# Patient Record
Sex: FEMALE | Race: WHITE | NOT HISPANIC OR LATINO | Employment: OTHER | URBAN - METROPOLITAN AREA
[De-identification: names, ages, dates, MRNs, and addresses within clinical notes are randomized per-mention and may not be internally consistent; named-entity substitution may affect disease eponyms.]

---

## 2017-01-26 ENCOUNTER — HOSPITAL ENCOUNTER (EMERGENCY)
Facility: MEDICAL CENTER | Age: 82
End: 2017-01-26
Attending: EMERGENCY MEDICINE
Payer: MEDICARE

## 2017-01-26 ENCOUNTER — APPOINTMENT (OUTPATIENT)
Dept: RADIOLOGY | Facility: MEDICAL CENTER | Age: 82
End: 2017-01-26
Attending: EMERGENCY MEDICINE
Payer: MEDICARE

## 2017-01-26 VITALS
OXYGEN SATURATION: 99 % | HEART RATE: 80 BPM | DIASTOLIC BLOOD PRESSURE: 92 MMHG | SYSTOLIC BLOOD PRESSURE: 169 MMHG | HEIGHT: 66 IN | RESPIRATION RATE: 25 BRPM | TEMPERATURE: 98.1 F | BODY MASS INDEX: 21.47 KG/M2 | WEIGHT: 133.6 LBS

## 2017-01-26 DIAGNOSIS — J01.00 ACUTE MAXILLARY SINUSITIS, RECURRENCE NOT SPECIFIED: ICD-10-CM

## 2017-01-26 DIAGNOSIS — R51.9 INTRACTABLE HEADACHE, UNSPECIFIED CHRONICITY PATTERN, UNSPECIFIED HEADACHE TYPE: ICD-10-CM

## 2017-01-26 DIAGNOSIS — R11.0 NAUSEA: ICD-10-CM

## 2017-01-26 LAB
ALBUMIN SERPL BCP-MCNC: 3.9 G/DL (ref 3.2–4.9)
ALBUMIN/GLOB SERPL: 1.4 G/DL
ALP SERPL-CCNC: 47 U/L (ref 30–99)
ALT SERPL-CCNC: 15 U/L (ref 2–50)
ANION GAP SERPL CALC-SCNC: 9 MMOL/L (ref 0–11.9)
APTT PPP: 24 SEC (ref 24.7–36)
AST SERPL-CCNC: 16 U/L (ref 12–45)
BASOPHILS # BLD AUTO: 0.4 % (ref 0–1.8)
BASOPHILS # BLD: 0.03 K/UL (ref 0–0.12)
BILIRUB SERPL-MCNC: 0.7 MG/DL (ref 0.1–1.5)
BUN SERPL-MCNC: 12 MG/DL (ref 8–22)
CALCIUM SERPL-MCNC: 8.8 MG/DL (ref 8.4–10.2)
CHLORIDE SERPL-SCNC: 102 MMOL/L (ref 96–112)
CO2 SERPL-SCNC: 24 MMOL/L (ref 20–33)
CREAT SERPL-MCNC: 0.78 MG/DL (ref 0.5–1.4)
EOSINOPHIL # BLD AUTO: 0.01 K/UL (ref 0–0.51)
EOSINOPHIL NFR BLD: 0.1 % (ref 0–6.9)
ERYTHROCYTE [DISTWIDTH] IN BLOOD BY AUTOMATED COUNT: 43 FL (ref 35.9–50)
GFR SERPL CREATININE-BSD FRML MDRD: >60 ML/MIN/1.73 M 2
GLOBULIN SER CALC-MCNC: 2.8 G/DL (ref 1.9–3.5)
GLUCOSE SERPL-MCNC: 104 MG/DL (ref 65–99)
HCT VFR BLD AUTO: 44.2 % (ref 37–47)
HGB BLD-MCNC: 15.3 G/DL (ref 12–16)
IMM GRANULOCYTES # BLD AUTO: 0.03 K/UL (ref 0–0.11)
IMM GRANULOCYTES NFR BLD AUTO: 0.4 % (ref 0–0.9)
INR PPP: 1.07 (ref 0.87–1.13)
LYMPHOCYTES # BLD AUTO: 1.17 K/UL (ref 1–4.8)
LYMPHOCYTES NFR BLD: 13.9 % (ref 22–41)
MCH RBC QN AUTO: 32.1 PG (ref 27–33)
MCHC RBC AUTO-ENTMCNC: 34.6 G/DL (ref 33.6–35)
MCV RBC AUTO: 92.7 FL (ref 81.4–97.8)
MONOCYTES # BLD AUTO: 0.7 K/UL (ref 0–0.85)
MONOCYTES NFR BLD AUTO: 8.3 % (ref 0–13.4)
NEUTROPHILS # BLD AUTO: 6.45 K/UL (ref 2–7.15)
NEUTROPHILS NFR BLD: 76.9 % (ref 44–72)
NRBC # BLD AUTO: 0 K/UL
NRBC BLD AUTO-RTO: 0 /100 WBC
PLATELET # BLD AUTO: 275 K/UL (ref 164–446)
PMV BLD AUTO: 10.3 FL (ref 9–12.9)
POTASSIUM SERPL-SCNC: 3.7 MMOL/L (ref 3.6–5.5)
PROT SERPL-MCNC: 6.7 G/DL (ref 6–8.2)
PROTHROMBIN TIME: 13.7 SEC (ref 12–14.6)
RBC # BLD AUTO: 4.77 M/UL (ref 4.2–5.4)
SODIUM SERPL-SCNC: 135 MMOL/L (ref 135–145)
WBC # BLD AUTO: 8.4 K/UL (ref 4.8–10.8)

## 2017-01-26 PROCEDURE — 85610 PROTHROMBIN TIME: CPT

## 2017-01-26 PROCEDURE — 96361 HYDRATE IV INFUSION ADD-ON: CPT

## 2017-01-26 PROCEDURE — 96374 THER/PROPH/DIAG INJ IV PUSH: CPT

## 2017-01-26 PROCEDURE — 85730 THROMBOPLASTIN TIME PARTIAL: CPT

## 2017-01-26 PROCEDURE — 700111 HCHG RX REV CODE 636 W/ 250 OVERRIDE (IP): Performed by: EMERGENCY MEDICINE

## 2017-01-26 PROCEDURE — 80053 COMPREHEN METABOLIC PANEL: CPT

## 2017-01-26 PROCEDURE — 85025 COMPLETE CBC W/AUTO DIFF WBC: CPT

## 2017-01-26 PROCEDURE — 96376 TX/PRO/DX INJ SAME DRUG ADON: CPT

## 2017-01-26 PROCEDURE — 70450 CT HEAD/BRAIN W/O DYE: CPT

## 2017-01-26 PROCEDURE — 99284 EMERGENCY DEPT VISIT MOD MDM: CPT

## 2017-01-26 PROCEDURE — 700105 HCHG RX REV CODE 258: Performed by: EMERGENCY MEDICINE

## 2017-01-26 PROCEDURE — 96375 TX/PRO/DX INJ NEW DRUG ADDON: CPT

## 2017-01-26 PROCEDURE — 36415 COLL VENOUS BLD VENIPUNCTURE: CPT

## 2017-01-26 RX ORDER — SODIUM CHLORIDE 9 MG/ML
INJECTION, SOLUTION INTRAVENOUS CONTINUOUS
Status: DISCONTINUED | OUTPATIENT
Start: 2017-01-26 | End: 2017-01-26 | Stop reason: HOSPADM

## 2017-01-26 RX ORDER — METOCLOPRAMIDE HYDROCHLORIDE 5 MG/ML
10 INJECTION INTRAMUSCULAR; INTRAVENOUS ONCE
Status: DISCONTINUED | OUTPATIENT
Start: 2017-01-26 | End: 2017-01-26 | Stop reason: HOSPADM

## 2017-01-26 RX ORDER — HYDROCODONE BITARTRATE AND ACETAMINOPHEN 5; 325 MG/1; MG/1
1-2 TABLET ORAL EVERY 4 HOURS PRN
Qty: 20 TAB | Refills: 0 | Status: SHIPPED | OUTPATIENT
Start: 2017-01-26 | End: 2017-02-08

## 2017-01-26 RX ORDER — ONDANSETRON 4 MG/1
4 TABLET, ORALLY DISINTEGRATING ORAL EVERY 8 HOURS PRN
Qty: 10 TAB | Refills: 0 | Status: SHIPPED | OUTPATIENT
Start: 2017-01-26 | End: 2017-04-13

## 2017-01-26 RX ORDER — CEPHALEXIN 500 MG/1
500 CAPSULE ORAL 4 TIMES DAILY
Qty: 40 CAP | Refills: 0 | Status: SHIPPED | OUTPATIENT
Start: 2017-01-26 | End: 2017-02-05

## 2017-01-26 RX ORDER — ONDANSETRON 2 MG/ML
4 INJECTION INTRAMUSCULAR; INTRAVENOUS ONCE
Status: COMPLETED | OUTPATIENT
Start: 2017-01-26 | End: 2017-01-26

## 2017-01-26 RX ADMIN — HYDROMORPHONE HYDROCHLORIDE 0.5 MG: 1 INJECTION, SOLUTION INTRAMUSCULAR; INTRAVENOUS; SUBCUTANEOUS at 16:10

## 2017-01-26 RX ADMIN — ONDANSETRON 4 MG: 2 INJECTION, SOLUTION INTRAMUSCULAR; INTRAVENOUS at 16:09

## 2017-01-26 RX ADMIN — HYDROMORPHONE HYDROCHLORIDE 0.5 MG: 1 INJECTION, SOLUTION INTRAMUSCULAR; INTRAVENOUS; SUBCUTANEOUS at 14:29

## 2017-01-26 RX ADMIN — SODIUM CHLORIDE: 9 INJECTION, SOLUTION INTRAVENOUS at 16:08

## 2017-01-26 RX ADMIN — ONDANSETRON 4 MG: 2 INJECTION, SOLUTION INTRAMUSCULAR; INTRAVENOUS at 14:29

## 2017-01-26 ASSESSMENT — PAIN SCALES - GENERAL
PAINLEVEL_OUTOF10: 8
PAINLEVEL_OUTOF10: 8

## 2017-01-26 NOTE — ED NOTES
Pt stated would like to sign out, aware rooms being cleaned and will receive next available bed.

## 2017-01-26 NOTE — ED NOTES
"Spouse at Triage desk yelling at RN stating \"We are here to see the Neurologist.  We are not here for a full routine. We are not here for an emergency.  This should all be set up.\"  Explained to pt this is an emergency department, asked if pt had a scheduled appointment, spouse stated \"No.\"  Explained to spouse no beds available at this time, would get pt in to see ERP as soon as possible.  Pt walked away from Triage Desk.  "

## 2017-01-26 NOTE — ED NOTES
"Chief Complaint   Patient presents with   • Headache     started on 01/08/2017, worse w/ any kind of movement     /92 mmHg  Pulse 82  Temp(Src) 36.7 °C (98.1 °F)  Resp 18  Ht 1.676 m (5' 6\")  Wt 60.6 kg (133 lb 9.6 oz)  BMI 21.57 kg/m2  SpO2 97%    Pt sent by family member who works w/ Neurosurgeon, c/o increasing pain in head since the beginning of January.  States\"I know I have been really bad for not coming in sooner.\"    "

## 2017-01-26 NOTE — ED AVS SNAPSHOT
After Visit Summary                                                                                                                Prabha Lizama   MRN: [id number]    Department:  [facility], Emergency Dept   Date of Visit:  1/26/2017            [facility], Emergency Dept    [address] [facility] [address]    Phone:  [phone]      You were seen by     Yair Cagle M.D.      Your Diagnosis Was     Intractable headache, unspecified chronicity pattern, unspecified headache type     R51       These are the medications you received during your hospitalization from 01/26/2017 1126 to 01/26/2017 1737     Date/Time Order Dose Route Action    01/26/2017 1429 HYDROmorphone (DILAUDID) injection 0.5 mg 0.5 mg Intravenous Given    01/26/2017 1429 ondansetron (ZOFRAN) syringe/vial injection 4 mg 4 mg Intravenous Given    01/26/2017 1608 NS infusion   Intravenous New Bag    01/26/2017 1610 HYDROmorphone (DILAUDID) injection 0.5 mg 0.5 mg Intravenous Given    01/26/2017 1609 ondansetron (ZOFRAN) syringe/vial injection 4 mg 4 mg Intravenous Given    01/26/2017 1700 metoclopramide (REGLAN) injection 10 mg 10 mg Intravenous Refused      Follow-up Information     1. Follow up with Kishor Lu M.D..    Specialty:  Family Medicine    Contact information    [address] [facility]  [address]  [place] [address] [phone]        Medication Information     Review all of your home medications and newly ordered medications with your primary doctor and/or pharmacist as soon as possible. Follow medication instructions as directed by your doctor and/or pharmacist.     Please keep your complete medication list with you and share with your physician. Update the information when medications are discontinued, doses are changed, or new medications (including over-the-counter products) are added; and carry medication information at all times in the event of emergency  situations.               Medication List      START taking these medications        Instructions    cephALEXin 500 MG Caps   Commonly known as:  KEFLEX    Take 1 Cap by mouth 4 times a day for 10 days.   Dose:  500 mg       hydrocodone-acetaminophen 5-325 MG Tabs per tablet   Commonly known as:  NORCO    Take 1-2 Tabs by mouth every four hours as needed.   Dose:  1-2 Tab       ondansetron 4 MG Tbdp   Commonly known as:  ZOFRAN ODT    Take 1 Tab by mouth every 8 hours as needed.   Dose:  4 mg         ASK your doctor about these medications        Instructions    aspirin EC 81 MG Tbec   Commonly known as:  ECOTRIN    Take 81 mg by mouth every bedtime.   Dose:  81 mg       DIOVAN 320 MG tablet   Generic drug:  valsartan    Take 320 mg by mouth every day.   Dose:  320 mg       HAIR/SKIN/NAILS PO    Take 1 Tab by mouth every day.   Dose:  1 Tab       lorazepam 1 MG Tabs   Commonly known as:  ATIVAN    Take 1 Tab by mouth every 8 hours as needed for Anxiety.   Dose:  1 mg       metoprolol 25 MG Tabs   Commonly known as:  LOPRESSOR    Take 25 mg by mouth 2 times a day.   Dose:  25 mg       oyster shell calcium/vitamin D 250-125 MG-UNIT Tabs tablet    Take 1 Tab by mouth every day.   Dose:  1 Tab       terazosin 2 MG Caps   Commonly known as:  HYTRIN    Take 2 mg by mouth every bedtime.   Dose:  2 mg               Procedures and tests performed during your visit     APTT    CBC WITH DIFFERENTIAL    COMP METABOLIC PANEL    CT-HEAD W/O    ESTIMATED GFR    IV Saline Lock    PROTHROMBIN TIME        Discharge Instructions       Nausea, Adult  Nausea is the feeling that you have an upset stomach or have to vomit. Nausea by itself is not likely a serious concern, but it may be an early sign of more serious medical problems. As nausea gets worse, it can lead to vomiting. If vomiting develops, there is the risk of dehydration.   CAUSES   · Viral infections.  · Food poisoning.  · Medicines.  · Pregnancy.  · Motion  sickness.  · Migraine headaches.  · Emotional distress.  · Severe pain from any source.  · Alcohol intoxication.  HOME CARE INSTRUCTIONS  · Get plenty of rest.  · Ask your caregiver about specific rehydration instructions.  · Eat small amounts of food and sip liquids more often.  · Take all medicines as told by your caregiver.  SEEK MEDICAL CARE IF:  · You have not improved after 2 days, or you get worse.  · You have a headache.  SEEK IMMEDIATE MEDICAL CARE IF:   · You have a fever.  · You faint.  · You keep vomiting or have blood in your vomit.  · You are extremely weak or dehydrated.  · You have dark or bloody stools.  · You have severe chest or abdominal pain.  MAKE SURE YOU:  · Understand these instructions.  · Will watch your condition.  · Will get help right away if you are not doing well or get worse.     This information is not intended to replace advice given to you by your health care provider. Make sure you discuss any questions you have with your health care provider.     Document Released: 01/25/2006 Document Revised: 01/08/2016 Document Reviewed: 08/29/2012  [employer] Interactive Patient Education ©2016 [employer]    Sinusitis, Adult  Sinusitis is redness, soreness, and inflammation of the paranasal sinuses. Paranasal sinuses are air pockets within the bones of your face. They are located beneath your eyes, in the middle of your forehead, and above your eyes. In healthy paranasal sinuses, mucus is able to drain out, and air is able to circulate through them by way of your nose. However, when your paranasal sinuses are inflamed, mucus and air can become trapped. This can allow bacteria and other germs to grow and cause infection.  Sinusitis can develop quickly and last only a short time (acute) or continue over a long period (chronic). Sinusitis that lasts for more than 12 weeks is considered chronic.  CAUSES  Causes of sinusitis include:  · Allergies.  · Structural abnormalities, such as displacement  of the cartilage that separates your nostrils (deviated septum), which can decrease the air flow through your nose and sinuses and affect sinus drainage.  · Functional abnormalities, such as when the small hairs (cilia) that line your sinuses and help remove mucus do not work properly or are not present.  SIGNS AND SYMPTOMS  Symptoms of acute and chronic sinusitis are the same. The primary symptoms are pain and pressure around the affected sinuses. Other symptoms include:  · Upper toothache.  · Earache.  · Headache.  · Bad breath.  · Decreased sense of smell and taste.  · A cough, which worsens when you are lying flat.  · Fatigue.  · Fever.  · Thick drainage from your nose, which often is green and may contain pus (purulent).  · Swelling and warmth over the affected sinuses.  DIAGNOSIS  Your health care provider will perform a physical exam. During your exam, your health care provider may perform any of the following to help determine if you have acute sinusitis or chronic sinusitis:  · Look in your nose for signs of abnormal growths in your nostrils (nasal polyps).  · Tap over the affected sinus to check for signs of infection.  · View the inside of your sinuses using an imaging device that has a light attached (endoscope).  If your health care provider suspects that you have chronic sinusitis, one or more of the following tests may be recommended:  · Allergy tests.  · Nasal culture. A sample of mucus is taken from your nose, sent to a lab, and screened for bacteria.  · Nasal cytology. A sample of mucus is taken from your nose and examined by your health care provider to determine if your sinusitis is related to an allergy.  TREATMENT  Most cases of acute sinusitis are related to a viral infection and will resolve on their own within 10 days. Sometimes, medicines are prescribed to help relieve symptoms of both acute and chronic sinusitis. These may include pain medicines, decongestants, nasal steroid sprays, or  saline sprays.  However, for sinusitis related to a bacterial infection, your health care provider will prescribe antibiotic medicines. These are medicines that will help kill the bacteria causing the infection.  Rarely, sinusitis is caused by a fungal infection. In these cases, your health care provider will prescribe antifungal medicine.  For some cases of chronic sinusitis, surgery is needed. Generally, these are cases in which sinusitis recurs more than 3 times per year, despite other treatments.  HOME CARE INSTRUCTIONS  · Drink plenty of water. Water helps thin the mucus so your sinuses can drain more easily.  · Use a humidifier.  · Inhale steam 3-4 times a day (for example, sit in the bathroom with the shower running).  · Apply a warm, moist washcloth to your face 3-4 times a day, or as directed by your health care provider.  · Use saline nasal sprays to help moisten and clean your sinuses.  · Take medicines only as directed by your health care provider.  · If you were prescribed either an antibiotic or antifungal medicine, finish it all even if you start to feel better.  SEEK IMMEDIATE MEDICAL CARE IF:  · You have increasing pain or severe headaches.  · You have nausea, vomiting, or drowsiness.  · You have swelling around your face.  · You have vision problems.  · You have a stiff neck.  · You have difficulty breathing.     This information is not intended to replace advice given to you by your health care provider. Make sure you discuss any questions you have with your health care provider.     Document Released: 12/18/2006 Document Revised: 01/08/2016 Document Reviewed: 01/01/2013  [employer] Interactive Patient Education ©2016 [employer]  Headaches, Frequently Asked Questions  MIGRAINE HEADACHES  Q: What is migraine? What causes it? How can I treat it?  A: Generally, migraine headaches begin as a dull ache. Then they develop into a constant, throbbing, and pulsating pain. You may experience pain at the  "temples. You may experience pain at the front or back of one or both sides of the head. The pain is usually accompanied by a combination of:  · Nausea.   · Vomiting.   · Sensitivity to light and noise.   Some people (about 15%) experience an aura (see below) before an attack. The cause of migraine is believed to be chemical reactions in the brain. Treatment for migraine may include over-the-counter or prescription medications. It may also include self-help techniques. These include relaxation training and biofeedback.   Q: What is an aura?  A: About 15% of people with migraine get an \"aura\". This is a sign of neurological symptoms that occur before a migraine headache. You may see wavy or jagged lines, dots, or flashing lights. You might experience tunnel vision or blind spots in one or both eyes. The aura can include visual or auditory hallucinations (something imagined). It may include disruptions in smell (such as strange odors), taste or touch. Other symptoms include:  · Numbness.   · A \"pins and needles\" sensation.   · Difficulty in recalling or speaking the correct word.   These neurological events may last as long as 60 minutes. These symptoms will fade as the headache begins.  Q: What is a trigger?  A: Certain physical or environmental factors can lead to or \"trigger\" a migraine. These include:  · Foods.   · Hormonal changes.   · Weather.   · Stress.   It is important to remember that triggers are different for everyone. To help prevent migraine attacks, you need to figure out which triggers affect you. Keep a headache diary. This is a good way to track triggers. The diary will help you talk to your healthcare professional about your condition.  Q: Does weather affect migraines?  A: Bright sunshine, hot, humid conditions, and drastic changes in barometric pressure may lead to, or \"trigger,\" a migraine attack in some people. But studies have shown that weather does not act as a trigger for everyone with " "migraines.  Q: What is the link between migraine and hormones?  A: Hormones start and regulate many of your body's functions. Hormones keep your body in balance within a constantly changing environment. The levels of hormones in your body are unbalanced at times. Examples are during menstruation, pregnancy, or menopause. That can lead to a migraine attack. In fact, about three quarters of all women with migraine report that their attacks are related to the menstrual cycle.   Q: Is there an increased risk of stroke for migraine sufferers?  A: The likelihood of a migraine attack causing a stroke is very remote. That is not to say that migraine sufferers cannot have a stroke associated with their migraines. In persons under age 40, the most common associated factor for stroke is migraine headache. But over the course of a person's normal life span, the occurrence of migraine headache may actually be associated with a reduced risk of dying from cerebrovascular disease due to stroke.   Q: What are acute medications for migraine?  A: Acute medications are used to treat the pain of the headache after it has started. Examples over-the-counter medications, NSAIDs, ergots, and triptans.   Q: What are the triptans?  A: Triptans are the newest class of abortive medications. They are specifically targeted to treat migraine. Triptans are vasoconstrictors. They moderate some chemical reactions in the brain. The triptans work on receptors in your brain. Triptans help to restore the balance of a neurotransmitter called serotonin. Fluctuations in levels of serotonin are thought to be a main cause of migraine.   Q: Are over-the-counter medications for migraine effective?  A: Over-the-counter, or \"OTC,\" medications may be effective in relieving mild to moderate pain and associated symptoms of migraine. But you should see your caregiver before beginning any treatment regimen for migraine.   Q: What are preventive medications for " "migraine?  A: Preventive medications for migraine are sometimes referred to as \"prophylactic\" treatments. They are used to reduce the frequency, severity, and length of migraine attacks. Examples of preventive medications include antiepileptic medications, antidepressants, beta-blockers, calcium channel blockers, and NSAIDs (nonsteroidal anti-inflammatory drugs).  Q: Why are anticonvulsants used to treat migraine?  A: During the past few years, there has been an increased interest in antiepileptic drugs for the prevention of migraine. They are sometimes referred to as \"anticonvulsants\". Both epilepsy and migraine may be caused by similar reactions in the brain.   Q: Why are antidepressants used to treat migraine?  A: Antidepressants are typically used to treat people with depression. They may reduce migraine frequency by regulating chemical levels, such as serotonin, in the brain.   Q: What alternative therapies are used to treat migraine?  A: The term \"alternative therapies\" is often used to describe treatments considered outside the scope of conventional Western medicine. Examples of alternative therapy include acupuncture, acupressure, and yoga. Another common alternative treatment is herbal therapy. Some herbs are believed to relieve headache pain. Always discuss alternative therapies with your caregiver before proceeding. Some herbal products contain arsenic and other toxins.  TENSION HEADACHES  Q: What is a tension-type headache? What causes it? How can I treat it?  A: Tension-type headaches occur randomly. They are often the result of temporary stress, anxiety, fatigue, or anger. Symptoms include soreness in your temples, a tightening band-like sensation around your head (a \"vice-like\" ache). Symptoms can also include a pulling feeling, pressure sensations, and yamilex head and neck muscles. The headache begins in your forehead, temples, or the back of your head and neck. Treatment for tension-type " "headache may include over-the-counter or prescription medications. Treatment may also include self-help techniques such as relaxation training and biofeedback.  CLUSTER HEADACHES  Q: What is a cluster headache? What causes it? How can I treat it?  A: Cluster headache gets its name because the attacks come in groups. The pain arrives with little, if any, warning. It is usually on one side of the head. A tearing or bloodshot eye and a runny nose on the same side of the headache may also accompany the pain. Cluster headaches are believed to be caused by chemical reactions in the brain. They have been described as the most severe and intense of any headache type. Treatment for cluster headache includes prescription medication and oxygen.  SINUS HEADACHES  Q: What is a sinus headache? What causes it? How can I treat it?  A: When a cavity in the bones of the face and skull (a sinus) becomes inflamed, the inflammation will cause localized pain. This condition is usually the result of an allergic reaction, a tumor, or an infection. If your headache is caused by a sinus blockage, such as an infection, you will probably have a fever. An x-ray will confirm a sinus blockage. Your caregiver's treatment might include antibiotics for the infection, as well as antihistamines or decongestants.   REBOUND HEADACHES  Q: What is a rebound headache? What causes it? How can I treat it?  A: A pattern of taking acute headache medications too often can lead to a condition known as \"rebound headache.\" A pattern of taking too much headache medication includes taking it more than 2 days per week or in excessive amounts. That means more than the label or a caregiver advises. With rebound headaches, your medications not only stop relieving pain, they actually begin to cause headaches. Doctors treat rebound headache by tapering the medication that is being overused. Sometimes your caregiver will gradually substitute a different type of treatment " or medication. Stopping may be a challenge. Regularly overusing a medication increases the potential for serious side effects. Consult a caregiver if you regularly use headache medications more than 2 days per week or more than the label advises.  ADDITIONAL QUESTIONS AND ANSWERS  Q: What is biofeedback?  A: Biofeedback is a self-help treatment. Biofeedback uses special equipment to monitor your body's involuntary physical responses. Biofeedback monitors:  · Breathing.   · Pulse.   · Heart rate.   · Temperature.   · Muscle tension.   · Brain activity.   Biofeedback helps you refine and perfect your relaxation exercises. You learn to control the physical responses that are related to stress. Once the technique has been mastered, you do not need the equipment any more.  Q: Are headaches hereditary?  A: Four out of five (80%) of people that suffer report a family history of migraine. Scientists are not sure if this is genetic or a family predisposition. Despite the uncertainty, a child has a 50% chance of having migraine if one parent suffers. The child has a 75% chance if both parents suffer.   Q: Can children get headaches?  A: By the time they reach high school, most young people have experienced some type of headache. Many safe and effective approaches or medications can prevent a headache from occurring or stop it after it has begun.   Q: What type of doctor should I see to diagnose and treat my headache?  A: Start with your primary caregiver. Discuss his or her experience and approach to headaches. Discuss methods of classification, diagnosis, and treatment. Your caregiver may decide to recommend you to a headache specialist, depending upon your symptoms or other physical conditions. Having diabetes, allergies, etc., may require a more comprehensive and inclusive approach to your headache. The National Headache Foundation will provide, upon request, a list of NHF physician members in your state.  Document  Released: 03/09/2005 Document Revised: 03/11/2013 Document Reviewed: 08/17/2009  [employer]® Patient Information ©2013 [employer].          Patient Information     Patient Information    Following emergency treatment: all patient requiring follow-up care must return either to a private physician or a clinic if your condition worsens before you are able to obtain further medical attention, please return to the emergency room.     Billing Information    At [facility], we work to make the billing process streamlined for our patients.  Our Representatives are here to answer any questions you may have regarding your hospital bill.  If you have insurance coverage and have supplied your insurance information to us, we will submit a claim to your insurer on your behalf.  Should you have any questions regarding your bill, we can be reached online or by phone as follows:  Online: You are able pay your bills online or live chat with our representatives about any billing questions you may have. We are here to help Monday - Friday from 8:00am to 7:30pm and 9:00am - 12:00pm on Saturdays.  Please visit [email or web address]  for more information.   Phone:  [phone] or [phone]    Please note that your emergency physician, surgeon, pathologist, radiologist, anesthesiologist, and other specialists are not employed by [facility] and will therefore bill separately for their services.  Please contact them directly for any questions concerning their bills at the numbers below:     Emergency Physician Services:  [phone]  [facility]:  [phone]  [facility]:  [phone]  [facility]:  [phone]    1. Your final bill may vary from the amount quoted upon discharge if all procedures are not complete at that time, or if your doctor has additional procedures of which we are not aware. You will receive an additional bill if you return to the  Emergency Department at [facility] for suture removal regardless of the facility of which the sutures were placed.     2. Please arrange for settlement of this account at the emergency registration.    3. All self-pay accounts are due in full at the time of treatment.  If you are unable to meet this obligation then payment is expected within 4-5 days.     4. If you have had radiology studies (CT, X-ray, Ultrasound, MRI), you have received a preliminary result during your emergency department visit. Please contact the radiology department [phone] to receive a copy of your final result. Please discuss the Final result with your primary physician or with the follow up physician provided.     Crisis Hotline:  [place] Crisis Hotline:  2-491-ASVJPNU or 1-548.986.5042  [place] Crisis Hotline:    1-214.319.3448 or 784-464-0982         ED Discharge Follow Up Questions    1. In order to provide you with very good care, we would like to follow up with a phone call in the next few days.  May we have your permission to contact you?     YES /  NO    2. What is the best phone number to call you? (       )_____-__________    3. What is the best time to call you?      Morning  /  Afternoon  /  Evening                   Patient Signature:  ____________________________________________________________    Date:  ____________________________________________________________      Your appointments     Feb 08, 2017  2:20 PM   New Patient with Amos Martinez M.D.   [facility])    [address]  [address]  [place] [address]   [phone]           Please bring Photo ID, Insurance Cards, All Medication Bottles and copies of any legal documents (such as Living Will, Power of ) If speaking a language besides English please bring an adult . Please arrive 30 minutes prior for check in and registration. You will be receiving a confirmation call a few days before  your appointment from our automated call confirmation system.            May 16, 2017  1:40 PM   Established Patient with José Miguel Zimmer M.D.   [facility])    [address], [address]  [place] [facility] [id number] [phone]           You will be receiving a confirmation call a few days before your appointment from our automated call confirmation system.

## 2017-01-26 NOTE — ED AVS SNAPSHOT
Loopcam Access Code: [email or web address]  Expires: 2/25/2017  5:22 PM    Your email address is not on file at [facility].  Email Addresses are required for you to sign up for Loopcam, please contact [phone] to verify your personal information and to provide your email address prior to attempting to register for Loopcam.    Prabha Mathews Lizama  [address] [place] [address]    Loopcam  A secure, online tool to manage your health information     [facility]’s Loopcam® is a secure, online tool that connects you to your personalized health information from the privacy of your home -- day or night - making it very easy for you to manage your healthcare. Once the activation process is completed, you can even access your medical information using the Loopcam mohamud, which is available for free in the Apple Mohamud store or Google Play store.     To learn more about Loopcam, visit [email or web address]    There are two levels of access available (as shown below):   My Chart Features  [facility] Primary Care Doctor [facility]  Specialists [facility]  Urgent  Care [facility] Primary Care Doctor   Email your healthcare team securely and privately 24/7 X X X    Manage appointments: schedule your next appointment; view details of past/upcoming appointments X      Request prescription refills. X      View recent personal medical records, including lab and immunizations X X X X   View health record, including health history, allergies, medications X X X X   Read reports about your outpatient visits, procedures, consult and ER notes X X X X   See your discharge summary, which is a recap of your hospital and/or ER visit that includes your diagnosis, lab results, and care plan X X  X     How to register for [employer]:  Once your e-mail address has been verified, follow the following steps to sign up for Loopcam.     1. Go to  [email or web address]  2. Click on the Sign Up Now box, which takes you to the New Member Sign Up page. You will  need to provide the following information:  a. Enter your [employer] Access Code exactly as it appears at the top of this page. (You will not need to use this code after you’ve completed the sign-up process. If you do not sign up before the expiration date, you must request a new code.)   b. Enter your date of birth.   c. Enter your home email address.   d. Click Submit, and follow the next screen’s instructions.  3. Create a Up[employer] ID. This will be your [employer] login ID and cannot be changed, so think of one that is secure and easy to remember.  4. Create a [employer] password. You can change your password at any time.  5. Enter your Password Reset Question and Answer. This can be used at a later time if you forget your password.   6. Enter your e-mail address. This allows you to receive e-mail notifications when new information is available in [employer].  7. Click Sign Up. You can now view your health information.    For assistance activating your [employer] account, call [phone]

## 2017-01-26 NOTE — ED AVS SNAPSHOT
1/26/2017          Prabha Lizama  [address]  [place] [address]    Dear Prabha:    [facility] wants to ensure your discharge home is safe and you or your loved ones have had all your questions answered regarding your care after you leave the hospital.    You may receive a telephone call within two days of your discharge.  This call is to make certain you understand your discharge instructions as well as ensure we provided you with the best care possible during your stay with us.     The call will only last approximately 3-5 minutes and will be done by a nurse.    Once again, we want to ensure your discharge home is safe and that you have a clear understanding of any next steps in your care.  If you have any questions or concerns, please do not hesitate to contact us, we are here for you.  Thank you for choosing [facility] for your healthcare needs.    Sincerely,    Shaji Emmanuel    [facility]

## 2017-01-26 NOTE — ED PROVIDER NOTES
ED Provider Note    CHIEF COMPLAINT  Chief Complaint   Patient presents with   • Headache     started on 01/08/2017, worse w/ any kind of movement       HPI  Prabha Lizama is a 81 y.o. female who presents for evaluation of a headache. She states that on January 8 of this year she felt an explosion in her head. Since then she's been having headaches. It's worse with movement of her head or if she steps to firmly on the ground. She's had no loss of consciousness. She is not on any blood thinners. She's had no numbness or weakness. She's had no syncope. She's never had this happen to her in the past.    REVIEW OF SYSTEMS  See HPI for further details. All other systems are negative.     PAST MEDICAL HISTORY  Past Medical History   Diagnosis Date   • CHI (closed head injury)    • Urinary tract infection    • Hypertension      medicated   • Insomnia    • Thyrotoxicosis      history in 2013 / euthyroid 2015   • Multiple thyroid nodules 2008   • Elevated cortisol level (CMS-HCC)      unknown etiology       FAMILY HISTORY  Family History   Problem Relation Age of Onset   • Heart Disease Mother    • Heart Disease Father    • Hypertension Sister    • Arthritis Sister    • Thyroid Sister        SOCIAL HISTORY  Social History     Social History   • Marital Status:      Spouse Name: N/A   • Number of Children: N/A   • Years of Education: N/A     Social History Main Topics   • Smoking status: Never Smoker    • Smokeless tobacco: Never Used   • Alcohol Use: No   • Drug Use: No   • Sexual Activity: Not Asked     Other Topics Concern   • None     Social History Narrative       SURGICAL HISTORY  Past Surgical History   Procedure Laterality Date   • Tonsillectomy     • Appendectomy         CURRENT MEDICATIONS  Home Medications     **Home medications have not yet been reviewed for this encounter**          ALLERGIES  Allergies   Allergen Reactions   • Amoxicillin      Unsure of reaction/or allergy   • Morphine Vomiting and  "Nausea   • Penicillins      1970 reaction       PHYSICAL EXAM  VITAL SIGNS: /92 mmHg  Pulse 82  Temp(Src) 36.7 °C (98.1 °F)  Resp 18  Ht 1.676 m (5' 6\")  Wt 60.6 kg (133 lb 9.6 oz)  BMI 21.57 kg/m2  SpO2 97%    Constitutional: Well developed, Well nourished, No acute distress, Non-toxic appearance.   HENT: Normocephalic, Atraumatic.   Eyes: PERRL, EOMI, Conjunctiva normal, No discharge.   Neck: Slight tenderness to palpation of the posterior neck muscles.  Cardiovascular: Normal heart rate.   Thorax & Lungs: No respiratory distress.  Skin: Warm, Dry.   Musculoskeletal: Good range of motion in all major joints.  Neurologic: Awake alert and oriented x 3. Cranial nerves II through XII are intact. Normal motor function, No focal deficits noted.       RADIOLOGY/PROCEDURES  CT-HEAD W/O   Final Result      1.  No evidence of acute intracranial process.      2.  Bilateral maxillary and right ethmoid sinus fluid consistent with sinus disease.      3.  Mild atrophy.            COURSE & MEDICAL DECISION MAKING  Pertinent Labs & Imaging studies reviewed. (See chart for details)  This is a 81-year-old here for evaluation of headache. Her symptoms were concerning as she described them as feeling like an explosion in her head on July 8. Patient appears neurologically intact. Her blood pressure is elevated however she has a history of hypertension and is currently on 3 different medications and is followed by Dr. agustin. An IV is established and she is treated with Dilaudid and Zofran. Laboratories included chemistries which are normal. INR is normal. CBC shows normal white count with a differential of 76 polys and 13 lymphocytes. CT scan of her head shows no evidence of an acute intracranial process Jesus Alberto does appear to have maxillary and ethmoid sinusitis. I discussed results of the tests with the patient and the . The patient is quite frustrated. He states that for the past 8 years she has been sick but nobody " is ever found anything wrong with her. At this point I do not think she requires acute hospitalization. I requested they follow up with her primary care provider as well as Dr. agustin who takes care of her for her hypertension. I'll provide her a prescription for Norco, Zofran, and Keflex. She is given a discharge instruction sheet on headaches as well as nausea and sinusitis. She does have tenderness to palpation of the neck musculature and I suspect this mostly represents a tension type headache but clearly she has sinusitis which is probably contributing to her symptoms. She is discharged in the care of her  in stable condition.    FINAL IMPRESSION  1. Headache  2. Sinusitis  3. Nausea         Electronically signed by: Yair Cagle, 1/26/2017 2:03 PM

## 2017-01-27 NOTE — ED NOTES
Pt feeling better at this time. D/c pt home, with family  rx given . Pt and family  aware of f/u instructions , aware to return for any changes or concerns. No further questions upon d/c home from ed

## 2017-01-27 NOTE — DISCHARGE INSTRUCTIONS
Nausea, Adult  Nausea is the feeling that you have an upset stomach or have to vomit. Nausea by itself is not likely a serious concern, but it may be an early sign of more serious medical problems. As nausea gets worse, it can lead to vomiting. If vomiting develops, there is the risk of dehydration.   CAUSES   · Viral infections.  · Food poisoning.  · Medicines.  · Pregnancy.  · Motion sickness.  · Migraine headaches.  · Emotional distress.  · Severe pain from any source.  · Alcohol intoxication.  HOME CARE INSTRUCTIONS  · Get plenty of rest.  · Ask your caregiver about specific rehydration instructions.  · Eat small amounts of food and sip liquids more often.  · Take all medicines as told by your caregiver.  SEEK MEDICAL CARE IF:  · You have not improved after 2 days, or you get worse.  · You have a headache.  SEEK IMMEDIATE MEDICAL CARE IF:   · You have a fever.  · You faint.  · You keep vomiting or have blood in your vomit.  · You are extremely weak or dehydrated.  · You have dark or bloody stools.  · You have severe chest or abdominal pain.  MAKE SURE YOU:  · Understand these instructions.  · Will watch your condition.  · Will get help right away if you are not doing well or get worse.     This information is not intended to replace advice given to you by your health care provider. Make sure you discuss any questions you have with your health care provider.     Document Released: 01/25/2006 Document Revised: 01/08/2016 Document Reviewed: 08/29/2012  [employer] Interactive Patient Education ©2016 [employer]    Sinusitis, Adult  Sinusitis is redness, soreness, and inflammation of the paranasal sinuses. Paranasal sinuses are air pockets within the bones of your face. They are located beneath your eyes, in the middle of your forehead, and above your eyes. In healthy paranasal sinuses, mucus is able to drain out, and air is able to circulate through them by way of your nose. However, when your paranasal sinuses are  inflamed, mucus and air can become trapped. This can allow bacteria and other germs to grow and cause infection.  Sinusitis can develop quickly and last only a short time (acute) or continue over a long period (chronic). Sinusitis that lasts for more than 12 weeks is considered chronic.  CAUSES  Causes of sinusitis include:  · Allergies.  · Structural abnormalities, such as displacement of the cartilage that separates your nostrils (deviated septum), which can decrease the air flow through your nose and sinuses and affect sinus drainage.  · Functional abnormalities, such as when the small hairs (cilia) that line your sinuses and help remove mucus do not work properly or are not present.  SIGNS AND SYMPTOMS  Symptoms of acute and chronic sinusitis are the same. The primary symptoms are pain and pressure around the affected sinuses. Other symptoms include:  · Upper toothache.  · Earache.  · Headache.  · Bad breath.  · Decreased sense of smell and taste.  · A cough, which worsens when you are lying flat.  · Fatigue.  · Fever.  · Thick drainage from your nose, which often is green and may contain pus (purulent).  · Swelling and warmth over the affected sinuses.  DIAGNOSIS  Your health care provider will perform a physical exam. During your exam, your health care provider may perform any of the following to help determine if you have acute sinusitis or chronic sinusitis:  · Look in your nose for signs of abnormal growths in your nostrils (nasal polyps).  · Tap over the affected sinus to check for signs of infection.  · View the inside of your sinuses using an imaging device that has a light attached (endoscope).  If your health care provider suspects that you have chronic sinusitis, one or more of the following tests may be recommended:  · Allergy tests.  · Nasal culture. A sample of mucus is taken from your nose, sent to a lab, and screened for bacteria.  · Nasal cytology. A sample of mucus is taken from your nose and  examined by your health care provider to determine if your sinusitis is related to an allergy.  TREATMENT  Most cases of acute sinusitis are related to a viral infection and will resolve on their own within 10 days. Sometimes, medicines are prescribed to help relieve symptoms of both acute and chronic sinusitis. These may include pain medicines, decongestants, nasal steroid sprays, or saline sprays.  However, for sinusitis related to a bacterial infection, your health care provider will prescribe antibiotic medicines. These are medicines that will help kill the bacteria causing the infection.  Rarely, sinusitis is caused by a fungal infection. In these cases, your health care provider will prescribe antifungal medicine.  For some cases of chronic sinusitis, surgery is needed. Generally, these are cases in which sinusitis recurs more than 3 times per year, despite other treatments.  HOME CARE INSTRUCTIONS  · Drink plenty of water. Water helps thin the mucus so your sinuses can drain more easily.  · Use a humidifier.  · Inhale steam 3-4 times a day (for example, sit in the bathroom with the shower running).  · Apply a warm, moist washcloth to your face 3-4 times a day, or as directed by your health care provider.  · Use saline nasal sprays to help moisten and clean your sinuses.  · Take medicines only as directed by your health care provider.  · If you were prescribed either an antibiotic or antifungal medicine, finish it all even if you start to feel better.  SEEK IMMEDIATE MEDICAL CARE IF:  · You have increasing pain or severe headaches.  · You have nausea, vomiting, or drowsiness.  · You have swelling around your face.  · You have vision problems.  · You have a stiff neck.  · You have difficulty breathing.     This information is not intended to replace advice given to you by your health care provider. Make sure you discuss any questions you have with your health care provider.     Document Released: 12/18/2006  "Document Revised: 01/08/2016 Document Reviewed: 01/01/2013  [employer] Interactive Patient Education ©2016 [employer]  Headaches, Frequently Asked Questions  MIGRAINE HEADACHES  Q: What is migraine? What causes it? How can I treat it?  A: Generally, migraine headaches begin as a dull ache. Then they develop into a constant, throbbing, and pulsating pain. You may experience pain at the temples. You may experience pain at the front or back of one or both sides of the head. The pain is usually accompanied by a combination of:  · Nausea.   · Vomiting.   · Sensitivity to light and noise.   Some people (about 15%) experience an aura (see below) before an attack. The cause of migraine is believed to be chemical reactions in the brain. Treatment for migraine may include over-the-counter or prescription medications. It may also include self-help techniques. These include relaxation training and biofeedback.   Q: What is an aura?  A: About 15% of people with migraine get an \"aura\". This is a sign of neurological symptoms that occur before a migraine headache. You may see wavy or jagged lines, dots, or flashing lights. You might experience tunnel vision or blind spots in one or both eyes. The aura can include visual or auditory hallucinations (something imagined). It may include disruptions in smell (such as strange odors), taste or touch. Other symptoms include:  · Numbness.   · A \"pins and needles\" sensation.   · Difficulty in recalling or speaking the correct word.   These neurological events may last as long as 60 minutes. These symptoms will fade as the headache begins.  Q: What is a trigger?  A: Certain physical or environmental factors can lead to or \"trigger\" a migraine. These include:  · Foods.   · Hormonal changes.   · Weather.   · Stress.   It is important to remember that triggers are different for everyone. To help prevent migraine attacks, you need to figure out which triggers affect you. Keep a headache diary. " "This is a good way to track triggers. The diary will help you talk to your healthcare professional about your condition.  Q: Does weather affect migraines?  A: Bright sunshine, hot, humid conditions, and drastic changes in barometric pressure may lead to, or \"trigger,\" a migraine attack in some people. But studies have shown that weather does not act as a trigger for everyone with migraines.  Q: What is the link between migraine and hormones?  A: Hormones start and regulate many of your body's functions. Hormones keep your body in balance within a constantly changing environment. The levels of hormones in your body are unbalanced at times. Examples are during menstruation, pregnancy, or menopause. That can lead to a migraine attack. In fact, about three quarters of all women with migraine report that their attacks are related to the menstrual cycle.   Q: Is there an increased risk of stroke for migraine sufferers?  A: The likelihood of a migraine attack causing a stroke is very remote. That is not to say that migraine sufferers cannot have a stroke associated with their migraines. In persons under age 40, the most common associated factor for stroke is migraine headache. But over the course of a person's normal life span, the occurrence of migraine headache may actually be associated with a reduced risk of dying from cerebrovascular disease due to stroke.   Q: What are acute medications for migraine?  A: Acute medications are used to treat the pain of the headache after it has started. Examples over-the-counter medications, NSAIDs, ergots, and triptans.   Q: What are the triptans?  A: Triptans are the newest class of abortive medications. They are specifically targeted to treat migraine. Triptans are vasoconstrictors. They moderate some chemical reactions in the brain. The triptans work on receptors in your brain. Triptans help to restore the balance of a neurotransmitter called serotonin. Fluctuations in levels of " "serotonin are thought to be a main cause of migraine.   Q: Are over-the-counter medications for migraine effective?  A: Over-the-counter, or \"OTC,\" medications may be effective in relieving mild to moderate pain and associated symptoms of migraine. But you should see your caregiver before beginning any treatment regimen for migraine.   Q: What are preventive medications for migraine?  A: Preventive medications for migraine are sometimes referred to as \"prophylactic\" treatments. They are used to reduce the frequency, severity, and length of migraine attacks. Examples of preventive medications include antiepileptic medications, antidepressants, beta-blockers, calcium channel blockers, and NSAIDs (nonsteroidal anti-inflammatory drugs).  Q: Why are anticonvulsants used to treat migraine?  A: During the past few years, there has been an increased interest in antiepileptic drugs for the prevention of migraine. They are sometimes referred to as \"anticonvulsants\". Both epilepsy and migraine may be caused by similar reactions in the brain.   Q: Why are antidepressants used to treat migraine?  A: Antidepressants are typically used to treat people with depression. They may reduce migraine frequency by regulating chemical levels, such as serotonin, in the brain.   Q: What alternative therapies are used to treat migraine?  A: The term \"alternative therapies\" is often used to describe treatments considered outside the scope of conventional Western medicine. Examples of alternative therapy include acupuncture, acupressure, and yoga. Another common alternative treatment is herbal therapy. Some herbs are believed to relieve headache pain. Always discuss alternative therapies with your caregiver before proceeding. Some herbal products contain arsenic and other toxins.  TENSION HEADACHES  Q: What is a tension-type headache? What causes it? How can I treat it?  A: Tension-type headaches occur randomly. They are often the result of " "temporary stress, anxiety, fatigue, or anger. Symptoms include soreness in your temples, a tightening band-like sensation around your head (a \"vice-like\" ache). Symptoms can also include a pulling feeling, pressure sensations, and yamilex head and neck muscles. The headache begins in your forehead, temples, or the back of your head and neck. Treatment for tension-type headache may include over-the-counter or prescription medications. Treatment may also include self-help techniques such as relaxation training and biofeedback.  CLUSTER HEADACHES  Q: What is a cluster headache? What causes it? How can I treat it?  A: Cluster headache gets its name because the attacks come in groups. The pain arrives with little, if any, warning. It is usually on one side of the head. A tearing or bloodshot eye and a runny nose on the same side of the headache may also accompany the pain. Cluster headaches are believed to be caused by chemical reactions in the brain. They have been described as the most severe and intense of any headache type. Treatment for cluster headache includes prescription medication and oxygen.  SINUS HEADACHES  Q: What is a sinus headache? What causes it? How can I treat it?  A: When a cavity in the bones of the face and skull (a sinus) becomes inflamed, the inflammation will cause localized pain. This condition is usually the result of an allergic reaction, a tumor, or an infection. If your headache is caused by a sinus blockage, such as an infection, you will probably have a fever. An x-ray will confirm a sinus blockage. Your caregiver's treatment might include antibiotics for the infection, as well as antihistamines or decongestants.   REBOUND HEADACHES  Q: What is a rebound headache? What causes it? How can I treat it?  A: A pattern of taking acute headache medications too often can lead to a condition known as \"rebound headache.\" A pattern of taking too much headache medication includes taking it more " than 2 days per week or in excessive amounts. That means more than the label or a caregiver advises. With rebound headaches, your medications not only stop relieving pain, they actually begin to cause headaches. Doctors treat rebound headache by tapering the medication that is being overused. Sometimes your caregiver will gradually substitute a different type of treatment or medication. Stopping may be a challenge. Regularly overusing a medication increases the potential for serious side effects. Consult a caregiver if you regularly use headache medications more than 2 days per week or more than the label advises.  ADDITIONAL QUESTIONS AND ANSWERS  Q: What is biofeedback?  A: Biofeedback is a self-help treatment. Biofeedback uses special equipment to monitor your body's involuntary physical responses. Biofeedback monitors:  · Breathing.   · Pulse.   · Heart rate.   · Temperature.   · Muscle tension.   · Brain activity.   Biofeedback helps you refine and perfect your relaxation exercises. You learn to control the physical responses that are related to stress. Once the technique has been mastered, you do not need the equipment any more.  Q: Are headaches hereditary?  A: Four out of five (80%) of people that suffer report a family history of migraine. Scientists are not sure if this is genetic or a family predisposition. Despite the uncertainty, a child has a 50% chance of having migraine if one parent suffers. The child has a 75% chance if both parents suffer.   Q: Can children get headaches?  A: By the time they reach high school, most young people have experienced some type of headache. Many safe and effective approaches or medications can prevent a headache from occurring or stop it after it has begun.   Q: What type of doctor should I see to diagnose and treat my headache?  A: Start with your primary caregiver. Discuss his or her experience and approach to headaches. Discuss methods of classification, diagnosis,  and treatment. Your caregiver may decide to recommend you to a headache specialist, depending upon your symptoms or other physical conditions. Having diabetes, allergies, etc., may require a more comprehensive and inclusive approach to your headache. The [employer] will provide, upon request, a list of NHF physician members in your state.  Document Released: 03/09/2005 Document Revised: 03/11/2013 Document Reviewed: 08/17/2009  [employer]® Patient Information ©2013 [employer].

## 2017-01-27 NOTE — ED NOTES
ERP aware of patient blood pressure. Patient cleared for discharge. Patient to follow-up with cardiovascular specialist.   Patient requested jell-o and crackers. Patient then vomited 200 ml orange/clear fluids. Patient stated would rest to see if nausea improves.

## 2017-02-08 ENCOUNTER — OFFICE VISIT (OUTPATIENT)
Dept: MEDICAL GROUP | Facility: MEDICAL CENTER | Age: 82
End: 2017-02-08
Payer: MEDICARE

## 2017-02-08 VITALS
HEART RATE: 89 BPM | TEMPERATURE: 99.9 F | BODY MASS INDEX: 21.66 KG/M2 | SYSTOLIC BLOOD PRESSURE: 138 MMHG | HEIGHT: 66 IN | DIASTOLIC BLOOD PRESSURE: 88 MMHG | WEIGHT: 134.8 LBS | OXYGEN SATURATION: 97 %

## 2017-02-08 DIAGNOSIS — I10 ESSENTIAL HYPERTENSION: ICD-10-CM

## 2017-02-08 DIAGNOSIS — L65.9 HAIR LOSS: ICD-10-CM

## 2017-02-08 DIAGNOSIS — F51.01 PRIMARY INSOMNIA: ICD-10-CM

## 2017-02-08 PROCEDURE — 99215 OFFICE O/P EST HI 40 MIN: CPT | Performed by: FAMILY MEDICINE

## 2017-02-08 RX ORDER — LORAZEPAM 0.5 MG/1
TABLET ORAL
Refills: 1 | COMMUNITY
Start: 2017-02-02 | End: 2017-02-08

## 2017-02-08 ASSESSMENT — PATIENT HEALTH QUESTIONNAIRE - PHQ9: CLINICAL INTERPRETATION OF PHQ2 SCORE: 0

## 2017-02-08 NOTE — MR AVS SNAPSHOT
"        Prabha Mathews Dl   2017 2:20 PM   Office Visit   MRN: [id number]    Department:  [place] [address]   Dept Phone:  [phone]    Description:  Female : 1935   Provider:  Amos Martinez M.D.           Reason for Visit     Establish Care Post ER visit      Allergies as of 2017     Allergen Noted Reactions    Amoxicillin 2015       Unsure of reaction/or allergy    Morphine 2016   Vomiting, Nausea    Penicillins 2015       1970 reaction      You were diagnosed with     Primary insomnia   [321777]       Hair loss   [703837]       Essential hypertension   [4154273]         Vital Signs     Blood Pressure Pulse Temperature Height Weight Body Mass Index    138/88 mmHg 89 37.7 °C (99.9 °F) 1.676 m (5' 6\") 61.145 kg (134 lb 12.8 oz) 21.77 kg/m2    Oxygen Saturation Smoking Status                97% Never Smoker           Basic Information     Date Of Birth Sex Race Ethnicity Preferred Language    1935 Female White Non- English      Your appointments     Mar 08, 2017  1:20 PM   Established Patient with Amos Martinez M.D.   [facility]    [address]  [address]  [facility] [address] [phone]           You will be receiving a confirmation call a few days before your appointment from our automated call confirmation system.            May 16, 2017  1:40 PM   Established Patient with José Miguel Zimmer M.D.   [facility] ([facility]    [address]  [facility] [address] [phone]           You will be receiving a confirmation call a few days before your appointment from our automated call confirmation system.              Problem List              ICD-10-CM Priority Class Noted - Resolved    HTN (hypertension) I10   2010 - Present    Insomnia G47.00   Unknown - Present    Impaired fasting blood sugar R73.01   7/10/2014 - Present    Anxiety F41.9   2014 " - Present    Multiple thyroid nodules E04.2   Unknown - Present    Elevated cortisol level (CMS-HCC) E27.0   9/29/2014 - Present    Hair loss L65.9   2/8/2017 - Present      Health Maintenance        Date Due Completion Dates    IMM DTaP/Tdap/Td Vaccine (1 - Tdap) 5/23/1956 ---    IMM ZOSTER VACCINE 5/23/1997 ---    IMM PNEUMOCOCCAL 65+ (ADULT) LOW/MEDIUM RISK SERIES (2 of 2 - PPSV23) 7/6/2017 7/6/2016    BONE DENSITY 1/1/2019 1/1/2014 (Done)    Override on 1/1/2014: Done            Current Immunizations     13-VALENT PCV PREVNAR 7/6/2016 11:43 AM    Influenza TIV (IM) 10/14/2015, 10/22/2014, 10/22/2013, 11/5/2012, 10/2/2011, 10/11/2010, 10/25/2008  3:45 PM    Influenza Vaccine Adult HD 10/25/2016 11:14 AM, 10/14/2015  4:14 PM, 10/22/2014      Below and/or attached are the medications your provider expects you to take. Review all of your home medications and newly ordered medications with your provider and/or pharmacist. Follow medication instructions as directed by your provider and/or pharmacist. Please keep your medication list with you and share with your provider. Update the information when medications are discontinued, doses are changed, or new medications (including over-the-counter products) are added; and carry medication information at all times in the event of emergency situations     Allergies:  AMOXICILLIN - (reactions not documented)     MORPHINE - Vomiting,Nausea     PENICILLINS - (reactions not documented)               Medications  Valid as of: February 08, 2017 -  3:13 PM    Generic Name Brand Name Tablet Size Instructions for use    Aspirin (Tablet Delayed Response) ECOTRIN 81 MG Take 81 mg by mouth every bedtime.        Calcium Carb-Cholecalciferol (Tab) oyster shell calcium/vitamin D 250-125 MG-UNIT Take 1 Tab by mouth every day.        Metoprolol Tartrate (Tab) LOPRESSOR 25 MG Take 25 mg by mouth 2 times a day.        Multiple Vitamins-Minerals   Take 1 Tab by mouth every day.         Ondansetron (TABLET DISPERSIBLE) ZOFRAN ODT 4 MG Take 1 Tab by mouth every 8 hours as needed.        Terazosin HCl (Cap) HYTRIN 2 MG Take 2 mg by mouth every bedtime.        Valsartan (Tab) DIOVAN 320 MG Take 320 mg by mouth every day.        .                 Medicines prescribed today were sent to:     [facility] [address] [facility] [place] - [address] [facility] [address]    [address] [facility] [place] [address]    Phone: [phone] Fax: [phone]    Open 24 Hours?: No      Medication refill instructions:       If your prescription bottle indicates you have medication refills left, it is not necessary to call your provider’s office. Please contact your pharmacy and they will refill your medication.    If your prescription bottle indicates you do not have any refills left, you may request refills at any time through one of the following ways: The online [employer] system (except Urgent Care), by calling your provider’s office, or by asking your pharmacy to contact your provider’s office with a refill request. Medication refills are processed only during regular business hours and may not be available until the next business day. Your provider may request additional information or to have a follow-up visit with you prior to refilling your medication.   *Please Note: Medication refills are assigned a new Rx number when refilled electronically. Your pharmacy may indicate that no refills were authorized even though a new prescription for the same medication is available at the pharmacy. Please request the medicine by name with the pharmacy before contacting your provider for a refill.        Your To Do List     Future Labs/Procedures Complete By Expires    FERRITIN  As directed 2/8/2018    IRON/TOTAL IRON BIND  As directed 2/8/2018      Instructions    Tips for Sleep:   A) Goal: Obtain a minimum of 7-8hours of continuous, uninterrupted, restful sleep per night.   B) Tips for Sleep Hygiene:   I) Go to  bed and wake up at consistent times whether work/school day or not.    II) Keep room dark, quiet, and comfortable.  Increase exposure to sunlight during awake times and avoid bright lights (especially anything with a backlight) at least the last 1-2hours before going to sleep.    III) Don't nap.    IV) Avoid stimulant or caffeine use more than 4 hours after wake time.           Shootitlive Access Code: [email or web address]  Expires: 2/25/2017  5:22 PM    Shootitlive  A secure, online tool to manage your health information     [employer]’s Shootitlive® is a secure, online tool that connects you to your personalized health information from the privacy of your home -- day or night - making it very easy for you to manage your healthcare. Once the activation process is completed, you can even access your medical information using the Shootitlive mohamud, which is available for free in the Apple Mohamud store or Google Play store.     Shootitlive provides the following levels of access (as shown below):   My Chart Features   [facility]  Specialists [facility]  Urgent  Care [facility]  Primary Care  Doctor   Email your healthcare team securely and privately 24/7 X X X    Manage appointments: schedule your next appointment; view details of past/upcoming appointments X      Request prescription refills. X      View recent personal medical records, including lab and immunizations X X X X   View health record, including health history, allergies, medications X X X X   Read reports about your outpatient visits, procedures, consult and ER notes X X X X   See your discharge summary, which is a recap of your hospital and/or ER visit that includes your diagnosis, lab results, and care plan. X X       How to register for Shootitlive:  1. Go to  [email or web address].  2. Click on the Sign Up Now box, which takes you to the New Member Sign Up page. You will need to provide the following information:  a. Enter your Shootitlive Access Code exactly  as it appears at the top of this page. (You will not need to use this code after you’ve completed the sign-up process. If you do not sign up before the expiration date, you must request a new code.)   b. Enter your date of birth.   c. Enter your home email address.   d. Click Submit, and follow the next screen’s instructions.  3. Create a Icera ID. This will be your Icera login ID and cannot be changed, so think of one that is secure and easy to remember.  4. Create a Icera password. You can change your password at any time.  5. Enter your Password Reset Question and Answer. This can be used at a later time if you forget your password.   6. Enter your e-mail address. This allows you to receive e-mail notifications when new information is available in Icera.  7. Click Sign Up. You can now view your health information.    For assistance activating your Icera account, call [phone]

## 2017-02-08 NOTE — PATIENT INSTRUCTIONS
Tips for Sleep:   A) Goal: Obtain a minimum of 7-8hours of continuous, uninterrupted, restful sleep per night.   B) Tips for Sleep Hygiene:   I) Go to bed and wake up at consistent times whether work/school day or not.    II) Keep room dark, quiet, and comfortable.  Increase exposure to sunlight during awake times and avoid bright lights (especially anything with a backlight) at least the last 1-2hours before going to sleep.    III) Don't nap.    IV) Avoid stimulant or caffeine use more than 4 hours after wake time.

## 2017-02-12 NOTE — PROGRESS NOTES
Subjective:     Chief Complaint   Patient presents with   • Establish Care     Post ER visit       History of Present Illness:  Prabha Lizama is a 81 y.o. female established patient who presents today to discuss her concerns for chronic, ongoing problems with insomnia as well as hairloss, and also to establish primary medical care with me:    Insomnia  Patient with difficulty both getting to sleep and staying asleep.  Formerly, she was taking benzodiazepines to help address this.  However, the problem is less severe than previous.  When it does get worse, she still takes Xanax PRN.    Patient denies heavy or binge alcohol drinking behavior.  Patient denies smoking.    ROS is NEGATIVE for exacerbation of anxiety, tachycardia, palpitations, tachypnea, dyspnea, daytime hypersomnolence/fatigue.    Hair loss  Patient reports long-standing problem with hair loss, mainly on her head, states that she has been having evaluations of her thyroid hormones with Endocrinologist (Dr. Zimmer).  Review of thyroid hormone levels (11/2016), CBC (01/2017) and CMP (01/2017) did not reveal any abnormalities in thyroid hormones, nutritional deficiencies (B12, folate, iron), or other metabolic issues (hepatic, renal), respectively.      Patient states she eats meat on a regular basis (at least once a week), tries to eat a variety of vegetables & fruits as well as whole grains.    ROS is NEGATIVE for generalized weakness/fatigue, generalized pallor, dizziness, lightheadedness, blurred vision, chest pain/pressure, palpitations, tachycardia, dyspnea, hematemesis, hemoptysis, diarrhea, hematochezia, melena, hematuria, vaginal bleeding, hand and foot tingling.    HTN (Hypertension)  Patient is taking her anti-hypertensives as directed, denies any adverse s/sx of elevated blood pressure, also denies any dizziness/lightheadedness or generalized fatigue/weakness.    ROS is NEGATIVE for dizziness, generalized weakness/fatigue,  "vision/hearing changes, jaw pain/paresthesias, BUE pain/paresthesias/numbness/weakness, chest pain/pressure, palpitations, dyspnea, RUQ abdominal pain, oliguria/anuria, BLE edema.      Patient Active Problem List    Diagnosis Date Noted   • Hair loss 02/08/2017   • Elevated cortisol level (CMS-[facility]) 09/29/2014   • Multiple thyroid nodules    • Anxiety 08/07/2014   • Impaired fasting blood sugar 07/10/2014   • Insomnia    • HTN (hypertension) 02/16/2010       Additional History:   Allergies:    Amoxicillin; Morphine; and Penicillins     Current Medications:     Current Outpatient Prescriptions   Medication Sig Dispense Refill   • Calcium Carb-Cholecalciferol (OYSTER SHELL CALCIUM/VITAMIN D) 250-125 MG-UNIT Tab tablet Take 1 Tab by mouth every day.     • Multiple Vitamins-Minerals (HAIR/SKIN/NAILS PO) Take 1 Tab by mouth every day.     • metoprolol (LOPRESSOR) 25 MG Tab Take 25 mg by mouth 2 times a day.     • ondansetron (ZOFRAN ODT) 4 MG TABLET DISPERSIBLE Take 1 Tab by mouth every 8 hours as needed. 10 Tab 0   • aspirin EC (ECOTRIN) 81 MG TBEC Take 81 mg by mouth every bedtime.     • terazosin (HYTRIN) 2 MG CAPS Take 2 mg by mouth every bedtime.     • valsartan (DIOVAN) 320 MG tablet Take 320 mg by mouth every day.       No current facility-administered medications for this visit.        Social History:     Social History   Substance Use Topics   • Smoking status: Never Smoker    • Smokeless tobacco: Never Used   • Alcohol Use: No       ROS:     - NOTE: All other systems reviewed and are negative, except as in HPI.     Objective:   Physical Exam:    Vitals: Blood pressure 138/88, pulse 89, temperature 37.7 °C (99.9 °F), height 1.676 m (5' 6\"), weight 61.145 kg (134 lb 12.8 oz), SpO2 97 %.   BMI: Body mass index is 21.77 kg/(m^2).   General/Constitutional: Vitals as above, Well nourished, well developed female in no acute distress   Head/Eyes: Head is grossly normal & atraumatic, bilateral conjunctivae clear and not " injected, bilateral EOMI, bilateral PERRL   ENT: Bilateral external ears grossly normal in appearance, Hearing grossly intact, External nares normal in appearance and without discharge/bleeding   Respiratory: No respiratory distress, bilateral lungs are clear to ausculation in all lung fields (anterior/lateral/posterior), no wheezing/rhonchi/rales   Cardiovascular: Regular rate and rhythm without murmur/gallops/rubs, distal pulses are intact and equal bilaterally (radial, posterior tibial), no bilateral lower extremity edema   MSK: Gait grossly normal & not antalgic   Integumentary: Patient with diffusely thinning hair over her scalp particularly at the crown, but no other signs of hirsutism (hair growth over face chest or back, no acne, No apparent rashes   Psych: Judgment grossly appropriate, no apparent depression/anxiety    Assessment and Plan:   1. Primary insomnia  Uncontrolled.  We discussed sleep hygiene, which is detailed int he patient instruction section as below.  Patient can take remaining Xanax PRN.  Patient verbalizes understanding.    2. Hair loss  Uncontrolled, unstable.  Although thyroid hormones WNL, and CBC not suggestive of microcytic/hypochromic anemia, I will evaluate for iron deficiency for possible ddx of telogen effluvium (which may be shown by low-normal iron), as well as hyperadrogenism.   - TESTOSTERONE, FREE AND TOTAL   - IRON/TOTAL IRON BIND; Future   - FERRITIN; Future    3. Essential hypertension  Stable, well-controlled.  Patient to continue taking antihypertensives as directed.    NOTE: A total of 40minutes was spent in direct face-to-face time with the patient, of which over 50% of the time was spent in counseling and/or coordination of care, the contents of which are described in this note.    PLEASE NOTE: This dictation was created using voice recognition software. I have made every reasonable attempt to correct obvious errors, but I expect that there are errors of grammar and  possibly content that I did not discover before finalizing the note.

## 2017-02-12 NOTE — ASSESSMENT & PLAN NOTE
Patient is taking her anti-hypertensives as directed, denies any adverse s/sx of elevated blood pressure, also denies any dizziness/lightheadedness or generalized fatigue/weakness.    ROS is NEGATIVE for dizziness, generalized weakness/fatigue, vision/hearing changes, jaw pain/paresthesias, BUE pain/paresthesias/numbness/weakness, chest pain/pressure, palpitations, dyspnea, RUQ abdominal pain, oliguria/anuria, BLE edema.

## 2017-02-12 NOTE — ASSESSMENT & PLAN NOTE
Patient reports long-standing problem with hair loss, mainly on her head, states that she has been having evaluations of her thyroid hormones with Endocrinologist (Dr. Zimmer).  Review of thyroid hormone levels (11/2016), CBC (01/2017) and CMP (01/2017) did not reveal any abnormalities in thyroid hormones, nutritional deficiencies (B12, folate, iron), or other metabolic issues (hepatic, renal), respectively.      Patient states she eats meat on a regular basis (at least once a week), tries to eat a variety of vegetables & fruits as well as whole grains.    ROS is NEGATIVE for generalized weakness/fatigue, generalized pallor, dizziness, lightheadedness, blurred vision, chest pain/pressure, palpitations, tachycardia, dyspnea, hematemesis, hemoptysis, diarrhea, hematochezia, melena, hematuria, vaginal bleeding, hand and foot tingling.

## 2017-02-12 NOTE — ASSESSMENT & PLAN NOTE
Patient with difficulty both getting to sleep and staying asleep.  Formerly, she was taking benzodiazepines to help address this.  However, the problem is less severe than previous.  When it does get worse, she still takes Xanax PRN.    Patient denies heavy or binge alcohol drinking behavior.  Patient denies smoking.    ROS is NEGATIVE for exacerbation of anxiety, tachycardia, palpitations, tachypnea, dyspnea, daytime hypersomnolence/fatigue.

## 2017-03-08 ENCOUNTER — OFFICE VISIT (OUTPATIENT)
Dept: MEDICAL GROUP | Facility: MEDICAL CENTER | Age: 82
End: 2017-03-08
Payer: MEDICARE

## 2017-03-08 VITALS
BODY MASS INDEX: 21.21 KG/M2 | OXYGEN SATURATION: 98 % | SYSTOLIC BLOOD PRESSURE: 154 MMHG | WEIGHT: 132 LBS | TEMPERATURE: 99.9 F | DIASTOLIC BLOOD PRESSURE: 82 MMHG | HEIGHT: 66 IN | HEART RATE: 85 BPM

## 2017-03-08 DIAGNOSIS — L65.9 HAIR LOSS: ICD-10-CM

## 2017-03-08 DIAGNOSIS — F51.01 PRIMARY INSOMNIA: ICD-10-CM

## 2017-03-08 DIAGNOSIS — I10 ESSENTIAL HYPERTENSION: ICD-10-CM

## 2017-03-08 DIAGNOSIS — Z78.0 POSTMENOPAUSAL ESTROGEN DEFICIENCY: ICD-10-CM

## 2017-03-08 PROCEDURE — 4040F PNEUMOC VAC/ADMIN/RCVD: CPT | Performed by: FAMILY MEDICINE

## 2017-03-08 PROCEDURE — 1101F PT FALLS ASSESS-DOCD LE1/YR: CPT | Performed by: FAMILY MEDICINE

## 2017-03-08 PROCEDURE — G8482 FLU IMMUNIZE ORDER/ADMIN: HCPCS | Performed by: FAMILY MEDICINE

## 2017-03-08 PROCEDURE — 99214 OFFICE O/P EST MOD 30 MIN: CPT | Performed by: FAMILY MEDICINE

## 2017-03-08 PROCEDURE — 1036F TOBACCO NON-USER: CPT | Performed by: FAMILY MEDICINE

## 2017-03-08 PROCEDURE — G8419 CALC BMI OUT NRM PARAM NOF/U: HCPCS | Performed by: FAMILY MEDICINE

## 2017-03-08 PROCEDURE — G8432 DEP SCR NOT DOC, RNG: HCPCS | Performed by: FAMILY MEDICINE

## 2017-03-08 RX ORDER — LANOLIN ALCOHOL/MO/W.PET/CERES
325 CREAM (GRAM) TOPICAL
Qty: 90 TAB | Refills: 2 | Status: SHIPPED | OUTPATIENT
Start: 2017-03-08 | End: 2017-04-13 | Stop reason: SDUPTHER

## 2017-03-08 RX ORDER — HYDROCODONE BITARTRATE AND ACETAMINOPHEN 5; 325 MG/1; MG/1
TABLET ORAL
Refills: 0 | COMMUNITY
Start: 2017-01-26 | End: 2017-04-13

## 2017-03-08 NOTE — MR AVS SNAPSHOT
"        Prabha Diaznighat Lizama   3/8/2017 1:20 PM   Office Visit   MRN: [id number]    Department:  [place] [address]   Dept Phone:  [phone]    Description:  Female : 1935   Provider:  Amos Martinez M.D.           Reason for Visit     Follow-Up hair loss and BP      Allergies as of 3/8/2017     Allergen Noted Reactions    Amoxicillin 2015       Unsure of reaction/or allergy    Morphine 2016   Vomiting, Nausea    Penicillins 2015       1970 reaction      You were diagnosed with     Hair loss   [781384]       Essential hypertension   [6919008]       Primary insomnia   [729443]       Postmenopausal estrogen deficiency   [779524]         Vital Signs     Blood Pressure Pulse Temperature Height Weight Body Mass Index    154/82 mmHg 85 37.7 °C (99.9 °F) 1.676 m (5' 5.98\") 59.875 kg (132 lb) 21.32 kg/m2    Oxygen Saturation Smoking Status                98% Never Smoker           Basic Information     Date Of Birth Sex Race Ethnicity Preferred Language    1935 Female White Non- English      Your appointments     2017  1:20 PM   Established Patient with Amos Martinez M.D.   [facility]    [address]  [facility] [address] [phone]           You will be receiving a confirmation call a few days before your appointment from our automated call confirmation system.            May 16, 2017  1:40 PM   Established Patient with José Miguel Zimmer M.D.   [facility] ([facility]    [address]  [facility] [address] [phone]           You will be receiving a confirmation call a few days before your appointment from our automated call confirmation system.              Problem List              ICD-10-CM Priority Class Noted - Resolved    HTN (hypertension) I10   2010 - Present    Insomnia G47.00   Unknown - Present    Impaired fasting blood sugar R73.01   " 7/10/2014 - Present    Anxiety F41.9   8/7/2014 - Present    Multiple thyroid nodules E04.2   Unknown - Present    Elevated cortisol level (CMS-HCC) E27.0   9/29/2014 - Present    Hair loss L65.9   2/8/2017 - Present    Postmenopausal estrogen deficiency Z78.0   3/8/2017 - Present      Health Maintenance        Date Due Completion Dates    IMM DTaP/Tdap/Td Vaccine (1 - Tdap) 5/23/1956 ---    IMM ZOSTER VACCINE 5/23/1997 ---    IMM PNEUMOCOCCAL 65+ (ADULT) LOW/MEDIUM RISK SERIES (2 of 2 - PPSV23) 7/6/2017 7/6/2016    BONE DENSITY 1/1/2019 1/1/2014 (Done)    Override on 1/1/2014: Done            Current Immunizations     13-VALENT PCV PREVNAR 7/6/2016 11:43 AM    Influenza TIV (IM) 10/14/2015, 10/22/2014, 10/22/2013, 11/5/2012, 10/2/2011, 10/11/2010, 10/25/2008  3:45 PM    Influenza Vaccine Adult HD 10/25/2016 11:14 AM, 10/14/2015  4:14 PM, 10/22/2014      Below and/or attached are the medications your provider expects you to take. Review all of your home medications and newly ordered medications with your provider and/or pharmacist. Follow medication instructions as directed by your provider and/or pharmacist. Please keep your medication list with you and share with your provider. Update the information when medications are discontinued, doses are changed, or new medications (including over-the-counter products) are added; and carry medication information at all times in the event of emergency situations     Allergies:  AMOXICILLIN - (reactions not documented)     MORPHINE - Vomiting,Nausea     PENICILLINS - (reactions not documented)               Medications  Valid as of: March 08, 2017 -  2:07 PM    Generic Name Brand Name Tablet Size Instructions for use    Aspirin (Tablet Delayed Response) ECOTRIN 81 MG Take 81 mg by mouth every bedtime.        Calcium Carb-Cholecalciferol (Tab) oyster shell calcium/vitamin D 250-125 MG-UNIT Take 1 Tab by mouth every day.        Ferrous Sulfate (Tablet Delayed Response) ferrous  sulfate 325 (65 FE) MG Take 1 Tab by mouth 3 times a day, with meals.        Hydrocodone-Acetaminophen (Tab) NORCO 5-325 MG TK     1  TO  2 TS  PO    Q  4 H    PRN        Metoprolol Tartrate (Tab) LOPRESSOR 25 MG Take 25 mg by mouth 2 times a day.        Multiple Vitamins-Minerals   Take 1 Tab by mouth every day.        Ondansetron (TABLET DISPERSIBLE) ZOFRAN ODT 4 MG Take 1 Tab by mouth every 8 hours as needed.        Terazosin HCl (Cap) HYTRIN 2 MG Take 2 mg by mouth every bedtime.        Valsartan (Tab) DIOVAN 320 MG Take 320 mg by mouth every day.        .                 Medicines prescribed today were sent to:     [facility] [address] [place] [address] & [address] [address]    Phone: [phone] Fax: [phone]    Open 24 Hours?: No      Medication refill instructions:       If your prescription bottle indicates you have medication refills left, it is not necessary to call your provider’s office. Please contact your pharmacy and they will refill your medication.    If your prescription bottle indicates you do not have any refills left, you may request refills at any time through one of the following ways: The online [employer] system (except Urgent Care), by calling your provider’s office, or by asking your pharmacy to contact your provider’s office with a refill request. Medication refills are processed only during regular business hours and may not be available until the next business day. Your provider may request additional information or to have a follow-up visit with you prior to refilling your medication.   *Please Note: Medication refills are assigned a new Rx number when refilled electronically. Your pharmacy may indicate that no refills were authorized even though a new prescription for the same medication is available at the pharmacy. Please request the medicine by name with the pharmacy before contacting your provider for a refill.         Your To Do List     Future Labs/Procedures Complete By Expires    DS-BONE DENSITY STUDY (DEXA)  As directed 3/8/2018    VITAMIN D,25 HYDROXY  As directed 3/8/2018      Instructions    Tips for Sleep:   A) Goal: Obtain a minimum of 7-8hours of continuous, uninterrupted, restful sleep per night.   B) Tips for Sleep Hygiene:   I) Go to bed and wake up at consistent times whether work/school day or not.    II) Keep room dark, quiet, and comfortable.  Increase exposure to sunlight during awake times and avoid bright lights (especially anything with a backlight) at least the last 1-2hours before going to sleep.    III) Don't nap.    IV) Avoid stimulant or caffeine use more than 4 hours after wake time.           [employer] Access Code: [email or web address]  Expires: 4/7/2017  2:07 PM    [employer] [facility]  A secure, online tool to manage your health information     [employer]® is a secure, online tool that connects you to your personalized health information from the privacy of your home -- day or night - making it very easy for you to manage your healthcare. Once the activation process is completed, you can even access your medical information using the [employer] mohamud, which is available for free in the Apple Mohamud store or Google Play store.     [employer] [facility] provides the following levels of access (as shown below):   My Chart Features   [facility] Doctor [facility]  Specialists [facility]  Urgent  Care Non-[facility]  Primary Care  Doctor   Email your healthcare team securely and privately 24/7 X X X    Manage appointments: schedule your next appointment; view details of past/upcoming appointments X      Request prescription refills. X      View recent personal medical records, including lab and immunizations X X X X   View health record, including health history, allergies, medications X X X X   Read reports about your outpatient visits, procedures, consult and ER notes X X X X   See your discharge summary, which is a recap of  your hospital and/or ER visit that includes your diagnosis, lab results, and care plan. X X       How to register for [employer]:  1. Go to  [email or web address].  2. Click on the Sign Up Now box, which takes you to the New Member Sign Up page. You will need to provide the following information:  a. Enter your [employer] Access Code exactly as it appears at the top of this page. (You will not need to use this code after you’ve completed the sign-up process. If you do not sign up before the expiration date, you must request a new code.)   b. Enter your date of birth.   c. Enter your home email address.   d. Click Submit, and follow the next screen’s instructions.  3. Create a [employer] ID. This will be your [employer] login ID and cannot be changed, so think of one that is secure and easy to remember.  4. Create a [employer] password. You can change your password at any time.  5. Enter your Password Reset Question and Answer. This can be used at a later time if you forget your password.   6. Enter your e-mail address. This allows you to receive e-mail notifications when new information is available in [employer].  7. Click Sign Up. You can now view your health information.    For assistance activating your [employer] account, call [phone]

## 2017-03-08 NOTE — ASSESSMENT & PLAN NOTE
Patient with persistent hair loss, has had iron labs performed at [facility].  We reviewed the levels and interpreted the results to mean that her hair loss is possibly 2/2 telogen effuvium with a ferritin level less than 70.  Therefore, we will begin iron supplementation despite her very healthy diet.

## 2017-03-08 NOTE — ASSESSMENT & PLAN NOTE
Sleeping from 11:30pm -1:30am or 2am.  Taking Melatonin 5mg when she wakes up.  Usually able to go back to sleep.

## 2017-03-16 ENCOUNTER — HOSPITAL ENCOUNTER (OUTPATIENT)
Dept: RADIOLOGY | Facility: MEDICAL CENTER | Age: 82
End: 2017-03-16
Attending: FAMILY MEDICINE
Payer: MEDICARE

## 2017-03-16 DIAGNOSIS — Z78.0 POSTMENOPAUSAL ESTROGEN DEFICIENCY: ICD-10-CM

## 2017-03-16 PROCEDURE — 77080 DXA BONE DENSITY AXIAL: CPT

## 2017-03-19 NOTE — ASSESSMENT & PLAN NOTE
Patient is taking her Lopressor 75mg by mouth twice a day, as well as valsartan 320mg by mouth daily. We reviewed recent laceration not reveal any abnormalities to hepatic or renal functions. Additionally, reviewed of recent lab reveals that patient does not have an anemia.     ROS is NEGATIVE for dizziness, generalized weakness/fatigue, vision/hearing changes, jaw pain/paresthesias, BUE pain/paresthesias/numbness/weakness, chest pain/pressure, palpitations, dyspnea.

## 2017-03-19 NOTE — PROGRESS NOTES
Subjective:     Chief Complaint   Patient presents with   • Follow-Up     hair loss and BP       History of Present Illness:  Prabha Lizama is a 81 y.o. female established patient who presents today to follow-up for hair loss as well as blood pressure management:    Hair loss  Patient with persistent hair loss, has had iron labs performed at [facility].  We reviewed the levels and interpreted the results to mean that her hair loss is possibly 2/2 telogen effuvium with a ferritin level less than 70.  Therefore, we will begin iron supplementation despite her very healthy diet.    Insomnia  Sleeping from 11:30pm -1:30am or 2am.  Taking Melatonin 5mg when she wakes up.  Usually able to go back to sleep.    HTN (Hypertension)  Patient is taking her Lopressor 75mg by mouth twice a day, as well as valsartan 320mg by mouth daily. We reviewed recent laceration not reveal any abnormalities to hepatic or renal functions. Additionally, reviewed of recent lab reveals that patient does not have an anemia.     ROS is NEGATIVE for dizziness, generalized weakness/fatigue, vision/hearing changes, jaw pain/paresthesias, BUE pain/paresthesias/numbness/weakness, chest pain/pressure, palpitations, dyspnea.    Postmenopausal estrogen deficiency  Patient hasn't had a bone density scan in at least 3 years.        Patient Active Problem List    Diagnosis Date Noted   • Postmenopausal estrogen deficiency 03/08/2017   • Hair loss 02/08/2017   • Elevated cortisol level (CMS-[facility]) 09/29/2014   • Multiple thyroid nodules    • Anxiety 08/07/2014   • Impaired fasting blood sugar 07/10/2014   • Insomnia    • HTN (hypertension) 02/16/2010       Additional History:   Allergies:    Amoxicillin; Morphine; and Penicillins     Current Medications:     Current Outpatient Prescriptions   Medication Sig Dispense Refill   • ferrous sulfate 325 (65 FE) MG EC tablet Take 1 Tab by mouth 3 times a day, with meals. 90 Tab 2   • metoprolol (LOPRESSOR) 25 MG Tab  "Take 25 mg by mouth 2 times a day.     • terazosin (HYTRIN) 2 MG CAPS Take 2 mg by mouth every bedtime.     • valsartan (DIOVAN) 320 MG tablet Take 320 mg by mouth every day.     • hydrocodone-acetaminophen (NORCO) 5-325 MG Tab per tablet TK     1  TO  2 TS  PO    Q  4 H    PRN  0   • Calcium Carb-Cholecalciferol (OYSTER SHELL CALCIUM/VITAMIN D) 250-125 MG-UNIT Tab tablet Take 1 Tab by mouth every day.     • Multiple Vitamins-Minerals (HAIR/SKIN/NAILS PO) Take 1 Tab by mouth every day.     • ondansetron (ZOFRAN ODT) 4 MG TABLET DISPERSIBLE Take 1 Tab by mouth every 8 hours as needed. 10 Tab 0   • aspirin EC (ECOTRIN) 81 MG TBEC Take 81 mg by mouth every bedtime.       No current facility-administered medications for this visit.        Social History:     Social History   Substance Use Topics   • Smoking status: Never Smoker    • Smokeless tobacco: Never Used   • Alcohol Use: No       ROS:     - ROS is NEGATIVE for blurred vision, polydipsia, polyuria, diaphoresis, palpitations, fatigue, irritability, flank pain, BLE paresthesias.    - ROS is NEGATIVE for: cold or heat intolerance, anxiety/depression, chest pain/pressure, hair thickening/coarsening/falling out/thinning, skin changes, diarrhea/constipation.    - NOTE: All other systems reviewed and are negative, except as in HPI.     Objective:   Physical Exam:    Vitals: Blood pressure 154/82, pulse 85, temperature 37.7 °C (99.9 °F), height 1.676 m (5' 5.98\"), weight 59.875 kg (132 lb), SpO2 98 %.   BMI: Body mass index is 21.32 kg/(m^2).   General/Constitutional: Vitals as above, Well nourished, well developed female in no acute distress   Head/Eyes: Head is grossly normal & atraumatic, bilateral conjunctivae clear and not injected, bilateral EOMI, bilateral PERRL   ENT: Bilateral external ears grossly normal in appearance, Hearing grossly intact, External nares normal in appearance and without discharge/bleeding   Respiratory: No respiratory distress, bilateral " lungs are clear to ausculation in all lung fields (anterior/lateral/posterior), no wheezing/rhonchi/rales   Cardiovascular: Regular rate and rhythm without murmur/gallops/rubs, distal pulses are intact and equal bilaterally (radial, posterior tibial), no bilateral lower extremity edema   MSK: Gait grossly normal & not antalgic   Integumentary: No apparent rashes   Psych: Judgment grossly appropriate, no apparent depression/anxiety    Imaging/Labs: Ferritin 56, serum iron as well as TIBC within normal limits (124 and 345 respectively), total and free testosterone within normal limits.    Assessment and Plan:   1. Hair loss  Uncontrolled, lab suggestive that he losses secondary to telogen effluvium. Patient to begin iron supplementation and we will revisit this in approximately one month.   - ferrous sulfate 325 (65 FE) MG EC tablet; Take 1 Tab by mouth 3 times a day, with meals.  Dispense: 90 Tab; Refill: 2    2. Primary insomnia  Uncontrolled, we discussed sleep hygiene. Patient will continue to work on this, and is notified that she can increase her dose of melatonin to 10 mg by mouth daily at bedtime. Patient was understanding.    3. Essential hypertension  Nearly well controlled. Patient to continue taking Lopressor as well as valsartan. We will recheck this again in one month. Patient briefed on your precautions for hypertensive emergency. Patient verbalizes understanding.    4. Postmenopausal estrogen deficiency  DEXA scan labs today. We'll reorder. Vitamin D check for bone health.   - DS-BONE DENSITY STUDY (DEXA); Future   - VITAMIN D,25 HYDROXY; Future      PLEASE NOTE: This dictation was created using voice recognition software. I have made every reasonable attempt to correct obvious errors, but I expect that there are errors of grammar and possibly content that I did not discover before finalizing the note.

## 2017-03-20 ENCOUNTER — TELEPHONE (OUTPATIENT)
Dept: MEDICAL GROUP | Facility: MEDICAL CENTER | Age: 82
End: 2017-03-20

## 2017-03-20 NOTE — TELEPHONE ENCOUNTER
Phone Number Called: [phone]     Message: Spoke with patient and let them know Amos Martinez M.D.'s message.    Left Message for patient to call back: NA

## 2017-03-20 NOTE — TELEPHONE ENCOUNTER
----- Message from Amos Martinez M.D. sent at 3/20/2017  5:46 AM PDT -----  MA to call patient to relate the following:     - Patient has osteoporosis.       - We will discuss medication for treatment at next visit on 04/13/17 @1:20pm.       - In the meantime, focus on high-calcium foods like brocolli, brussel sprouts, henrietta greens, kale, beans.

## 2017-04-13 ENCOUNTER — OFFICE VISIT (OUTPATIENT)
Dept: MEDICAL GROUP | Facility: MEDICAL CENTER | Age: 82
End: 2017-04-13
Payer: MEDICARE

## 2017-04-13 VITALS
HEIGHT: 65 IN | OXYGEN SATURATION: 96 % | DIASTOLIC BLOOD PRESSURE: 86 MMHG | BODY MASS INDEX: 22.63 KG/M2 | WEIGHT: 135.8 LBS | TEMPERATURE: 99.3 F | SYSTOLIC BLOOD PRESSURE: 146 MMHG | HEART RATE: 84 BPM

## 2017-04-13 DIAGNOSIS — M81.0 OSTEOPOROSIS: ICD-10-CM

## 2017-04-13 DIAGNOSIS — L65.9 HAIR LOSS: ICD-10-CM

## 2017-04-13 DIAGNOSIS — F51.01 PRIMARY INSOMNIA: ICD-10-CM

## 2017-04-13 PROCEDURE — 99215 OFFICE O/P EST HI 40 MIN: CPT | Performed by: FAMILY MEDICINE

## 2017-04-13 RX ORDER — LANOLIN ALCOHOL/MO/W.PET/CERES
325 CREAM (GRAM) TOPICAL 2 TIMES DAILY WITH MEALS
COMMUNITY
Start: 2017-04-13 | End: 2018-01-15

## 2017-04-13 RX ORDER — CHOLECALCIFEROL (VITAMIN D3) 125 MCG
1 CAPSULE ORAL DAILY
Qty: 90 TAB | Refills: 1 | COMMUNITY
Start: 2017-04-13 | End: 2018-05-18

## 2017-04-13 RX ORDER — FERROUS SULFATE 325(65) MG
TABLET ORAL
Refills: 2 | COMMUNITY
Start: 2017-03-08 | End: 2017-04-13

## 2017-04-13 RX ORDER — CHOLECALCIFEROL (VITAMIN D3) 125 MCG
CAPSULE ORAL
COMMUNITY
Start: 2017-04-13 | End: 2018-05-09

## 2017-04-13 NOTE — PROGRESS NOTES
Subjective:     Chief Complaint   Patient presents with   • Results     DEXA   • Hair/Scalp Problem     hair loss       History of Present Illness:  Prabha Lizama is a 81 y.o. female established patient who presents today to follow up regarding results of DEXA scan, as well as management for hair loss, and also to discuss the management:    Osteoporosis  Patient reviewed recent DEXA scan, which diagnosis her with osteoporosis both in the lumbar spine as well as left hip. Patient verbalizes understanding, and would like to proceed with increasing her dietary intake of calcium via planned based sources. Patient discussed that taking higher amounts of calcium supplementations through pills may increase her risk for renal consequences such as nephrolithiasis.    ROS is NEGATIVE for fevers, chills, rigors, flank pain, dysuria, hematuria, pyuria, polyuria, increased frequency of urination, diarrhea, constipation.    Hair loss  Patient I reviewed her recent iron labs, which shows that ferritin is at 56, which is less than what is needed for hair maintenance and growth at 70.  Patient states that, when she picked up her iron sulfate prescription pharmacy, she was told by pharmacist that are supplements can make her constipated. Patient therefore did not take it for this concern.    ROS is NEGATIVE for: cold or heat intolerance, anxiety/depression, chest pain/pressure, skin changes, diarrhea/constipation.    Insomnia  Patient has been having continued difficulty with insomnia, has stated that she has taken melatonin at a dose of 5 mg by mouth daily at bedtime. However, she may need further work on sleep hygiene.    Patient tries to sleep at approximately 10:30 to 11:00 each night, stopped watching TV approximately 30 minutes earlier at 10 to 10:30, and does not use anything else with a white back light.  She states that her room is in the back of the house and therefore relatively dark, however she does not use a  "night blindfold.      Patient states that she then wakes up somewhat returning one and 2:00, and then again between 4 and 5:00, and unable to go back to sleep. Therefore total sleep time is approximately 4-5 hours per night. Patient does not nap in the day, patient does not drink caffeinated beverages.    Patient denies taking any illicit drugs.    Patient does state that she has increased stress, but that this is not from her particular source, that this is \"the way I've always been.\"      Patient Active Problem List    Diagnosis Date Noted   • Osteoporosis 04/13/2017   • Postmenopausal estrogen deficiency 03/08/2017   • Hair loss 02/08/2017   • Elevated cortisol level (CMS-[facility]) 09/29/2014   • Multiple thyroid nodules    • Anxiety 08/07/2014   • Impaired fasting blood sugar 07/10/2014   • Insomnia    • HTN (hypertension) 02/16/2010       Additional History:   Allergies:    Amoxicillin; Morphine; and Penicillins     Current Medications:     Current Outpatient Prescriptions   Medication Sig Dispense Refill   • Melatonin ([facility] MELATONIN) 5 MG Tab Take  by mouth.     • ferrous sulfate 325 (65 FE) MG EC tablet Take 1 Tab by mouth 2 times a day, with meals.     • Cholecalciferol (VITAMIN D3) 2000 UNITS Tab Take 1 tablet by mouth every day. 90 Tab 1   • metoprolol (LOPRESSOR) 25 MG Tab Take 25 mg by mouth 2 times a day.     • terazosin (HYTRIN) 2 MG CAPS Take 2 mg by mouth every bedtime.     • valsartan (DIOVAN) 320 MG tablet Take 320 mg by mouth every day.     • Calcium Carb-Cholecalciferol (OYSTER SHELL CALCIUM/VITAMIN D) 250-125 MG-UNIT Tab tablet Take 1 Tab by mouth every day.     • Multiple Vitamins-Minerals (HAIR/SKIN/NAILS PO) Take 1 Tab by mouth every day.     • aspirin EC (ECOTRIN) 81 MG TBEC Take 81 mg by mouth every bedtime.       No current facility-administered medications for this visit.        Social History:     Social History   Substance Use Topics   • Smoking status: Never Smoker    • Smokeless tobacco: " "Never Used   • Alcohol Use: No       ROS:     - ROS is NEGATIVE for dizziness, generalized weakness/fatigue, vision/hearing changes, jaw pain/paresthesias, BUE pain/paresthesias/numbness/weakness, chest pain/pressure, palpitations, dyspnea, RUQ abdominal pain, oliguria/anuria, BLE edema.    - NOTE: All other systems reviewed and are negative, except as in HPI.     Objective:   Physical Exam:    Vitals: Blood pressure 162/88, pulse 84, temperature 37.4 °C (99.4 °F), height 1.651 m (5' 5\"), weight 61.598 kg (135 lb 12.8 oz), SpO2 96 %, not currently breastfeeding.   BMI: Body mass index is 22.6 kg/(m^2).   General/Constitutional: Vitals as above, Well nourished, well developed female in no acute distress   Head/Eyes: Head is grossly normal & atraumatic, bilateral conjunctivae clear and not injected, bilateral EOMI, bilateral PERRL   ENT: Bilateral external ears grossly normal in appearance, Hearing grossly intact, External nares normal in appearance and without discharge/bleeding   Respiratory: No respiratory distress, bilateral lungs are clear to ausculation in all lung fields (anterior/lateral/posterior), no wheezing/rhonchi/rales   Cardiovascular: Regular rate and rhythm without murmur/gallops/rubs, distal pulses are intact and equal bilaterally (radial, posterior tibial), no bilateral lower extremity edema   MSK: Gait grossly normal & not antalgic   Integumentary: No apparent rashes   Psych: Judgment grossly appropriate, no apparent depression/anxiety    Imaging/Labs:     - Patient and I discussed the DEXA scan is positive for osteoporosis both her lumbar spine as well as left hip    - Vitamin D levels low-normal at 34    - Ferritin 56, Iron labs otherwise WNL    Assessment and Plan:   1. Osteoporosis  Uncontrolled.  Patient given information re: how to increase plant-based sources of Calcium.  Vit D3 as below.  Patient can continue with current calcium supplement (250mg) on a daily basis.   - Cholecalciferol " (VITAMIN D3) 2000 UNITS Tab; Take 1 tablet by mouth every day.  Dispense: 90 Tab; Refill: 1    2. Hair loss  Uncontrolled.  Patient given information re: how to increase plant-based sources of Iron.  If insufficent results, she will start Ferrous sulfate at 1time per day with meals, can increase to twice daily with meals.  Patient instructed to take citrus fruit (e.g. Mandarin oranges)   - ferrous sulfate 325 (65 FE) MG EC tablet; Take 1 Tab by mouth 2 times a day, with meals.    3. Primary insomnia  Uncontrolled.  Patient given instructions on sleep hygiene (night blindfold, quitting TV at least 1 hour prior to bedtime, increasing cardiovascular exercise [duration + frequency], can go up on Melatonin)   - Melatonin ([employer] MELATONIN) 5 MG Tab; Take  by mouth.    NOTE: A total of 40minutes was spent in direct face-to-face time with the patient, of which over 50% of the time was spent in counseling and/or coordination of care, the contents of which are described in this note.    PLEASE NOTE: This dictation was created using voice recognition software. I have made every reasonable attempt to correct obvious errors, but I expect that there are errors of grammar and possibly content that I did not discover before finalizing the note.

## 2017-04-13 NOTE — ASSESSMENT & PLAN NOTE
Patient reviewed recent DEXA scan, which diagnosis her with osteoporosis both in the lumbar spine as well as left hip. Patient verbalizes understanding, and would like to proceed with increasing her dietary intake of calcium via planned based sources. Patient discussed that taking higher amounts of calcium supplementations through pills may increase her risk for renal consequences such as nephrolithiasis.    ROS is NEGATIVE for fevers, chills, rigors, flank pain, dysuria, hematuria, pyuria, polyuria, increased frequency of urination, diarrhea, constipation.

## 2017-04-13 NOTE — MR AVS SNAPSHOT
"        Prabha Mathews Dl   2017 1:20 PM   Office Visit   MRN: [id number]    Department:  [place] [address]   Dept Phone:  [phone]    Description:  Female : 1935   Provider:  Amos Martinez M.D.           Reason for Visit     Results DEXA    Hair/Scalp Problem hair loss      Allergies as of 2017     Allergen Noted Reactions    Amoxicillin 2015       Unsure of reaction/or allergy    Morphine 2016   Vomiting, Nausea    Penicillins 2015       1970 reaction      You were diagnosed with     Osteoporosis   [7381195]       Hair loss   [657232]       Primary insomnia   [091049]         Vital Signs     Blood Pressure Pulse Temperature Height Weight Body Mass Index    162/88 mmHg 84 37.4 °C (99.4 °F) 1.651 m (5' 5\") 61.598 kg (135 lb 12.8 oz) 22.60 kg/m2    Oxygen Saturation Breastfeeding? Smoking Status             96% No Never Smoker          Basic Information     Date Of Birth Sex Race Ethnicity Preferred Language    1935 Female White Non- English      Your appointments     May 16, 2017  1:40 PM   Established Patient with José Miguel Zimmer M.D.   [facility] ([facility])    [address]  [facility] [address] [phone]           You will be receiving a confirmation call a few days before your appointment from our automated call confirmation system.            2017  1:20 PM   ANNUAL EXAM PREVENTATIVE with Amos Martinez M.D.   [facility] ([place])    [address]  [place] [id number] [phone]              Problem List              ICD-10-CM Priority Class Noted - Resolved    HTN (hypertension) I10   2010 - Present    Insomnia G47.00   Unknown - Present    Impaired fasting blood sugar R73.01   7/10/2014 - Present    Anxiety F41.9   2014 - Present    Multiple thyroid nodules E04.2   Unknown - Present    Elevated cortisol level (CMS-[facility]) " E27.0   9/29/2014 - Present    Hair loss L65.9   2/8/2017 - Present    Postmenopausal estrogen deficiency Z78.0   3/8/2017 - Present    Osteoporosis M81.0   4/13/2017 - Present      Health Maintenance        Date Due Completion Dates    IMM DTaP/Tdap/Td Vaccine (1 - Tdap) 5/23/1956 ---    IMM ZOSTER VACCINE 5/23/1997 ---    IMM PNEUMOCOCCAL 65+ (ADULT) LOW/MEDIUM RISK SERIES (2 of 2 - PPSV23) 7/6/2017 7/6/2016    BONE DENSITY 3/16/2022 3/16/2017, 1/1/2014 (Done)    Override on 1/1/2014: Done            Current Immunizations     13-VALENT PCV PREVNAR 7/6/2016 11:43 AM    Influenza TIV (IM) 10/14/2015, 10/22/2014, 10/22/2013, 11/5/2012, 10/2/2011, 10/11/2010, 10/25/2008  3:45 PM    Influenza Vaccine Adult HD 10/25/2016 11:14 AM, 10/14/2015  4:14 PM, 10/22/2014      Below and/or attached are the medications your provider expects you to take. Review all of your home medications and newly ordered medications with your provider and/or pharmacist. Follow medication instructions as directed by your provider and/or pharmacist. Please keep your medication list with you and share with your provider. Update the information when medications are discontinued, doses are changed, or new medications (including over-the-counter products) are added; and carry medication information at all times in the event of emergency situations     Allergies:  AMOXICILLIN - (reactions not documented)     MORPHINE - Vomiting,Nausea     PENICILLINS - (reactions not documented)               Medications  Valid as of: April 13, 2017 -  2:04 PM    Generic Name Brand Name Tablet Size Instructions for use    Aspirin (Tablet Delayed Response) ECOTRIN 81 MG Take 81 mg by mouth every bedtime.        Calcium Carb-Cholecalciferol (Tab) oyster shell calcium/vitamin D 250-125 MG-UNIT Take 1 Tab by mouth every day.        Cholecalciferol (Tab) Vitamin D3 2000 UNITS Take 1 tablet by mouth every day.        Ferrous Sulfate (Tablet Delayed Response) ferrous sulfate  325 (65 FE) MG Take 1 Tab by mouth 2 times a day, with meals.        Hydrocodone-Acetaminophen (Tab) NORCO 5-325 MG TK     1  TO  2 TS  PO    Q  4 H    PRN        Melatonin (Tab) Melatonin 5 MG Take  by mouth.        Metoprolol Tartrate (Tab) LOPRESSOR 25 MG Take 25 mg by mouth 2 times a day.        Multiple Vitamins-Minerals   Take 1 Tab by mouth every day.        Ondansetron (TABLET DISPERSIBLE) ZOFRAN ODT 4 MG Take 1 Tab by mouth every 8 hours as needed.        Terazosin HCl (Cap) HYTRIN 2 MG Take 2 mg by mouth every bedtime.        Valsartan (Tab) DIOVAN 320 MG Take 320 mg by mouth every day.        .                 Medicines prescribed today were sent to:     [facility] [address] [place] [address] & [address] [address]    Phone: [phone] Fax: [phone]    Open 24 Hours?: No      Medication refill instructions:       If your prescription bottle indicates you have medication refills left, it is not necessary to call your provider’s office. Please contact your pharmacy and they will refill your medication.    If your prescription bottle indicates you do not have any refills left, you may request refills at any time through one of the following ways: The online [employer] system (except Urgent Care), by calling your provider’s office, or by asking your pharmacy to contact your provider’s office with a refill request. Medication refills are processed only during regular business hours and may not be available until the next business day. Your provider may request additional information or to have a follow-up visit with you prior to refilling your medication.   *Please Note: Medication refills are assigned a new Rx number when refilled electronically. Your pharmacy may indicate that no refills were authorized even though a new prescription for the same medication is available at the pharmacy. Please request the medicine by name with the pharmacy  before contacting your provider for a refill.           AdvanDx Access Code: [email or web address]  Expires: 5/13/2017  2:04 PM    AdvanDx  A secure, online tool to manage your health information     [employer]’s AdvanDx® is a secure, online tool that connects you to your personalized health information from the privacy of your home -- day or night - making it very easy for you to manage your healthcare. Once the activation process is completed, you can even access your medical information using the AdvanDx mohamud, which is available for free in the Apple Mohamud store or Google Play store.     AdvanDx provides the following levels of access (as shown below):   My Chart Features   [facility] Primary Care Doctor [facility]  Specialists [facility]  Urgent  Care Non-[facility]  Primary Care  Doctor   Email your healthcare team securely and privately 24/7 X X X    Manage appointments: schedule your next appointment; view details of past/upcoming appointments X      Request prescription refills. X      View recent personal medical records, including lab and immunizations X X X X   View health record, including health history, allergies, medications X X X X   Read reports about your outpatient visits, procedures, consult and ER notes X X X X   See your discharge summary, which is a recap of your hospital and/or ER visit that includes your diagnosis, lab results, and care plan. X X       How to register for AdvanDx:  1. Go to  [email or web address].  2. Click on the Sign Up Now box, which takes you to the New Member Sign Up page. You will need to provide the following information:  a. Enter your AdvanDx Access Code exactly as it appears at the top of this page. (You will not need to use this code after you’ve completed the sign-up process. If you do not sign up before the expiration date, you must request a new code.)   b. Enter your date of birth.   c. Enter your home email address.   d. Click Submit, and follow the next screen’s  instructions.  3. Create a [employer] ID. This will be your [employer] login ID and cannot be changed, so think of one that is secure and easy to remember.  4. Create a [employer] password. You can change your password at any time.  5. Enter your Password Reset Question and Answer. This can be used at a later time if you forget your password.   6. Enter your e-mail address. This allows you to receive e-mail notifications when new information is available in [employer].  7. Click Sign Up. You can now view your health information.    For assistance activating your [employer] account, call [phone]

## 2017-04-13 NOTE — ASSESSMENT & PLAN NOTE
Patient I reviewed her recent iron labs, which shows that ferritin is at 56, which is less than what is needed for hair maintenance and growth at 70.  Patient states that, when she picked up her iron sulfate prescription pharmacy, she was told by pharmacist that are supplements can make her constipated. Patient therefore did not take it for this concern.    ROS is NEGATIVE for: cold or heat intolerance, anxiety/depression, chest pain/pressure, skin changes, diarrhea/constipation.

## 2017-04-13 NOTE — ASSESSMENT & PLAN NOTE
"Patient has been having continued difficulty with insomnia, has stated that she has taken melatonin at a dose of 5 mg by mouth daily at bedtime. However, she may need further work on sleep hygiene.    Patient tries to sleep at approximately 10:30 to 11:00 each night, stopped watching TV approximately 30 minutes earlier at 10 to 10:30, and does not use anything else with a white back light.  She states that her room is in the back of the house and therefore relatively dark, however she does not use a night blindfold.      Patient states that she then wakes up somewhat returning one and 2:00, and then again between 4 and 5:00, and unable to go back to sleep. Therefore total sleep time is approximately 4-5 hours per night. Patient does not nap in the day, patient does not drink caffeinated beverages.    Patient denies taking any illicit drugs.    Patient does state that she has increased stress, but that this is not from her particular source, that this is \"the way I've always been.\"  "

## 2017-04-17 DIAGNOSIS — E04.2 MULTIPLE THYROID NODULES: ICD-10-CM

## 2017-04-20 ENCOUNTER — TELEPHONE (OUTPATIENT)
Dept: ENDOCRINOLOGY | Facility: MEDICAL CENTER | Age: 82
End: 2017-04-20

## 2017-04-20 NOTE — TELEPHONE ENCOUNTER
You have numerous nodules but 2 nodules stand out as being significantly larger than the others. Only one of the 2 has been biopsied. When I discussed that with you you did not care to biopsy the other large nodule. As of your ultrasound in July none of your nodules have gotten any larger. However, if we are not going to biopsy the other large one then we should follow it with ultrasound sometime between now and July. Your choice.    José Miguel Zimmer M.D.

## 2017-04-20 NOTE — TELEPHONE ENCOUNTER
Pt called states radiology called her to schedule a US of her neck, and she wanted to know if dr Zimmer wanted her to get this done before her next appt, since she didn't know anything about this she wanted to make sure.   she states last august she was told he US was good.    Next appointment pt has with dr Zimmer on 5/16/17

## 2017-04-21 NOTE — TELEPHONE ENCOUNTER
Called back spoke with pt, notified of dr Zimmer's note. Pt agreed and states she will think about getting it done.

## 2017-05-16 ENCOUNTER — OFFICE VISIT (OUTPATIENT)
Dept: ENDOCRINOLOGY | Facility: MEDICAL CENTER | Age: 82
End: 2017-05-16
Payer: MEDICARE

## 2017-05-16 VITALS
DIASTOLIC BLOOD PRESSURE: 90 MMHG | SYSTOLIC BLOOD PRESSURE: 140 MMHG | WEIGHT: 135 LBS | HEART RATE: 89 BPM | HEIGHT: 66 IN | OXYGEN SATURATION: 99 % | BODY MASS INDEX: 21.69 KG/M2

## 2017-05-16 DIAGNOSIS — E04.2 MULTIPLE THYROID NODULES: ICD-10-CM

## 2017-05-16 PROCEDURE — G8420 CALC BMI NORM PARAMETERS: HCPCS | Performed by: INTERNAL MEDICINE

## 2017-05-16 PROCEDURE — 1101F PT FALLS ASSESS-DOCD LE1/YR: CPT | Performed by: INTERNAL MEDICINE

## 2017-05-16 PROCEDURE — 1036F TOBACCO NON-USER: CPT | Performed by: INTERNAL MEDICINE

## 2017-05-16 PROCEDURE — 99213 OFFICE O/P EST LOW 20 MIN: CPT | Performed by: INTERNAL MEDICINE

## 2017-05-16 PROCEDURE — 4040F PNEUMOC VAC/ADMIN/RCVD: CPT | Performed by: INTERNAL MEDICINE

## 2017-05-16 NOTE — MR AVS SNAPSHOT
"        Prabha Mathews Dl   2017 1:40 PM   Office Visit   MRN: [id number]    Department:  Endocrinology [facility]   Dept Phone:  [phone]    Description:  Female : 1935   Provider:  José Miguel Zimmer M.D.           Allergies as of 2017     Allergen Noted Reactions    Amoxicillin 2015       Unsure of reaction/or allergy    Morphine 2016   Vomiting, Nausea    Penicillins 2015       1970 reaction      Vital Signs     Blood Pressure Pulse Height Weight Body Mass Index Oxygen Saturation    140/90 mmHg 89 1.676 m (5' 5.98\") 61.236 kg (135 lb) 21.80 kg/m2 99%    Smoking Status                   Never Smoker            Basic Information     Date Of Birth Sex Race Ethnicity Preferred Language    1935 Female White Non- English      Your appointments     2017  1:20 PM   ANNUAL EXAM PREVENTATIVE with Amos Martinez M.D.   [facility] ([place])    [address]  [facility] [id number] [phone]            2017  1:40 PM   Established Patient with José Miguel Zimmer M.D.   [facility] ([facility])    [address]  [facility] [address] [phone]           You will be receiving a confirmation call a few days before your appointment from our automated call confirmation system.              Problem List              ICD-10-CM Priority Class Noted - Resolved    HTN (hypertension) I10   2010 - Present    Insomnia G47.00   Unknown - Present    Impaired fasting blood sugar R73.01   7/10/2014 - Present    Anxiety F41.9   2014 - Present    Multiple thyroid nodules E04.2   Unknown - Present    Elevated cortisol level (CMS-[facility]) E27.0   2014 - Present    Hair loss L65.9   2017 - Present    Postmenopausal estrogen deficiency Z78.0   3/8/2017 - Present    Osteoporosis M81.0   2017 - Present      Health Maintenance        Date Due Completion Dates    IMM " DTaP/Tdap/Td Vaccine (1 - Tdap) 5/23/1956 ---    IMM ZOSTER VACCINE 5/23/1997 ---    IMM PNEUMOCOCCAL 65+ (ADULT) LOW/MEDIUM RISK SERIES (2 of 2 - PPSV23) 7/6/2017 7/6/2016    BONE DENSITY 3/16/2022 3/16/2017, 1/1/2014 (Done)    Override on 1/1/2014: Done            Current Immunizations     13-VALENT PCV PREVNAR 7/6/2016 11:43 AM    Influenza TIV (IM) 10/14/2015, 10/22/2014, 10/22/2013, 11/5/2012, 10/2/2011, 10/11/2010, 10/25/2008  3:45 PM    Influenza Vaccine Adult HD 10/25/2016 11:14 AM, 10/14/2015  4:14 PM, 10/22/2014      Below and/or attached are the medications your provider expects you to take. Review all of your home medications and newly ordered medications with your provider and/or pharmacist. Follow medication instructions as directed by your provider and/or pharmacist. Please keep your medication list with you and share with your provider. Update the information when medications are discontinued, doses are changed, or new medications (including over-the-counter products) are added; and carry medication information at all times in the event of emergency situations     Allergies:  AMOXICILLIN - (reactions not documented)     MORPHINE - Vomiting,Nausea     PENICILLINS - (reactions not documented)               Medications  Valid as of: May 16, 2017 -  2:10 PM    Generic Name Brand Name Tablet Size Instructions for use    Aspirin (Tablet Delayed Response) ECOTRIN 81 MG Take 81 mg by mouth every bedtime.        Calcium Carb-Cholecalciferol (Tab) oyster shell calcium/vitamin D 250-125 MG-UNIT Take 1 Tab by mouth every day.        Cholecalciferol (Tab) Vitamin D3 2000 UNITS Take 1 tablet by mouth every day.        Ferrous Sulfate (Tablet Delayed Response) ferrous sulfate 325 (65 FE) MG Take 1 Tab by mouth 2 times a day, with meals.        Melatonin (Tab) Melatonin 5 MG Take  by mouth.        Metoprolol Tartrate (Tab) LOPRESSOR 25 MG Take 25 mg by mouth 2 times a day.        Multiple Vitamins-Minerals   Take 1  Tab by mouth every day.        Terazosin HCl (Cap) HYTRIN 2 MG Take 2 mg by mouth every bedtime.        Valsartan (Tab) DIOVAN 320 MG Take 320 mg by mouth every day.        .                 Medicines prescribed today were sent to:     [id number] - [place], [place] - [address] [facility] [address] [facility] [address]    [address] [place] [address]    Phone: [phone] Fax: [phone]    Open 24 Hours?: No      Medication refill instructions:       If your prescription bottle indicates you have medication refills left, it is not necessary to call your provider’s office. Please contact your pharmacy and they will refill your medication.    If your prescription bottle indicates you do not have any refills left, you may request refills at any time through one of the following ways: The online HIT Community system (except Urgent Care), by calling your provider’s office, or by asking your pharmacy to contact your provider’s office with a refill request. Medication refills are processed only during regular business hours and may not be available until the next business day. Your provider may request additional information or to have a follow-up visit with you prior to refilling your medication.   *Please Note: Medication refills are assigned a new Rx number when refilled electronically. Your pharmacy may indicate that no refills were authorized even though a new prescription for the same medication is available at the pharmacy. Please request the medicine by name with the pharmacy before contacting your provider for a refill.           HIT Community Access Code: [email or web address]  Expires: 6/15/2017  2:10 PM    HIT Community  A secure, online tool to manage your health information     [employer]’s HIT Community® is a secure, online tool that connects you to your personalized health information from the privacy of your home -- day or night - making it very easy for you to manage your healthcare. Once the activation process is  completed, you can even access your medical information using the Hotel[employer]ight mohamud, which is available for free in the Apple Mohamud store or Google Play store.     HotelTonight provides the following levels of access (as shown below):   My Chart Features   [facility] Primary Care Doctor [facility]  Specialists [facility]  Urgent  Care Non-[facility]  Primary Care  Doctor   Email your healthcare team securely and privately 24/7 X X X    Manage appointments: schedule your next appointment; view details of past/upcoming appointments X      Request prescription refills. X      View recent personal medical records, including lab and immunizations X X X X   View health record, including health history, allergies, medications X X X X   Read reports about your outpatient visits, procedures, consult and ER notes X X X X   See your discharge summary, which is a recap of your hospital and/or ER visit that includes your diagnosis, lab results, and care plan. X X       How to register for HotelTonight:  1. Go to  [email or web address].  2. Click on the Sign Up Now box, which takes you to the New Member Sign Up page. You will need to provide the following information:  a. Enter your HotelTonight Access Code exactly as it appears at the top of this page. (You will not need to use this code after you’ve completed the sign-up process. If you do not sign up before the expiration date, you must request a new code.)   b. Enter your date of birth.   c. Enter your home email address.   d. Click Submit, and follow the next screen’s instructions.  3. Create a HotelTonight ID. This will be your HotelTonight login ID and cannot be changed, so think of one that is secure and easy to remember.  4. Create a HotelTonight password. You can change your password at any time.  5. Enter your Password Reset Question and Answer. This can be used at a later time if you forget your password.   6. Enter your e-mail address. This allows you to receive e-mail notifications when new information is  available in [employer].  7. Click Sign Up. You can now view your health information.    For assistance activating your [employer] account, call [phone]

## 2017-05-17 NOTE — PROGRESS NOTES
Chief Complaint   Patient presents with   • Follow-Up     multiple thyroid nodules        HPI:    Multiple thyroid nodules          Thyroid gland is asymptomatic. Patient is not aware of any change in her gland. No discomfort. No difficulty swallowing, breathing, or voice change.           I ordered her thyroid ultrasound last month. She said somebody contacted her to schedule but mentioned the Medicare may not pay for it in less than a year. That does not make sense.           The order is still active so I told her to call scheduling and go ahead and schedule it. The largest nodule in the right lobe has been biopsied with a benign outcome. There is a significant nodule in the left lobe that has not been biopsied. We need to see the ultrasound to see if either one of these nodules has grown.    ROS:  Still complaining about her hair thinning. I think her scalp is healthy. I explained again that I think this might be nutritional. She needs more protein. She told me she is taking biotin.  As a great deal of difficulty sleeping. She can sleep for about 4 hours and then she is awake and can get back to sleep. A common problem at her age.  All other systems reported as negative or unchanged since last exam      Allergies:   Allergies   Allergen Reactions   • Amoxicillin      Unsure of reaction/or allergy   • Morphine Vomiting and Nausea   • Penicillins      1970 reaction       Current medicines including changes today:  Current Outpatient Prescriptions   Medication Sig Dispense Refill   • Melatonin ([facility] MELATONIN) 5 MG Tab Take  by mouth.     • Cholecalciferol (VITAMIN D3) 2000 UNITS Tab Take 1 tablet by mouth every day. 90 Tab 1   • Multiple Vitamins-Minerals (HAIR/SKIN/NAILS PO) Take 1 Tab by mouth every day.     • metoprolol (LOPRESSOR) 25 MG Tab Take 25 mg by mouth 2 times a day.     • aspirin EC (ECOTRIN) 81 MG TBEC Take 81 mg by mouth every bedtime.     • terazosin (HYTRIN) 2 MG CAPS Take 2 mg by mouth every  "bedtime.     • valsartan (DIOVAN) 320 MG tablet Take 320 mg by mouth every day.     • ferrous sulfate 325 (65 FE) MG EC tablet Take 1 Tab by mouth 2 times a day, with meals.     • Calcium Carb-Cholecalciferol (OYSTER SHELL CALCIUM/VITAMIN D) 250-125 MG-UNIT Tab tablet Take 1 Tab by mouth every day.       No current facility-administered medications for this visit.        Past Medical History   Diagnosis Date   • CHI (closed head injury)    • Urinary tract infection    • Hypertension      medicated   • Insomnia    • Thyrotoxicosis      history in 2013 / euthyroid 2015   • Multiple thyroid nodules 2008   • Elevated cortisol level (CMS-[facility])      unknown etiology       PHYSICAL EXAM:    /90 mmHg  Pulse 89  Ht 1.676 m (5' 5.98\")  Wt 61.236 kg (135 lb)  BMI 21.80 kg/m2  SpO2 99%    Gen.   appears healthy              Skin   appropriate for sex and age           Her scalp hair is thinning. The scalp appears healthy.    HEENT  unremarkable    Neck  thyroid gland is palpable with a bosselated surface. I cannot feel distinct nodules in the gland or elsewhere in the neck or supraclavicular areas.    Heart  regular    Extremities  no edema    Neuro  gait and station normal    Psych  appropriate        ASSESSMENT AND RECOMMENDATIONS    1. Multiple thyroid nodules            Repeat ultrasound. The left lobe nodule has not been biopsied.      DISPOSITION: Return in about 6 months (around 11/16/2017).       José Miguel Zimmer M.D.    Copies to: Amos Martinez M.D. [phone]  "

## 2017-06-09 ENCOUNTER — HOSPITAL ENCOUNTER (OUTPATIENT)
Dept: RADIOLOGY | Facility: MEDICAL CENTER | Age: 82
End: 2017-06-09
Attending: INTERNAL MEDICINE
Payer: MEDICARE

## 2017-06-09 DIAGNOSIS — E04.2 MULTIPLE THYROID NODULES: ICD-10-CM

## 2017-06-09 PROCEDURE — 76536 US EXAM OF HEAD AND NECK: CPT

## 2017-07-06 ENCOUNTER — TELEPHONE (OUTPATIENT)
Dept: MEDICAL GROUP | Facility: MEDICAL CENTER | Age: 82
End: 2017-07-06

## 2017-07-06 NOTE — TELEPHONE ENCOUNTER
ANNUAL WELLNESS VISIT PRE-VISIT PLANNING     1.  Reviewed last PCP office visit assessment and plan notes: Yes 04/13/2017    2.  If any orders were placed last visit do we have Results/Consult Notes?        •  Labs? Not comp        •  Imaging? Yes 06/09/2017    4.  Patient is due for these Health Maintenance Topics:   Health Maintenance Due   Topic Date Due   • Annual Wellness Visit  1935   • IMM DTaP/Tdap/Td Vaccine (1 - Tdap) 05/23/1956   • IMM ZOSTER VACCINE  05/23/1997   • IMM PNEUMOCOCCAL 65+ (ADULT) LOW/MEDIUM RISK SERIES (2 of 2 - PPSV23) 07/06/2017       5.  Immunizations were updated in [employer] using WebIZ?: Yes         •  Is patient due for Tdap/Shingles? Yes.  If yes, was patient alerted of copay? Yes    6.  Patient has:  No chronic disease    7.  Updated Care Team with "[employer] and all specialists?        •   Gait devices, O2, CPAP, etc: yes        •   Eye professional: yes       •   Other specialists (GYN, cardiology, endo, etc): no    8.  Is patient in need of any refills prior to office visit? No       •    Separate refill encounter created?: no    9.  Patient was informed to arrive 15 min prior to their scheduled appointment and bring in their medication bottles? yes    10.  Patient was advised: “This is a free wellness visit. The provider will screen for medical conditions to help you stay healthy. If you have other concerns to address you may be asked to discuss these at a separate visit or there may be an additional fee.”  Yes

## 2017-07-13 ENCOUNTER — OFFICE VISIT (OUTPATIENT)
Dept: MEDICAL GROUP | Facility: MEDICAL CENTER | Age: 82
End: 2017-07-13
Payer: MEDICARE

## 2017-07-13 VITALS
WEIGHT: 136.2 LBS | SYSTOLIC BLOOD PRESSURE: 150 MMHG | TEMPERATURE: 99.1 F | RESPIRATION RATE: 16 BRPM | HEIGHT: 65 IN | OXYGEN SATURATION: 98 % | HEART RATE: 84 BPM | DIASTOLIC BLOOD PRESSURE: 84 MMHG | BODY MASS INDEX: 22.69 KG/M2

## 2017-07-13 DIAGNOSIS — Z00.00 MEDICARE ANNUAL WELLNESS VISIT, SUBSEQUENT: ICD-10-CM

## 2017-07-13 PROCEDURE — G0439 PPPS, SUBSEQ VISIT: HCPCS | Performed by: FAMILY MEDICINE

## 2017-07-13 ASSESSMENT — PAIN SCALES - GENERAL: PAINLEVEL: NO PAIN

## 2017-07-13 ASSESSMENT — PATIENT HEALTH QUESTIONNAIRE - PHQ9: CLINICAL INTERPRETATION OF PHQ2 SCORE: 0

## 2017-07-13 NOTE — PROGRESS NOTES
Chief Complaint   Patient presents with   • Annual Exam         HPI:  Prabha Lizama is a 82 y.o. female here for Medicare Annual Wellness Visit    Patient Active Problem List    Diagnosis Date Noted   • Osteoporosis 04/13/2017   • Postmenopausal estrogen deficiency 03/08/2017   • Hair loss 02/08/2017   • Elevated cortisol level (CMS-HCC) 09/29/2014   • Multiple thyroid nodules    • Anxiety 08/07/2014   • Impaired fasting blood sugar 07/10/2014   • Insomnia    • HTN (hypertension) 02/16/2010       Current Outpatient Prescriptions   Medication Sig Dispense Refill   • Cholecalciferol (VITAMIN D3) 2000 UNITS Tab Take 1 tablet by mouth every day. 90 Tab 1   • Calcium Carb-Cholecalciferol (OYSTER SHELL CALCIUM/VITAMIN D) 250-125 MG-UNIT Tab tablet Take 1 Tab by mouth every day.     • Multiple Vitamins-Minerals (HAIR/SKIN/NAILS PO) Take 1 Tab by mouth every day.     • metoprolol (LOPRESSOR) 25 MG Tab Take 25 mg by mouth 2 times a day.     • aspirin EC (ECOTRIN) 81 MG TBEC Take 81 mg by mouth every bedtime.     • terazosin (HYTRIN) 2 MG CAPS Take 2 mg by mouth every bedtime.     • valsartan (DIOVAN) 320 MG tablet Take 320 mg by mouth every day.     • Melatonin ([employer] MELATONIN) 5 MG Tab Take  by mouth.     • ferrous sulfate 325 (65 FE) MG EC tablet Take 1 Tab by mouth 2 times a day, with meals.       No current facility-administered medications for this visit.        Patient is taking medications as noted in medication list.  Current supplements as per medication list.   Chronic narcotic pain medicines: no    Allergies: Amoxicillin; Morphine; and Penicillins    Current social contact/activities: dinner, concerts, spend time with .      Is patient current with immunizations?  No, due for PNEUMOVAX (PPSV23), TDAP and ZOSTAVAX (Shingles). Patient would like to discuss PPSV 23 with PCP.    She  reports that she has never smoked. She has never used smokeless tobacco. She reports that she does not drink alcohol or use  illicit drugs.  Counseling given: Not Answered        DPA/Advanced Directive:  Patient does not have an Advanced Directive.  A packet and workshop information was given on Advanced Directives.    ROS:    Gait: Uses no assistive device   Ostomy: no   Other tubes: no   Amputations: no   Chronic oxygen use: no   Last eye exam: appt coming up 08/04/2017   Wears hearing aids: no   : Denies incontinence     Depression Screening    Little interest or pleasure in doing things?  0 - not at all  Feeling down, depressed, or hopeless?  0 - not at all  Patient Health Questionnaire Score: 0  If depressive symptoms identified deferred to follow up visit unless specifically addressed in assessment and plan.    Interpretation of PHQ-9 Total Score   Score Severity   1-4 Minimal Depression   5-9 Mild Depression   10-14 Moderate Depression   15-19 Moderately Severe Depression   20-27 Severe Depression    Screening for Cognitive Impairment    Three Minute Recall (banana, sunrise, fence)  3/3    Draw clock face with all 12 numbers set to the hand to show 10 minutes past 11 o'clock  5/5  If cognitive concerns identified deferred to follow up visit unless specifically addressed in assessment and plan.    Fall Risk Assessment    Has the patient had two or more falls in the last year or any fall with injury in the last year?  No  If Fall Risk identified deferred to follow up visit unless specifically addressed in assessment and plan.    Safety Assessment    Throw rugs on floor.  No  Handrails on all stairs.  No  Good lighting in all hallways.  Yes  Difficulty hearing.  No  Patient counseled about all safety risks that were identified.    Functional Assessment ADLs    Are there any barriers preventing you from cooking for yourself or meeting nutritional needs?  No.    Are there any barriers preventing you from driving safely or obtaining transportation?  No.    Are there any barriers preventing you from using a telephone or calling for  "help?  No.    Are there any barriers preventing you from shopping?  No.    Are there any barriers preventing you from taking care of your own finances?  No.    Are there any barriers preventing you from managing your medications?  No.    Are currently engaging any exercise or physical activity?  Yes.  Walking 30 mins daily.     Health Maintenance Summary                Annual Wellness Visit Overdue 1935     IMM DTaP/Tdap/Td Vaccine Overdue 5/23/1956     IMM ZOSTER VACCINE Overdue 5/23/1997     IMM PNEUMOCOCCAL 65+ (ADULT) LOW/MEDIUM RISK SERIES Overdue 7/6/2017      Done 7/6/2016 Imm Admin: Pneumococcal Conjugate Vaccine (Prevnar/PCV-13)    IMM INFLUENZA Next Due 9/1/2017      Done 10/25/2016 Imm Admin: Influenza Vaccine Adult HD     Patient has more history with this topic...    BONE DENSITY Next Due 3/16/2022      Done 3/16/2017 DS-BONE DENSITY STUDY (DEXA)     Patient has more history with this topic...          Patient Care Team:  Amos Martinez M.D. as PCP - General (Family Medicine)  José Miguel Zimmer M.D. as Consulting Physician (Endocrinology)  Michael J Bloch, M.D. as Consulting Physician (Internal Medicine)  Afia Kulkarni O.D. as Consulting Physician (Optometry)    Social History   Substance Use Topics   • Smoking status: Never Smoker    • Smokeless tobacco: Never Used   • Alcohol Use: No     Family History   Problem Relation Age of Onset   • Heart Disease Mother    • Heart Disease Father    • Hypertension Sister    • Arthritis Sister    • Thyroid Sister      She  has a past medical history of CHI (closed head injury); Urinary tract infection; Hypertension; Insomnia; Thyrotoxicosis; Multiple thyroid nodules (2008); and Elevated cortisol level (CMS-HCC).   Past Surgical History   Procedure Laterality Date   • Tonsillectomy     • Appendectomy             Exam:     Blood pressure 150/84, pulse 84, temperature 37.3 °C (99.1 °F), resp. rate 16, height 1.657 m (5' 5.24\"), weight 61.78 kg (136 lb 3.2 " oz), SpO2 98 %. Body mass index is 22.5 kg/(m^2).    Hearing excellent.    Dentition good --> seeing dentist for cleaning 3x/year  Alert, oriented in no acute distress.  Eye contact is good, speech goal directed, affect calm      Assessment and Plan. The following treatment and monitoring plan is recommended:    1. Medicare annual wellness visit, subsequent           Services suggested: No services needed at this time  Health Care Screening recommendations as per orders if indicated.  Referrals offered: PT/OT/Nutrition counseling/Behavioral Health/Smoking cessation as per orders if indicated.    Discussion today about general wellness and lifestyle habits:    · Prevent falls and reduce trip hazards; Cautioned about securing or removing rugs.  · Have a working fire alarm and carbon monoxide detector;   · Engage in regular physical activity and social activities       Follow-up: No Follow-up on file.

## 2017-07-13 NOTE — MR AVS SNAPSHOT
"        Prabha Mathews Dl   2017 1:20 PM   Office Visit   MRN: [id number]    Department:  [place] [address]   Dept Phone:  [phone]    Description:  Female : 1935   Provider:  Amos Martinez M.D.; [facility]            Reason for Visit     Annual Exam           Allergies as of 2017     Allergen Noted Reactions    Amoxicillin 2015       Unsure of reaction/or allergy    Morphine 2016   Vomiting, Nausea    Penicillins 2015       1970 reaction      Vital Signs     Blood Pressure Pulse Temperature Respirations Height Weight    150/84 mmHg 84 37.3 °C (99.1 °F) 16 1.657 m (5' 5.24\") 61.78 kg (136 lb 3.2 oz)    Body Mass Index Oxygen Saturation Smoking Status             22.50 kg/m2 98% Never Smoker          Basic Information     Date Of Birth Sex Race Ethnicity Preferred Language    1935 Female White Non- English      Your appointments     2017  1:40 PM   Established Patient with José Miguel Zimmer M.D.   [facility] ([facility])    [address], [address]  [place] [address] [phone]           You will be receiving a confirmation call a few days before your appointment from our automated call confirmation system.              Problem List              ICD-10-CM Priority Class Noted - Resolved    HTN (hypertension) I10   2010 - Present    Insomnia G47.00   Unknown - Present    Impaired fasting blood sugar R73.01   7/10/2014 - Present    Anxiety F41.9   2014 - Present    Multiple thyroid nodules E04.2   Unknown - Present    Elevated cortisol level (CMS-[facility]) E27.0   2014 - Present    Hair loss L65.9   2017 - Present    Postmenopausal estrogen deficiency Z78.0   3/8/2017 - Present    Osteoporosis M81.0   2017 - Present      Health Maintenance        Date Due Completion Dates    IMM DTaP/Tdap/Td Vaccine (1 - Tdap) 1956 ---    IMM ZOSTER VACCINE 1997 ---    IMM PNEUMOCOCCAL " 65+ (ADULT) LOW/MEDIUM RISK SERIES (2 of 2 - PPSV23) 7/6/2017 7/6/2016    IMM INFLUENZA (1) 9/1/2017 10/25/2016, 10/14/2015, 10/14/2015, 10/22/2014, 10/22/2014, 10/22/2013, 11/5/2012, 10/2/2011, 10/11/2010, 10/25/2008    BONE DENSITY 3/16/2022 3/16/2017, 1/1/2014 (Done)    Override on 1/1/2014: Done            Current Immunizations     13-VALENT PCV PREVNAR 7/6/2016 11:43 AM    Influenza TIV (IM) 10/14/2015, 10/22/2014, 10/22/2013, 11/5/2012, 10/2/2011, 10/11/2010, 10/25/2008  3:45 PM    Influenza Vaccine Adult HD 10/25/2016 11:14 AM, 10/14/2015  4:14 PM, 10/22/2014      Below and/or attached are the medications your provider expects you to take. Review all of your home medications and newly ordered medications with your provider and/or pharmacist. Follow medication instructions as directed by your provider and/or pharmacist. Please keep your medication list with you and share with your provider. Update the information when medications are discontinued, doses are changed, or new medications (including over-the-counter products) are added; and carry medication information at all times in the event of emergency situations     Allergies:  AMOXICILLIN - (reactions not documented)     MORPHINE - Vomiting,Nausea     PENICILLINS - (reactions not documented)               Medications  Valid as of: July 13, 2017 -  2:15 PM    Generic Name Brand Name Tablet Size Instructions for use    Aspirin (Tablet Delayed Response) ECOTRIN 81 MG Take 81 mg by mouth every bedtime.        Calcium Carb-Cholecalciferol (Tab) oyster shell calcium/vitamin D 250-125 MG-UNIT Take 1 Tab by mouth every day.        Cholecalciferol (Tab) Vitamin D3 2000 UNITS Take 1 tablet by mouth every day.        Ferrous Sulfate (Tablet Delayed Response) ferrous sulfate 325 (65 FE) MG Take 1 Tab by mouth 2 times a day, with meals.        Melatonin (Tab) Melatonin 5 MG Take  by mouth.        Metoprolol Tartrate (Tab) LOPRESSOR 25 MG Take 25 mg by mouth 2 times a  day.        Multiple Vitamins-Minerals   Take 1 Tab by mouth every day.        Terazosin HCl (Cap) HYTRIN 2 MG Take 2 mg by mouth every bedtime.        Valsartan (Tab) DIOVAN 320 MG Take 320 mg by mouth every day.        .                 Medicines prescribed today were sent to:     [facility] [place], [place] - [address] AT [address]    [address] [place] [address]    Phone: [phone] Fax: [phone]    Open 24 Hours?: No      Medication refill instructions:       If your prescription bottle indicates you have medication refills left, it is not necessary to call your provider’s office. Please contact your pharmacy and they will refill your medication.    If your prescription bottle indicates you do not have any refills left, you may request refills at any time through one of the following ways: The online [employer] system (except Urgent Care), by calling your provider’s office, or by asking your pharmacy to contact your provider’s office with a refill request. Medication refills are processed only during regular business hours and may not be available until the next business day. Your provider may request additional information or to have a follow-up visit with you prior to refilling your medication.   *Please Note: Medication refills are assigned a new Rx number when refilled electronically. Your pharmacy may indicate that no refills were authorized even though a new prescription for the same medication is available at the pharmacy. Please request the medicine by name with the pharmacy before contacting your provider for a refill.        Instructions    Please check for the following vaccines with Medicare:     1) PPSV23 (Pneumococcal polysaccharide)     2) Tdap (Tetanus, diphtheria, pertussis)     3) Shingles (aka Zoster vaccine aka Zostavax)          MyChart Status: Patient Declined

## 2017-07-13 NOTE — PATIENT INSTRUCTIONS
Please check for the following vaccines with Medicare:     1) PPSV23 (Pneumococcal polysaccharide)     2) Tdap (Tetanus, diphtheria, pertussis)     3) Shingles (aka Zoster vaccine aka Zostavax)

## 2017-11-28 ENCOUNTER — OFFICE VISIT (OUTPATIENT)
Dept: ENDOCRINOLOGY | Facility: MEDICAL CENTER | Age: 82
End: 2017-11-28
Payer: MEDICARE

## 2017-11-28 VITALS
BODY MASS INDEX: 22.49 KG/M2 | HEIGHT: 65 IN | SYSTOLIC BLOOD PRESSURE: 146 MMHG | OXYGEN SATURATION: 97 % | DIASTOLIC BLOOD PRESSURE: 90 MMHG | HEART RATE: 95 BPM | WEIGHT: 135 LBS

## 2017-11-28 DIAGNOSIS — E04.2 MULTIPLE THYROID NODULES: ICD-10-CM

## 2017-11-28 PROCEDURE — 99213 OFFICE O/P EST LOW 20 MIN: CPT | Performed by: INTERNAL MEDICINE

## 2017-11-28 NOTE — PROGRESS NOTES
"Chief Complaint   Patient presents with   • Follow-Up     multiple thyroid nodules        HPI:    Multiple thyroid nodules          Thyroid gland is asymptomatic. Patient is not aware of any change in her gland. No discomfort. No difficulty swallowing, breathing, or voice change.           Thyroid ultrasound June 2017 showed her multiple nodules as been stable. The nodules appear to be suspicious in any way. The largest nodule on the right as had FNA biopsy showing a \"benign\" result. There is also a nodule in the left low 2.6 cm which is unchanged in size compared to the year before. This nodule has not been biopsied but there is nothing particularly suspicious about it. She had a very difficult time with the previous FNA biopsy and really does not want to do another one unless I insist. I think as long as were following these nodules will be okay to do without.           She does not take thyroid hormone. Her last thyroid blood levels were one year ago. TSH 2.1 and free thyroxine 1.5.             She is going to do blood tests for Dr. Bloch so and he has included a TSH level so that should suffice.    ROS:  Patient has been monitoring blood pressures regularly and consistently finds it in good range with systolics 130 or less and diastolics less than 80. Current office reading I think is not consistent with her usual readings. Also her heart rate slowed from 95 down to 70 during our conversation in the office.    Her scalp hair is growing in and she is pleased about that. She is using biotin as well as good nutrition.    All other systems reported as negative or unchanged since last exam      Allergies:   Allergies   Allergen Reactions   • Amoxicillin      Unsure of reaction/or allergy   • Morphine Vomiting and Nausea   • Penicillins      1970 reaction       Current medicines including changes today:  Current Outpatient Prescriptions   Medication Sig Dispense Refill   • Cholecalciferol (VITAMIN D3) 2000 UNITS Tab " "Take 1 tablet by mouth every day. 90 Tab 1   • Calcium Carb-Cholecalciferol (OYSTER SHELL CALCIUM/VITAMIN D) 250-125 MG-UNIT Tab tablet Take 1 Tab by mouth every day.     • Multiple Vitamins-Minerals (HAIR/SKIN/NAILS PO) Take 1 Tab by mouth every day.     • metoprolol (LOPRESSOR) 25 MG Tab Take 25 mg by mouth 2 times a day.     • aspirin EC (ECOTRIN) 81 MG TBEC Take 81 mg by mouth every bedtime.     • terazosin (HYTRIN) 2 MG CAPS Take 2 mg by mouth every bedtime.     • valsartan (DIOVAN) 320 MG tablet Take 320 mg by mouth every day.     • Melatonin ([employer]S MELATONIN) 5 MG Tab Take  by mouth.     • ferrous sulfate 325 (65 FE) MG EC tablet Take 1 Tab by mouth 2 times a day, with meals.       No current facility-administered medications for this visit.         Past Medical History:   Diagnosis Date   • CHI (closed head injury)    • Elevated cortisol level (CMS-HCC)     unknown etiology   • Hypertension     medicated   • Insomnia    • Multiple thyroid nodules 2008   • Thyrotoxicosis     history in 2013 / euthyroid 2015   • Urinary tract infection        PHYSICAL EXAM:    /90   Pulse 95   Ht 1.651 m (5' 5\")   Wt 61.2 kg (135 lb)   SpO2 97%   BMI 22.47 kg/m²      Gen.   appears healthy in no distress    Skin   appropriate for sex and age    HEENT  unremarkable     Neck  thyroid gland is mildly enlarged bilaterally. Bosselated surface. I don't feel any distinct nodules in the gland or elsewhere in the neck or clavicular areas.    Heart  regular    Extremities  no edema    Neuro  gait and station normal    Psych  appropriate      ASSESSMENT AND RECOMMENDATIONS    1. Multiple thyroid nodules           Asymptomatic and stable by ultrasound. FNA biopsy ×1 \"benign\"      DISPOSITION:   Repeat ultrasound in follow-up in July 2008       José Miguel Zimmer M.D.    Copies to: Amos Martinez M.D. [phone]                   Dr Michael Bloch  "

## 2018-01-12 ENCOUNTER — TELEPHONE (OUTPATIENT)
Dept: MEDICAL GROUP | Facility: MEDICAL CENTER | Age: 83
End: 2018-01-12

## 2018-01-12 NOTE — TELEPHONE ENCOUNTER
ESTABLISHED PATIENT PRE-VISIT PLANNING     Note: Patient will not be contacted if there is no indication to call.     1.  Reviewed notes from the last few office visits within the medical group: Yes 07/13/2017    2.  If any orders were placed at last visit or intended to be done for this visit (i.e. 6 mos follow-up), do we have Results/Consult Notes?        •  Labs - Labs were not ordered at last office visit.   Note: If patient appointment is for lab review and patient did not complete labs, check with provider if OK to reschedule patient until labs completed.       •  Imaging - Imaging was not ordered at last office visit.       •  Referrals - No referrals were ordered at last office visit.    3. Is this appointment scheduled as a Hospital Follow-Up? No    4.  Immunizations were updated in Epic using WebIZ?: No WebIZ record       •  Web [employer] Recommendations: MMR , PNEUMOVAX (PPSV23), TDAP and ZOSTAVAX (Shingles)    5.  Patient is due for the following Health Maintenance Topics:   Health Maintenance Due   Topic Date Due   • IMM DTaP/Tdap/Td Vaccine (1 - Tdap) 05/23/1956   • IMM ZOSTER VACCINE  05/23/1997   • IMM PNEUMOCOCCAL 65+ (ADULT) LOW/MEDIUM RISK SERIES (2 of 2 - PPSV23) 07/06/2017   • IMM INFLUENZA (1) 09/01/2017         6.  Patient was NOT informed to arrive 15 min prior to their scheduled appointment and bring in their medication bottles.

## 2018-01-15 ENCOUNTER — OFFICE VISIT (OUTPATIENT)
Dept: MEDICAL GROUP | Facility: MEDICAL CENTER | Age: 83
End: 2018-01-15
Payer: MEDICARE

## 2018-01-15 VITALS
DIASTOLIC BLOOD PRESSURE: 88 MMHG | WEIGHT: 135 LBS | HEIGHT: 65 IN | OXYGEN SATURATION: 94 % | TEMPERATURE: 98.8 F | HEART RATE: 87 BPM | SYSTOLIC BLOOD PRESSURE: 152 MMHG | BODY MASS INDEX: 22.49 KG/M2

## 2018-01-15 DIAGNOSIS — E78.00 PURE HYPERCHOLESTEROLEMIA: ICD-10-CM

## 2018-01-15 DIAGNOSIS — E04.2 MULTIPLE THYROID NODULES: ICD-10-CM

## 2018-01-15 DIAGNOSIS — F51.01 PRIMARY INSOMNIA: ICD-10-CM

## 2018-01-15 DIAGNOSIS — R73.01 IMPAIRED FASTING BLOOD SUGAR: ICD-10-CM

## 2018-01-15 LAB
HBA1C MFR BLD: 5.2 % (ref ?–5.8)
INT CON NEG: NEGATIVE
INT CON POS: POSITIVE

## 2018-01-15 PROCEDURE — 99214 OFFICE O/P EST MOD 30 MIN: CPT | Performed by: FAMILY MEDICINE

## 2018-01-15 PROCEDURE — 83036 HEMOGLOBIN GLYCOSYLATED A1C: CPT | Performed by: FAMILY MEDICINE

## 2018-01-15 RX ORDER — TERAZOSIN 1 MG/1
1 CAPSULE ORAL EVERY MORNING
Qty: 30 CAP | Refills: 3 | COMMUNITY
Start: 2018-01-15 | End: 2018-07-13 | Stop reason: DRUGHIGH

## 2018-01-15 NOTE — ASSESSMENT & PLAN NOTE
Patient and I discussed recent labs (see below) and that ASCVD risk is increased based on most recent lipid panel, current blood pressure (hypertensive,  medication), diabetes status (non-diabetic), and smoking status (non-smoker).    Patient and I then discussed necessary dietary changes to make to address dyslipidemia.  Patient verbalized understanding.    ROS is NEGATIVE for dizziness, generalized weakness/fatigue, vision/hearing changes, jaw pain/paresthesias, BUE pain/paresthesias/numbness/weakness, chest pain/pressure, palpitations, dyspnea, RUQ abdominal pain, oliguria/anuria, BLE edema.    Lab Results   Component Value Date/Time    CHOLSTRLTOT 200 (H) 02/08/2010 07:10 AM     (H) 02/08/2010 07:10 AM    HDL 54 02/08/2010 07:10 AM    TRIGLYCERIDE 121 02/08/2010 07:10 AM     Dietary history as below:  Breakfast: Baptism Oats pre-packaged oatmeal (with water), apple, Ensure  Lunch: (Ham Cheese & Bashir Lettuce sandwich on dill/rye bread; may sub in friend egg for cheese) OR Cheerios (with pineapple or walnuts) or slice of Pizza (with coke zero), water  Dinner: Salad (Coleslaw or Bashir + Tomato), 2oz sirloin, 2-3 veggies [zucchini cauliflower, brocolli], water  Snack: Yogurt

## 2018-01-15 NOTE — ASSESSMENT & PLAN NOTE
Patient with impaired fasting blood sugar based on recent labs (01/2018), that we will check her A1c today to detect if she has prediabetes or diabetes.    ROS is NEGATIVE for blurred vision, polydipsia, polyuria, diaphoresis, palpitations, fatigue, irritability, flank pain, BLE paresthesias.

## 2018-01-16 NOTE — PROGRESS NOTES
Subjective:   Chief Complaint/History of Present Illness:  Prabha Lizama is a 82 y.o. female established patient who presents today to discuss the following medical conditions:    Pure hypercholesterolemia  Patient and I discussed recent labs (see below) and that ASCVD risk is increased based on most recent lipid panel, current blood pressure (hypertensive,  medication), diabetes status (non-diabetic), and smoking status (non-smoker).    Patient and I then discussed necessary dietary changes to make to address dyslipidemia.  Patient verbalized understanding.    ROS is NEGATIVE for dizziness, generalized weakness/fatigue, vision/hearing changes, jaw pain/paresthesias, BUE pain/paresthesias/numbness/weakness, chest pain/pressure, palpitations, dyspnea, RUQ abdominal pain, oliguria/anuria, BLE edema.    Lab Results   Component Value Date/Time    CHOLSTRLTOT 200 (H) 02/08/2010 07:10 AM     (H) 02/08/2010 07:10 AM    HDL 54 02/08/2010 07:10 AM    TRIGLYCERIDE 121 02/08/2010 07:10 AM     Dietary history as below:  Breakfast: [employer] Oats pre-packaged oatmeal (with water), apple, Ensure  Lunch: (Ham Cheese & Bashir Lettuce sandwich on dill/rye bread; may sub in friend egg for cheese) OR Cheerios (with pineapple or walnuts) or slice of Pizza (with coke zero), water  Dinner: Salad (Coleslaw or Bashir + Tomato), 2oz sirloin, 2-3 veggies [zucchini cauliflower, brocolli], water  Snack: Yogurt    Impaired fasting blood sugar  Patient with impaired fasting blood sugar based on recent labs (01/2018), that we will check her A1c today to detect if she has prediabetes or diabetes.    ROS is NEGATIVE for blurred vision, polydipsia, polyuria, diaphoresis, palpitations, fatigue, irritability, flank pain, BLE paresthesias.    Multiple thyroid nodules  Chronic, stable, well-controlled, being monitored/evaluated/managed by Dr. Zimmer.    Insomnia  Chronic, uncontrolled, but improving and somewhat responsive to Melatonin,  "but this may also causing her to have daytime \"groginess.\"      ROS is NEGATIVE for generalized weakness/fatigue, headaches upon awakening, dyspnea, tachypnea, respiratory distress, cyanotic skin changes.      Patient Active Problem List    Diagnosis Date Noted   • Pure hypercholesterolemia 01/15/2018   • Osteoporosis 04/13/2017   • Postmenopausal estrogen deficiency 03/08/2017   • Hair loss 02/08/2017   • Elevated cortisol level (CMS-[facility]) 09/29/2014   • Multiple thyroid nodules    • Anxiety 08/07/2014   • Impaired fasting blood sugar 07/10/2014   • Insomnia    • HTN (hypertension) 02/16/2010       Additional History:   Allergies:    Amoxicillin; Morphine; and Penicillins     Current Medications:     Current Outpatient Prescriptions   Medication Sig Dispense Refill   • terazosin (HYTRIN) 1 MG Cap Take 1 Cap by mouth every morning. 30 Cap 3   • Melatonin ([facility] MELATONIN) 5 MG Tab Take  by mouth.     • metoprolol (LOPRESSOR) 25 MG Tab Take 25 mg by mouth 2 times a day.     • terazosin (HYTRIN) 2 MG CAPS Take 2 mg by mouth every bedtime.     • valsartan (DIOVAN) 320 MG tablet Take 320 mg by mouth every day.     • Cholecalciferol (VITAMIN D3) 2000 UNITS Tab Take 1 tablet by mouth every day. 90 Tab 1   • Calcium Carb-Cholecalciferol (OYSTER SHELL CALCIUM/VITAMIN D) 250-125 MG-UNIT Tab tablet Take 1 Tab by mouth every day.     • Multiple Vitamins-Minerals (HAIR/SKIN/NAILS PO) Take 1 Tab by mouth every day.     • aspirin EC (ECOTRIN) 81 MG TBEC Take 81 mg by mouth every bedtime.       No current facility-administered medications for this visit.         Social History:     Social History   Substance Use Topics   • Smoking status: Never Smoker   • Smokeless tobacco: Never Used   • Alcohol use No       ROS:     - NOTE: All other systems reviewed and are negative, except as in HPI.     Objective:   Physical Exam:    Vitals: Blood pressure 152/88, pulse 87, temperature 37.1 °C (98.8 °F), height 1.651 m (5' 5\"), weight 61.2 kg " (135 lb), SpO2 94 %, not currently breastfeeding.   BMI: Body mass index is 22.47 kg/m².   General/Constitutional: Vitals as above, Well nourished, well developed female in no acute distress   Head/Eyes: Head is grossly normal & atraumatic, bilateral conjunctivae clear and not injected, bilateral EOMI, bilateral PERRL   ENT: Bilateral external ears grossly normal in appearance, Hearing grossly intact, External nares normal in appearance and without discharge/bleeding   Respiratory: No respiratory distress, bilateral lungs are clear to ausculation in all lung fields (anterior/lateral/posterior), no wheezing/rhonchi/rales   Cardiovascular: Regular rate and rhythm without murmur/gallops/rubs, distal pulses are intact and equal bilaterally (radial, posterior tibial), no bilateral lower extremity edema   MSK: Gait grossly normal & not antalgic   Integumentary: No apparent rashes   Psych: Judgment grossly appropriate, no apparent depression/anxiety    Health Maintenance:     - Not addressed at this visit    Imaging/Labs:     - POC A1c 5.2%, no prediabetes, and no diabetes    Assessment and Plan:   1. Pure hypercholesterolemia  Chronic, uncontrolled, last lipid profile in 11/2016.  Lipid profile to recheck.   - LIPID PROFILE; Future    2. Impaired fasting blood sugar  Chronic, previously uncontrolled but without prediabetes nor diabetes.   - POCT  A1C    3. Multiple thyroid nodules  Chronic, stable, well-controlled, continue care with Dr. Zimmer.    4. Primary insomnia  Chronic, uncontrolled, patient advised to take Melatonin at lower dosages.        RTC: in 6months for Medicare Annual Wellness.    PLEASE NOTE: This dictation was created using voice recognition software. I have made every reasonable attempt to correct obvious errors, but I expect that there are errors of grammar and possibly content that I did not discover before finalizing the note.

## 2018-01-16 NOTE — ASSESSMENT & PLAN NOTE
"Chronic, uncontrolled, but improving and somewhat responsive to Melatonin, but this may also causing her to have daytime \"groginess.\"      ROS is NEGATIVE for generalized weakness/fatigue, headaches upon awakening, dyspnea, tachypnea, respiratory distress, cyanotic skin changes.  "

## 2018-05-09 ENCOUNTER — HOSPITAL ENCOUNTER (EMERGENCY)
Facility: MEDICAL CENTER | Age: 83
End: 2018-05-09
Attending: EMERGENCY MEDICINE
Payer: MEDICARE

## 2018-05-09 ENCOUNTER — APPOINTMENT (OUTPATIENT)
Dept: RADIOLOGY | Facility: MEDICAL CENTER | Age: 83
End: 2018-05-09
Attending: EMERGENCY MEDICINE
Payer: MEDICARE

## 2018-05-09 VITALS
RESPIRATION RATE: 17 BRPM | OXYGEN SATURATION: 98 % | SYSTOLIC BLOOD PRESSURE: 158 MMHG | HEART RATE: 74 BPM | HEIGHT: 66 IN | TEMPERATURE: 98.6 F | WEIGHT: 133.6 LBS | DIASTOLIC BLOOD PRESSURE: 91 MMHG | BODY MASS INDEX: 21.47 KG/M2

## 2018-05-09 DIAGNOSIS — N39.0 URINARY TRACT INFECTION WITHOUT HEMATURIA, SITE UNSPECIFIED: ICD-10-CM

## 2018-05-09 DIAGNOSIS — M54.9 PAIN, UPPER BACK: ICD-10-CM

## 2018-05-09 DIAGNOSIS — F41.9 ANXIETY: ICD-10-CM

## 2018-05-09 LAB
ALBUMIN SERPL BCP-MCNC: 4.1 G/DL (ref 3.2–4.9)
ALBUMIN/GLOB SERPL: 1.4 G/DL
ALP SERPL-CCNC: 44 U/L (ref 30–99)
ALT SERPL-CCNC: 19 U/L (ref 2–50)
ANION GAP SERPL CALC-SCNC: 8 MMOL/L (ref 0–11.9)
APPEARANCE UR: CLEAR
AST SERPL-CCNC: 21 U/L (ref 12–45)
BACTERIA #/AREA URNS HPF: ABNORMAL /HPF
BASOPHILS # BLD AUTO: 0.8 % (ref 0–1.8)
BASOPHILS # BLD: 0.05 K/UL (ref 0–0.12)
BILIRUB SERPL-MCNC: 0.6 MG/DL (ref 0.1–1.5)
BILIRUB UR QL STRIP.AUTO: NEGATIVE
BUN SERPL-MCNC: 9 MG/DL (ref 8–22)
CALCIUM SERPL-MCNC: 9 MG/DL (ref 8.4–10.2)
CHLORIDE SERPL-SCNC: 104 MMOL/L (ref 96–112)
CO2 SERPL-SCNC: 24 MMOL/L (ref 20–33)
COLOR UR: YELLOW
CREAT SERPL-MCNC: 0.72 MG/DL (ref 0.5–1.4)
EOSINOPHIL # BLD AUTO: 0.01 K/UL (ref 0–0.51)
EOSINOPHIL NFR BLD: 0.2 % (ref 0–6.9)
EPI CELLS #/AREA URNS HPF: ABNORMAL /HPF
ERYTHROCYTE [DISTWIDTH] IN BLOOD BY AUTOMATED COUNT: 42.7 FL (ref 35.9–50)
GLOBULIN SER CALC-MCNC: 3 G/DL (ref 1.9–3.5)
GLUCOSE SERPL-MCNC: 124 MG/DL (ref 65–99)
GLUCOSE UR STRIP.AUTO-MCNC: NEGATIVE MG/DL
HCT VFR BLD AUTO: 45.6 % (ref 37–47)
HGB BLD-MCNC: 15.8 G/DL (ref 12–16)
IMM GRANULOCYTES # BLD AUTO: 0.03 K/UL (ref 0–0.11)
IMM GRANULOCYTES NFR BLD AUTO: 0.5 % (ref 0–0.9)
KETONES UR STRIP.AUTO-MCNC: NEGATIVE MG/DL
LEUKOCYTE ESTERASE UR QL STRIP.AUTO: ABNORMAL
LIPASE SERPL-CCNC: 27 U/L (ref 7–58)
LYMPHOCYTES # BLD AUTO: 0.97 K/UL (ref 1–4.8)
LYMPHOCYTES NFR BLD: 14.6 % (ref 22–41)
MCH RBC QN AUTO: 32 PG (ref 27–33)
MCHC RBC AUTO-ENTMCNC: 34.6 G/DL (ref 33.6–35)
MCV RBC AUTO: 92.5 FL (ref 81.4–97.8)
MICRO URNS: ABNORMAL
MONOCYTES # BLD AUTO: 0.57 K/UL (ref 0–0.85)
MONOCYTES NFR BLD AUTO: 8.6 % (ref 0–13.4)
NEUTROPHILS # BLD AUTO: 5.02 K/UL (ref 2–7.15)
NEUTROPHILS NFR BLD: 75.3 % (ref 44–72)
NITRITE UR QL STRIP.AUTO: NEGATIVE
NRBC # BLD AUTO: 0 K/UL
NRBC BLD-RTO: 0 /100 WBC
PH UR STRIP.AUTO: 6 [PH]
PLATELET # BLD AUTO: 316 K/UL (ref 164–446)
PMV BLD AUTO: 10 FL (ref 9–12.9)
POTASSIUM SERPL-SCNC: 3.4 MMOL/L (ref 3.6–5.5)
PROT SERPL-MCNC: 7.1 G/DL (ref 6–8.2)
PROT UR QL STRIP: NEGATIVE MG/DL
RBC # BLD AUTO: 4.93 M/UL (ref 4.2–5.4)
RBC # URNS HPF: ABNORMAL /HPF
RBC UR QL AUTO: ABNORMAL
SODIUM SERPL-SCNC: 136 MMOL/L (ref 135–145)
SP GR UR STRIP.AUTO: <=1.005
TROPONIN I SERPL-MCNC: <0.02 NG/ML (ref 0–0.04)
UNIDENT CRYS URNS QL MICRO: ABNORMAL /HPF
WBC # BLD AUTO: 6.7 K/UL (ref 4.8–10.8)
WBC #/AREA URNS HPF: ABNORMAL /HPF

## 2018-05-09 PROCEDURE — 83690 ASSAY OF LIPASE: CPT

## 2018-05-09 PROCEDURE — 81001 URINALYSIS AUTO W/SCOPE: CPT

## 2018-05-09 PROCEDURE — A9270 NON-COVERED ITEM OR SERVICE: HCPCS | Performed by: EMERGENCY MEDICINE

## 2018-05-09 PROCEDURE — 94760 N-INVAS EAR/PLS OXIMETRY 1: CPT

## 2018-05-09 PROCEDURE — 85025 COMPLETE CBC W/AUTO DIFF WBC: CPT

## 2018-05-09 PROCEDURE — 700111 HCHG RX REV CODE 636 W/ 250 OVERRIDE (IP): Performed by: EMERGENCY MEDICINE

## 2018-05-09 PROCEDURE — 71045 X-RAY EXAM CHEST 1 VIEW: CPT

## 2018-05-09 PROCEDURE — 99284 EMERGENCY DEPT VISIT MOD MDM: CPT

## 2018-05-09 PROCEDURE — 96376 TX/PRO/DX INJ SAME DRUG ADON: CPT

## 2018-05-09 PROCEDURE — 84484 ASSAY OF TROPONIN QUANT: CPT

## 2018-05-09 PROCEDURE — 96375 TX/PRO/DX INJ NEW DRUG ADDON: CPT

## 2018-05-09 PROCEDURE — 96374 THER/PROPH/DIAG INJ IV PUSH: CPT

## 2018-05-09 PROCEDURE — 80053 COMPREHEN METABOLIC PANEL: CPT

## 2018-05-09 PROCEDURE — 700102 HCHG RX REV CODE 250 W/ 637 OVERRIDE(OP): Performed by: EMERGENCY MEDICINE

## 2018-05-09 PROCEDURE — 36415 COLL VENOUS BLD VENIPUNCTURE: CPT

## 2018-05-09 RX ORDER — ONDANSETRON 2 MG/ML
4 INJECTION INTRAMUSCULAR; INTRAVENOUS ONCE
Status: COMPLETED | OUTPATIENT
Start: 2018-05-09 | End: 2018-05-09

## 2018-05-09 RX ORDER — LORAZEPAM 0.5 MG/1
0.5 TABLET ORAL EVERY 8 HOURS PRN
Qty: 4 TAB | Refills: 0 | Status: SHIPPED | OUTPATIENT
Start: 2018-05-09 | End: 2018-05-12

## 2018-05-09 RX ORDER — CIPROFLOXACIN 500 MG/1
500 TABLET, FILM COATED ORAL 2 TIMES DAILY
Qty: 20 TAB | Refills: 0 | Status: ON HOLD | OUTPATIENT
Start: 2018-05-09 | End: 2018-05-22

## 2018-05-09 RX ORDER — CIPROFLOXACIN 500 MG/1
500 TABLET, FILM COATED ORAL ONCE
Status: COMPLETED | OUTPATIENT
Start: 2018-05-09 | End: 2018-05-09

## 2018-05-09 RX ADMIN — ONDANSETRON 4 MG: 2 INJECTION INTRAMUSCULAR; INTRAVENOUS at 17:35

## 2018-05-09 RX ADMIN — CIPROFLOXACIN HYDROCHLORIDE 500 MG: 500 TABLET, FILM COATED ORAL at 19:45

## 2018-05-09 RX ADMIN — ONDANSETRON 4 MG: 2 INJECTION INTRAMUSCULAR; INTRAVENOUS at 19:41

## 2018-05-09 ASSESSMENT — PAIN SCALES - GENERAL: PAINLEVEL_OUTOF10: 2

## 2018-05-09 ASSESSMENT — LIFESTYLE VARIABLES: DO YOU DRINK ALCOHOL: NO

## 2018-05-09 NOTE — ED NOTES
"Chief Complaint   Patient presents with   • Back Pain     upper back pain, non traumatic intermittently for 1 week, no appetite. No chest pain or SOB.   BP (!) 186/99   Pulse 92   Temp 37.2 °C (99 °F)   Resp 18   Ht 1.676 m (5' 6\")   Wt 60.6 kg (133 lb 9.6 oz)   SpO2 98%   BMI 21.56 kg/m²      Patient complains of N/V, insomnia, upper back pain, headaches, starting one week. Also having rib pain where a rib was previously broken on R side. Nausea is present constantly. Back pain is present with movement.   "

## 2018-05-09 NOTE — ED NOTES
Chief Complaint   Patient presents with   • Back Pain     upper back pain, non traumatic intermittently for 1 week, no appetite. No chest pain or SOB.

## 2018-05-10 NOTE — DISCHARGE INSTRUCTIONS
Urinary Tract Infection, Adult  Introduction  A urinary tract infection (UTI) is an infection of any part of the urinary tract. The urinary tract includes the:  · Kidneys.  · Ureters.  · Bladder.  · Urethra.  These organs make, store, and get rid of pee (urine) in the body.  Follow these instructions at home:  · Take over-the-counter and prescription medicines only as told by your doctor.  · If you were prescribed an antibiotic medicine, take it as told by your doctor. Do not stop taking the antibiotic even if you start to feel better.  · Avoid the following drinks:  ¨ Alcohol.  ¨ Caffeine.  ¨ Tea.  ¨ Carbonated drinks.  · Drink enough fluid to keep your pee clear or pale yellow.  · Keep all follow-up visits as told by your doctor. This is important.  · Make sure to:  ¨ Empty your bladder often and completely. Do not to hold pee for long periods of time.  ¨ Empty your bladder before and after sex.  ¨ Wipe from front to back after a bowel movement if you are female. Use each tissue one time when you wipe.  Contact a doctor if:  · You have back pain.  · You have a fever.  · You feel sick to your stomach (nauseous).  · You throw up (vomit).  · Your symptoms do not get better after 3 days.  · Your symptoms go away and then come back.  Get help right away if:  · You have very bad back pain.  · You have very bad lower belly (abdominal) pain.  · You are throwing up and cannot keep down any medicines or water.  This information is not intended to replace advice given to you by your health care provider. Make sure you discuss any questions you have with your health care provider.  Document Released: 06/05/2009 Document Revised: 05/25/2017 Document Reviewed: 11/07/2016  © 2017 [employer]  Panic Attacks  Panic attacks are sudden, short feelings of great fear or discomfort. You may have them for no reason when you are relaxed, when you are uneasy (anxious), or when you are sleeping.  Follow these instructions at home:  · Take all  your medicines as told.  · Check with your doctor before starting new medicines.  · Keep all doctor visits.  Contact a doctor if:  · You are not able to take your medicines as told.  · Your symptoms do not get better.  · Your symptoms get worse.  Get help right away if:  · Your attacks seem different than your normal attacks.  · You have thoughts about hurting yourself or others.  · You take panic attack medicine and you have a side effect.  This information is not intended to replace advice given to you by your health care provider. Make sure you discuss any questions you have with your health care provider.  Document Released: 01/20/2012 Document Revised: 05/25/2017 Document Reviewed: 08/01/2014  [employer] Patient Education © 2017 [employer]  Back Pain, Adult  Introduction  Back pain is very common. The pain often gets better over time. The cause of back pain is usually not dangerous. Most people can learn to manage their back pain on their own.  Follow these instructions at home:  Watch your back pain for any changes. The following actions may help to lessen any pain you are feeling:  · Stay active. Start with short walks on flat ground if you can. Try to walk farther each day.  · Exercise regularly as told by your doctor. Exercise helps your back heal faster. It also helps avoid future injury by keeping your muscles strong and flexible.  · Do not sit, drive, or  one place for more than 30 minutes.  · Do not stay in bed. Resting more than 1-2 days can slow down your recovery.  · Be careful when you bend or lift an object. Use good form when lifting:  ¨ Bend at your knees.  ¨ Keep the object close to your body.  ¨ Do not twist.  · Sleep on a firm mattress. Lie on your side, and bend your knees. If you lie on your back, put a pillow under your knees.  · Take medicines only as told by your doctor.  · Put ice on the injured area.  ¨ Put ice in a plastic bag.  ¨ Place a towel between your skin  and the bag.  ¨ Leave the ice on for 20 minutes, 2-3 times a day for the first 2-3 days. After that, you can switch between ice and heat packs.  · Avoid feeling anxious or stressed. Find good ways to deal with stress, such as exercise.  · Maintain a healthy weight. Extra weight puts stress on your back.  Contact a doctor if:  · You have pain that does not go away with rest or medicine.  · You have worsening pain that goes down into your legs or buttocks.  · You have pain that does not get better in one week.  · You have pain at night.  · You lose weight.  · You have a fever or chills.  Get help right away if:  · You cannot control when you poop (bowel movement) or pee (urinate).  · Your arms or legs feel weak.  · Your arms or legs lose feeling (numbness).  · You feel sick to your stomach (nauseous) or throw up (vomit).  · You have belly (abdominal) pain.  · You feel like you may pass out (faint).  This information is not intended to replace advice given to you by your health care provider. Make sure you discuss any questions you have with your health care provider.  Document Released: 06/05/2009 Document Revised: 05/25/2017 Document Reviewed: 04/21/2015  © 2017 [employer]

## 2018-05-10 NOTE — ED PROVIDER NOTES
ED Provider Note    CHIEF COMPLAINT  Chief Complaint   Patient presents with   • Back Pain     upper back pain, non traumatic intermittently for 1 week, no appetite. No chest pain or SOB.       HPI  Prabha Lizama is a 82 y.o. female here for evaluation of chronic back pain. The patient states that she has had upper back pain for 3-4 months, that is intermittent in nature. She states it is an aching pain, but it is non-radiating. She has no chest pain, no shortness of breath, and states that her back pain is worse with bending and rotating. She has no neck pain, no headache. She does complain of some dysuria, but no overt abdominal pain. She has no leg pain.    PAST MEDICAL HISTORY   has a past medical history of CHI (closed head injury); Elevated cortisol level (HCC); Hypertension; Insomnia; Multiple thyroid nodules (2008); Thyrotoxicosis; and Urinary tract infection.    SOCIAL HISTORY  Social History     Social History Main Topics   • Smoking status: Never Smoker   • Smokeless tobacco: Never Used   • Alcohol use No   • Drug use: No   • Sexual activity: Not on file       SURGICAL HISTORY   has a past surgical history that includes tonsillectomy and appendectomy.    CURRENT MEDICATIONS  Home Medications     Reviewed by Anneliese Vallecillo R.N. (Registered Nurse) on 05/09/18 at 1642  Med List Status: Not Addressed   Medication Last Dose Status   aspirin EC (ECOTRIN) 81 MG TBEC 5/8/2018 Active   Calcium Carb-Cholecalciferol (OYSTER SHELL CALCIUM/VITAMIN D) 250-125 MG-UNIT Tab tablet 5/9/2018 Active   Cholecalciferol (VITAMIN D3) 2000 UNITS Tab 5/9/2018 Active   metoprolol (LOPRESSOR) 25 MG Tab 5/9/2018 Active   Multiple Vitamins-Minerals (HAIR/SKIN/NAILS PO) 5/9/2018 Active   terazosin (HYTRIN) 1 MG Cap 5/9/2018 Active   terazosin (HYTRIN) 2 MG CAPS 5/8/2018 Active   valsartan (DIOVAN) 320 MG tablet 5/9/2018 Active                ALLERGIES  Allergies   Allergen Reactions   • Amoxicillin      Unsure of reaction/or  allergy   • Morphine Vomiting and Nausea   • Penicillins      1970 reaction       REVIEW OF SYSTEMS  See HPI for further details. Review of systems as above, otherwise all other systems are negative.     PHYSICAL EXAM  Constitutional: Well developed, well nourished. No acute distress.  HEENT: Normocephalic, atraumatic. Posterior pharynx clear and moist.  Eyes:  EOMI. Normal sclera.  Neck: Supple, Full range of motion, nontender.  Chest/Pulmonary: clear to ausculation. Symmetrical expansion.   Cardio: Regular rate and rhythm with no murmur.   Abdomen: Soft, nontender. No peritoneal signs. No guarding. No palpable masses.  Back: Tenderness to T5, T6, T7 midline, no obvious step-off  Musculoskeletal: No deformity, no edema, neurovascular intact.   Neuro: Clear speech, appropriate, cooperative, cranial nerves II-XII grossly intact.  Psych: Normal mood and affect    Results for orders placed or performed during the hospital encounter of 05/09/18   CBC WITH DIFFERENTIAL   Result Value Ref Range    WBC 6.7 4.8 - 10.8 K/uL    RBC 4.93 4.20 - 5.40 M/uL    Hemoglobin 15.8 12.0 - 16.0 g/dL    Hematocrit 45.6 37.0 - 47.0 %    MCV 92.5 81.4 - 97.8 fL    MCH 32.0 27.0 - 33.0 pg    MCHC 34.6 33.6 - 35.0 g/dL    RDW 42.7 35.9 - 50.0 fL    Platelet Count 316 164 - 446 K/uL    MPV 10.0 9.0 - 12.9 fL    Neutrophils-Polys 75.30 (H) 44.00 - 72.00 %    Lymphocytes 14.60 (L) 22.00 - 41.00 %    Monocytes 8.60 0.00 - 13.40 %    Eosinophils 0.20 0.00 - 6.90 %    Basophils 0.80 0.00 - 1.80 %    Immature Granulocytes 0.50 0.00 - 0.90 %    Nucleated RBC 0.00 /100 WBC    Neutrophils (Absolute) 5.02 2.00 - 7.15 K/uL    Lymphs (Absolute) 0.97 (L) 1.00 - 4.80 K/uL    Monos (Absolute) 0.57 0.00 - 0.85 K/uL    Eos (Absolute) 0.01 0.00 - 0.51 K/uL    Baso (Absolute) 0.05 0.00 - 0.12 K/uL    Immature Granulocytes (abs) 0.03 0.00 - 0.11 K/uL    NRBC (Absolute) 0.00 K/uL   COMP METABOLIC PANEL   Result Value Ref Range    Sodium 136 135 - 145 mmol/L     Potassium 3.4 (L) 3.6 - 5.5 mmol/L    Chloride 104 96 - 112 mmol/L    Co2 24 20 - 33 mmol/L    Anion Gap 8.0 0.0 - 11.9    Glucose 124 (H) 65 - 99 mg/dL    Bun 9 8 - 22 mg/dL    Creatinine 0.72 0.50 - 1.40 mg/dL    Calcium 9.0 8.4 - 10.2 mg/dL    AST(SGOT) 21 12 - 45 U/L    ALT(SGPT) 19 2 - 50 U/L    Alkaline Phosphatase 44 30 - 99 U/L    Total Bilirubin 0.6 0.1 - 1.5 mg/dL    Albumin 4.1 3.2 - 4.9 g/dL    Total Protein 7.1 6.0 - 8.2 g/dL    Globulin 3.0 1.9 - 3.5 g/dL    A-G Ratio 1.4 g/dL   LIPASE   Result Value Ref Range    Lipase 27 7 - 58 U/L   TROPONIN   Result Value Ref Range    Troponin I <0.02 0.00 - 0.04 ng/mL   URINALYSIS CULTURE, IF INDICATED   Result Value Ref Range    Color Yellow     Character Clear     Specific Gravity <=1.005 <1.035    Ph 6.0 5.0 - 8.0    Glucose Negative Negative mg/dL    Ketones Negative Negative mg/dL    Protein Negative Negative mg/dL    Bilirubin Negative Negative    Nitrite Negative Negative    Leukocyte Esterase Small (A) Negative    Occult Blood Trace (A) Negative    Micro Urine Req Microscopic    URINE MICROSCOPIC (W/UA)   Result Value Ref Range    WBC 0-2 /hpf    RBC 0-2 /hpf    Bacteria Rare (A) None /hpf    Epithelial Cells Rare Few /hpf    Urine Crystals Few Amorphous /hpf   ESTIMATED GFR   Result Value Ref Range    GFR If African American >60 >60 mL/min/1.73 m 2    GFR If Non African American >60 >60 mL/min/1.73 m 2        DX-CHEST-PORTABLE (1 VIEW)   Final Result      No acute cardiac or pulmonary abnormalities are identified. The ribs are not well seen on the single view portable chest examination.          PROCEDURES     MEDICAL RECORD  I have reviewed patient's medical record and pertinent results are listed above.    COURSE & MEDICAL DECISION MAKING  I have reviewed any medical record information, laboratory studies and radiographic results as noted above.    If you have had any blood pressure issues while here in the emergency department, please see your doctor  for a further evaluation or work up.    The patient is afebrile, nontoxic appearing, but is anxious. She will be given medications here for anxiety, as a short duration, until she sees her family practice doctor this week. She also treated with Cipro for UTI. At this time she'll follow-up, or return here for any further issues or concerns. Her back pain is chronic in nature, and has been present for approximately 4 months, she's not had any trauma to the back, and it is reproducible with rotation and movement. Over-the-counter medication review her discomfort, and she agrees to follow-up for this as well. I do not suspect dissection secondary to the length of time of her presentation, and her not having any chest pain or shortness breath.    Differential diagnoses include but not limited to: Infection, arthritic back changes, abscess, chronic pain, anxiety    This patient presents with UTI, anxiety, and upper back pain that is chronic..  At this time, I have counseled the patient/family regarding their medications, pain control, and follow up.  They will continue their medications, if any, as prescribed.  They will return immediately for any worsening symptoms and/or any other medical concerns.  They will see their doctor, or contact the doctor provided, in 1-2 days for follow up.       FINAL IMPRESSION  1. Urinary tract infection without hematuria, site unspecified    2. Anxiety    3. Pain, upper back            Electronically signed by: Joshua Berger, 5/9/2018 7:24 PM

## 2018-05-10 NOTE — ED NOTES
Pt dc'd home with rx for ativan and cipro rx e-prescribed to pt's pharmacy of choice, pt encouraged to f/u with pcp, opportunity provided to ask questions all questions answered, pt ambulated out of ed with steady gait with .

## 2018-05-18 ENCOUNTER — APPOINTMENT (OUTPATIENT)
Dept: RADIOLOGY | Facility: MEDICAL CENTER | Age: 83
End: 2018-05-18
Attending: EMERGENCY MEDICINE
Payer: MEDICARE

## 2018-05-18 ENCOUNTER — HOSPITAL ENCOUNTER (OUTPATIENT)
Facility: MEDICAL CENTER | Age: 83
End: 2018-05-22
Attending: EMERGENCY MEDICINE | Admitting: INTERNAL MEDICINE
Payer: MEDICARE

## 2018-05-18 DIAGNOSIS — S32.030A CLOSED WEDGE COMPRESSION FRACTURE OF THIRD LUMBAR VERTEBRA, INITIAL ENCOUNTER: ICD-10-CM

## 2018-05-18 DIAGNOSIS — S32.030A CLOSED COMPRESSION FRACTURE OF THIRD LUMBAR VERTEBRA, INITIAL ENCOUNTER: ICD-10-CM

## 2018-05-18 PROBLEM — N39.0 UTI (URINARY TRACT INFECTION): Status: ACTIVE | Noted: 2018-05-18

## 2018-05-18 LAB
ALBUMIN SERPL BCP-MCNC: 3.3 G/DL (ref 3.2–4.9)
ALBUMIN/GLOB SERPL: 1.7 G/DL
ALP SERPL-CCNC: 36 U/L (ref 30–99)
ALT SERPL-CCNC: 11 U/L (ref 2–50)
ANION GAP SERPL CALC-SCNC: 9 MMOL/L (ref 0–11.9)
AST SERPL-CCNC: 18 U/L (ref 12–45)
BASOPHILS # BLD AUTO: 0.5 % (ref 0–1.8)
BASOPHILS # BLD: 0.09 K/UL (ref 0–0.12)
BILIRUB SERPL-MCNC: 0.4 MG/DL (ref 0.1–1.5)
BUN SERPL-MCNC: 12 MG/DL (ref 8–22)
CALCIUM SERPL-MCNC: 8.4 MG/DL (ref 8.5–10.5)
CHLORIDE SERPL-SCNC: 106 MMOL/L (ref 96–112)
CO2 SERPL-SCNC: 21 MMOL/L (ref 20–33)
CREAT SERPL-MCNC: 0.59 MG/DL (ref 0.5–1.4)
EOSINOPHIL # BLD AUTO: 0.03 K/UL (ref 0–0.51)
EOSINOPHIL NFR BLD: 0.2 % (ref 0–6.9)
ERYTHROCYTE [DISTWIDTH] IN BLOOD BY AUTOMATED COUNT: 43.8 FL (ref 35.9–50)
GLOBULIN SER CALC-MCNC: 2 G/DL (ref 1.9–3.5)
GLUCOSE SERPL-MCNC: 113 MG/DL (ref 65–99)
HCT VFR BLD AUTO: 38.9 % (ref 37–47)
HGB BLD-MCNC: 13.2 G/DL (ref 12–16)
IMM GRANULOCYTES # BLD AUTO: 0.19 K/UL (ref 0–0.11)
IMM GRANULOCYTES NFR BLD AUTO: 1.1 % (ref 0–0.9)
LYMPHOCYTES # BLD AUTO: 0.64 K/UL (ref 1–4.8)
LYMPHOCYTES NFR BLD: 3.6 % (ref 22–41)
MCH RBC QN AUTO: 32.1 PG (ref 27–33)
MCHC RBC AUTO-ENTMCNC: 33.9 G/DL (ref 33.6–35)
MCV RBC AUTO: 94.6 FL (ref 81.4–97.8)
MONOCYTES # BLD AUTO: 1.15 K/UL (ref 0–0.85)
MONOCYTES NFR BLD AUTO: 6.4 % (ref 0–13.4)
NEUTROPHILS # BLD AUTO: 15.81 K/UL (ref 2–7.15)
NEUTROPHILS NFR BLD: 88.2 % (ref 44–72)
NRBC # BLD AUTO: 0 K/UL
NRBC BLD-RTO: 0 /100 WBC
PLATELET # BLD AUTO: 247 K/UL (ref 164–446)
PMV BLD AUTO: 10.3 FL (ref 9–12.9)
POTASSIUM SERPL-SCNC: 3.5 MMOL/L (ref 3.6–5.5)
PROT SERPL-MCNC: 5.3 G/DL (ref 6–8.2)
RBC # BLD AUTO: 4.11 M/UL (ref 4.2–5.4)
SODIUM SERPL-SCNC: 136 MMOL/L (ref 135–145)
WBC # BLD AUTO: 17.9 K/UL (ref 4.8–10.8)

## 2018-05-18 PROCEDURE — 74177 CT ABD & PELVIS W/CONTRAST: CPT

## 2018-05-18 PROCEDURE — 72100 X-RAY EXAM L-S SPINE 2/3 VWS: CPT

## 2018-05-18 PROCEDURE — 99220 PR INITIAL OBSERVATION CARE,LEVL III: CPT | Performed by: INTERNAL MEDICINE

## 2018-05-18 PROCEDURE — 96376 TX/PRO/DX INJ SAME DRUG ADON: CPT

## 2018-05-18 PROCEDURE — 85025 COMPLETE CBC W/AUTO DIFF WBC: CPT

## 2018-05-18 PROCEDURE — G0378 HOSPITAL OBSERVATION PER HR: HCPCS

## 2018-05-18 PROCEDURE — 80053 COMPREHEN METABOLIC PANEL: CPT

## 2018-05-18 PROCEDURE — 700117 HCHG RX CONTRAST REV CODE 255: Performed by: EMERGENCY MEDICINE

## 2018-05-18 PROCEDURE — 700105 HCHG RX REV CODE 258: Performed by: EMERGENCY MEDICINE

## 2018-05-18 PROCEDURE — 96374 THER/PROPH/DIAG INJ IV PUSH: CPT | Mod: XU

## 2018-05-18 PROCEDURE — 96375 TX/PRO/DX INJ NEW DRUG ADDON: CPT

## 2018-05-18 PROCEDURE — 72131 CT LUMBAR SPINE W/O DYE: CPT

## 2018-05-18 PROCEDURE — 72170 X-RAY EXAM OF PELVIS: CPT

## 2018-05-18 PROCEDURE — 700111 HCHG RX REV CODE 636 W/ 250 OVERRIDE (IP): Performed by: EMERGENCY MEDICINE

## 2018-05-18 PROCEDURE — 71045 X-RAY EXAM CHEST 1 VIEW: CPT

## 2018-05-18 PROCEDURE — 99285 EMERGENCY DEPT VISIT HI MDM: CPT

## 2018-05-18 RX ORDER — MORPHINE SULFATE 4 MG/ML
4 INJECTION, SOLUTION INTRAMUSCULAR; INTRAVENOUS
Status: DISCONTINUED | OUTPATIENT
Start: 2018-05-18 | End: 2018-05-22 | Stop reason: HOSPADM

## 2018-05-18 RX ORDER — POLYETHYLENE GLYCOL 3350 17 G/17G
1 POWDER, FOR SOLUTION ORAL
Status: DISCONTINUED | OUTPATIENT
Start: 2018-05-18 | End: 2018-05-22 | Stop reason: HOSPADM

## 2018-05-18 RX ORDER — KETOROLAC TROMETHAMINE 30 MG/ML
15 INJECTION, SOLUTION INTRAMUSCULAR; INTRAVENOUS EVERY 6 HOURS PRN
Status: DISCONTINUED | OUTPATIENT
Start: 2018-05-18 | End: 2018-05-22 | Stop reason: HOSPADM

## 2018-05-18 RX ORDER — LABETALOL HYDROCHLORIDE 5 MG/ML
10 INJECTION, SOLUTION INTRAVENOUS EVERY 4 HOURS PRN
Status: DISCONTINUED | OUTPATIENT
Start: 2018-05-18 | End: 2018-05-22 | Stop reason: HOSPADM

## 2018-05-18 RX ORDER — ONDANSETRON 2 MG/ML
4 INJECTION INTRAMUSCULAR; INTRAVENOUS ONCE
Status: COMPLETED | OUTPATIENT
Start: 2018-05-18 | End: 2018-05-18

## 2018-05-18 RX ORDER — OXYCODONE HYDROCHLORIDE 5 MG/1
5 TABLET ORAL
Status: DISCONTINUED | OUTPATIENT
Start: 2018-05-18 | End: 2018-05-20

## 2018-05-18 RX ORDER — ONDANSETRON 2 MG/ML
4 INJECTION INTRAMUSCULAR; INTRAVENOUS EVERY 4 HOURS PRN
Status: DISCONTINUED | OUTPATIENT
Start: 2018-05-18 | End: 2018-05-22 | Stop reason: HOSPADM

## 2018-05-18 RX ORDER — SODIUM CHLORIDE 9 MG/ML
INJECTION, SOLUTION INTRAVENOUS CONTINUOUS
Status: DISCONTINUED | OUTPATIENT
Start: 2018-05-18 | End: 2018-05-20

## 2018-05-18 RX ORDER — AMOXICILLIN 250 MG
2 CAPSULE ORAL 2 TIMES DAILY
Status: DISCONTINUED | OUTPATIENT
Start: 2018-05-18 | End: 2018-05-22 | Stop reason: HOSPADM

## 2018-05-18 RX ORDER — IBUPROFEN 600 MG/1
600 TABLET ORAL EVERY 6 HOURS PRN
Status: DISCONTINUED | OUTPATIENT
Start: 2018-05-24 | End: 2018-05-22 | Stop reason: HOSPADM

## 2018-05-18 RX ORDER — VALSARTAN 80 MG/1
320 TABLET ORAL EVERY MORNING
Status: DISCONTINUED | OUTPATIENT
Start: 2018-05-19 | End: 2018-05-22 | Stop reason: HOSPADM

## 2018-05-18 RX ORDER — TERAZOSIN 1 MG/1
1 CAPSULE ORAL EVERY MORNING
Status: DISCONTINUED | OUTPATIENT
Start: 2018-05-19 | End: 2018-05-22 | Stop reason: HOSPADM

## 2018-05-18 RX ORDER — CIPROFLOXACIN 500 MG/1
500 TABLET, FILM COATED ORAL 2 TIMES DAILY
Status: DISPENSED | OUTPATIENT
Start: 2018-05-18 | End: 2018-05-20

## 2018-05-18 RX ORDER — ACETAMINOPHEN 325 MG/1
650 TABLET ORAL EVERY 6 HOURS PRN
Status: DISCONTINUED | OUTPATIENT
Start: 2018-05-18 | End: 2018-05-22 | Stop reason: HOSPADM

## 2018-05-18 RX ORDER — ONDANSETRON 4 MG/1
4 TABLET, ORALLY DISINTEGRATING ORAL EVERY 4 HOURS PRN
Status: DISCONTINUED | OUTPATIENT
Start: 2018-05-18 | End: 2018-05-22 | Stop reason: HOSPADM

## 2018-05-18 RX ORDER — KETOROLAC TROMETHAMINE 30 MG/ML
30 INJECTION, SOLUTION INTRAMUSCULAR; INTRAVENOUS ONCE
Status: COMPLETED | OUTPATIENT
Start: 2018-05-18 | End: 2018-05-19

## 2018-05-18 RX ORDER — OXYCODONE HYDROCHLORIDE 10 MG/1
10 TABLET ORAL
Status: DISCONTINUED | OUTPATIENT
Start: 2018-05-18 | End: 2018-05-20

## 2018-05-18 RX ORDER — BISACODYL 10 MG
10 SUPPOSITORY, RECTAL RECTAL
Status: DISCONTINUED | OUTPATIENT
Start: 2018-05-18 | End: 2018-05-22 | Stop reason: HOSPADM

## 2018-05-18 RX ADMIN — IOHEXOL 100 ML: 350 INJECTION, SOLUTION INTRAVENOUS at 20:34

## 2018-05-18 RX ADMIN — FENTANYL CITRATE 50 MCG: 50 INJECTION INTRAMUSCULAR; INTRAVENOUS at 17:11

## 2018-05-18 RX ADMIN — SODIUM CHLORIDE: 9 INJECTION, SOLUTION INTRAVENOUS at 17:11

## 2018-05-18 RX ADMIN — ONDANSETRON 4 MG: 2 INJECTION INTRAMUSCULAR; INTRAVENOUS at 17:11

## 2018-05-18 RX ADMIN — FENTANYL CITRATE 50 MCG: 50 INJECTION INTRAMUSCULAR; INTRAVENOUS at 22:45

## 2018-05-18 ASSESSMENT — ENCOUNTER SYMPTOMS
BRUISES/BLEEDS EASILY: 0
CHILLS: 0
COUGH: 0
HEADACHES: 0
FEVER: 0
SEIZURES: 0
MYALGIAS: 0
BLURRED VISION: 0
DIARRHEA: 0
BLOOD IN STOOL: 0
TINGLING: 0
SPUTUM PRODUCTION: 0
FALLS: 1
NECK PAIN: 0
NAUSEA: 0
ABDOMINAL PAIN: 0
FOCAL WEAKNESS: 0
WHEEZING: 0
BACK PAIN: 1
DIAPHORESIS: 0
VOMITING: 0
FLANK PAIN: 0
PALPITATIONS: 0
LOSS OF CONSCIOUSNESS: 0
DIZZINESS: 0
DEPRESSION: 0
SORE THROAT: 0
SENSORY CHANGE: 0
SHORTNESS OF BREATH: 0

## 2018-05-18 ASSESSMENT — PAIN SCALES - GENERAL
PAINLEVEL_OUTOF10: 5
PAINLEVEL_OUTOF10: 3

## 2018-05-18 ASSESSMENT — PAIN SCALES - WONG BAKER: WONGBAKER_NUMERICALRESPONSE: HURTS A WHOLE LOT

## 2018-05-19 PROBLEM — D72.829 LEUKOCYTOSIS: Status: ACTIVE | Noted: 2018-05-19

## 2018-05-19 PROBLEM — E87.6 HYPOKALEMIA: Status: ACTIVE | Noted: 2018-05-19

## 2018-05-19 LAB
25(OH)D3 SERPL-MCNC: 31 NG/ML (ref 30–100)
ANION GAP SERPL CALC-SCNC: 9 MMOL/L (ref 0–11.9)
BASOPHILS # BLD AUTO: 0.6 % (ref 0–1.8)
BASOPHILS # BLD: 0.04 K/UL (ref 0–0.12)
BUN SERPL-MCNC: 10 MG/DL (ref 8–22)
CALCIUM SERPL-MCNC: 8.7 MG/DL (ref 8.5–10.5)
CHLORIDE SERPL-SCNC: 108 MMOL/L (ref 96–112)
CO2 SERPL-SCNC: 22 MMOL/L (ref 20–33)
CREAT SERPL-MCNC: 0.81 MG/DL (ref 0.5–1.4)
EOSINOPHIL # BLD AUTO: 0.03 K/UL (ref 0–0.51)
EOSINOPHIL NFR BLD: 0.5 % (ref 0–6.9)
ERYTHROCYTE [DISTWIDTH] IN BLOOD BY AUTOMATED COUNT: 44.4 FL (ref 35.9–50)
GLUCOSE SERPL-MCNC: 173 MG/DL (ref 65–99)
HCT VFR BLD AUTO: 38.5 % (ref 37–47)
HGB BLD-MCNC: 13 G/DL (ref 12–16)
IMM GRANULOCYTES # BLD AUTO: 0.02 K/UL (ref 0–0.11)
IMM GRANULOCYTES NFR BLD AUTO: 0.3 % (ref 0–0.9)
LYMPHOCYTES # BLD AUTO: 0.66 K/UL (ref 1–4.8)
LYMPHOCYTES NFR BLD: 10.1 % (ref 22–41)
MAGNESIUM SERPL-MCNC: 2.2 MG/DL (ref 1.5–2.5)
MCH RBC QN AUTO: 32.1 PG (ref 27–33)
MCHC RBC AUTO-ENTMCNC: 33.8 G/DL (ref 33.6–35)
MCV RBC AUTO: 95.1 FL (ref 81.4–97.8)
MONOCYTES # BLD AUTO: 0.48 K/UL (ref 0–0.85)
MONOCYTES NFR BLD AUTO: 7.4 % (ref 0–13.4)
NEUTROPHILS # BLD AUTO: 5.29 K/UL (ref 2–7.15)
NEUTROPHILS NFR BLD: 81.1 % (ref 44–72)
NRBC # BLD AUTO: 0 K/UL
NRBC BLD-RTO: 0 /100 WBC
PHOSPHATE SERPL-MCNC: 3.9 MG/DL (ref 2.5–4.5)
PLATELET # BLD AUTO: 238 K/UL (ref 164–446)
PMV BLD AUTO: 10.3 FL (ref 9–12.9)
POTASSIUM SERPL-SCNC: 3.3 MMOL/L (ref 3.6–5.5)
RBC # BLD AUTO: 4.05 M/UL (ref 4.2–5.4)
SODIUM SERPL-SCNC: 139 MMOL/L (ref 135–145)
TSH SERPL DL<=0.005 MIU/L-ACNC: 0.74 UIU/ML (ref 0.38–5.33)
WBC # BLD AUTO: 6.5 K/UL (ref 4.8–10.8)

## 2018-05-19 PROCEDURE — 85025 COMPLETE CBC W/AUTO DIFF WBC: CPT

## 2018-05-19 PROCEDURE — 700102 HCHG RX REV CODE 250 W/ 637 OVERRIDE(OP): Performed by: FAMILY MEDICINE

## 2018-05-19 PROCEDURE — G0378 HOSPITAL OBSERVATION PER HR: HCPCS

## 2018-05-19 PROCEDURE — 700111 HCHG RX REV CODE 636 W/ 250 OVERRIDE (IP): Performed by: INTERNAL MEDICINE

## 2018-05-19 PROCEDURE — A9270 NON-COVERED ITEM OR SERVICE: HCPCS | Performed by: INTERNAL MEDICINE

## 2018-05-19 PROCEDURE — 700101 HCHG RX REV CODE 250: Performed by: INTERNAL MEDICINE

## 2018-05-19 PROCEDURE — 96376 TX/PRO/DX INJ SAME DRUG ADON: CPT

## 2018-05-19 PROCEDURE — L0637 LSO SC R ANT/POS PNL PRE CST: HCPCS

## 2018-05-19 PROCEDURE — 84443 ASSAY THYROID STIM HORMONE: CPT

## 2018-05-19 PROCEDURE — 82306 VITAMIN D 25 HYDROXY: CPT

## 2018-05-19 PROCEDURE — 99226 PR SUBSEQUENT OBSERVATION CARE,LEVEL III: CPT | Performed by: FAMILY MEDICINE

## 2018-05-19 PROCEDURE — 83735 ASSAY OF MAGNESIUM: CPT

## 2018-05-19 PROCEDURE — 700111 HCHG RX REV CODE 636 W/ 250 OVERRIDE (IP): Performed by: EMERGENCY MEDICINE

## 2018-05-19 PROCEDURE — 36415 COLL VENOUS BLD VENIPUNCTURE: CPT

## 2018-05-19 PROCEDURE — A9270 NON-COVERED ITEM OR SERVICE: HCPCS | Performed by: FAMILY MEDICINE

## 2018-05-19 PROCEDURE — 96375 TX/PRO/DX INJ NEW DRUG ADDON: CPT

## 2018-05-19 PROCEDURE — 84100 ASSAY OF PHOSPHORUS: CPT

## 2018-05-19 PROCEDURE — 700102 HCHG RX REV CODE 250 W/ 637 OVERRIDE(OP): Performed by: INTERNAL MEDICINE

## 2018-05-19 PROCEDURE — 80048 BASIC METABOLIC PNL TOTAL CA: CPT

## 2018-05-19 RX ORDER — CLONIDINE HYDROCHLORIDE 0.1 MG/1
0.2 TABLET ORAL ONCE
Status: COMPLETED | OUTPATIENT
Start: 2018-05-19 | End: 2018-05-19

## 2018-05-19 RX ORDER — POTASSIUM CHLORIDE 20 MEQ/1
20 TABLET, EXTENDED RELEASE ORAL DAILY
Status: DISCONTINUED | OUTPATIENT
Start: 2018-05-19 | End: 2018-05-21

## 2018-05-19 RX ADMIN — STANDARDIZED SENNA CONCENTRATE AND DOCUSATE SODIUM 2 TABLET: 8.6; 5 TABLET, FILM COATED ORAL at 22:09

## 2018-05-19 RX ADMIN — OXYCODONE HYDROCHLORIDE 10 MG: 10 TABLET ORAL at 22:07

## 2018-05-19 RX ADMIN — STANDARDIZED SENNA CONCENTRATE AND DOCUSATE SODIUM 2 TABLET: 8.6; 5 TABLET, FILM COATED ORAL at 10:45

## 2018-05-19 RX ADMIN — METOPROLOL TARTRATE 25 MG: 25 TABLET, FILM COATED ORAL at 00:32

## 2018-05-19 RX ADMIN — STANDARDIZED SENNA CONCENTRATE AND DOCUSATE SODIUM 2 TABLET: 8.6; 5 TABLET, FILM COATED ORAL at 00:31

## 2018-05-19 RX ADMIN — POTASSIUM CHLORIDE 20 MEQ: 1500 TABLET, EXTENDED RELEASE ORAL at 12:25

## 2018-05-19 RX ADMIN — KETOROLAC TROMETHAMINE 30 MG: 30 INJECTION, SOLUTION INTRAMUSCULAR; INTRAVENOUS at 00:33

## 2018-05-19 RX ADMIN — ONDANSETRON 4 MG: 2 INJECTION INTRAMUSCULAR; INTRAVENOUS at 15:59

## 2018-05-19 RX ADMIN — OXYCODONE HYDROCHLORIDE 10 MG: 10 TABLET ORAL at 13:06

## 2018-05-19 RX ADMIN — METOPROLOL TARTRATE 25 MG: 25 TABLET, FILM COATED ORAL at 10:45

## 2018-05-19 RX ADMIN — ASPIRIN 81 MG: 81 TABLET, COATED ORAL at 22:06

## 2018-05-19 RX ADMIN — OXYCODONE HYDROCHLORIDE 5 MG: 5 TABLET ORAL at 02:56

## 2018-05-19 RX ADMIN — CLONIDINE HYDROCHLORIDE 0.2 MG: 0.1 TABLET ORAL at 02:56

## 2018-05-19 RX ADMIN — METOPROLOL TARTRATE 25 MG: 25 TABLET, FILM COATED ORAL at 22:06

## 2018-05-19 RX ADMIN — FENTANYL CITRATE 50 MCG: 50 INJECTION INTRAMUSCULAR; INTRAVENOUS at 01:31

## 2018-05-19 RX ADMIN — LABETALOL HYDROCHLORIDE 10 MG: 5 INJECTION, SOLUTION INTRAVENOUS at 01:47

## 2018-05-19 RX ADMIN — ONDANSETRON 4 MG: 2 INJECTION INTRAMUSCULAR; INTRAVENOUS at 20:29

## 2018-05-19 ASSESSMENT — ENCOUNTER SYMPTOMS
WHEEZING: 0
NAUSEA: 0
CHILLS: 0
NERVOUS/ANXIOUS: 1
HEARTBURN: 0
WEAKNESS: 1
BACK PAIN: 1
ABDOMINAL PAIN: 0
VOMITING: 0
FOCAL WEAKNESS: 0
COUGH: 0
SENSORY CHANGE: 0
BLURRED VISION: 0
HEADACHES: 0
SORE THROAT: 0
SHORTNESS OF BREATH: 0
FEVER: 0
NECK PAIN: 0
DIZZINESS: 0
DIARRHEA: 0

## 2018-05-19 ASSESSMENT — COPD QUESTIONNAIRES
COPD SCREENING SCORE: 3
DURING THE PAST 4 WEEKS HOW MUCH DID YOU FEEL SHORT OF BREATH: SOME OF THE TIME
HAVE YOU SMOKED AT LEAST 100 CIGARETTES IN YOUR ENTIRE LIFE: NO/DON'T KNOW
DO YOU EVER COUGH UP ANY MUCUS OR PHLEGM?: NO/ONLY WITH OCCASIONAL COLDS OR INFECTIONS

## 2018-05-19 ASSESSMENT — PAIN SCALES - GENERAL
PAINLEVEL_OUTOF10: 3
PAINLEVEL_OUTOF10: 5
PAINLEVEL_OUTOF10: 6
PAINLEVEL_OUTOF10: 2
PAINLEVEL_OUTOF10: 3
PAINLEVEL_OUTOF10: 2
PAINLEVEL_OUTOF10: 3
PAINLEVEL_OUTOF10: 6
PAINLEVEL_OUTOF10: 4
PAINLEVEL_OUTOF10: 2
PAINLEVEL_OUTOF10: 10

## 2018-05-19 ASSESSMENT — LIFESTYLE VARIABLES: EVER_SMOKED: NEVER

## 2018-05-19 ASSESSMENT — PATIENT HEALTH QUESTIONNAIRE - PHQ9
2. FEELING DOWN, DEPRESSED, IRRITABLE, OR HOPELESS: NOT AT ALL
1. LITTLE INTEREST OR PLEASURE IN DOING THINGS: NOT AT ALL
SUM OF ALL RESPONSES TO PHQ9 QUESTIONS 1 AND 2: 0

## 2018-05-19 ASSESSMENT — PAIN SCALES - WONG BAKER: WONGBAKER_NUMERICALRESPONSE: DOESN'T HURT AT ALL

## 2018-05-19 NOTE — H&P
Hospital Medicine History and Physical    Date of Service  5/18/2018    Chief Complaint  Chief Complaint   Patient presents with   • Low Back Pain     slipped at home and fell backwards on to back.  family/ems reports she landed on her tailbone.  denies numbness/tingling.  no loc.  pain in lower back.    • Nausea     feels nauseous after pain medication.       History of Presenting Illness  82 y.o. female with a past medical history of hypertension who presented 5/18/2018 with a ground-level fall and back pain. Patient states that she is bending over to pick her glasses when she suddenly fell down and a her back and right hip. She denied any loss of consciousness, chest pain or seizure like activity. She reports severe pain 10/10 pain worse with any kind of movement. Patient has been unable to get up or ambulate. The patient denies any weakness, numbness, tingling or urinary/bowel incontinence. She denies any fevers, chest pain, shortness of breath or abdominal pain. The patient was recently diagnosed with a UTI and started on ciprofloxacin. However she continues to report some dysuria today.    Primary Care Physician  Amos Martinez M.D.    Consultants  Neurosurgery Dr. Szymanski    Code Status  Full code    Review of Systems  Review of Systems   Constitutional: Negative for chills, diaphoresis and fever.   HENT: Negative for hearing loss and sore throat.    Eyes: Negative for blurred vision.   Respiratory: Negative for cough, sputum production, shortness of breath and wheezing.    Cardiovascular: Negative for chest pain, palpitations and leg swelling.   Gastrointestinal: Negative for abdominal pain, blood in stool, diarrhea, nausea and vomiting.   Genitourinary: Positive for dysuria. Negative for flank pain and urgency.   Musculoskeletal: Positive for back pain and falls. Negative for joint pain, myalgias and neck pain.   Skin: Negative for rash.   Neurological: Negative for dizziness, tingling, sensory change,  focal weakness, seizures, loss of consciousness and headaches.   Endo/Heme/Allergies: Does not bruise/bleed easily.   Psychiatric/Behavioral: Negative for depression and suicidal ideas.   All other systems reviewed and are negative.       Past Medical History  Past Medical History:   Diagnosis Date   • Multiple thyroid nodules    • CHI (closed head injury)    • Elevated cortisol level (HCC)     unknown etiology   • Hypertension     medicated   • Insomnia    • Thyrotoxicosis     history in  / euthyroid    • Urinary tract infection        Surgical History  Past Surgical History:   Procedure Laterality Date   • APPENDECTOMY     • TONSILLECTOMY         Medications  No current facility-administered medications on file prior to encounter.      Current Outpatient Prescriptions on File Prior to Encounter   Medication Sig Dispense Refill   • ciprofloxacin (CIPRO) 500 MG Tab Take 1 Tab by mouth 2 times a day. 20 Tab 0   • terazosin (HYTRIN) 1 MG Cap Take 1 Cap by mouth every morning. 30 Cap 3   • metoprolol (LOPRESSOR) 25 MG Tab Take 25 mg by mouth 2 times a day.     • aspirin EC (ECOTRIN) 81 MG TBEC Take 81 mg by mouth every evening.     • terazosin (HYTRIN) 2 MG CAPS Take 2 mg by mouth every bedtime.     • valsartan (DIOVAN) 320 MG tablet Take 320 mg by mouth every morning.         Family History  Family History   Problem Relation Age of Onset   • Heart Disease Mother    • Heart Disease Father    • Hypertension Sister    • Arthritis Sister    • Thyroid Sister        Social History  Social History   Substance Use Topics   • Smoking status: Never Smoker   • Smokeless tobacco: Never Used   • Alcohol use No       Allergies  Allergies   Allergen Reactions   • Amoxicillin      Unsure of reaction/or allergy   • Morphine Vomiting and Nausea   • Penicillins      1970 reaction        Physical Exam  Laboratory   Hemodynamics  No data recorded.      Pulse  Av.5  Min: 72  Max: 95    Blood Pressure : 155/70, NIBP: (!)  170/70      Respiratory      Respiration: 17, Pulse Oximetry: 96 %             Physical Exam   Constitutional: She is oriented to person, place, and time. She appears well-developed and well-nourished. No distress.   HENT:   Head: Normocephalic and atraumatic.   Mouth/Throat: Oropharynx is clear and moist.   Eyes: Conjunctivae are normal. Pupils are equal, round, and reactive to light.   Neck: Neck supple.   Cardiovascular: Normal rate, regular rhythm and normal heart sounds.    Pulmonary/Chest: Effort normal and breath sounds normal. No respiratory distress. She has no wheezes. She has no rales.   Abdominal: Soft. Bowel sounds are normal. She exhibits no distension. There is no tenderness. There is no rebound.   Musculoskeletal: She exhibits tenderness. She exhibits no edema.   Limited range of motion of lower extremities due to pain   Neurological: She is alert and oriented to person, place, and time. No cranial nerve deficit. Coordination normal.   Strength and sensation intact   Skin: Skin is warm and dry.   Psychiatric: She has a normal mood and affect. Her behavior is normal.   Nursing note and vitals reviewed.      Recent Labs      05/18/18   1715   WBC  17.9*   RBC  4.11*   HEMOGLOBIN  13.2   HEMATOCRIT  38.9   MCV  94.6   MCH  32.1   MCHC  33.9   RDW  43.8   PLATELETCT  247   MPV  10.3     Recent Labs      05/18/18   1715   SODIUM  136   POTASSIUM  3.5*   CHLORIDE  106   CO2  21   GLUCOSE  113*   BUN  12   CREATININE  0.59   CALCIUM  8.4*     Recent Labs      05/18/18   1715   ALTSGPT  11   ASTSGOT  18   ALKPHOSPHAT  36   TBILIRUBIN  0.4   GLUCOSE  113*                 Lab Results   Component Value Date    TROPONINI <0.02 05/09/2018     Urinalysis:    Lab Results  Component Value Date/Time   SPECGRAVITY <=1.005 05/09/2018 1712   GLUCOSEUR Negative 05/09/2018 1712   KETONES Negative 05/09/2018 1712   NITRITE Negative 05/09/2018 1712   WBCURINE 0-2 05/09/2018 1712   RBCURINE 0-2 05/09/2018 1712   BACTERIA  Rare (A) 05/09/2018 1712   EPITHELCELL Rare 05/09/2018 1712        Imaging  CT-LSPINE W/O PLUS RECONS   Final Result      Acute appearing mild compression fracture involving the superior endplate of L3 with approximately 10% loss of height and posterior buckling resulting in mild narrowing of the central canal.      CT-ABDOMEN-PELVIS WITH   Final Result      No evidence of abdominal or pelvic injury.      Apparent mild acute compression deformity involving the superior endplate of L3. This will be described in better detail on lumbar spine CT report.      Distended urinary bladder.      No significant change in 2.1 cm left ovarian cyst.      DX-LUMBAR SPINE-2 OR 3 VIEWS   Final Result      Negative lumbar spine series.      DX-CHEST-PORTABLE (1 VIEW)   Final Result      No acute cardiac or pulmonary abnormality is noted.      DX-PELVIS-1 OR 2 VIEWS   Final Result      Negative AP view of the pelvis.           Assessment/Plan     I anticipate this patient is appropriate for observation status at this time.    Closed compression fracture of L3 lumbar vertebra (HCC)- (present on admission)   Assessment & Plan    L 3 compression fracture with intractable back pain  Patient will be started on a multimodal pain control regimen  Toradol IV Q6 hours PRN, tylenol, oxycodone and IV morphine for breakthrough, monitor respiratory status closely  Neurosurgery was consulted   PT OT eval            UTI (urinary tract infection)- (present on admission)   Assessment & Plan    Recently diagnosed with UTI  Continue Cipro 500 mg BID  Pt continues to report some dysuria  Check UA and urine culture        Essential hypertension- (present on admission)   Assessment & Plan    Uncontrolled  Continue metoprolol 25 mg twice a day, Hytrin 1 mg daily, valsartan 32 mg daily  IV labetalol when necessary for SBP greater than 160            VTE prophylaxis: SCD.

## 2018-05-19 NOTE — ED NOTES
Patient placed on and off bedpan to urinate. Gown and sheets changed.  at bedside. Awaiting admission.

## 2018-05-19 NOTE — ED PROVIDER NOTES
ED Provider Note    CHIEF COMPLAINT  Chief Complaint   Patient presents with   • Low Back Pain     slipped at home and fell backwards on to back.  family/ems reports she landed on her tailbone.  denies numbness/tingling.  no loc.  pain in lower back.    • Nausea     feels nauseous after pain medication.       HPI  Prabha Lizama is a 82 y.o. female who presents complaining of low back pain and right sided pelvic pain after ground-level fall today.  Patient slipped.  Patient states the pain is severe.  10/10 in severity.  Worse with movement.  Patient was unable to get up.  Patient denied numbness tingling or weakness in extremities.  No further details of the history of present illness can be obtained within the constraints of the urgency the patient's clinical condition    REVIEW OF SYSTEMS  See HPI for further details.  No fever no chills.  No neck pain.  No headache.  No chest pain.  No loss of consciousness.   all other systems are negative.    PAST MEDICAL HISTORY  Past Medical History:   Diagnosis Date   • CHI (closed head injury)    • Elevated cortisol level (HCC)     unknown etiology   • Hypertension     medicated   • Insomnia    • Multiple thyroid nodules 2008   • Thyrotoxicosis     history in 2013 / euthyroid 2015   • Urinary tract infection        FAMILY HISTORY  Family History   Problem Relation Age of Onset   • Heart Disease Mother    • Heart Disease Father    • Hypertension Sister    • Arthritis Sister    • Thyroid Sister        SOCIAL HISTORY  Social History     Social History   • Marital status:      Spouse name: N/A   • Number of children: N/A   • Years of education: N/A     Social History Main Topics   • Smoking status: Never Smoker   • Smokeless tobacco: Never Used   • Alcohol use No   • Drug use: No   • Sexual activity: Not on file     Other Topics Concern   • Not on file     Social History Narrative   • No narrative on file       SURGICAL HISTORY  Past Surgical History:   Procedure  "Laterality Date   • APPENDECTOMY     • TONSILLECTOMY         CURRENT MEDICATIONS  @ He is Leisa@    ALLERGIES  Allergies   Allergen Reactions   • Amoxicillin      Unsure of reaction/or allergy   • Morphine Vomiting and Nausea   • Penicillins      1970 reaction       PHYSICAL EXAM  VITAL SIGNS: /70   Pulse 95   Resp 17   Ht 1.676 m (5' 6\")   Wt 59 kg (130 lb)   SpO2 96%   BMI 20.98 kg/m²   Constitutional: Elderly female.  Tearful.  In obvious distress  HENT: Normocephalic, Atraumatic, Bilateral external ears normal, Oropharynx moist, No oral exudates, Nose normal.   Eyes: KRISSY, EOMI, Conjunctiva normal, No discharge.   Neck: Normal range of motion, No tenderness, Supple, No stridor. No nuchal rigidity.  Nontender cervical spine  Lymphatic: No lymphadenopathy noted.   Cardiovascular: Normal heart rate, Normal rhythm, No murmurs, No rubs, No gallops.   Thorax & Lungs: Normal breath sounds, No respiratory distress, No wheezing, No chest tenderness.  : Tender right pelvis.  Tender right sacroiliac tenderness  Abdomen: Soft.  Mild tenderness in the right upper and lower quadrant  Musculoskeletal: Tenderness over the lumbar spine diffusely  Skin: Warm, Dry, No erythema, No rash.   Back: No tenderness, positive right sided CVA tenderness.   Extremities: No edema, No tenderness, No cyanosis, No clubbing. Dorsalis pedis pulses 2+ equal bilaterally. Radial pulses 2+ equal bilaterally    RADIOLOGY/PROCEDURES  CT-LSPINE W/O PLUS RECONS   Final Result      Acute appearing mild compression fracture involving the superior endplate of L3 with approximately 10% loss of height and posterior buckling resulting in mild narrowing of the central canal.      CT-ABDOMEN-PELVIS WITH   Final Result      No evidence of abdominal or pelvic injury.      Apparent mild acute compression deformity involving the superior endplate of L3. This will be described in better detail on lumbar spine CT report.      Distended urinary bladder.    "   No significant change in 2.1 cm left ovarian cyst.      DX-LUMBAR SPINE-2 OR 3 VIEWS   Final Result      Negative lumbar spine series.      DX-CHEST-PORTABLE (1 VIEW)   Final Result      No acute cardiac or pulmonary abnormality is noted.      DX-PELVIS-1 OR 2 VIEWS   Final Result      Negative AP view of the pelvis.        Results for orders placed or performed during the hospital encounter of 05/18/18   CBC WITH DIFFERENTIAL   Result Value Ref Range    WBC 17.9 (H) 4.8 - 10.8 K/uL    RBC 4.11 (L) 4.20 - 5.40 M/uL    Hemoglobin 13.2 12.0 - 16.0 g/dL    Hematocrit 38.9 37.0 - 47.0 %    MCV 94.6 81.4 - 97.8 fL    MCH 32.1 27.0 - 33.0 pg    MCHC 33.9 33.6 - 35.0 g/dL    RDW 43.8 35.9 - 50.0 fL    Platelet Count 247 164 - 446 K/uL    MPV 10.3 9.0 - 12.9 fL    Neutrophils-Polys 88.20 (H) 44.00 - 72.00 %    Lymphocytes 3.60 (L) 22.00 - 41.00 %    Monocytes 6.40 0.00 - 13.40 %    Eosinophils 0.20 0.00 - 6.90 %    Basophils 0.50 0.00 - 1.80 %    Immature Granulocytes 1.10 (H) 0.00 - 0.90 %    Nucleated RBC 0.00 /100 WBC    Neutrophils (Absolute) 15.81 (H) 2.00 - 7.15 K/uL    Lymphs (Absolute) 0.64 (L) 1.00 - 4.80 K/uL    Monos (Absolute) 1.15 (H) 0.00 - 0.85 K/uL    Eos (Absolute) 0.03 0.00 - 0.51 K/uL    Baso (Absolute) 0.09 0.00 - 0.12 K/uL    Immature Granulocytes (abs) 0.19 (H) 0.00 - 0.11 K/uL    NRBC (Absolute) 0.00 K/uL   COMP METABOLIC PANEL   Result Value Ref Range    Sodium 136 135 - 145 mmol/L    Potassium 3.5 (L) 3.6 - 5.5 mmol/L    Chloride 106 96 - 112 mmol/L    Co2 21 20 - 33 mmol/L    Anion Gap 9.0 0.0 - 11.9    Glucose 113 (H) 65 - 99 mg/dL    Bun 12 8 - 22 mg/dL    Creatinine 0.59 0.50 - 1.40 mg/dL    Calcium 8.4 (L) 8.5 - 10.5 mg/dL    AST(SGOT) 18 12 - 45 U/L    ALT(SGPT) 11 2 - 50 U/L    Alkaline Phosphatase 36 30 - 99 U/L    Total Bilirubin 0.4 0.1 - 1.5 mg/dL    Albumin 3.3 3.2 - 4.9 g/dL    Total Protein 5.3 (L) 6.0 - 8.2 g/dL    Globulin 2.0 1.9 - 3.5 g/dL    A-G Ratio 1.7 g/dL   ESTIMATED GFR    Result Value Ref Range    GFR If African American >60 >60 mL/min/1.73 m 2    GFR If Non African American >60 >60 mL/min/1.73 m 2         COURSE & MEDICAL DECISION MAKING  Pertinent Labs & Imaging studies reviewed. (See chart for details)  Differential diagnosis includes spine fracture versus pelvis fracture versus intra-abdominal injury.    Plain films are unremarkable.  CT scan delineated a 10% compression fracture of the lumbar spine.  Patient is in quite a bit of pain.  She certainly will not be able to be discharged home.  Patient will be admitted by Dr. Jung.  Spine consultation will be obtained.    FINAL IMPRESSION  1.  Compression fracture L3  2.   3.    Please note that this dictation was created using voice recognition software. I have worked with consultants from the vendor as well as technical experts from [employer] to optimize the interface. I have made every reasonable attempt to correct obvious errors, but I expect that there are errors of grammar and possibly content that I did not discover before finalizing the note.    Electronically signed by: Aguilar Garcia, 5/18/2018 10:04 PM

## 2018-05-19 NOTE — CARE PLAN
Problem: Pain Management  Goal: Pain level will decrease to patient's comfort goal    Intervention: Educate and implement non-pharmacologic comfort measures. Examples: relaxation, distration, play therapy, activity therapy, massage, etc.  Pt educated on deep breathing and relaxation techniques using breathe.

## 2018-05-19 NOTE — CARE PLAN
Problem: Communication  Goal: The ability to communicate needs accurately and effectively will improve    Intervention: Educate patient and significant other/support system about the plan of care, procedures, treatments, medications and allow for questions  Pt oriented to room and plan of care for toileting and pain management, demonstrates use of call light, needs met.

## 2018-05-19 NOTE — CARE PLAN
Problem: Safety  Goal: Will remain free from falls    Intervention: Implement fall precautions  Pt unable to move due to back injury, bed alarm is not necessary at this time.

## 2018-05-19 NOTE — CONSULTS
Neurosurgery Consult Note    Patient: Prabha Lizama    MRN: [id number]    Date of Consultation: 5/19/2018    Reason for Consultation: L3 osteoporotic compression fracture    Referring Physician: Dr. Aguilar Garcia    History of Present Illness:  Prabha Lizama is a 82 y.o. female who presents complaining of low back pain and right sided pelvic pain after ground-level fall yesterday.  Patient slipped.  Patient states the pain is severe.  10/10 in severity.  Worse with movement.  Patient was unable to get up.  Patient denied numbness tingling or weakness in extremities.    Review of Systems:  Constitutional: Denies fevers, chills, night sweats, or weight changes  Eyes: Denies changes in vision, or eye pain  Ears/Nose/Throat/Mouth: Denies nasal congestion or sore throat   Cardiovascular: Denies chest pain or palpitations   Respiratory: Denies shortness of breath, or cough  Gastrointestinal/Hepatic: Denies abdominal pain, nausea, vomiting, diarrhea, or constipation  Genitourinary: Denies dysuria, frequency, or incontinence  Musculoskeletal/Rheum: Back pain  Skin: Denies rash  Neurological: Denies headache, confusion, memory loss, focal weakness, or parasthesias  Psychiatric: Denies mood disorder   Endocrine: Denies hx of diabetes  Heme/Oncology/Lymph Nodes: Denies enlarged lymph nodes, bruising, or known bleeding disorder  Allergic/Immunologic: Denies hx of autoimmune disorder    Medications:  Current Facility-Administered Medications   Medication Dose Route Frequency Provider Last Rate Last Dose   • potassium chloride SA (Kdur) tablet 20 mEq  20 mEq Oral DAILY Sohan Novoa M.D.       • NS infusion   Intravenous Continuous Aguilar Garcia M.D.   Stopped at 05/19/18 0059   • fentaNYL (SUBLIMAZE) injection 50 mcg  50 mcg Intravenous Q HOUR PRN Aguilar Garcia M.D.   50 mcg at 05/19/18 0131   • senna-docusate (PERICOLACE or SENOKOT S) 8.6-50 MG per tablet 2 Tab  2 Tab Oral BID Juarez Jung M.D.   2 Tab at  05/19/18 1045    And   • polyethylene glycol/lytes (MIRALAX) PACKET 1 Packet  1 Packet Oral QDAY PRN Juarez Jung M.D.        And   • magnesium hydroxide (MILK OF MAGNESIA) suspension 30 mL  30 mL Oral QDAY PRN Juarez Jung M.D.        And   • bisacodyl (DULCOLAX) suppository 10 mg  10 mg Rectal QDAY PRN Juarez Jung M.D.       • Respiratory Care per Protocol   Nebulization Continuous RT Juarez Jung M.D.       • ondansetron (ZOFRAN) syringe/vial injection 4 mg  4 mg Intravenous Q4HRS PRN Juarez Jung M.D.       • ondansetron (ZOFRAN ODT) dispertab 4 mg  4 mg Oral Q4HRS PRN Juarez Jung M.D.       • acetaminophen (TYLENOL) tablet 650 mg  650 mg Oral Q6HRS PRN Juarez Jung M.D.       • Pharmacy Consult Request ...Pain Management Review   Other PRN Juarez Jung M.D.        And   • oxyCODONE immediate-release (ROXICODONE) tablet 5 mg  5 mg Oral Q3HRS PRN Juarez Jung M.D.   5 mg at 05/19/18 0256    And   • oxyCODONE immediate-release (ROXICODONE) tablet 10 mg  10 mg Oral Q3HRS PRN Juarez Jung M.D.        And   • morphine (pf) 4 mg/ml injection 4 mg  4 mg Intravenous Q3HRS PRN Juarez Jung M.D.       • metoprolol (LOPRESSOR) tablet 25 mg  25 mg Oral BID Juarez Jung M.D.   25 mg at 05/19/18 1045   • aspirin EC (ECOTRIN) tablet 81 mg  81 mg Oral Q EVENING Juarez Jung M.D.       • valsartan (DIOVAN) tablet 320 mg  320 mg Oral JEFE Jung M.D.       • ciprofloxacin (CIPRO) tablet 500 mg  500 mg Oral BID Juarez Jung M.D.       • terazosin (HYTRIN) capsule 1 mg  1 mg Oral JEFE Jung M.D.       • ketorolac (TORADOL) injection 15 mg  15 mg Intravenous Q6HRS PRN Juarez Jung M.D.       • [START ON 5/24/2018] ibuprofen (MOTRIN) tablet 600 mg  600 mg Oral Q6HRS PRN Juarez Jung M.D.       • labetalol (NORMODYNE,TRANDATE) injection 10 mg  10 mg Intravenous Q4HRS PRN Juarez Jung M.D.   10 mg at 05/19/18 0147       Allergies:  Allergies   Allergen Reactions   • Amoxicillin      Unsure of reaction/or allergy   •  Hydralazine Unspecified     Pt reported severe hypotensive reaction   • Morphine Vomiting and Nausea   • Penicillins      1970 reaction       Past Medical History:  Past Medical History:   Diagnosis Date   • CHI (closed head injury)    • Elevated cortisol level (HCC)     unknown etiology   • Hypertension     medicated   • Insomnia    • Multiple thyroid nodules 2008   • Thyrotoxicosis     history in 2013 / euthyroid 2015   • Urinary tract infection        Past Surgical History:  Past Surgical History:   Procedure Laterality Date   • APPENDECTOMY     • TONSILLECTOMY         Family History:  Family History   Problem Relation Age of Onset   • Heart Disease Mother    • Heart Disease Father    • Hypertension Sister    • Arthritis Sister    • Thyroid Sister        Social History:  Social History     Social History   • Marital status:      Spouse name: N/A   • Number of children: N/A   • Years of education: N/A     Occupational History   • Not on file.     Social History Main Topics   • Smoking status: Never Smoker   • Smokeless tobacco: Never Used   • Alcohol use No   • Drug use: No   • Sexual activity: Not on file     Other Topics Concern   • Not on file     Social History Narrative   • No narrative on file       Physical Examination:  Alert and oriented  Appears comfortable sitting in bed  Full strength in the lower extremities bilaterally  Sensation is normal in the lower extremities bilaterally  Gait not assessed due to pain    Labs:  Recent Labs      05/18/18   1715  05/19/18   0836   WBC  17.9*  6.5   RBC  4.11*  4.05*   HEMOGLOBIN  13.2  13.0   HEMATOCRIT  38.9  38.5   MCV  94.6  95.1   MCH  32.1  32.1   MCHC  33.9  33.8   RDW  43.8  44.4   PLATELETCT  247  238   MPV  10.3  10.3     Recent Labs      05/18/18   1715  05/19/18   0836   SODIUM  136  139   POTASSIUM  3.5*  3.3*   CHLORIDE  106  108   CO2  21  22   GLUCOSE  113*  173*   BUN  12  10   CREATININE  0.59  0.81   CALCIUM  8.4*  8.7                Imaging:  Minor compression fracture involving L3    Assessment and Plan:  Prabha Lizama is an 82-year-old woman who fell on her tailbone and has sustained a minor compression fracture at L3. I do not think this will require surgical intervention. I recommend treating her in a lumbosacral orthosis to see if this will help with her pain and mobility. If it does then this will be sufficient, but if she is unable to mobilize despite the lumbosacral orthosis then vertebral plasty could be considered, although it would not be my 1st recommendation.    Jono Szymanski M.D.

## 2018-05-19 NOTE — CARE PLAN
"Problem: Safety  Goal: Will remain free from injury  Outcome: PROGRESSING AS EXPECTED  Safety precautions in place. Call light within reach. Bed is low and in locked position. Hourly rounding in place.     Problem: Bowel/Gastric:  Goal: Normal bowel function is maintained or improved  Outcome: PROGRESSING AS EXPECTED  Pt was having nausea after receiving oxycodone. Offered pt Zofran. Pt refused at first saying \"I don't want too many pills in my body\". Educated the pt that if her nausea is really bad she should receive medication. Pt agreed to receive Zofran. Medication given. Will monitor for response.     Problem: Pain Management  Goal: Pain level will decrease to patient's comfort goal  Outcome: PROGRESSING AS EXPECTED  Pt pain is controlled with Oxycodone 5-10 mg. Pt does not like to take the pain medications. Educated the pt that if it hurts and is uncomfortable before her pain gets too high to let the nurse know. Pt verbalized understanding.      "

## 2018-05-19 NOTE — ED NOTES
Patient placed on bedpan, had BM. Provided pericare. No other needs at this time.  at bedside. Awaiting CT.

## 2018-05-19 NOTE — CARE PLAN
Problem: Venous Thromboembolism (VTW)/Deep Vein Thrombosis (DVT) Prevention:  Goal: Patient will participate in Venous Thrombosis (VTE)/Deep Vein Thrombosis (DVT)Prevention Measures    Intervention: Ensure patient wears graduated elastic stockings (MARY hose) and/or SCDs, if ordered, when in bed or chair (Remove at least once per shift for skin check)  SCDs in place.

## 2018-05-19 NOTE — PROGRESS NOTES
[facility]    Date of Service: 2018    Chief Complaint  82 y.o. female admitted 2018 with L3 fracture    Interval Problem Update  L3 fx - pain controlled  HTN - controlled  Low K    Consultants/Specialty  Neurosurgery consulted    Disposition  TBD        Review of Systems   Constitutional: Positive for malaise/fatigue. Negative for chills and fever.   HENT: Negative for hearing loss and sore throat.    Eyes: Negative for blurred vision.   Respiratory: Negative for cough, shortness of breath and wheezing.    Cardiovascular: Negative for chest pain and leg swelling.   Gastrointestinal: Negative for abdominal pain, diarrhea, heartburn, nausea and vomiting.   Genitourinary: Negative for dysuria.   Musculoskeletal: Positive for back pain. Negative for neck pain.   Skin: Negative for rash.   Neurological: Positive for weakness. Negative for dizziness, sensory change, focal weakness and headaches.   Psychiatric/Behavioral: The patient is nervous/anxious.       Physical Exam  Laboratory/Imaging   Hemodynamics  Temp (24hrs), Av.5 °C (97.7 °F), Min:36 °C (96.8 °F), Max:37 °C (98.6 °F)   Temperature: 36 °C (96.8 °F)  Pulse  Av.4  Min: 65  Max: 98 Heart Rate (Monitored): 65  Blood Pressure : 106/57, NIBP: (!) 170/70      Respiratory      Respiration: 20, Pulse Oximetry: 98 %     Work Of Breathing / Effort: Mild  RUL Breath Sounds: Clear, RML Breath Sounds: Clear, RLL Breath Sounds: Diminished, ARSEN Breath Sounds: Clear, LLL Breath Sounds: Diminished    Fluids  No intake or output data in the 24 hours ending 18 1118    Nutrition  Orders Placed This Encounter   Procedures   • Diet Order     Standing Status:   Standing     Number of Occurrences:   1     Order Specific Question:   Diet:     Answer:   Cardiac [6]     Order Specific Question:   Diet:     Answer:   2 Gram Sodium [7]     Physical Exam   Constitutional: She is oriented to person, place, and time. She appears well-developed  and well-nourished.   HENT:   Head: Normocephalic and atraumatic.   Eyes: Conjunctivae are normal. Pupils are equal, round, and reactive to light.   Neck: No tracheal deviation present. No thyromegaly present.   Cardiovascular: Normal rate and regular rhythm.    Pulmonary/Chest: Effort normal and breath sounds normal.   Abdominal: Soft. Bowel sounds are normal. She exhibits no distension. There is no tenderness.   Musculoskeletal: She exhibits no edema.   Lymphadenopathy:     She has no cervical adenopathy.   Neurological: She is alert and oriented to person, place, and time.   MMT 5/5   Skin: Skin is warm and dry.   Nursing note and vitals reviewed.      Recent Labs      05/18/18   1715  05/19/18   0836   WBC  17.9*  6.5   RBC  4.11*  4.05*   HEMOGLOBIN  13.2  13.0   HEMATOCRIT  38.9  38.5   MCV  94.6  95.1   MCH  32.1  32.1   MCHC  33.9  33.8   RDW  43.8  44.4   PLATELETCT  247  238   MPV  10.3  10.3     Recent Labs      05/18/18   1715  05/19/18   0836   SODIUM  136  139   POTASSIUM  3.5*  3.3*   CHLORIDE  106  108   CO2  21  22   GLUCOSE  113*  173*   BUN  12  10   CREATININE  0.59  0.81   CALCIUM  8.4*  8.7                      Assessment/Plan     * Closed compression fracture of L3 lumbar vertebra (HCC)- (present on admission)   Assessment & Plan    Pain control  Neurosurgery consulted            Leukocytosis- (present on admission)   Assessment & Plan    Reactive?  Follow cbc        Hypokalemia- (present on admission)   Assessment & Plan    Kdur, follow bmp        UTI (urinary tract infection)- (present on admission)   Assessment & Plan    Cipro        Essential hypertension- (present on admission)   Assessment & Plan    Metoprolol, Hytrin, Valsartan 32 mg daily            Quality-Core Measures   Reviewed items::  Medications reviewed, Labs reviewed and Radiology images reviewed  Keenan catheter::  No Keenan  DVT prophylaxis - mechanical:  SCDs  Ulcer Prophylaxis::  No  Antibiotics:  Treating active  infection/contamination beyond 24 hours perioperative coverage

## 2018-05-20 LAB
ANION GAP SERPL CALC-SCNC: 7 MMOL/L (ref 0–11.9)
BUN SERPL-MCNC: 9 MG/DL (ref 8–22)
CALCIUM SERPL-MCNC: 8.6 MG/DL (ref 8.5–10.5)
CHLORIDE SERPL-SCNC: 105 MMOL/L (ref 96–112)
CO2 SERPL-SCNC: 24 MMOL/L (ref 20–33)
CREAT SERPL-MCNC: 0.82 MG/DL (ref 0.5–1.4)
GLUCOSE SERPL-MCNC: 102 MG/DL (ref 65–99)
POTASSIUM SERPL-SCNC: 4 MMOL/L (ref 3.6–5.5)
SODIUM SERPL-SCNC: 136 MMOL/L (ref 135–145)

## 2018-05-20 PROCEDURE — 99225 PR SUBSEQUENT OBSERVATION CARE,LEVEL II: CPT | Performed by: FAMILY MEDICINE

## 2018-05-20 PROCEDURE — 80048 BASIC METABOLIC PNL TOTAL CA: CPT

## 2018-05-20 PROCEDURE — G8988 SELF CARE GOAL STATUS: HCPCS | Mod: CI

## 2018-05-20 PROCEDURE — 96376 TX/PRO/DX INJ SAME DRUG ADON: CPT

## 2018-05-20 PROCEDURE — 700111 HCHG RX REV CODE 636 W/ 250 OVERRIDE (IP): Performed by: INTERNAL MEDICINE

## 2018-05-20 PROCEDURE — 97166 OT EVAL MOD COMPLEX 45 MIN: CPT

## 2018-05-20 PROCEDURE — A9270 NON-COVERED ITEM OR SERVICE: HCPCS | Performed by: INTERNAL MEDICINE

## 2018-05-20 PROCEDURE — G8978 MOBILITY CURRENT STATUS: HCPCS | Mod: CK

## 2018-05-20 PROCEDURE — 302146: Performed by: INTERNAL MEDICINE

## 2018-05-20 PROCEDURE — 700102 HCHG RX REV CODE 250 W/ 637 OVERRIDE(OP): Performed by: FAMILY MEDICINE

## 2018-05-20 PROCEDURE — G0378 HOSPITAL OBSERVATION PER HR: HCPCS

## 2018-05-20 PROCEDURE — G8987 SELF CARE CURRENT STATUS: HCPCS | Mod: CK

## 2018-05-20 PROCEDURE — 700102 HCHG RX REV CODE 250 W/ 637 OVERRIDE(OP): Performed by: INTERNAL MEDICINE

## 2018-05-20 PROCEDURE — 97535 SELF CARE MNGMENT TRAINING: CPT

## 2018-05-20 PROCEDURE — 36415 COLL VENOUS BLD VENIPUNCTURE: CPT

## 2018-05-20 PROCEDURE — 97162 PT EVAL MOD COMPLEX 30 MIN: CPT

## 2018-05-20 PROCEDURE — G8979 MOBILITY GOAL STATUS: HCPCS | Mod: CI

## 2018-05-20 PROCEDURE — A9270 NON-COVERED ITEM OR SERVICE: HCPCS | Performed by: FAMILY MEDICINE

## 2018-05-20 RX ORDER — HYDROCODONE BITARTRATE AND ACETAMINOPHEN 5; 325 MG/1; MG/1
1-2 TABLET ORAL EVERY 4 HOURS PRN
Status: DISCONTINUED | OUTPATIENT
Start: 2018-05-20 | End: 2018-05-22 | Stop reason: HOSPADM

## 2018-05-20 RX ADMIN — VALSARTAN 320 MG: 80 TABLET ORAL at 10:54

## 2018-05-20 RX ADMIN — STANDARDIZED SENNA CONCENTRATE AND DOCUSATE SODIUM 2 TABLET: 8.6; 5 TABLET, FILM COATED ORAL at 21:01

## 2018-05-20 RX ADMIN — METOPROLOL TARTRATE 25 MG: 25 TABLET, FILM COATED ORAL at 21:02

## 2018-05-20 RX ADMIN — ASPIRIN 81 MG: 81 TABLET, COATED ORAL at 21:01

## 2018-05-20 RX ADMIN — STANDARDIZED SENNA CONCENTRATE AND DOCUSATE SODIUM 2 TABLET: 8.6; 5 TABLET, FILM COATED ORAL at 10:54

## 2018-05-20 RX ADMIN — OXYCODONE HYDROCHLORIDE 10 MG: 10 TABLET ORAL at 04:40

## 2018-05-20 RX ADMIN — HYDROCODONE BITARTRATE AND ACETAMINOPHEN 1 TABLET: 5; 325 TABLET ORAL at 21:03

## 2018-05-20 RX ADMIN — POTASSIUM CHLORIDE 20 MEQ: 1500 TABLET, EXTENDED RELEASE ORAL at 10:54

## 2018-05-20 RX ADMIN — TERAZOSIN HYDROCHLORIDE 1 MG: 1 CAPSULE ORAL at 10:54

## 2018-05-20 RX ADMIN — METOPROLOL TARTRATE 25 MG: 25 TABLET, FILM COATED ORAL at 10:54

## 2018-05-20 RX ADMIN — ONDANSETRON 4 MG: 2 INJECTION INTRAMUSCULAR; INTRAVENOUS at 08:53

## 2018-05-20 RX ADMIN — HYDROCODONE BITARTRATE AND ACETAMINOPHEN 1 TABLET: 5; 325 TABLET ORAL at 12:57

## 2018-05-20 ASSESSMENT — ENCOUNTER SYMPTOMS
VOMITING: 0
ABDOMINAL PAIN: 0
DIZZINESS: 0
NAUSEA: 1
NECK PAIN: 0
CHILLS: 0
BLURRED VISION: 0
NERVOUS/ANXIOUS: 1
DIARRHEA: 0
WEAKNESS: 1
HEADACHES: 0
SENSORY CHANGE: 0
WHEEZING: 0
FEVER: 0
SORE THROAT: 0
FOCAL WEAKNESS: 0
SHORTNESS OF BREATH: 0
HEARTBURN: 0
BACK PAIN: 1
COUGH: 0

## 2018-05-20 ASSESSMENT — COGNITIVE AND FUNCTIONAL STATUS - GENERAL
SUGGESTED CMS G CODE MODIFIER DAILY ACTIVITY: CK
MOVING TO AND FROM BED TO CHAIR: A LITTLE
DRESSING REGULAR UPPER BODY CLOTHING: A LITTLE
HELP NEEDED FOR BATHING: A LOT
SUGGESTED CMS G CODE MODIFIER MOBILITY: CK
MOBILITY SCORE: 18
WALKING IN HOSPITAL ROOM: A LITTLE
DRESSING REGULAR LOWER BODY CLOTHING: A LOT
MOVING FROM LYING ON BACK TO SITTING ON SIDE OF FLAT BED: A LITTLE
PERSONAL GROOMING: A LITTLE
TURNING FROM BACK TO SIDE WHILE IN FLAT BAD: A LITTLE
DAILY ACTIVITIY SCORE: 17
CLIMB 3 TO 5 STEPS WITH RAILING: A LITTLE
TOILETING: A LITTLE
STANDING UP FROM CHAIR USING ARMS: A LITTLE

## 2018-05-20 ASSESSMENT — PAIN SCALES - GENERAL
PAINLEVEL_OUTOF10: 8
PAINLEVEL_OUTOF10: 8

## 2018-05-20 ASSESSMENT — GAIT ASSESSMENTS
ASSISTIVE DEVICE: FRONT WHEEL WALKER
GAIT LEVEL OF ASSIST: CONTACT GUARD ASSIST
DISTANCE (FEET): 25

## 2018-05-20 ASSESSMENT — ACTIVITIES OF DAILY LIVING (ADL): TOILETING: INDEPENDENT

## 2018-05-20 NOTE — PROGRESS NOTES
Patient concerned about getting BP meds but is very nauseas, gave Zofran 0853 and needed to hold morning meds. BP is 159/61. Patient worked with PT/OT and is still dry heaving. Paged hospitalist. BP is now 133/71.

## 2018-05-20 NOTE — THERAPY
"Physical Therapy Evaluation completed.   Bed Mobility:  Supine to Sit: Minimal Assist  Transfers: Sit to Stand: Minimal Assist  Gait: Level Of Assist: Contact Guard Assist with Front-Wheel Walker       Plan of Care: Will benefit from Physical Therapy 4 times per week  Discharge Recommendations: Equipment: Front-Wheel Walker. Post-acute therapy Discharge to home with outpatient or home health for additional skilled therapy services.    See \"Rehab Therapy-Acute\" Patient Summary Report for complete documentation.     "

## 2018-05-20 NOTE — PROGRESS NOTES
[facility]ist Progress Note    Date of Service: 2018    Chief Complaint  82 y.o. female admitted 2018 with L3 fracture    Interval Problem Update  L3 fx - pain controlled, but may be having nausea from oxycodone  HTN - controlled    Consultants/Specialty  Neurosurgery - Stephon    Disposition  TBD        Review of Systems   Constitutional: Positive for malaise/fatigue. Negative for chills and fever.   HENT: Negative for hearing loss and sore throat.    Eyes: Negative for blurred vision.   Respiratory: Negative for cough, shortness of breath and wheezing.    Cardiovascular: Negative for chest pain and leg swelling.   Gastrointestinal: Positive for nausea. Negative for abdominal pain, diarrhea, heartburn and vomiting.   Genitourinary: Negative for dysuria.   Musculoskeletal: Positive for back pain. Negative for neck pain.   Skin: Negative for rash.   Neurological: Positive for weakness. Negative for dizziness, sensory change, focal weakness and headaches.   Psychiatric/Behavioral: The patient is nervous/anxious.       Physical Exam  Laboratory/Imaging   Hemodynamics  Temp (24hrs), Av.2 °C (98.9 °F), Min:36.4 °C (97.6 °F), Max:37.6 °C (99.7 °F)   Temperature: 37.2 °C (99 °F)  Pulse  Av.7  Min: 61  Max: 98    Blood Pressure : 145/61      Respiratory      Respiration: 18, Pulse Oximetry: 93 %     Work Of Breathing / Effort: Mild  RUL Breath Sounds: Clear, RML Breath Sounds: Clear, RLL Breath Sounds: Clear, ARSEN Breath Sounds: Clear, LLL Breath Sounds: Clear    Fluids    Intake/Output Summary (Last 24 hours) at 18 1423  Last data filed at 18 2030   Gross per 24 hour   Intake              100 ml   Output                0 ml   Net              100 ml       Nutrition  Orders Placed This Encounter   Procedures   • Diet Order     Standing Status:   Standing     Number of Occurrences:   1     Order Specific Question:   Diet:     Answer:   Cardiac [6]     Order Specific Question:   Diet:      Answer:   2 Gram Sodium [7]     Physical Exam   Constitutional: She is oriented to person, place, and time. She appears well-developed and well-nourished.   HENT:   Head: Normocephalic and atraumatic.   Eyes: Conjunctivae are normal. Pupils are equal, round, and reactive to light.   Neck: No tracheal deviation present. No thyromegaly present.   Cardiovascular: Normal rate and regular rhythm.    Pulmonary/Chest: Effort normal and breath sounds normal.   Abdominal: Soft. Bowel sounds are normal. She exhibits no distension. There is no tenderness.   Musculoskeletal: She exhibits no edema.   Lymphadenopathy:     She has no cervical adenopathy.   Neurological: She is alert and oriented to person, place, and time.   MMT 5/5   Skin: Skin is warm and dry.   Nursing note and vitals reviewed.      Recent Labs      05/18/18 1715 05/19/18   0836   WBC  17.9*  6.5   RBC  4.11*  4.05*   HEMOGLOBIN  13.2  13.0   HEMATOCRIT  38.9  38.5   MCV  94.6  95.1   MCH  32.1  32.1   MCHC  33.9  33.8   RDW  43.8  44.4   PLATELETCT  247  238   MPV  10.3  10.3     Recent Labs      05/18/18   1715  05/19/18   0836  05/20/18   0042   SODIUM  136  139  136   POTASSIUM  3.5*  3.3*  4.0   CHLORIDE  106  108  105   CO2  21  22  24   GLUCOSE  113*  173*  102*   BUN  12  10  9   CREATININE  0.59  0.81  0.82   CALCIUM  8.4*  8.7  8.6                      Assessment/Plan     * Closed compression fracture of L3 lumbar vertebra (HCC)- (present on admission)   Assessment & Plan    Change to Norco, Back brace  PT, OT          Leukocytosis- (present on admission)   Assessment & Plan    Reactive?  Follow cbc        Hypokalemia- (present on admission)   Assessment & Plan    Kdur, follow bmp        UTI (urinary tract infection)- (present on admission)   Assessment & Plan    Finished course of Cipro        Essential hypertension- (present on admission)   Assessment & Plan    Metoprolol, Terazosin, Valsartan             Quality-Core Measures   Reviewed items::   Medications reviewed, Labs reviewed and Radiology images reviewed  Keenan catheter::  No Keenan  DVT prophylaxis - mechanical:  SCDs  Ulcer Prophylaxis::  No  Antibiotics:  Treating active infection/contamination beyond 24 hours perioperative coverage

## 2018-05-20 NOTE — CARE PLAN
Problem: Psychosocial Needs:  Goal: Level of anxiety will decrease  Patient very anxious about blood pressure.  Assured patient we would monitor BP. So far blood pressure is within good range.  Will continue to monitor.

## 2018-05-20 NOTE — PROGRESS NOTES
Shift report received from night RN, assumed care at 0715. Patient up in bed, routinely medicated prn per MAR. AOx4, up with assist, calls appropriately, bed alarm not in use. PIV assessed and locked. O2 0.5L nasal cannula, SPO2 0.5%. VTE SCDs. Plan is PT/OT. Discussed POC for multimodal pain management, monitoring VS/labs/I&O, mobility, day time routine, comfort, and safety. Patient has call light and personal belongings within reach. Safety and fall precautions in place. Reviewed current labs, notes, medications, and orders. Hourly rounding in place with RN and CNA.

## 2018-05-20 NOTE — CARE PLAN
Problem: Safety  Goal: Will remain free from falls  Patient medicated for pain rated at 6/10.  Patient able to rest comfortably.

## 2018-05-20 NOTE — THERAPY
"Occupational Therapy Evaluation completed.   Functional Status:  Pt up with PT upon OT's arrival. Pt required mod A for toilet transfer, min A toileting.  Pt returned to bed with min A using log roll.  Pt limited by pain and nausea, and as a result moves very slowly, requiring extra time with all tasks.  Plan of Care: Will benefit from Occupational Therapy 3 times per week  Discharge Recommendations:  Equipment: Will Continue to Assess for Equipment Needs. Pt will likely require further therapy at SNF prior to DC home.    Pt's LSO currently too large, causing discomfort.    See \"Rehab Therapy-Acute\" Patient Summary Report for complete documentation.    "

## 2018-05-20 NOTE — PROGRESS NOTES
Spoke with Dr Szymanski and he thinks patient might do better with a smaller back brace. Reached out to [facility] with traction and they will come refit her.

## 2018-05-20 NOTE — PROGRESS NOTES
Neurosurgery Progress Note    Subjective:  Back pain    Exam:  Mobilized from bathroom to bed with assistance in LSO brace  Large LSO provided; I think a small would fit her better    BP  Min: 115/58  Max: 159/61  Pulse  Av.2  Min: 61  Max: 83  Resp  Av.2  Min: 16  Max: 20  Temp  Av.1 °C (98.7 °F)  Min: 36.4 °C (97.6 °F)  Max: 37.6 °C (99.7 °F)  SpO2  Av.5 %  Min: 93 %  Max: 98 %    No Data Recorded    Recent Labs      18   0836   WBC  17.9*  6.5   RBC  4.11*  4.05*   HEMOGLOBIN  13.2  13.0   HEMATOCRIT  38.9  38.5   MCV  94.6  95.1   MCH  32.1  32.1   MCHC  33.9  33.8   RDW  43.8  44.4   PLATELETCT  247  238   MPV  10.3  10.3     Recent Labs      18   0836  18   0042   SODIUM  136  139  136   POTASSIUM  3.5*  3.3*  4.0   CHLORIDE  106  108  105   CO2  21  22  24   GLUCOSE  113*  173*  102*   BUN  12  10  9   CREATININE  0.59  0.81  0.82   CALCIUM  8.4*  8.7  8.6               Intake/Output       18 0700 - 18 0659 18 0700 - 18 0659       Total  Total       Intake    P.O.  --  100 100  --  -- --    P.O. -- 100 100 -- -- --    Total Intake -- 100 100 -- -- --       Output    Urine  --  -- --  --  -- --    Number of Times Voided 4 x 6 x 10 x -- -- --    Stool  --  -- --  --  -- --    Number of Times Stooled -- 0 x 0 x -- -- --    Total Output -- -- -- -- -- --       Net I/O     -- 100 100 -- -- --            Intake/Output Summary (Last 24 hours) at 18 1047  Last data filed at 18   Gross per 24 hour   Intake              100 ml   Output                0 ml   Net              100 ml            • potassium chloride SA  20 mEq DAILY   • NS   Continuous   • fentaNYL  50 mcg Q HOUR PRN   • senna-docusate  2 Tab BID    And   • polyethylene glycol/lytes  1 Packet QDAY PRN    And   • magnesium hydroxide  30 mL QDAY PRN    And   • bisacodyl  10 mg QDAY PRN   • Respiratory Care per  Protocol   Continuous RT   • ondansetron  4 mg Q4HRS PRN   • ondansetron  4 mg Q4HRS PRN   • acetaminophen  650 mg Q6HRS PRN   • Pharmacy Consult Request   PRN    And   • oxyCODONE immediate-release  5 mg Q3HRS PRN    And   • oxyCODONE immediate-release  10 mg Q3HRS PRN    And   • morphine injection  4 mg Q3HRS PRN   • metoprolol  25 mg BID   • aspirin EC  81 mg Q EVENING   • valsartan  320 mg QAM   • terazosin  1 mg QAM   • ketorolac  15 mg Q6HRS PRN   • [START ON 5/24/2018] ibuprofen  600 mg Q6HRS PRN   • labetalol  10 mg Q4HRS PRN       Assessment and Plan:  Hospital day #3  Prophylactic anticoagulation: no         Start date/time: OK from NSx POV  Traction to refit LSO brace  Continue to mobilize in LSO brace  Pain control will be an issue; significant psychological component to patient's pain

## 2018-05-21 ENCOUNTER — HOME HEALTH ADMISSION (OUTPATIENT)
Dept: HOME HEALTH SERVICES | Facility: HOME HEALTHCARE | Age: 83
End: 2018-05-21
Payer: MEDICARE

## 2018-05-21 LAB
ANION GAP SERPL CALC-SCNC: 5 MMOL/L (ref 0–11.9)
BUN SERPL-MCNC: 10 MG/DL (ref 8–22)
CALCIUM SERPL-MCNC: 8.6 MG/DL (ref 8.5–10.5)
CHLORIDE SERPL-SCNC: 105 MMOL/L (ref 96–112)
CO2 SERPL-SCNC: 26 MMOL/L (ref 20–33)
CREAT SERPL-MCNC: 0.82 MG/DL (ref 0.5–1.4)
GLUCOSE SERPL-MCNC: 102 MG/DL (ref 65–99)
POTASSIUM SERPL-SCNC: 4.4 MMOL/L (ref 3.6–5.5)
SODIUM SERPL-SCNC: 136 MMOL/L (ref 135–145)

## 2018-05-21 PROCEDURE — 80048 BASIC METABOLIC PNL TOTAL CA: CPT

## 2018-05-21 PROCEDURE — A9270 NON-COVERED ITEM OR SERVICE: HCPCS | Performed by: INTERNAL MEDICINE

## 2018-05-21 PROCEDURE — 99225 PR SUBSEQUENT OBSERVATION CARE,LEVEL II: CPT | Performed by: FAMILY MEDICINE

## 2018-05-21 PROCEDURE — 700102 HCHG RX REV CODE 250 W/ 637 OVERRIDE(OP): Performed by: FAMILY MEDICINE

## 2018-05-21 PROCEDURE — 36415 COLL VENOUS BLD VENIPUNCTURE: CPT

## 2018-05-21 PROCEDURE — 96376 TX/PRO/DX INJ SAME DRUG ADON: CPT

## 2018-05-21 PROCEDURE — 97110 THERAPEUTIC EXERCISES: CPT

## 2018-05-21 PROCEDURE — 97530 THERAPEUTIC ACTIVITIES: CPT

## 2018-05-21 PROCEDURE — 97116 GAIT TRAINING THERAPY: CPT | Mod: XU

## 2018-05-21 PROCEDURE — 700111 HCHG RX REV CODE 636 W/ 250 OVERRIDE (IP): Performed by: INTERNAL MEDICINE

## 2018-05-21 PROCEDURE — G0378 HOSPITAL OBSERVATION PER HR: HCPCS

## 2018-05-21 PROCEDURE — A9270 NON-COVERED ITEM OR SERVICE: HCPCS | Performed by: FAMILY MEDICINE

## 2018-05-21 PROCEDURE — 700102 HCHG RX REV CODE 250 W/ 637 OVERRIDE(OP): Performed by: INTERNAL MEDICINE

## 2018-05-21 PROCEDURE — 97535 SELF CARE MNGMENT TRAINING: CPT | Mod: XU

## 2018-05-21 RX ADMIN — POTASSIUM CHLORIDE 20 MEQ: 1500 TABLET, EXTENDED RELEASE ORAL at 09:35

## 2018-05-21 RX ADMIN — VALSARTAN 320 MG: 80 TABLET ORAL at 09:35

## 2018-05-21 RX ADMIN — TERAZOSIN HYDROCHLORIDE 1 MG: 1 CAPSULE ORAL at 09:35

## 2018-05-21 RX ADMIN — MAGNESIUM HYDROXIDE 30 ML: 400 SUSPENSION ORAL at 09:35

## 2018-05-21 RX ADMIN — METOPROLOL TARTRATE 25 MG: 25 TABLET, FILM COATED ORAL at 09:35

## 2018-05-21 RX ADMIN — ASPIRIN 81 MG: 81 TABLET, COATED ORAL at 21:08

## 2018-05-21 RX ADMIN — ACETAMINOPHEN 650 MG: 325 TABLET, FILM COATED ORAL at 09:36

## 2018-05-21 RX ADMIN — BISACODYL 10 MG: 10 SUPPOSITORY RECTAL at 13:50

## 2018-05-21 RX ADMIN — HYDROCODONE BITARTRATE AND ACETAMINOPHEN 1 TABLET: 5; 325 TABLET ORAL at 02:34

## 2018-05-21 RX ADMIN — STANDARDIZED SENNA CONCENTRATE AND DOCUSATE SODIUM 2 TABLET: 8.6; 5 TABLET, FILM COATED ORAL at 09:35

## 2018-05-21 RX ADMIN — KETOROLAC TROMETHAMINE 15 MG: 30 INJECTION, SOLUTION INTRAMUSCULAR; INTRAVENOUS at 18:05

## 2018-05-21 RX ADMIN — METOPROLOL TARTRATE 25 MG: 25 TABLET, FILM COATED ORAL at 21:08

## 2018-05-21 ASSESSMENT — COGNITIVE AND FUNCTIONAL STATUS - GENERAL
MOVING TO AND FROM BED TO CHAIR: UNABLE
HELP NEEDED FOR BATHING: A LOT
TURNING FROM BACK TO SIDE WHILE IN FLAT BAD: A LITTLE
DRESSING REGULAR LOWER BODY CLOTHING: A LOT
MOVING FROM LYING ON BACK TO SITTING ON SIDE OF FLAT BED: A LITTLE
SUGGESTED CMS G CODE MODIFIER DAILY ACTIVITY: CK
CLIMB 3 TO 5 STEPS WITH RAILING: A LITTLE
DAILY ACTIVITIY SCORE: 17
STANDING UP FROM CHAIR USING ARMS: A LITTLE
WALKING IN HOSPITAL ROOM: A LITTLE
SUGGESTED CMS G CODE MODIFIER MOBILITY: CK
DRESSING REGULAR UPPER BODY CLOTHING: A LITTLE
TOILETING: A LITTLE
PERSONAL GROOMING: A LITTLE
MOBILITY SCORE: 16

## 2018-05-21 ASSESSMENT — ENCOUNTER SYMPTOMS
FOCAL WEAKNESS: 0
NAUSEA: 1
BACK PAIN: 1
NECK PAIN: 0
BLURRED VISION: 0
FEVER: 0
WEAKNESS: 1
SENSORY CHANGE: 0
COUGH: 0
SHORTNESS OF BREATH: 0
CHILLS: 0
ABDOMINAL PAIN: 0
VOMITING: 0
WHEEZING: 0
SORE THROAT: 0
HEADACHES: 0
DIZZINESS: 0
DIARRHEA: 0
HEARTBURN: 0
NERVOUS/ANXIOUS: 1

## 2018-05-21 ASSESSMENT — PAIN SCALES - GENERAL
PAINLEVEL_OUTOF10: 2
PAINLEVEL_OUTOF10: 5
PAINLEVEL_OUTOF10: 3
PAINLEVEL_OUTOF10: 3
PAINLEVEL_OUTOF10: 5
PAINLEVEL_OUTOF10: 5
PAINLEVEL_OUTOF10: 4
PAINLEVEL_OUTOF10: 4

## 2018-05-21 ASSESSMENT — GAIT ASSESSMENTS
GAIT LEVEL OF ASSIST: STAND BY ASSIST
ASSISTIVE DEVICE: FRONT WHEEL WALKER
DISTANCE (FEET): 40
DEVIATION: ANTALGIC;BRADYKINETIC;SHUFFLED GAIT;STEP TO

## 2018-05-21 ASSESSMENT — PATIENT HEALTH QUESTIONNAIRE - PHQ9
1. LITTLE INTEREST OR PLEASURE IN DOING THINGS: NOT AT ALL
2. FEELING DOWN, DEPRESSED, IRRITABLE, OR HOPELESS: NOT AT ALL
SUM OF ALL RESPONSES TO PHQ9 QUESTIONS 1 AND 2: 0

## 2018-05-21 NOTE — PROGRESS NOTES
[facility]ist Progress Note    Date of Service: 2018    Chief Complaint  82 y.o. female admitted 2018 with L3 fracture    Interval Problem Update  L3 fx - pain better controlled, less nausea, pt and spouse quite anxious about discharge, pt was seen in the hallway ambulating with walker and brace on with PT  HTN - controlled    Consultants/Specialty  Neurosurgery - Victor    Disposition  TBD        Review of Systems   Constitutional: Positive for malaise/fatigue. Negative for chills and fever.   HENT: Negative for hearing loss and sore throat.    Eyes: Negative for blurred vision.   Respiratory: Negative for cough, shortness of breath and wheezing.    Cardiovascular: Negative for chest pain and leg swelling.   Gastrointestinal: Positive for nausea. Negative for abdominal pain, diarrhea, heartburn and vomiting.   Genitourinary: Negative for dysuria.   Musculoskeletal: Positive for back pain. Negative for neck pain.   Skin: Negative for rash.   Neurological: Positive for weakness. Negative for dizziness, sensory change, focal weakness and headaches.   Psychiatric/Behavioral: The patient is nervous/anxious.       Physical Exam  Laboratory/Imaging   Hemodynamics  Temp (24hrs), Av.3 °C (99.1 °F), Min:36.9 °C (98.5 °F), Max:37.7 °C (99.8 °F)   Temperature: 37.7 °C (99.8 °F)  Pulse  Av.1  Min: 61  Max: 98    Blood Pressure : 140/69      Respiratory      Respiration: 16, Pulse Oximetry: 96 %     Work Of Breathing / Effort: Mild  RUL Breath Sounds: Clear, RML Breath Sounds: Clear, RLL Breath Sounds: Clear, ARSEN Breath Sounds: Clear, LLL Breath Sounds: Diminished    Fluids    Intake/Output Summary (Last 24 hours) at 18 1419  Last data filed at 18 0200   Gross per 24 hour   Intake               20 ml   Output                0 ml   Net               20 ml       Nutrition  Orders Placed This Encounter   Procedures   • Diet Order     Standing Status:   Standing     Number of Occurrences:   1      Order Specific Question:   Diet:     Answer:   Cardiac [6]     Order Specific Question:   Diet:     Answer:   2 Gram Sodium [7]     Physical Exam   Constitutional: She is oriented to person, place, and time. She appears well-developed and well-nourished.   HENT:   Head: Normocephalic and atraumatic.   Eyes: Conjunctivae are normal. Pupils are equal, round, and reactive to light.   Neck: No tracheal deviation present. No thyromegaly present.   Cardiovascular: Normal rate and regular rhythm.    Pulmonary/Chest: Effort normal and breath sounds normal.   Abdominal: Soft. Bowel sounds are normal. She exhibits no distension. There is no tenderness.   Musculoskeletal: She exhibits no edema.   Lymphadenopathy:     She has no cervical adenopathy.   Neurological: She is alert and oriented to person, place, and time.   MMT 5/5   Skin: Skin is warm and dry.   Nursing note and vitals reviewed.      Recent Labs      05/18/18   1715  05/19/18   0836   WBC  17.9*  6.5   RBC  4.11*  4.05*   HEMOGLOBIN  13.2  13.0   HEMATOCRIT  38.9  38.5   MCV  94.6  95.1   MCH  32.1  32.1   MCHC  33.9  33.8   RDW  43.8  44.4   PLATELETCT  247  238   MPV  10.3  10.3     Recent Labs      05/19/18   0836  05/20/18   0042  05/21/18   0239   SODIUM  139  136  136   POTASSIUM  3.3*  4.0  4.4   CHLORIDE  108  105  105   CO2  22  24  26   GLUCOSE  173*  102*  102*   BUN  10  9  10   CREATININE  0.81  0.82  0.82   CALCIUM  8.7  8.6  8.6                      Assessment/Plan     * Closed compression fracture of L3 lumbar vertebra (HCC)- (present on admission)   Assessment & Plan    Norco, Back brace  PT, OT  Encourage to ambulate        Leukocytosis- (present on admission)   Assessment & Plan    Reactive?  Follow cbc        Hypokalemia- (present on admission)   Assessment & Plan    Stop Kdur, follow bmp        UTI (urinary tract infection)- (present on admission)   Assessment & Plan    Finished course of Cipro        Essential hypertension- (present on  admission)   Assessment & Plan    Metoprolol, Terazosin, Valsartan             Quality-Core Measures   Reviewed items::  Medications reviewed, Labs reviewed and Radiology images reviewed  Keenan catheter::  No Keenan  DVT prophylaxis - mechanical:  SCDs  Ulcer Prophylaxis::  No  Antibiotics:  Treating active infection/contamination beyond 24 hours perioperative coverage

## 2018-05-21 NOTE — DISCHARGE PLANNING
ATTN: Case Management  RE: Referral for Home Health                We would like to take this opportunity to thank you for submitting a referral for your patient to continue care with [facility]. Our skilled team is dedicated to helping all patients recover and gain independence in the home setting.            As of 5/21/18, we have accepted the above patient into our service. A [facility] clinician will be out to see the patient within 48 hours to conduct our initial visit. If you have any questions or concerns regarding the patient’s transition to Home Health, please do not hesitate to contact us. We are open for referrals 7 days a week from 8AM to 5PM at [phone].      We look forward to collaborating with you,  [facility] Team

## 2018-05-21 NOTE — DISCHARGE PLANNING
TCN met with patient and  at bedside to discuss the care teams recommendation for HH. Patient and  are agreeable to suggestion and selected [facility]. Choice signed and faxed to [facility]. TCN to follow as needed.

## 2018-05-21 NOTE — PROGRESS NOTES
Neurosurgery Progress Note    Subjective:  Back pain    Exam:  DF/PF intact  Brace at bedside    BP  Min: 125/51  Max: 154/80  Pulse  Av.4  Min: 68  Max: 88  Resp  Av.2  Min: 16  Max: 18  Temp  Av.2 °C (99 °F)  Min: 36.9 °C (98.5 °F)  Max: 37.4 °C (99.4 °F)  SpO2  Av.2 %  Min: 93 %  Max: 96 %    No Data Recorded    Recent Labs      18   1715  18   0836   WBC  17.9*  6.5   RBC  4.11*  4.05*   HEMOGLOBIN  13.2  13.0   HEMATOCRIT  38.9  38.5   MCV  94.6  95.1   MCH  32.1  32.1   MCHC  33.9  33.8   RDW  43.8  44.4   PLATELETCT  247  238   MPV  10.3  10.3     Recent Labs      18   0836  18   0042  18   0239   SODIUM  139  136  136   POTASSIUM  3.3*  4.0  4.4   CHLORIDE  108  105  105   CO2  22  24  26   GLUCOSE  173*  102*  102*   BUN  10  9  10   CREATININE  0.81  0.82  0.82   CALCIUM  8.7  8.6  8.6               Intake/Output       18 0700 - 18 0659 18 07 - 18 0659      1900-0659 Total 1498-6747 2095-1359 Total       Intake    Enteral  --  20 20  --  -- --    Free Water / Tube Flush -- 20 20 -- -- --    Total Intake -- 20 20 -- -- --       Output    Urine  --  -- --  --  -- --    Number of Times Voided 2 x 1 x 3 x -- -- --    Total Output -- -- -- -- -- --       Net I/O     -- 20 20 -- -- --            Intake/Output Summary (Last 24 hours) at 18 0944  Last data filed at 18 0200   Gross per 24 hour   Intake               20 ml   Output                0 ml   Net               20 ml            • HYDROcodone-acetaminophen  1-2 Tab Q4HRS PRN   • prochlorperazine  10 mg Q6HRS PRN   • potassium chloride SA  20 mEq DAILY   • fentaNYL  50 mcg Q HOUR PRN   • senna-docusate  2 Tab BID    And   • polyethylene glycol/lytes  1 Packet QDAY PRN    And   • magnesium hydroxide  30 mL QDAY PRN    And   • bisacodyl  10 mg QDAY PRN   • Respiratory Care per Protocol   Continuous RT   • ondansetron  4 mg Q4HRS PRN   • ondansetron  4 mg Q4HRS PRN    • acetaminophen  650 mg Q6HRS PRN   • Pharmacy Consult Request   PRN    And   • morphine injection  4 mg Q3HRS PRN   • metoprolol  25 mg BID   • aspirin EC  81 mg Q EVENING   • valsartan  320 mg QAM   • terazosin  1 mg QAM   • ketorolac  15 mg Q6HRS PRN   • [START ON 5/24/2018] ibuprofen  600 mg Q6HRS PRN   • labetalol  10 mg Q4HRS PRN       Assessment and Plan:  Hospital day #4  Prophylactic anticoagulation: no         Start date/time: OK from NSx POV  Traction to refit LSO brace  Continue to mobilize in LSO brace  Pain control will be an issue; significant psychological component to patient's pain  Will sign off- dc home when able- follow up two weekswith xrays- will send a note to our office

## 2018-05-21 NOTE — DISCHARGE PLANNING
Received choice form from [facility] at 117.  Referral sent to [facility] at 1118 on 05-21-18.

## 2018-05-21 NOTE — CARE PLAN
Problem: Safety  Goal: Will remain free from injury  Patient educated on  calling for assistance with mobility , appropriate signage in place, bed locked and in lowest position. Treaded slipper socks on , reinforced use of call light.

## 2018-05-21 NOTE — THERAPY
"Physical Therapy Treatment completed.   Bed Mobility:  Supine to Sit: Minimal Assist  Transfers: Sit to Stand: Contact Guard Assist  Gait: Level Of Assist: Stand by Assist with Front-Wheel Walker       Plan of Care: Will benefit from Physical Therapy 4 times per week  Discharge Recommendations: Equipment: No Equipment Needed. FWW delivered to pt room.    See \"Rehab Therapy-Acute\" Patient Summary Report for complete documentation.     Pt still presenting w/ decreased fucntional mobility. Pt has the most difficulty w/ bed mobility and requires assist d/t pain and weakness. Pt also very perseverative on brace fit. Brace does go into pts armpits causing discomfort. Adjusted brace in standing w/ pt finding more comfort. Pt initially unsteady w/ gait but was able to show improved steadiness w/ increased ambulation. Pt was able to perform stairs however was very anxious and scared. Pt and SO given training on mobility w/ SO able to demonstrate safely. Pt also w/ increased nausea and pain throughout tx most likely d/t the pressure on the abdomen from brace and not having a BM. Pt stating that she will feel comfortable staying one more day to address BM and BP issues. Will conitine to follow to address functional limitations.  "

## 2018-05-21 NOTE — PROGRESS NOTES
Assumed Care at 1900  VSS  No complaints of pain at this time   Call light within reach.  Patient resting comfortably    Room air   at bedside

## 2018-05-21 NOTE — THERAPY
"Occupational Therapy Treatment completed with focus on ADLs, ADL transfers and patient education.  Functional Status:  Pt seen for OT tx. SBA supine > sit, max A to don brace. Pt able to direct care. Pt continues to c/o discomfort from brace, brace adjusted to pt. SBA sit > stand, tolerated amb to BR. Completed toilet transfer and pericare w/ supv. Stood at the sink w/ SBA. Pt educated about maintaining spinal precautions during ADLs and ADL transfers. Pt also educated about possible DME needed for home to assist w/ ADLs and ADL transfers. Pt pleasant and cooperative. Pt motivated to regain strength, endurance and independence in ADLs and ADL transfers.   Plan of Care: Will benefit from Occupational Therapy 3 times per week  Discharge Recommendations:  Equipment Will Continue to Assess for Equipment Needs.    See \"Rehab Therapy-Acute\" Patient Summary Report for complete documentation.   "

## 2018-05-21 NOTE — DISCHARGE PLANNING
Received choice form from [facility]ela at 1042.  Referral sent to [facility] at 1044 on 05-21-18.

## 2018-05-21 NOTE — CARE PLAN
Problem: Bowel/Gastric:  Goal: Normal bowel function is maintained or improved    Intervention: Educate patient and significant other/support system about diet, fluid intake, medications and activity to promote bowel function  Educated patient and encouraged adequate water intake to assist in ensuring bowel movement. Concerned patient has not had a bowel movement since 05/19  Bowel protocol in place , providing prune juice in the am

## 2018-05-21 NOTE — DISCHARGE PLANNING
Anticipated Discharge Disposition: Home    Action: Attending physician placed order for FWW. LSW met with pt at bedside to obtain DME choice. DME choice form initialed and signed consenting for referral to be sent to [facility]. LSW faxed choice form to [facility]. SW notified nurse to order FWW from traction. LSW recommended that pt and spouse obtain raised toilet seat from store such as [facility].     Barriers to Discharge: None    Plan: Pt pending acceptance from [facility].

## 2018-05-21 NOTE — FACE TO FACE
Face to Face Note  -  Durable Medical Equipment    Sohan Novoa M.D. - NPI: [id number]  I certify that this patient is under my care and that they have had a durable medical equipment(DME)face to face encounter by myself that meets the physician DME face-to-face encounter requirements with this patient on:    Date of encounter:   Patient:                    MRN:                       YOB: 2016  Prabha Mathews Lizama  [id number]  1935     The encounter with the patient was in whole, or in part, for the following medical condition, which is the primary reason for durable medical equipment:  Other - L3 Fracture    I certify that, based on my findings, the following durable medical equipment is medically necessary:  Walkers.    HOME O2 Saturation Measurements:(Values must be present for Home Oxygen orders)         ,     ,         My Clinical findings support the need for the above equipment due to:  Abnormal Gait    Supporting Symptoms: Pain     ------------------------------------------------------------------------------------------------------------------    Face to Face Supporting Documentation - Home Health    The encounter with this patient was in whole or in part the primary reason for home health admission.    Date of encounter:   Patient:                    MRN:                       YOB: 2016  Prabha Mathews [facility]  [id number]  1935     Home health to see patient for:  Skilled Nursing care for assessment, interventions & education    Skilled need for:  New Onset Medical Diagnosis L3 Fracture    Skilled nursing interventions to include:  Comment: PT/OT    Homebound evidenced status by:  Needs the assistance of another person in order to leave the home. Leaving home must require a considerable and taxing effort. There must exist a normal inability to leave the home.    Community Physician to provide follow up care: Amos Martinez M.D.     Optional  Interventions    Wound information & treatment:    Home Infusion Therapy orders:    Line/Drain/Airway:    I certify the face to face encounter for this home care referral meets the CMS requirements and the encounter/clinical assessment with the patient was, in whole, or in part, for the medical condition(s) listed above, which is the primary reason for home health care. Based on my clinical findings: the service(s) are medically necessary, support the need for home health care, and the homebound criteria are met.  I certify that this patient has had a face to face encounter by myself.  Sohan Novoa M.D. - NPI: [id number]    *Debility, frailty and advanced age in the absence of an acute deterioration or exacerbation of a condition do not qualify a patient for home health.

## 2018-05-22 VITALS
SYSTOLIC BLOOD PRESSURE: 132 MMHG | WEIGHT: 136.69 LBS | BODY MASS INDEX: 21.97 KG/M2 | RESPIRATION RATE: 16 BRPM | HEIGHT: 66 IN | HEART RATE: 83 BPM | DIASTOLIC BLOOD PRESSURE: 83 MMHG | OXYGEN SATURATION: 96 % | TEMPERATURE: 98.1 F

## 2018-05-22 PROBLEM — E87.6 HYPOKALEMIA: Status: RESOLVED | Noted: 2018-05-19 | Resolved: 2018-05-22

## 2018-05-22 LAB
ANION GAP SERPL CALC-SCNC: 6 MMOL/L (ref 0–11.9)
BUN SERPL-MCNC: 15 MG/DL (ref 8–22)
CALCIUM SERPL-MCNC: 8.2 MG/DL (ref 8.5–10.5)
CHLORIDE SERPL-SCNC: 104 MMOL/L (ref 96–112)
CO2 SERPL-SCNC: 25 MMOL/L (ref 20–33)
CREAT SERPL-MCNC: 0.72 MG/DL (ref 0.5–1.4)
GLUCOSE SERPL-MCNC: 100 MG/DL (ref 65–99)
POTASSIUM SERPL-SCNC: 4 MMOL/L (ref 3.6–5.5)
SODIUM SERPL-SCNC: 135 MMOL/L (ref 135–145)

## 2018-05-22 PROCEDURE — 97535 SELF CARE MNGMENT TRAINING: CPT

## 2018-05-22 PROCEDURE — 99217 PR OBSERVATION CARE DISCHARGE: CPT | Performed by: HOSPITALIST

## 2018-05-22 PROCEDURE — 36415 COLL VENOUS BLD VENIPUNCTURE: CPT

## 2018-05-22 PROCEDURE — 700102 HCHG RX REV CODE 250 W/ 637 OVERRIDE(OP): Performed by: INTERNAL MEDICINE

## 2018-05-22 PROCEDURE — 80048 BASIC METABOLIC PNL TOTAL CA: CPT

## 2018-05-22 PROCEDURE — 97530 THERAPEUTIC ACTIVITIES: CPT

## 2018-05-22 PROCEDURE — 700102 HCHG RX REV CODE 250 W/ 637 OVERRIDE(OP): Performed by: FAMILY MEDICINE

## 2018-05-22 PROCEDURE — 700111 HCHG RX REV CODE 636 W/ 250 OVERRIDE (IP): Performed by: INTERNAL MEDICINE

## 2018-05-22 PROCEDURE — A9270 NON-COVERED ITEM OR SERVICE: HCPCS | Performed by: FAMILY MEDICINE

## 2018-05-22 PROCEDURE — G0378 HOSPITAL OBSERVATION PER HR: HCPCS

## 2018-05-22 PROCEDURE — A9270 NON-COVERED ITEM OR SERVICE: HCPCS | Performed by: INTERNAL MEDICINE

## 2018-05-22 PROCEDURE — 96376 TX/PRO/DX INJ SAME DRUG ADON: CPT

## 2018-05-22 PROCEDURE — 97116 GAIT TRAINING THERAPY: CPT | Mod: XU

## 2018-05-22 RX ORDER — AMOXICILLIN 250 MG
2 CAPSULE ORAL 2 TIMES DAILY
Qty: 30 TAB | Refills: 0 | Status: SHIPPED | OUTPATIENT
Start: 2018-05-22 | End: 2018-08-08

## 2018-05-22 RX ORDER — ONDANSETRON 4 MG/1
4 TABLET, ORALLY DISINTEGRATING ORAL EVERY 4 HOURS PRN
Qty: 20 TAB | Refills: 0 | Status: SHIPPED | OUTPATIENT
Start: 2018-05-22 | End: 2019-03-14

## 2018-05-22 RX ORDER — HYDROCODONE BITARTRATE AND ACETAMINOPHEN 5; 325 MG/1; MG/1
1-2 TABLET ORAL EVERY 6 HOURS PRN
Qty: 30 TAB | Refills: 0 | Status: SHIPPED | OUTPATIENT
Start: 2018-05-22 | End: 2018-06-01

## 2018-05-22 RX ADMIN — TERAZOSIN HYDROCHLORIDE 1 MG: 1 CAPSULE ORAL at 08:42

## 2018-05-22 RX ADMIN — ACETAMINOPHEN 650 MG: 325 TABLET, FILM COATED ORAL at 02:17

## 2018-05-22 RX ADMIN — METOPROLOL TARTRATE 25 MG: 25 TABLET, FILM COATED ORAL at 08:43

## 2018-05-22 RX ADMIN — HYDROCODONE BITARTRATE AND ACETAMINOPHEN 1 TABLET: 5; 325 TABLET ORAL at 13:26

## 2018-05-22 RX ADMIN — KETOROLAC TROMETHAMINE 15 MG: 30 INJECTION, SOLUTION INTRAMUSCULAR; INTRAVENOUS at 02:18

## 2018-05-22 RX ADMIN — VALSARTAN 320 MG: 80 TABLET ORAL at 08:42

## 2018-05-22 ASSESSMENT — COGNITIVE AND FUNCTIONAL STATUS - GENERAL
DAILY ACTIVITIY SCORE: 19
MOBILITY SCORE: 18
HELP NEEDED FOR BATHING: A LITTLE
STANDING UP FROM CHAIR USING ARMS: A LITTLE
SUGGESTED CMS G CODE MODIFIER DAILY ACTIVITY: CK
TURNING FROM BACK TO SIDE WHILE IN FLAT BAD: A LITTLE
DRESSING REGULAR LOWER BODY CLOTHING: A LOT
MOVING FROM LYING ON BACK TO SITTING ON SIDE OF FLAT BED: A LITTLE
MOVING TO AND FROM BED TO CHAIR: A LITTLE
DRESSING REGULAR UPPER BODY CLOTHING: A LITTLE
SUGGESTED CMS G CODE MODIFIER MOBILITY: CK
WALKING IN HOSPITAL ROOM: A LITTLE
TOILETING: A LITTLE
CLIMB 3 TO 5 STEPS WITH RAILING: A LITTLE

## 2018-05-22 ASSESSMENT — GAIT ASSESSMENTS
GAIT LEVEL OF ASSIST: SUPERVISED
DISTANCE (FEET): 100
ASSISTIVE DEVICE: FRONT WHEEL WALKER
DEVIATION: ANTALGIC;BRADYKINETIC;DECREASED HEEL STRIKE

## 2018-05-22 ASSESSMENT — PATIENT HEALTH QUESTIONNAIRE - PHQ9
2. FEELING DOWN, DEPRESSED, IRRITABLE, OR HOPELESS: NOT AT ALL
SUM OF ALL RESPONSES TO PHQ9 QUESTIONS 1 AND 2: 0
1. LITTLE INTEREST OR PLEASURE IN DOING THINGS: NOT AT ALL

## 2018-05-22 ASSESSMENT — PAIN SCALES - GENERAL
PAINLEVEL_OUTOF10: 3
PAINLEVEL_OUTOF10: 5
PAINLEVEL_OUTOF10: 3
PAINLEVEL_OUTOF10: 5
PAINLEVEL_OUTOF10: 7

## 2018-05-22 NOTE — CARE PLAN
Problem: Safety  Goal: Will remain free from injury  Outcome: PROGRESSING AS EXPECTED  Treaded socks in place, bed in the lowest position, call light and belongings within reach, pt call for assistance appropriately    Problem: Pain Management  Goal: Pain level will decrease to patient's comfort goal  Outcome: PROGRESSING AS EXPECTED  Pt.'s pain adequately controlled with PRN pain medication.    Problem: Mobility  Goal: Risk for activity intolerance will decrease  Outcome: PROGRESSING AS EXPECTED  Pt. up to bathroom, standby assist with FWW. LSO brace on when OOB

## 2018-05-22 NOTE — CARE PLAN
Problem: Bowel/Gastric:  Goal: Normal bowel function is maintained or improved  Outcome: PROGRESSING AS EXPECTED  Pt able to have large, hard BM today after MOM and suppository.    Problem: Pain Management  Goal: Pain level will decrease to patient's comfort goal  Outcome: PROGRESSING AS EXPECTED  Pt complaining that most of her pain/discomfort comes from the back brace. Repeated re-fitting/adjusting by traction and OT.    Problem: Psychosocial Needs:  Goal: Level of anxiety will decrease  Outcome: PROGRESSING AS EXPECTED  Pt updated on POC, education on medications provided, encouraged pt in ADLs, prompt attention to needs.

## 2018-05-22 NOTE — THERAPY
"Occupational Therapy Treatment completed with focus on ADLs and ADL transfers.  Functional Status:  Pt seen for OT tx. Up in BR upon arrival. Pt and spouse educated on possible DME/AE needs for home to assist w/ ADLs and ADL transfers. Pt and spouse given handout on where to obtain equipment from. Pt and spouse accepting and receptive to education. Pt continues to complete ADLs and ADL transfers w/ SBA.   Plan of Care: Will benefit from Occupational Therapy 3 times per week  Discharge Recommendations:  Equipment Will Continue to Assess for Equipment Needs.     See \"Rehab Therapy-Acute\" Patient Summary Report for complete documentation.   "

## 2018-05-22 NOTE — PROGRESS NOTES
Pt anxious today about BM, cramps after MOM and suppository, brace fitting, pain meds, pain in foot, ambulating hallway. Defers decision making to RN or . Provided pt with information and education for each choice to be made.  Encouraged pt that it is her body, and she is the best one to make decisions for it.

## 2018-05-22 NOTE — THERAPY
"Physical Therapy Treatment completed.   Bed Mobility:  Supine to Sit: Stand by Assist  Transfers: Sit to Stand: Supervised  Gait: Level Of Assist: Supervised with Front-Wheel Walker       Plan of Care: Will benefit from Physical Therapy 4 times per week  Discharge Recommendations: Equipment: No Equipment Needed.     See \"Rehab Therapy-Acute\" Patient Summary Report for complete documentation.     Pt continues to progress w/ therapy. Pt is able to perform most mobility at a SPV-SBA level. Pt demonstrates a steady gait pattern w/ no noted LOB. Pt able to perform stairs w/ SO standing close for gaurding. Pt still very anxious about DC home. Reassured pt of ability to perform mobility and safety techniques w/ pt receptive. As per initial eval, pt will benefit from HH upon DC from acute care.  "

## 2018-05-22 NOTE — CARE PLAN
Problem: Bowel/Gastric:  Goal: Normal bowel function is maintained or improved  Outcome: PROGRESSING AS EXPECTED  Pt had some loose stools today afet her stool softner and suppository , will be cautious of c diff indicators      Problem: Pain Management  Goal: Pain level will decrease to patient's comfort goal  Outcome: PROGRESSING AS EXPECTED  Pt complaining that most of her pain/discomfort comes from the back brace. Repeated re-fitting/adjusting by traction and OT.

## 2018-05-22 NOTE — PROGRESS NOTES
Pt is very hesitant to take norco because it is an opioid. Have discussed pain management several times today. Have tried Tylenol, rest and repositioning, alleviating bowel problems as that was also causing discomfort, now toradol. Advised pt that norco is available as a tool for her pain management and it is up to pt to decide.

## 2018-05-23 ENCOUNTER — HOME CARE VISIT (OUTPATIENT)
Dept: HOME HEALTH SERVICES | Facility: HOME HEALTHCARE | Age: 83
End: 2018-05-23
Payer: MEDICARE

## 2018-05-23 ENCOUNTER — TELEPHONE (OUTPATIENT)
Dept: HEALTH INFORMATION MANAGEMENT | Facility: OTHER | Age: 83
End: 2018-05-23

## 2018-05-23 VITALS
HEIGHT: 65 IN | HEART RATE: 79 BPM | SYSTOLIC BLOOD PRESSURE: 122 MMHG | DIASTOLIC BLOOD PRESSURE: 74 MMHG | WEIGHT: 138.7 LBS | BODY MASS INDEX: 23.11 KG/M2 | TEMPERATURE: 99.2 F | RESPIRATION RATE: 16 BRPM | OXYGEN SATURATION: 97 %

## 2018-05-23 PROCEDURE — 665001 SOC-HOME HEALTH

## 2018-05-23 PROCEDURE — 665999 HH PPS REVENUE DEBIT

## 2018-05-23 PROCEDURE — 665998 HH PPS REVENUE CREDIT

## 2018-05-23 PROCEDURE — G0493 RN CARE EA 15 MIN HH/HOSPICE: HCPCS

## 2018-05-23 SDOH — ECONOMIC STABILITY: HOUSING INSECURITY: UNSAFE COOKING RANGE AREA: 0

## 2018-05-23 SDOH — ECONOMIC STABILITY: HOUSING INSECURITY: HOME SAFETY: PT HAS MANY THROW RUGS THAT CREATES A POTENTIAL RISK AS A TRIP/SLIP HAZARD.

## 2018-05-23 SDOH — ECONOMIC STABILITY: HOUSING INSECURITY: UNSAFE APPLIANCES: 0

## 2018-05-23 ASSESSMENT — PATIENT HEALTH QUESTIONNAIRE - PHQ9
1. LITTLE INTEREST OR PLEASURE IN DOING THINGS: 01
2. FEELING DOWN, DEPRESSED, IRRITABLE, OR HOPELESS: 01

## 2018-05-23 ASSESSMENT — ACTIVITIES OF DAILY LIVING (ADL)
HOME_HEALTH_OASIS: 01
HOME_HEALTH_OASIS: 02
OASIS_M1830: 03

## 2018-05-23 ASSESSMENT — ENCOUNTER SYMPTOMS: VOMITING: DENIES

## 2018-05-23 NOTE — DISCHARGE INSTRUCTIONS
Discharge Instructions    Discharged to home by car with relative. Discharged via wheelchair, hospital escort: Yes.  Special equipment needed: Not Applicable    Be sure to schedule a follow-up appointment with your primary care doctor or any specialists as instructed.   Follow up with your PCP  Take medication as prescribed  For any questions or concerns contact MD  Wear brace at all times when out of bed.    Discharge Plan:   Diet Plan: Discussed  Activity Level: Discussed  Confirmed Follow up Appointment: Patient to Call and Schedule Appointment  Pneumococcal Vaccine Administered/Refused: Not given - Patient refused pneumococcal vaccine  Influenza Vaccine Indication: Not indicated: Previously immunized this influenza season and > 8 years of age    I understand that a diet low in cholesterol, fat, and sodium is recommended for good health. Unless I have been given specific instructions below for another diet, I accept this instruction as my diet prescription.   Other diet: Regular diet    Special Instructions: Discharge instructions for the Orthopedic Patient    Follow up with Primary Care Physician within 2 weeks of discharge to home, regarding:  Review of medications and diagnostic testing.  Surveillance for medical complications.  Workup and treatment of osteoporosis, if appropriate.     -Is this a Joint Replacement patient? No    -Is this patient being discharged with medication to prevent blood clots?  No    · Is patient discharged on Warfarin / Coumadin?   No     Vertebral Fracture  Introduction  A vertebral fracture means that one of the bones in the spine is broken (fractured). These bones are called vertebrae. You may have back pain that gets worse when you move.  Vertebral fractures can be mild or severe. Many will get better without surgery. Surgery may be needed for more severe breaks.  Follow these instructions at home:  General instructions  · Take medicines only as told by your doctor.  · Do not  drive or use heavy machinery while taking pain medicine.  · If told, put ice on the injured area:  ¨ Put ice in a plastic bag.  ¨ Place a towel between your skin and the bag.  ¨ Leave the ice on for 30 minutes every 2 hours at first. Then use the ice as needed.  · Wear your neck brace or back brace as told by your doctor.  · Do not drink alcohol.  · Keep all follow-up visits as told by your doctor. This is important.  Activity  · Stay in bed (on bed rest) only as told by your doctor. Being on bed rest for too long can make your condition worse.  · Return to your normal activities as told by your doctor. Ask your doctor what is safe for you to do.  · Do exercises for your back (physical therapy) as told by your doctor.  · Exercise often as told by your doctor.  Contact a doctor if:  · You have a fever.  · You have a cough that makes your pain worse.  · Your pain medicine is not helping.  · Your pain does not get better over time.  · You cannot return to your normal activities as planned.  Get help right away if:  · Your pain is very bad and it suddenly gets worse.  · You are not able to move any body part (paralysis) that is below the level of your injury.  · You have numbness, tingling, or weakness in any body part that is below the level of your injury.  · You cannot control when you pee (urinate) or when you poop (have bowel movements).  This information is not intended to replace advice given to you by your health care provider. Make sure you discuss any questions you have with your health care provider.  Document Released: 06/07/2011 Document Revised: 05/25/2017 Document Reviewed: 12/23/2015  © 2017 Elsevier      Depression / Suicide Risk    As you are discharged from this [facility] facility, it is important to learn how to keep safe from harming yourself.    Recognize the warning signs:  · Abrupt changes in personality, positive or negative- including increase in energy   · Giving away possessions  · Change  in eating patterns- significant weight changes-  positive or negative  · Change in sleeping patterns- unable to sleep or sleeping all the time   · Unwillingness or inability to communicate  · Depression  · Unusual sadness, discouragement and loneliness  · Talk of wanting to die  · Neglect of personal appearance   · Rebelliousness- reckless behavior  · Withdrawal from people/activities they love  · Confusion- inability to concentrate     If you or a loved one observes any of these behaviors or has concerns about self-harm, here's what you can do:  · Talk about it- your feelings and reasons for harming yourself  · Remove any means that you might use to hurt yourself (examples: pills, rope, extension cords, firearm)  · Get professional help from the community (Mental Health, Substance Abuse, psychological counseling)  · Do not be alone:Call your Safe Contact- someone whom you trust who will be there for you.  · Call your local CRISIS HOTLINE 066-0709 or 301-382-2642  · Call your local Children's Mobile Crisis Response Team [place] (876) 233-3733 or www.[employer]  · Call the toll free National Suicide Prevention Hotlines   · National Suicide Prevention Lifeline 108-333-CMZU (8489)  · National Hope Line Network 800-SUICIDE (824-0129)

## 2018-05-23 NOTE — PROGRESS NOTES
Pt. D/C'd home.  Discharge instructions provided to pt.  Pt states understanding.  Pt states all questions have been answered.  Copy of discharge provided to pt.  Signed copy in chart.  Prescriptions provided to pt, copy in chart. Pt states that all personal belongings are in possession. Pt escorted off unit with assistance from CNA without incident.

## 2018-05-23 NOTE — TELEPHONE ENCOUNTER
Received referral from [facility]. Medications reviewed against discharge summary. No clinically significant interactions or medication issues noted.     Alysia Blue, DkD

## 2018-05-24 ENCOUNTER — HOME CARE VISIT (OUTPATIENT)
Dept: HOME HEALTH SERVICES | Facility: HOME HEALTHCARE | Age: 83
End: 2018-05-24
Payer: MEDICARE

## 2018-05-24 PROCEDURE — 665998 HH PPS REVENUE CREDIT

## 2018-05-24 PROCEDURE — 665999 HH PPS REVENUE DEBIT

## 2018-05-25 ENCOUNTER — HOME CARE VISIT (OUTPATIENT)
Dept: HOME HEALTH SERVICES | Facility: HOME HEALTHCARE | Age: 83
End: 2018-05-25
Payer: MEDICARE

## 2018-05-25 VITALS
DIASTOLIC BLOOD PRESSURE: 69 MMHG | TEMPERATURE: 97.7 F | RESPIRATION RATE: 16 BRPM | OXYGEN SATURATION: 98 % | HEART RATE: 85 BPM | SYSTOLIC BLOOD PRESSURE: 144 MMHG

## 2018-05-25 VITALS
TEMPERATURE: 97.7 F | RESPIRATION RATE: 16 BRPM | SYSTOLIC BLOOD PRESSURE: 158 MMHG | HEART RATE: 86 BPM | DIASTOLIC BLOOD PRESSURE: 7 MMHG | OXYGEN SATURATION: 98 %

## 2018-05-25 PROCEDURE — G0151 HHCP-SERV OF PT,EA 15 MIN: HCPCS

## 2018-05-25 PROCEDURE — G0162 HHC RN E&M PLAN SVS, 15 MIN: HCPCS

## 2018-05-25 PROCEDURE — 665999 HH PPS REVENUE DEBIT

## 2018-05-25 PROCEDURE — 665998 HH PPS REVENUE CREDIT

## 2018-05-25 ASSESSMENT — ENCOUNTER SYMPTOMS
VOMITING: DENIES
RESPIRATORY SYMPTOMS COMMENTS: NO CONCERNS AT THE TIME OF THIS ASSESSMENT.
DEBILITATING PAIN: 1
NAUSEA: DENIES

## 2018-05-25 ASSESSMENT — ACTIVITIES OF DAILY LIVING (ADL)
IADLS_COMMENTS: <!--EPICS-->SEE OT REPORT<!--EPICE-->
ADLS_COMMENTS: <!--EPICS-->SEE OT REPORT<!--EPICE-->

## 2018-05-26 PROCEDURE — 665998 HH PPS REVENUE CREDIT

## 2018-05-26 PROCEDURE — 665999 HH PPS REVENUE DEBIT

## 2018-05-27 PROCEDURE — 665999 HH PPS REVENUE DEBIT

## 2018-05-27 PROCEDURE — 665998 HH PPS REVENUE CREDIT

## 2018-05-28 PROCEDURE — 665998 HH PPS REVENUE CREDIT

## 2018-05-28 PROCEDURE — 665999 HH PPS REVENUE DEBIT

## 2018-05-29 ENCOUNTER — HOME CARE VISIT (OUTPATIENT)
Dept: HOME HEALTH SERVICES | Facility: HOME HEALTHCARE | Age: 83
End: 2018-05-29
Payer: MEDICARE

## 2018-05-29 VITALS
HEART RATE: 91 BPM | TEMPERATURE: 97.3 F | SYSTOLIC BLOOD PRESSURE: 128 MMHG | RESPIRATION RATE: 16 BRPM | DIASTOLIC BLOOD PRESSURE: 60 MMHG | OXYGEN SATURATION: 97 %

## 2018-05-29 VITALS
DIASTOLIC BLOOD PRESSURE: 60 MMHG | TEMPERATURE: 98.3 F | RESPIRATION RATE: 16 BRPM | HEART RATE: 89 BPM | OXYGEN SATURATION: 97 % | SYSTOLIC BLOOD PRESSURE: 128 MMHG

## 2018-05-29 PROCEDURE — G0495 RN CARE TRAIN/EDU IN HH: HCPCS

## 2018-05-29 PROCEDURE — 665998 HH PPS REVENUE CREDIT

## 2018-05-29 PROCEDURE — G0151 HHCP-SERV OF PT,EA 15 MIN: HCPCS

## 2018-05-29 PROCEDURE — 665999 HH PPS REVENUE DEBIT

## 2018-05-29 ASSESSMENT — ENCOUNTER SYMPTOMS: POOR JUDGMENT: 1

## 2018-05-30 ENCOUNTER — HOME CARE VISIT (OUTPATIENT)
Dept: HOME HEALTH SERVICES | Facility: HOME HEALTHCARE | Age: 83
End: 2018-05-30
Payer: MEDICARE

## 2018-05-30 VITALS
TEMPERATURE: 98.4 F | SYSTOLIC BLOOD PRESSURE: 112 MMHG | RESPIRATION RATE: 16 BRPM | HEART RATE: 76 BPM | OXYGEN SATURATION: 98 % | DIASTOLIC BLOOD PRESSURE: 68 MMHG

## 2018-05-30 PROCEDURE — 665999 HH PPS REVENUE DEBIT

## 2018-05-30 PROCEDURE — G0152 HHCP-SERV OF OT,EA 15 MIN: HCPCS

## 2018-05-30 PROCEDURE — 665998 HH PPS REVENUE CREDIT

## 2018-05-30 PROCEDURE — G0180 MD CERTIFICATION HHA PATIENT: HCPCS | Performed by: FAMILY MEDICINE

## 2018-05-30 SDOH — ECONOMIC STABILITY: HOUSING INSECURITY
HOME SAFETY: RECOMMEND REMOVEAL OF THROW RUGS AND CAUTION W/ WELL ANCHORED AREA RUGS. RECOMMEND SHOWER CHAIR, HAND HELD, NON-SLIP MAT AND GRAB BAR IN SHOWER.

## 2018-05-30 ASSESSMENT — ACTIVITIES OF DAILY LIVING (ADL)
LAUNDRY_ASSISTANCE: 6
TOILETING_ASSISTANCE: 4
DRESSING_UB_ASSISTANCE: 4
TRANSPORTATION_ASSISTANCE: 6
TOILETING_EQUIPMENT_USED: OVER TOILET COMMODE
GROOMING_COMMENTS: STANDING AT SINK
DRESSING_LB_ASSISTANCE: 4
EATING_ASSISTANCE: 0
GROOMING_ASSISTANCE: 0
HOUSEKEEPING_ASSISTANCE: 6
SHOPPING_ASSISTANCE: 6
MEAL_PREP_ASSISTANCE: 6
TELEPHONE_ASSISTANCE: 0
ORAL_CARE_ASSISTANCE: 0
BATHING_ASSISTANCE: 5
TOILETING_ASSISTANCE: 0

## 2018-05-31 PROCEDURE — 665999 HH PPS REVENUE DEBIT

## 2018-05-31 PROCEDURE — 665998 HH PPS REVENUE CREDIT

## 2018-05-31 ASSESSMENT — ACTIVITIES OF DAILY LIVING (ADL): ADLS_COMMENTS: ->

## 2018-06-01 ENCOUNTER — HOME CARE VISIT (OUTPATIENT)
Dept: HOME HEALTH SERVICES | Facility: HOME HEALTHCARE | Age: 83
End: 2018-06-01
Payer: MEDICARE

## 2018-06-01 VITALS
HEART RATE: 85 BPM | DIASTOLIC BLOOD PRESSURE: 72 MMHG | RESPIRATION RATE: 16 BRPM | OXYGEN SATURATION: 97 % | SYSTOLIC BLOOD PRESSURE: 122 MMHG | TEMPERATURE: 97.6 F

## 2018-06-01 VITALS
DIASTOLIC BLOOD PRESSURE: 72 MMHG | RESPIRATION RATE: 16 BRPM | HEART RATE: 85 BPM | OXYGEN SATURATION: 97 % | SYSTOLIC BLOOD PRESSURE: 122 MMHG | TEMPERATURE: 97.6 F

## 2018-06-01 PROCEDURE — G0151 HHCP-SERV OF PT,EA 15 MIN: HCPCS

## 2018-06-01 PROCEDURE — G0162 HHC RN E&M PLAN SVS, 15 MIN: HCPCS

## 2018-06-01 PROCEDURE — 665999 HH PPS REVENUE DEBIT

## 2018-06-01 PROCEDURE — 665998 HH PPS REVENUE CREDIT

## 2018-06-01 ASSESSMENT — ENCOUNTER SYMPTOMS
VOMITING: DENIES
RESPIRATORY SYMPTOMS COMMENTS: NO CONCERNS AT THE TIME OF THIS ASSESSMENT.
NAUSEA: DENIES
DEBILITATING PAIN: 1

## 2018-06-02 PROCEDURE — 665998 HH PPS REVENUE CREDIT

## 2018-06-02 PROCEDURE — 665999 HH PPS REVENUE DEBIT

## 2018-06-03 PROCEDURE — 665999 HH PPS REVENUE DEBIT

## 2018-06-03 PROCEDURE — 665998 HH PPS REVENUE CREDIT

## 2018-06-04 PROCEDURE — 665999 HH PPS REVENUE DEBIT

## 2018-06-04 PROCEDURE — 665998 HH PPS REVENUE CREDIT

## 2018-06-05 ENCOUNTER — HOME CARE VISIT (OUTPATIENT)
Dept: HOME HEALTH SERVICES | Facility: HOME HEALTHCARE | Age: 83
End: 2018-06-05
Payer: MEDICARE

## 2018-06-05 VITALS
TEMPERATURE: 97.5 F | DIASTOLIC BLOOD PRESSURE: 70 MMHG | SYSTOLIC BLOOD PRESSURE: 110 MMHG | RESPIRATION RATE: 16 BRPM | HEART RATE: 85 BPM

## 2018-06-05 PROCEDURE — G0151 HHCP-SERV OF PT,EA 15 MIN: HCPCS

## 2018-06-05 PROCEDURE — 665998 HH PPS REVENUE CREDIT

## 2018-06-05 PROCEDURE — 665999 HH PPS REVENUE DEBIT

## 2018-06-05 ASSESSMENT — ENCOUNTER SYMPTOMS: DEPRESSED MOOD: 1

## 2018-06-06 ENCOUNTER — HOME CARE VISIT (OUTPATIENT)
Dept: HOME HEALTH SERVICES | Facility: HOME HEALTHCARE | Age: 83
End: 2018-06-06
Payer: MEDICARE

## 2018-06-06 PROCEDURE — 665998 HH PPS REVENUE CREDIT

## 2018-06-06 PROCEDURE — 665999 HH PPS REVENUE DEBIT

## 2018-06-07 PROCEDURE — 665999 HH PPS REVENUE DEBIT

## 2018-06-07 PROCEDURE — 665998 HH PPS REVENUE CREDIT

## 2018-06-08 ENCOUNTER — HOME CARE VISIT (OUTPATIENT)
Dept: HOME HEALTH SERVICES | Facility: HOME HEALTHCARE | Age: 83
End: 2018-06-08
Payer: MEDICARE

## 2018-06-08 VITALS
RESPIRATION RATE: 18 BRPM | OXYGEN SATURATION: 98 % | SYSTOLIC BLOOD PRESSURE: 138 MMHG | DIASTOLIC BLOOD PRESSURE: 84 MMHG | HEART RATE: 82 BPM | TEMPERATURE: 98 F

## 2018-06-08 VITALS
TEMPERATURE: 98 F | DIASTOLIC BLOOD PRESSURE: 84 MMHG | HEART RATE: 82 BPM | SYSTOLIC BLOOD PRESSURE: 138 MMHG | RESPIRATION RATE: 18 BRPM | OXYGEN SATURATION: 98 %

## 2018-06-08 PROCEDURE — G0493 RN CARE EA 15 MIN HH/HOSPICE: HCPCS

## 2018-06-08 PROCEDURE — 665999 HH PPS REVENUE DEBIT

## 2018-06-08 PROCEDURE — 665998 HH PPS REVENUE CREDIT

## 2018-06-08 PROCEDURE — G0151 HHCP-SERV OF PT,EA 15 MIN: HCPCS

## 2018-06-08 ASSESSMENT — ENCOUNTER SYMPTOMS
NAUSEA: DENIES
VOMITING: DENIES
RESPIRATORY SYMPTOMS COMMENTS: NO CONCERNS AT TIME OF ASSESSMENT.

## 2018-06-09 PROCEDURE — 665999 HH PPS REVENUE DEBIT

## 2018-06-09 PROCEDURE — 665998 HH PPS REVENUE CREDIT

## 2018-06-10 ENCOUNTER — HOME CARE VISIT (OUTPATIENT)
Dept: HOME HEALTH SERVICES | Facility: HOME HEALTHCARE | Age: 83
End: 2018-06-10
Payer: MEDICARE

## 2018-06-10 PROCEDURE — 665999 HH PPS REVENUE DEBIT

## 2018-06-10 PROCEDURE — 665998 HH PPS REVENUE CREDIT

## 2018-06-11 PROCEDURE — 665998 HH PPS REVENUE CREDIT

## 2018-06-11 PROCEDURE — 665999 HH PPS REVENUE DEBIT

## 2018-06-12 PROCEDURE — 665999 HH PPS REVENUE DEBIT

## 2018-06-12 PROCEDURE — 665998 HH PPS REVENUE CREDIT

## 2018-06-13 PROCEDURE — 665999 HH PPS REVENUE DEBIT

## 2018-06-13 PROCEDURE — 665998 HH PPS REVENUE CREDIT

## 2018-06-14 ENCOUNTER — HOSPITAL ENCOUNTER (OUTPATIENT)
Dept: RADIOLOGY | Facility: MEDICAL CENTER | Age: 83
End: 2018-06-14
Attending: NEUROLOGICAL SURGERY
Payer: MEDICARE

## 2018-06-14 DIAGNOSIS — M48.56XS: ICD-10-CM

## 2018-06-14 PROCEDURE — 72100 X-RAY EXAM L-S SPINE 2/3 VWS: CPT

## 2018-06-14 PROCEDURE — 665999 HH PPS REVENUE DEBIT

## 2018-06-14 PROCEDURE — 665998 HH PPS REVENUE CREDIT

## 2018-06-15 ENCOUNTER — HOME CARE VISIT (OUTPATIENT)
Dept: HOME HEALTH SERVICES | Facility: HOME HEALTHCARE | Age: 83
End: 2018-06-15
Payer: MEDICARE

## 2018-06-15 VITALS
RESPIRATION RATE: 16 BRPM | TEMPERATURE: 97.9 F | SYSTOLIC BLOOD PRESSURE: 128 MMHG | DIASTOLIC BLOOD PRESSURE: 72 MMHG | HEART RATE: 88 BPM | OXYGEN SATURATION: 96 %

## 2018-06-15 VITALS
HEART RATE: 18 BPM | RESPIRATION RATE: 18 BRPM | SYSTOLIC BLOOD PRESSURE: 128 MMHG | OXYGEN SATURATION: 96 % | DIASTOLIC BLOOD PRESSURE: 72 MMHG | TEMPERATURE: 97.9 F

## 2018-06-15 PROCEDURE — 665999 HH PPS REVENUE DEBIT

## 2018-06-15 PROCEDURE — G0151 HHCP-SERV OF PT,EA 15 MIN: HCPCS

## 2018-06-15 PROCEDURE — 665998 HH PPS REVENUE CREDIT

## 2018-06-15 PROCEDURE — G0493 RN CARE EA 15 MIN HH/HOSPICE: HCPCS

## 2018-06-15 SDOH — ECONOMIC STABILITY: HOUSING INSECURITY: UNSAFE COOKING RANGE AREA: 0

## 2018-06-15 SDOH — ECONOMIC STABILITY: HOUSING INSECURITY: UNSAFE APPLIANCES: 0

## 2018-06-15 ASSESSMENT — ENCOUNTER SYMPTOMS
DEPRESSED MOOD: 1
VOMITING: DENIES
RESPIRATORY SYMPTOMS COMMENTS: OXYGEN SATURATION MEASURED ON ROOM AIR. TEMPERATURE MEASURED WITH TEMPORAL THERMOMETER.
NAUSEA: DENIES

## 2018-06-16 PROCEDURE — 665999 HH PPS REVENUE DEBIT

## 2018-06-16 PROCEDURE — 665998 HH PPS REVENUE CREDIT

## 2018-06-17 PROCEDURE — 665999 HH PPS REVENUE DEBIT

## 2018-06-17 PROCEDURE — 665998 HH PPS REVENUE CREDIT

## 2018-06-18 PROCEDURE — 665998 HH PPS REVENUE CREDIT

## 2018-06-18 PROCEDURE — 665999 HH PPS REVENUE DEBIT

## 2018-06-19 ENCOUNTER — TELEPHONE (OUTPATIENT)
Dept: MEDICAL GROUP | Facility: MEDICAL CENTER | Age: 83
End: 2018-06-19

## 2018-06-19 PROCEDURE — 665999 HH PPS REVENUE DEBIT

## 2018-06-19 PROCEDURE — 665998 HH PPS REVENUE CREDIT

## 2018-06-19 NOTE — TELEPHONE ENCOUNTER
Please schedule patient with me ASAP for evaluation of depression & insomnia on 06/25/18 @4:20pm (the pt at 4pm only needs to be 20min)

## 2018-06-19 NOTE — TELEPHONE ENCOUNTER
----- Message from Мария Fam R.N. sent at 6/15/2018  4:12 PM PDT -----  ACTION REQUIREDPatient expressed that she is having a hard time sleeping and has been feeling depressed.  Patient followed your course of guidance in taking melatonin and states this has not been working for her. Patient and  vocalize that the patient has been feeling hopeless, believes she will never sleep again and her condition will not change. She has had a decrease in appetite and increase in anxiety.  Patient denies thoughts of suicide or self harm. Per patient she does not have an appointment with you until middle of July, please advise a course of action.

## 2018-06-19 NOTE — TELEPHONE ENCOUNTER
"----- Message from Skye Pearl R.N. sent at 6/5/2018  1:49 PM PDT -----  ACTION REQUIRED:Patient reports that she is not sleeping through the night, she is constantly up to urinate and/or experiencing discomfort. SN educated about hypnotics in older adults, especially when being combined with opioid medications. Patient is asking for \"something, anything\" that will help her sleep.Please advise. Thank you! Leon RN [phone]  "

## 2018-06-19 NOTE — TELEPHONE ENCOUNTER
Phone Number Called: [phone] (home)      Message: Patient has scheduled for Monday 06/25/2018 and will call if this does not work for her.     Left Message for patient to call back: N\A

## 2018-06-20 PROCEDURE — 665999 HH PPS REVENUE DEBIT

## 2018-06-20 PROCEDURE — 665998 HH PPS REVENUE CREDIT

## 2018-06-21 PROCEDURE — 665999 HH PPS REVENUE DEBIT

## 2018-06-21 PROCEDURE — 665998 HH PPS REVENUE CREDIT

## 2018-06-22 ENCOUNTER — HOME CARE VISIT (OUTPATIENT)
Dept: HOME HEALTH SERVICES | Facility: HOME HEALTHCARE | Age: 83
End: 2018-06-22
Payer: MEDICARE

## 2018-06-22 VITALS
SYSTOLIC BLOOD PRESSURE: 138 MMHG | RESPIRATION RATE: 18 BRPM | TEMPERATURE: 98 F | DIASTOLIC BLOOD PRESSURE: 72 MMHG | HEART RATE: 80 BPM | OXYGEN SATURATION: 99 %

## 2018-06-22 PROCEDURE — 665998 HH PPS REVENUE CREDIT

## 2018-06-22 PROCEDURE — 665999 HH PPS REVENUE DEBIT

## 2018-06-22 PROCEDURE — G0493 RN CARE EA 15 MIN HH/HOSPICE: HCPCS

## 2018-06-22 ASSESSMENT — ENCOUNTER SYMPTOMS
RESPIRATORY SYMPTOMS COMMENTS: OXYGEN SATURATION MEASURED ON ROOM AIR. TEMPERATURE MEASURED WITH TEMPORAL THERMOMETER.
VOMITING: DENIES
NAUSEA: DENIES

## 2018-06-23 PROCEDURE — 665999 HH PPS REVENUE DEBIT

## 2018-06-23 PROCEDURE — 665998 HH PPS REVENUE CREDIT

## 2018-06-24 PROCEDURE — 665999 HH PPS REVENUE DEBIT

## 2018-06-24 PROCEDURE — 665998 HH PPS REVENUE CREDIT

## 2018-06-25 ENCOUNTER — OFFICE VISIT (OUTPATIENT)
Dept: MEDICAL GROUP | Facility: MEDICAL CENTER | Age: 83
End: 2018-06-25
Payer: MEDICARE

## 2018-06-25 VITALS
OXYGEN SATURATION: 97 % | TEMPERATURE: 100 F | HEIGHT: 65 IN | SYSTOLIC BLOOD PRESSURE: 158 MMHG | DIASTOLIC BLOOD PRESSURE: 86 MMHG | WEIGHT: 136 LBS | BODY MASS INDEX: 22.66 KG/M2 | HEART RATE: 86 BPM

## 2018-06-25 DIAGNOSIS — F32.A ANXIETY AND DEPRESSION: ICD-10-CM

## 2018-06-25 DIAGNOSIS — F51.01 PRIMARY INSOMNIA: ICD-10-CM

## 2018-06-25 DIAGNOSIS — F41.9 ANXIETY AND DEPRESSION: ICD-10-CM

## 2018-06-25 DIAGNOSIS — S32.030D CLOSED COMPRESSION FRACTURE OF L3 LUMBAR VERTEBRA WITH ROUTINE HEALING, SUBSEQUENT ENCOUNTER: ICD-10-CM

## 2018-06-25 PROBLEM — N39.0 UTI (URINARY TRACT INFECTION): Status: RESOLVED | Noted: 2018-05-18 | Resolved: 2018-06-25

## 2018-06-25 PROBLEM — D72.829 LEUKOCYTOSIS: Status: RESOLVED | Noted: 2018-05-19 | Resolved: 2018-06-25

## 2018-06-25 PROCEDURE — 99215 OFFICE O/P EST HI 40 MIN: CPT | Performed by: FAMILY MEDICINE

## 2018-06-25 PROCEDURE — 665999 HH PPS REVENUE DEBIT

## 2018-06-25 PROCEDURE — 665998 HH PPS REVENUE CREDIT

## 2018-06-25 RX ORDER — LORAZEPAM 0.5 MG/1
.25-.5 TABLET ORAL NIGHTLY PRN
Qty: 30 TAB | Refills: 0 | Status: SHIPPED | OUTPATIENT
Start: 2018-06-25 | End: 2018-07-25

## 2018-06-25 RX ORDER — SERTRALINE HYDROCHLORIDE 25 MG/1
25 TABLET, FILM COATED ORAL DAILY
Qty: 30 TAB | Refills: 0 | Status: SHIPPED | OUTPATIENT
Start: 2018-06-25 | End: 2018-07-25 | Stop reason: SDUPTHER

## 2018-06-26 PROCEDURE — 665999 HH PPS REVENUE DEBIT

## 2018-06-26 PROCEDURE — 665998 HH PPS REVENUE CREDIT

## 2018-06-27 PROCEDURE — 665999 HH PPS REVENUE DEBIT

## 2018-06-27 PROCEDURE — 665998 HH PPS REVENUE CREDIT

## 2018-06-28 PROCEDURE — 665998 HH PPS REVENUE CREDIT

## 2018-06-28 PROCEDURE — 665999 HH PPS REVENUE DEBIT

## 2018-06-29 ENCOUNTER — HOME CARE VISIT (OUTPATIENT)
Dept: HOME HEALTH SERVICES | Facility: HOME HEALTHCARE | Age: 83
End: 2018-06-29
Payer: MEDICARE

## 2018-06-29 PROCEDURE — 665999 HH PPS REVENUE DEBIT

## 2018-06-29 PROCEDURE — G0493 RN CARE EA 15 MIN HH/HOSPICE: HCPCS

## 2018-06-29 PROCEDURE — 665998 HH PPS REVENUE CREDIT

## 2018-06-30 PROCEDURE — 665999 HH PPS REVENUE DEBIT

## 2018-06-30 PROCEDURE — 665998 HH PPS REVENUE CREDIT

## 2018-06-30 NOTE — ASSESSMENT & PLAN NOTE
Chronic, uncontrolled, acutely worsened due to her recent back injury.  Additionally, patient states that she is experiencing insomnia.  We discussed pharmacotherapy, and have decided to use Zoloft.  This is due to the fact that patient's anxiety and depression are equally symptomatic to her, and therefore we will use something mid spectrum between anxiety symptoms of depression symptoms.    SIG E CAPS:      Positives: Sleep decreased/insomnia, Interest decreased, Guilt decreased, Energy decreased, Concentration decreased, Appetite decreased and Depressed Mood present     Negatives: Psychomotor Activity Change  and Suicidal Ideation      ROS is NEGATIVE for s/sx of panic attack: generalized weakness/fatigue, dizziness, vision/hearing changes, chest pain/pressure, palpitations, dyspnea, tachypnea, intense feelings of dread, insomnia, decreased appetite, persistent nausea.

## 2018-06-30 NOTE — PROGRESS NOTES
Subjective:   Chief Complaint/History of Present Illness:  Prabha Lizama is a 83 y.o. female established patient who presents today to discuss medical problems as listed below    Diagnoses of Closed compression fracture of L3 lumbar vertebra with routine healing, subsequent encounter, Anxiety and depression, and Primary insomnia were pertinent to this visit.    Closed compression fracture of L3 lumbar vertebra (HCC)  New problem, uncontrolled, due to a recent fall at home.  Patient was found to have an L3 compression fracture, thought to be a nonsurgical case, therefore was discharged home with back brace as well as Norco.  Patient was also given a referral to PT OT for home health purposes, and instructions to follow-up with neurosurgeon.    At present time, patient does not have any low back pain red flags.  However, patient continues to have problems with anxiety, depression, as well as insomnia.    ROS is NEGATIVE for BLE radicular pain, saddle paresthesias, bowel or bladder incontinence, gait instability     Anxiety and depression  Chronic, uncontrolled, acutely worsened due to her recent back injury.  Additionally, patient states that she is experiencing insomnia.  We discussed pharmacotherapy, and have decided to use Zoloft.  This is due to the fact that patient's anxiety and depression are equally symptomatic to her, and therefore we will use something mid spectrum between anxiety symptoms of depression symptoms.    SIG E CAPS:      Positives: Sleep decreased/insomnia, Interest decreased, Guilt decreased, Energy decreased, Concentration decreased, Appetite decreased and Depressed Mood present     Negatives: Psychomotor Activity Change  and Suicidal Ideation      ROS is NEGATIVE for s/sx of panic attack: generalized weakness/fatigue, dizziness, vision/hearing changes, chest pain/pressure, palpitations, dyspnea, tachypnea, intense feelings of dread, insomnia, decreased appetite, persistent  nausea.    Insomnia  Acute exacerbation of chronic condition, uncontrolled, worsened due to recent back injury.  Please see notes from same date of service 6/25/2018 regarding anxiety and depression.      Patient Active Problem List    Diagnosis Date Noted   • Closed compression fracture of L3 lumbar vertebra (HCC) 05/18/2018     Priority: High   • Essential hypertension 02/16/2010     Priority: Medium   • Pure hypercholesterolemia 01/15/2018   • Osteoporosis 04/13/2017   • Postmenopausal estrogen deficiency 03/08/2017   • Hair loss 02/08/2017   • Multiple thyroid nodules    • Anxiety and depression 08/07/2014   • Insomnia        Additional History:   Allergies:    Amoxicillin; Hydralazine; Morphine; and Penicillins     Current Medications:     Current Outpatient Prescriptions   Medication Sig Dispense Refill   • sertraline (ZOLOFT) 25 MG tablet Take 1 Tab by mouth every day. 30 Tab 0   • LORazepam (ATIVAN) 0.5 MG Tab Take 0.5-1 Tabs by mouth at bedtime as needed for Anxiety for up to 30 days. 30 Tab 0   • terazosin (HYTRIN) 2 MG Cap Take 2 mg by mouth every bedtime. in addition to 1mg in AM     • terazosin (HYTRIN) 1 MG Cap Take 1 Cap by mouth every morning. 30 Cap 3   • valsartan (DIOVAN) 320 MG tablet Take 320 mg by mouth every morning.     • Calcium-Magnesium-Vitamin D 185- MG-MG-UNIT Cap Take 2 Tabs by mouth every day.     • senna-docusate (PERICOLACE OR SENOKOT S) 8.6-50 MG Tab Take 2 Tabs by mouth 2 Times a Day. 30 Tab 0   • ondansetron (ZOFRAN ODT) 4 MG TABLET DISPERSIBLE Take 1 Tab by mouth every four hours as needed (give PO if IV route is unavailable. May give per feeding tube.). 20 Tab 0   • metoprolol (LOPRESSOR) 25 MG Tab Take 25 mg by mouth 2 times a day.     • aspirin EC (ECOTRIN) 81 MG TBEC Take 81 mg by mouth every evening.       No current facility-administered medications for this visit.         Social History:     Social History   Substance Use Topics   • Smoking status: Never Smoker   •  "Smokeless tobacco: Never Used   • Alcohol use No       ROS:     - NOTE: All other systems reviewed and are negative, except as in HPI.     Objective:   Physical Exam:    Vitals: Blood pressure 158/86, pulse 86, temperature 37.8 °C (100 °F), height 1.651 m (5' 5\"), weight 61.7 kg (136 lb), SpO2 97 %.   BMI: Body mass index is 22.63 kg/m².   General/Constitutional: Vitals as above, Well nourished, well developed female in no acute distress   Head/Eyes: Head is grossly normal & atraumatic, bilateral conjunctivae clear and not injected, bilateral EOMI, bilateral PERRL   ENT: Bilateral external ears grossly normal in appearance, Hearing grossly intact, External nares normal in appearance and without discharge/bleeding   Respiratory: No respiratory distress, bilateral lungs are clear to ausculation in all lung fields (anterior/lateral/posterior), no wheezing/rhonchi/rales   Cardiovascular: Regular rate and rhythm without murmur/gallops/rubs, distal pulses are intact and equal bilaterally (radial, posterior tibial), no bilateral lower extremity edema   MSK: Spinal vertebral tenderness to lower lumbar back on gentle palpation, as well as tenderness of lumbar paraspinal muscles to palpation, Gait grossly normal & not antalgic   Integumentary: No apparent rashes   Psych: Judgment grossly appropriate, no apparent depression/anxiety    Health Maintenance:     - Osteoporosis (03/2017 DEXA)    Imaging/Labs:     - 05/22/18 -- BMP WNL apart from mild decrease in Ca    Assessment and Plan:   1. Closed compression fracture of L3 lumbar vertebra with routine healing, subsequent encounter  New problem, uncontrolled, patient to continue with PT/OT, as well as neurosurgeon.      2. Anxiety and depression  Acute exacerbation of chronic condition, uncontrolled, trial of Zoloft, with follow-up with me within 2-4 weeks.   - sertraline (ZOLOFT) 25 MG tablet; Take 1 Tab by mouth every day.  Dispense: 30 Tab; Refill: 0    3. Primary " insomnia  Acute exacerbation of chronic condition, uncontrolled, trial of Zoloft, daily, as well as Ativan for nights with increased difficulty with insomnia.     - sertraline (ZOLOFT) 25 MG tablet; Take 1 Tab by mouth every day.  Dispense: 30 Tab; Refill: 0   - LORazepam (ATIVAN) 0.5 MG Tab; Take 0.5-1 Tabs by mouth at bedtime as needed for Anxiety for up to 30 days.  Dispense: 30 Tab; Refill: 0    NOTE: A total of 40minutes was spent in direct face-to-face time with the patient, of which over 50% of the time was spent in counseling and/or coordination of care regarding pharmacology options for anxiety/depression as well as insomnia.  Her questions were answered to her satisfaction.      RTC: in 2-4weeks for follow-up on Anxiety/Depression + Insomnia.    PLEASE NOTE: This dictation was created using voice recognition software. I have made every reasonable attempt to correct obvious errors, but I expect that there are errors of grammar and possibly content that I did not discover before finalizing the note.

## 2018-06-30 NOTE — ASSESSMENT & PLAN NOTE
Acute exacerbation of chronic condition, uncontrolled, worsened due to recent back injury.  Please see notes from same date of service 6/25/2018 regarding anxiety and depression.

## 2018-06-30 NOTE — ASSESSMENT & PLAN NOTE
New problem, uncontrolled, due to a recent fall at home.  Patient was found to have an L3 compression fracture, thought to be a nonsurgical case, therefore was discharged home with back brace as well as Norco.  Patient was also given a referral to PT OT for home health purposes, and instructions to follow-up with neurosurgeon.    At present time, patient does not have any low back pain red flags.  However, patient continues to have problems with anxiety, depression, as well as insomnia.    ROS is NEGATIVE for BLE radicular pain, saddle paresthesias, bowel or bladder incontinence, gait instability

## 2018-07-01 PROCEDURE — 665999 HH PPS REVENUE DEBIT

## 2018-07-01 PROCEDURE — 665998 HH PPS REVENUE CREDIT

## 2018-07-02 VITALS
OXYGEN SATURATION: 99 % | RESPIRATION RATE: 18 BRPM | HEART RATE: 76 BPM | SYSTOLIC BLOOD PRESSURE: 122 MMHG | DIASTOLIC BLOOD PRESSURE: 68 MMHG | TEMPERATURE: 98.3 F

## 2018-07-02 PROCEDURE — 665998 HH PPS REVENUE CREDIT

## 2018-07-02 PROCEDURE — 665999 HH PPS REVENUE DEBIT

## 2018-07-02 ASSESSMENT — ENCOUNTER SYMPTOMS
NAUSEA: DENIES
VOMITING: DENIES
RESPIRATORY SYMPTOMS COMMENTS: OXYGEN SATURATION MEASURED ON ROOM AIR. TEMPERATURE MEASURED WITH TEMPORAL THERMOMETER.

## 2018-07-03 PROCEDURE — 665999 HH PPS REVENUE DEBIT

## 2018-07-03 PROCEDURE — 665998 HH PPS REVENUE CREDIT

## 2018-07-04 PROCEDURE — 665999 HH PPS REVENUE DEBIT

## 2018-07-04 PROCEDURE — 665998 HH PPS REVENUE CREDIT

## 2018-07-05 PROCEDURE — 665998 HH PPS REVENUE CREDIT

## 2018-07-05 PROCEDURE — 665999 HH PPS REVENUE DEBIT

## 2018-07-06 ENCOUNTER — HOME CARE VISIT (OUTPATIENT)
Dept: HOME HEALTH SERVICES | Facility: HOME HEALTHCARE | Age: 83
End: 2018-07-06
Payer: MEDICARE

## 2018-07-06 VITALS
SYSTOLIC BLOOD PRESSURE: 140 MMHG | OXYGEN SATURATION: 99 % | TEMPERATURE: 98.2 F | HEART RATE: 76 BPM | DIASTOLIC BLOOD PRESSURE: 62 MMHG | RESPIRATION RATE: 18 BRPM

## 2018-07-06 PROCEDURE — 665998 HH PPS REVENUE CREDIT

## 2018-07-06 PROCEDURE — G0493 RN CARE EA 15 MIN HH/HOSPICE: HCPCS

## 2018-07-06 PROCEDURE — 665999 HH PPS REVENUE DEBIT

## 2018-07-06 ASSESSMENT — ENCOUNTER SYMPTOMS
NAUSEA: DENIES
RESPIRATORY SYMPTOMS COMMENTS: OXYGEN SATURATION MEASURED ON ROOM AIR. TEMPERATURE MEASURED WITH TEMPORAL THERMOMETER.
VOMITING: DENIES

## 2018-07-07 PROCEDURE — 665999 HH PPS REVENUE DEBIT

## 2018-07-07 PROCEDURE — 665998 HH PPS REVENUE CREDIT

## 2018-07-08 PROCEDURE — 665999 HH PPS REVENUE DEBIT

## 2018-07-08 PROCEDURE — 665998 HH PPS REVENUE CREDIT

## 2018-07-09 ENCOUNTER — TELEPHONE (OUTPATIENT)
Dept: MEDICAL GROUP | Facility: MEDICAL CENTER | Age: 83
End: 2018-07-09

## 2018-07-09 PROCEDURE — 665999 HH PPS REVENUE DEBIT

## 2018-07-09 PROCEDURE — 665998 HH PPS REVENUE CREDIT

## 2018-07-09 NOTE — TELEPHONE ENCOUNTER
ESTABLISHED PATIENT PRE-VISIT PLANNING     Note: Patient will not be contacted if there is no indication to call.     1.  Reviewed notes from the last few office visits within the medical group: Yes 06/25/2018    2.  If any orders were placed at last visit or intended to be done for this visit (i.e. 6 mos follow-up), do we have Results/Consult Notes?        •  Labs - Labs were not ordered at last office visit.   Note: If patient appointment is for lab review and patient did not complete labs, check with provider if OK to reschedule patient until labs completed.       •  Imaging - Imaging was not ordered at last office visit.       •  Referrals - No referrals were ordered at last office visit.    3. Is this appointment scheduled as a Hospital Follow-Up? No    4.  Immunizations were updated in [employer] using [employer]?: Yes       •  [employer] Recommendations: PREVNAR (PCV13) , TDAP and ZOSTAVAX (Shingles)    5.  Patient is due for the following Health Maintenance Topics:   Health Maintenance Due   Topic Date Due   • IMM DTaP/Tdap/Td Vaccine (1 - Tdap) 05/23/1954   • IMM PNEUMOCOCCAL 65+ (ADULT) LOW/MEDIUM RISK SERIES (2 of 2 - PPSV23) 07/06/2017           6.  MDX printed for Provider? NO    7.  Patient was NOT informed to arrive 15 min prior to their scheduled appointment and bring in their medication bottles.

## 2018-07-10 PROCEDURE — 665998 HH PPS REVENUE CREDIT

## 2018-07-10 PROCEDURE — 665999 HH PPS REVENUE DEBIT

## 2018-07-11 PROCEDURE — 665999 HH PPS REVENUE DEBIT

## 2018-07-11 PROCEDURE — 665998 HH PPS REVENUE CREDIT

## 2018-07-12 PROCEDURE — 665999 HH PPS REVENUE DEBIT

## 2018-07-12 PROCEDURE — 665998 HH PPS REVENUE CREDIT

## 2018-07-13 ENCOUNTER — HOME CARE VISIT (OUTPATIENT)
Dept: HOME HEALTH SERVICES | Facility: HOME HEALTHCARE | Age: 83
End: 2018-07-13
Payer: MEDICARE

## 2018-07-13 VITALS
OXYGEN SATURATION: 99 % | TEMPERATURE: 99.9 F | SYSTOLIC BLOOD PRESSURE: 150 MMHG | HEART RATE: 84 BPM | RESPIRATION RATE: 17 BRPM | DIASTOLIC BLOOD PRESSURE: 72 MMHG

## 2018-07-13 PROCEDURE — 665999 HH PPS REVENUE DEBIT

## 2018-07-13 PROCEDURE — 665998 HH PPS REVENUE CREDIT

## 2018-07-13 PROCEDURE — G0495 RN CARE TRAIN/EDU IN HH: HCPCS

## 2018-07-13 ASSESSMENT — ENCOUNTER SYMPTOMS
NAUSEA: DENIES
DEPRESSED MOOD: 1
RESPIRATORY SYMPTOMS COMMENTS: NO CONCERNS AT THE TIME OF THIS ASSESSMENT.
VOMITING: DENIES

## 2018-07-14 PROCEDURE — 665999 HH PPS REVENUE DEBIT

## 2018-07-14 PROCEDURE — 665998 HH PPS REVENUE CREDIT

## 2018-07-15 PROCEDURE — 665998 HH PPS REVENUE CREDIT

## 2018-07-15 PROCEDURE — 665999 HH PPS REVENUE DEBIT

## 2018-07-16 ENCOUNTER — APPOINTMENT (OUTPATIENT)
Dept: MEDICAL GROUP | Facility: MEDICAL CENTER | Age: 83
End: 2018-07-16
Payer: MEDICARE

## 2018-07-16 PROCEDURE — 665999 HH PPS REVENUE DEBIT

## 2018-07-16 PROCEDURE — 665998 HH PPS REVENUE CREDIT

## 2018-07-17 ENCOUNTER — OFFICE VISIT (OUTPATIENT)
Dept: ENDOCRINOLOGY | Facility: MEDICAL CENTER | Age: 83
End: 2018-07-17
Payer: MEDICARE

## 2018-07-17 VITALS
SYSTOLIC BLOOD PRESSURE: 150 MMHG | HEIGHT: 65 IN | OXYGEN SATURATION: 95 % | WEIGHT: 134 LBS | HEART RATE: 89 BPM | DIASTOLIC BLOOD PRESSURE: 80 MMHG | BODY MASS INDEX: 22.33 KG/M2

## 2018-07-17 DIAGNOSIS — E04.2 MULTIPLE THYROID NODULES: Primary | ICD-10-CM

## 2018-07-17 PROCEDURE — 665999 HH PPS REVENUE DEBIT

## 2018-07-17 PROCEDURE — 665998 HH PPS REVENUE CREDIT

## 2018-07-17 PROCEDURE — 99213 OFFICE O/P EST LOW 20 MIN: CPT | Performed by: INTERNAL MEDICINE

## 2018-07-18 PROCEDURE — 665998 HH PPS REVENUE CREDIT

## 2018-07-18 PROCEDURE — 665999 HH PPS REVENUE DEBIT

## 2018-07-18 NOTE — PROGRESS NOTES
Chief Complaint   Patient presents with   • Follow-Up     thyroid nodules        HPI:    Multiple thyroid nodules            Thyroid gland is asymptomatic. Patient is not aware of any change in her gland. No discomfort. No difficulty swallowing, breathing, or voice change.             Thyroid ultrasound done a year ago was stable showing no change in her multiple nodules.             She has 2 sizable nodules but only the large one on the right 3.7 cm has been biopsied with benign outcome. The 2.6 cm nodule on the left has not been biopsied because she did not want to go through that procedure again. She prefers to be followed by ultrasound          Currently she is recovering from a fall resulting in a compression fracture so she is not in a position where she can go through a thyroid ultrasound. She will do this in a month or 2 when she feels able             She is not taking thyroid hormone. Recent TSH is 0.7. She is clinically euthyroid    ROS:    Recent accidental fall and compression fracture lumbar spine. She is recovering but slowly        Allergies:   Allergies   Allergen Reactions   • Amoxicillin      Unsure of reaction/or allergy   • Hydralazine Unspecified     Pt reported severe hypotensive reaction   • Morphine Vomiting and Nausea   • Penicillins      1970 reaction       Current medicines including changes today:  Current Outpatient Prescriptions   Medication Sig Dispense Refill   • terazosin (HYTRIN) 2 MG Cap Take 2 mg by mouth 2 times a day.     • sertraline (ZOLOFT) 25 MG tablet Take 1 Tab by mouth every day. 30 Tab 0   • Calcium-Magnesium-Vitamin D 185- MG-MG-UNIT Cap Take 2 Tabs by mouth every day.     • metoprolol (LOPRESSOR) 25 MG Tab Take 25 mg by mouth 2 times a day.     • aspirin EC (ECOTRIN) 81 MG TBEC Take 81 mg by mouth every evening.     • valsartan (DIOVAN) 320 MG tablet Take 320 mg by mouth every morning.     • LORazepam (ATIVAN) 0.5 MG Tab Take 0.5-1 Tabs by mouth at bedtime as  "needed for Anxiety for up to 30 days. 30 Tab 0   • senna-docusate (PERICOLACE OR SENOKOT S) 8.6-50 MG Tab Take 2 Tabs by mouth 2 Times a Day. 30 Tab 0   • ondansetron (ZOFRAN ODT) 4 MG TABLET DISPERSIBLE Take 1 Tab by mouth every four hours as needed (give PO if IV route is unavailable. May give per feeding tube.). 20 Tab 0     No current facility-administered medications for this visit.         Past Medical History:   Diagnosis Date   • CHI (closed head injury)    • Elevated cortisol level (HCC)     unknown etiology   • Hypertension     medicated   • Insomnia    • Multiple thyroid nodules 2008   • Thyrotoxicosis     history in 2013 / euthyroid 2015   • Urinary tract infection        PHYSICAL EXAM:    /80   Pulse 89   Ht 1.651 m (5' 5\")   Wt 60.8 kg (134 lb)   SpO2 95%   BMI 22.30 kg/m²       Gen.   appears healthy for her age    Skin   appropriate for sex and age    HEENT  unremarkable    Neck   thyroid gland is somewhat difficult to palpate. It might be more substernal now. I do not feel any distinct nodules in the area of the thyroid or elsewhere in the neck or supraclavicular areas    Heart  regular    Extremities  no edema    Neuro  gait and station normal    Psych  appropriate, calm, pleasant      ASSESSMENT AND RECOMMENDATIONS    1. Multiple thyroid nodules               Asymptomatic               Current status per history of present illness    DISPOSITION: Soft tissue ultrasound thyroid when able                             Return in 6 months      José Miguel Zimmer M.D.    Copies to: Amos Martinez M.D. [phone]  "

## 2018-07-19 PROCEDURE — 665998 HH PPS REVENUE CREDIT

## 2018-07-19 PROCEDURE — 665999 HH PPS REVENUE DEBIT

## 2018-07-20 ENCOUNTER — HOME CARE VISIT (OUTPATIENT)
Dept: HOME HEALTH SERVICES | Facility: HOME HEALTHCARE | Age: 83
End: 2018-07-20
Payer: MEDICARE

## 2018-07-20 VITALS
HEART RATE: 80 BPM | RESPIRATION RATE: 16 BRPM | SYSTOLIC BLOOD PRESSURE: 118 MMHG | DIASTOLIC BLOOD PRESSURE: 68 MMHG | TEMPERATURE: 97.1 F | OXYGEN SATURATION: 97 %

## 2018-07-20 PROCEDURE — 665998 HH PPS REVENUE CREDIT

## 2018-07-20 PROCEDURE — G0493 RN CARE EA 15 MIN HH/HOSPICE: HCPCS

## 2018-07-20 PROCEDURE — 665999 HH PPS REVENUE DEBIT

## 2018-07-20 SDOH — ECONOMIC STABILITY: HOUSING INSECURITY: UNSAFE APPLIANCES: 0

## 2018-07-20 SDOH — ECONOMIC STABILITY: HOUSING INSECURITY: UNSAFE COOKING RANGE AREA: 0

## 2018-07-20 SDOH — ECONOMIC STABILITY: HOUSING INSECURITY: HOME SAFETY: PATINET LIVES IN A SINGLE LEVEL HOME WITH SPOUSE. PATIENT IS NOT ON SUPPLEMENTAL OXYGEN.

## 2018-07-20 ASSESSMENT — ACTIVITIES OF DAILY LIVING (ADL)
HOME_HEALTH_OASIS: 01
OASIS_M1830: 01

## 2018-07-21 PROCEDURE — 665999 HH PPS REVENUE DEBIT

## 2018-07-21 PROCEDURE — 665998 HH PPS REVENUE CREDIT

## 2018-07-23 ENCOUNTER — HOME CARE VISIT (OUTPATIENT)
Dept: HOME HEALTH SERVICES | Facility: HOME HEALTHCARE | Age: 83
End: 2018-07-23

## 2018-07-25 DIAGNOSIS — F32.A ANXIETY AND DEPRESSION: ICD-10-CM

## 2018-07-25 DIAGNOSIS — F41.9 ANXIETY AND DEPRESSION: ICD-10-CM

## 2018-07-25 DIAGNOSIS — F51.01 PRIMARY INSOMNIA: ICD-10-CM

## 2018-07-25 RX ORDER — SERTRALINE HYDROCHLORIDE 25 MG/1
TABLET, FILM COATED ORAL
Qty: 90 TAB | Refills: 0 | Status: SHIPPED | OUTPATIENT
Start: 2018-07-25 | End: 2018-08-08

## 2018-07-27 ENCOUNTER — HOSPITAL ENCOUNTER (OUTPATIENT)
Dept: RADIOLOGY | Facility: MEDICAL CENTER | Age: 83
End: 2018-07-27
Attending: NEUROLOGICAL SURGERY
Payer: MEDICARE

## 2018-07-27 DIAGNOSIS — M80.88XD OTHER OSTEOPOROSIS WITH CURRENT PATHOLOGICAL FRACTURE, VERTEBRA(E), SUBSEQUENT ENCOUNTER FOR FRACTURE WITH ROUTINE HEALING: ICD-10-CM

## 2018-07-27 PROCEDURE — 72100 X-RAY EXAM L-S SPINE 2/3 VWS: CPT

## 2018-08-08 ENCOUNTER — OFFICE VISIT (OUTPATIENT)
Dept: MEDICAL GROUP | Facility: MEDICAL CENTER | Age: 83
End: 2018-08-08
Payer: MEDICARE

## 2018-08-08 VITALS
OXYGEN SATURATION: 98 % | HEIGHT: 65 IN | TEMPERATURE: 99.5 F | SYSTOLIC BLOOD PRESSURE: 140 MMHG | BODY MASS INDEX: 23.13 KG/M2 | HEART RATE: 74 BPM | DIASTOLIC BLOOD PRESSURE: 60 MMHG | WEIGHT: 138.8 LBS

## 2018-08-08 DIAGNOSIS — E78.00 PURE HYPERCHOLESTEROLEMIA: ICD-10-CM

## 2018-08-08 DIAGNOSIS — F32.A ANXIETY AND DEPRESSION: ICD-10-CM

## 2018-08-08 DIAGNOSIS — F41.9 ANXIETY AND DEPRESSION: ICD-10-CM

## 2018-08-08 DIAGNOSIS — L65.9 HAIR LOSS: ICD-10-CM

## 2018-08-08 DIAGNOSIS — F51.01 PRIMARY INSOMNIA: ICD-10-CM

## 2018-08-08 PROCEDURE — 99214 OFFICE O/P EST MOD 30 MIN: CPT | Performed by: FAMILY MEDICINE

## 2018-08-12 NOTE — ASSESSMENT & PLAN NOTE
Chronic, uncontrolled, patient did not obtain previous laboratory evaluation, however would like to investigate at this time.

## 2018-08-12 NOTE — PROGRESS NOTES
Subjective:   Chief Complaint/History of Present Illness:  Prabha Lizama is a 83 y.o. female established patient who presents today to discuss medical problems as listed below    Diagnoses of Primary insomnia, Anxiety and depression, Pure hypercholesterolemia, and Hair loss were pertinent to this visit.    Insomnia  Chronic, uncontrolled, however improving.  Patient decided to stop Zoloft    No illicit use of drugs, no use of heavy alcohol    No panic attacks at present time.    ROS is NEGATIVE for generalized weakness/fatigue, dizziness, vision/hearing changes, chest pain/pressure, palpitations, dyspnea, tachypnea, intense feelings of dread, insomnia, decreased appetite, persistent nausea.     Anxiety and depression  Chronic, uncontrolled, please see notes from same date of service 8/8/2018 regarding insomnia.    Pure hypercholesterolemia  Patient and I discussed recent labs (see below; chronic, uncontrolled) and that ASCVD risk is increased based on most recent lipid panel, current blood pressure (hypertensive, with medication), diabetes status (non-diabetic), and smoking status (non-smoker).    Patient and I then discussed necessary dietary changes to make to address dyslipidemia.  Patient is NOT currently taking cholesterol lowering medication.  Patient verbalized understanding.    ROS is NEGATIVE for dizziness, generalized weakness/fatigue, vision/hearing changes, jaw pain/paresthesias, BUE pain/paresthesias/numbness/weakness, chest pain/pressure, palpitations, dyspnea, RUQ abdominal pain, oliguria/anuria, BLE edema.    Lab Results   Component Value Date/Time    CHOLSTRLTOT 200 (H) 02/08/2010 07:10 AM     (H) 02/08/2010 07:10 AM    HDL 54 02/08/2010 07:10 AM    TRIGLYCERIDE 121 02/08/2010 07:10 AM       Hair loss  Chronic, uncontrolled, patient did not obtain previous laboratory evaluation, however would like to investigate at this time.        Patient Active Problem List    Diagnosis Date Noted  "  • Closed compression fracture of L3 lumbar vertebra (HCC) 05/18/2018     Priority: High   • Essential hypertension 02/16/2010     Priority: Medium   • Pure hypercholesterolemia 01/15/2018   • Osteoporosis 04/13/2017   • Postmenopausal estrogen deficiency 03/08/2017   • Hair loss 02/08/2017   • Multiple thyroid nodules    • Anxiety and depression 08/07/2014   • Insomnia        Additional History:   Allergies:    Amoxicillin; Hydralazine; Morphine; and Penicillins     Current Medications:     Current Outpatient Prescriptions   Medication Sig Dispense Refill   • terazosin (HYTRIN) 2 MG Cap Take 2 mg by mouth 2 times a day.     • Calcium-Magnesium-Vitamin D 185- MG-MG-UNIT Cap Take 2 Tabs by mouth every day.     • metoprolol (LOPRESSOR) 25 MG Tab Take 25 mg by mouth 2 times a day.     • aspirin EC (ECOTRIN) 81 MG TBEC Take 81 mg by mouth every evening.     • valsartan (DIOVAN) 320 MG tablet Take 320 mg by mouth every morning.     • ondansetron (ZOFRAN ODT) 4 MG TABLET DISPERSIBLE Take 1 Tab by mouth every four hours as needed (give PO if IV route is unavailable. May give per feeding tube.). 20 Tab 0     No current facility-administered medications for this visit.         Social History:     Social History   Substance Use Topics   • Smoking status: Never Smoker   • Smokeless tobacco: Never Used   • Alcohol use No       ROS:     - NOTE: All other systems reviewed and are negative, except as in HPI.     Objective:   Physical Exam:    Vitals: Blood pressure 140/60, pulse 74, temperature 37.5 °C (99.5 °F), height 1.651 m (5' 5\"), weight 63 kg (138 lb 12.8 oz), SpO2 98 %.   BMI: Body mass index is 23.1 kg/m².   General/Constitutional: Vitals as above, Well nourished, well developed female in no acute distress   Head/Eyes: Head is grossly normal & atraumatic, bilateral conjunctivae clear and not injected, bilateral EOMI, bilateral PERRL   ENT: Bilateral external ears grossly normal in appearance, Hearing grossly " intact, External nares normal in appearance and without discharge/bleeding   Respiratory: No respiratory distress, bilateral lungs are clear to ausculation in all lung fields (anterior/lateral/posterior), no wheezing/rhonchi/rales   Cardiovascular: Regular rate and rhythm without murmur/gallops/rubs, distal pulses are intact and equal bilaterally (radial, posterior tibial), no bilateral lower extremity edema   MSK: Gait grossly normal & not antalgic   Integumentary: No apparent rashes   Psych: Judgment grossly appropriate, no apparent depression/anxiety    Health Maintenance:     - Not addressed at this time    Imaging/Labs:     - No new labs to review    Assessment and Plan:   1. Primary insomnia  Chronic, uncontrolled, labs as below to review for possible comorbidities.   - TESTOSTERONE F&T FEMALES/CHILD; Future   - FERRITIN; Future   - IRON/TOTAL IRON BIND; Future   - CBC WITH DIFFERENTIAL; Future   - TSH WITH REFLEX TO FT4; Future    2. Anxiety and depression  Chronic, uncontrolled, labs as below to review for possible comorbidities.   - TESTOSTERONE F&T FEMALES/CHILD; Future   - FERRITIN; Future   - IRON/TOTAL IRON BIND; Future   - CBC WITH DIFFERENTIAL; Future   - TSH WITH REFLEX TO FT4; Future    3. Pure hypercholesterolemia  Chronic, unknown control, labs to review.   - LIPID PROFILE; Future    4. Hair loss  Chronic, uncontrolled, labs as below to evaluate.   - TESTOSTERONE F&T FEMALES/CHILD; Future   - FERRITIN; Future    - IRON/TOTAL IRON BIND;  Future   - CBC WITH DIFFERENTIAL; Future   - FSH+LH+DHEA S+PROG_E1+E2   - TSH WITH REFLEX TO FT4; Future        RTC: in 1month for measurement of hair loss and cholesterol.    PLEASE NOTE: This dictation was created using voice recognition software. I have made every reasonable attempt to correct obvious errors, but I expect that there are errors of grammar and possibly content that I did not discover before finalizing the note.

## 2018-08-12 NOTE — ASSESSMENT & PLAN NOTE
Patient and I discussed recent labs (see below; chronic, uncontrolled) and that ASCVD risk is increased based on most recent lipid panel, current blood pressure (hypertensive, with medication), diabetes status (non-diabetic), and smoking status (non-smoker).    Patient and I then discussed necessary dietary changes to make to address dyslipidemia.  Patient is NOT currently taking cholesterol lowering medication.  Patient verbalized understanding.    ROS is NEGATIVE for dizziness, generalized weakness/fatigue, vision/hearing changes, jaw pain/paresthesias, BUE pain/paresthesias/numbness/weakness, chest pain/pressure, palpitations, dyspnea, RUQ abdominal pain, oliguria/anuria, BLE edema.    Lab Results   Component Value Date/Time    CHOLSTRLTOT 200 (H) 02/08/2010 07:10 AM     (H) 02/08/2010 07:10 AM    HDL 54 02/08/2010 07:10 AM    TRIGLYCERIDE 121 02/08/2010 07:10 AM

## 2018-08-12 NOTE — ASSESSMENT & PLAN NOTE
Chronic, uncontrolled, however improving.  Patient decided to stop Zoloft    No illicit use of drugs, no use of heavy alcohol    No panic attacks at present time.    ROS is NEGATIVE for generalized weakness/fatigue, dizziness, vision/hearing changes, chest pain/pressure, palpitations, dyspnea, tachypnea, intense feelings of dread, insomnia, decreased appetite, persistent nausea.

## 2018-09-11 LAB
BASOPHILS # BLD AUTO: 0 X10E3/UL (ref 0–0.2)
BASOPHILS NFR BLD AUTO: 1 %
CHOLEST SERPL-MCNC: 217 MG/DL (ref 100–199)
DHEA-S SERPL-MCNC: 64.8 UG/DL (ref 13.9–142.8)
EOSINOPHIL # BLD AUTO: 0.3 X10E3/UL (ref 0–0.4)
EOSINOPHIL NFR BLD AUTO: 6 %
ERYTHROCYTE [DISTWIDTH] IN BLOOD BY AUTOMATED COUNT: 13.4 % (ref 12.3–15.4)
ESTRADIOL SERPL-MCNC: 11.3 PG/ML
ESTRONE SERPL-MCNC: 46 PG/ML
FERRITIN SERPL-MCNC: 51 NG/ML (ref 15–150)
FSH SERPL-ACNC: 99.8 MIU/ML
HCT VFR BLD AUTO: 43.7 % (ref 34–46.6)
HDLC SERPL-MCNC: 56 MG/DL
HGB BLD-MCNC: 14.5 G/DL (ref 11.1–15.9)
IMM GRANULOCYTES # BLD: 0 X10E3/UL (ref 0–0.1)
IMM GRANULOCYTES NFR BLD: 0 %
IMMATURE CELLS  115398: NORMAL
IRON SATN MFR SERPL: 40 % (ref 15–55)
IRON SERPL-MCNC: 132 UG/DL (ref 27–139)
LABORATORY COMMENT REPORT: ABNORMAL
LDLC SERPL CALC-MCNC: 132 MG/DL (ref 0–99)
LH SERPL-ACNC: 55.6 MIU/ML
LYMPHOCYTES # BLD AUTO: 1.8 X10E3/UL (ref 0.7–3.1)
LYMPHOCYTES NFR BLD AUTO: 38 %
MCH RBC QN AUTO: 31.6 PG (ref 26.6–33)
MCHC RBC AUTO-ENTMCNC: 33.2 G/DL (ref 31.5–35.7)
MCV RBC AUTO: 95 FL (ref 79–97)
MONOCYTES # BLD AUTO: 0.3 X10E3/UL (ref 0.1–0.9)
MONOCYTES NFR BLD AUTO: 7 %
MORPHOLOGY BLD-IMP: NORMAL
NEUTROPHILS # BLD AUTO: 2.3 X10E3/UL (ref 1.4–7)
NEUTROPHILS NFR BLD AUTO: 48 %
NRBC BLD AUTO-RTO: NORMAL %
PLATELET # BLD AUTO: 306 X10E3/UL (ref 150–379)
PROGEST SERPL-MCNC: 0.3 NG/ML
RBC # BLD AUTO: 4.59 X10E6/UL (ref 3.77–5.28)
TESTOST SERPL-MCNC: 42 NG/DL
TIBC SERPL-MCNC: 332 UG/DL (ref 250–450)
TRIGL SERPL-MCNC: 144 MG/DL (ref 0–149)
TSH SERPL DL<=0.005 MIU/L-ACNC: 1.4 UIU/ML (ref 0.45–4.5)
UIBC SERPL-MCNC: 200 UG/DL (ref 118–369)
VLDLC SERPL CALC-MCNC: 29 MG/DL (ref 5–40)
WBC # BLD AUTO: 4.8 X10E3/UL (ref 3.4–10.8)

## 2018-09-12 ENCOUNTER — TELEPHONE (OUTPATIENT)
Dept: MEDICAL GROUP | Facility: MEDICAL CENTER | Age: 83
End: 2018-09-12

## 2018-09-12 NOTE — TELEPHONE ENCOUNTER
----- Message from Amos Martinez M.D. sent at 9/11/2018 11:11 PM PDT -----  MA to call patient to relate the following:     - Elevated total & LDL (bad) cholesterol, otherwise labs are normal     - If patient would like to discuss them in further detail, we can discuss this at clinic visit on 09/13/18@1:40pm.

## 2018-09-12 NOTE — TELEPHONE ENCOUNTER
Phone Number Called: [phone] (home)       Message: Patient informed of msg.     Left Message for patient to call back: N\A

## 2018-09-13 ENCOUNTER — OFFICE VISIT (OUTPATIENT)
Dept: MEDICAL GROUP | Facility: MEDICAL CENTER | Age: 83
End: 2018-09-13
Payer: MEDICARE

## 2018-09-13 VITALS
HEIGHT: 65 IN | HEART RATE: 81 BPM | SYSTOLIC BLOOD PRESSURE: 124 MMHG | TEMPERATURE: 99.1 F | DIASTOLIC BLOOD PRESSURE: 70 MMHG | BODY MASS INDEX: 22.92 KG/M2 | OXYGEN SATURATION: 97 % | WEIGHT: 137.6 LBS

## 2018-09-13 DIAGNOSIS — E78.00 PURE HYPERCHOLESTEROLEMIA: ICD-10-CM

## 2018-09-13 DIAGNOSIS — L65.9 HAIR LOSS: ICD-10-CM

## 2018-09-13 DIAGNOSIS — I10 ESSENTIAL HYPERTENSION: ICD-10-CM

## 2018-09-13 PROCEDURE — 99214 OFFICE O/P EST MOD 30 MIN: CPT | Performed by: FAMILY MEDICINE

## 2018-09-13 RX ORDER — MULTIVIT-MIN/IRON/FOLIC ACID/K 18-600-40
CAPSULE ORAL
COMMUNITY
End: 2020-08-26

## 2018-09-13 RX ORDER — BIOTIN 1 MG
TABLET ORAL
COMMUNITY
End: 2020-08-26

## 2018-09-19 NOTE — PROGRESS NOTES
Subjective:   Chief Complaint/History of Present Illness:  Prabha Lizama is a 83 y.o. female established patient who presents today to discuss medical problems as listed below    Diagnoses of Hair loss, Essential hypertension, and Pure hypercholesterolemia were pertinent to this visit.    Hair loss  Chronic, uncontrolled, reviewed previous labs, which essentially demonstrated that she does not have any apparent hormonal abnormalities, but does have lower ferritin levels and are required for healthy care maintenance.  Therefore, she may have to vision effluvium.  Patient is advised to use iron supplements in order to boost her iron levels, and an effort to reduce hair loss/improve her maintenance.    Patient without trichotillomania, without signs of anemia.    ROS is NEGATIVE for generalized weakness/fatigue, generalized pallor, dizziness, lightheadedness, blurred vision, chest pain/pressure, palpitations, tachycardia, dyspnea, hematemesis, hemoptysis, diarrhea, hematochezia, melena, hematuria, vaginal bleeding, hand and foot tingling.       Essential hypertension  Chronic, stable, well-controlled, taking medication as directed.     ROS is NEGATIVE for dizziness, generalized weakness/fatigue, cold sweats,  vision/hearing changes, jaw pain/paresthesias, BUE pain/paresthesias/numbness/weakness, chest pain/pressure, palpitations, dyspnea, nausea, RUQ abdominal pain, oliguria/anuria, BLE edema.      Pure hypercholesterolemia  Patient and I discussed recent labs (see below; pure hypercholesterolemia, mildly worsened from previous) and that ASCVD risk is increased based on most recent lipid panel, current blood pressure (hypertensive, with medication), diabetes status (non-diabetic), and smoking status (non-smoker).    Patient and I then discussed necessary dietary changes to make to address dyslipidemia.  Patient is NOT currently taking cholesterol lowering medication.  Patient verbalized understanding.    ROS is  NEGATIVE for dizziness, generalized weakness/fatigue, vision/hearing changes, jaw pain/paresthesias, BUE pain/paresthesias/numbness/weakness, chest pain/pressure, palpitations, dyspnea, RUQ abdominal pain, oliguria/anuria, BLE edema.    Lab Results   Component Value Date/Time    CHOLSTRLTOT 217 (H) 09/06/2018 07:17 AM    CHOLSTRLTOT 200 (H) 02/08/2010 07:10 AM     (H) 09/06/2018 07:17 AM     (H) 02/08/2010 07:10 AM    HDL 56 09/06/2018 07:17 AM    HDL 54 02/08/2010 07:10 AM    TRIGLYCERIDE 144 09/06/2018 07:17 AM    TRIGLYCERIDE 121 02/08/2010 07:10 AM          Patient Active Problem List    Diagnosis Date Noted   • (HCC) Closed compression fracture of L3 lumbar vertebra 05/18/2018     Priority: High   • Essential hypertension 02/16/2010     Priority: Medium   • Pure hypercholesterolemia 01/15/2018   • Osteoporosis 04/13/2017   • Postmenopausal estrogen deficiency 03/08/2017   • Hair loss 02/08/2017   • Multiple thyroid nodules    • Anxiety and depression 08/07/2014   • Insomnia        Additional History:   Allergies:    Amoxicillin; Hydralazine; Morphine; and Penicillins     Current Medications:     Current Outpatient Prescriptions   Medication Sig Dispense Refill   • Calcium-Magnesium 500-250 MG Tab Take  by mouth.     • Cholecalciferol (VITAMIN D) 2000 units Cap Take  by mouth.     • Biotin 1000 MCG Tab Take  by mouth.     • terazosin (HYTRIN) 2 MG Cap Take 2 mg by mouth 2 times a day.     • metoprolol (LOPRESSOR) 25 MG Tab Take 25 mg by mouth 2 times a day.     • aspirin EC (ECOTRIN) 81 MG TBEC Take 81 mg by mouth every evening.     • valsartan (DIOVAN) 320 MG tablet Take 320 mg by mouth every morning.     • ondansetron (ZOFRAN ODT) 4 MG TABLET DISPERSIBLE Take 1 Tab by mouth every four hours as needed (give PO if IV route is unavailable. May give per feeding tube.). 20 Tab 0     No current facility-administered medications for this visit.         Social History:     Social History   Substance Use  "Topics   • Smoking status: Never Smoker   • Smokeless tobacco: Never Used   • Alcohol use No       ROS:     - NOTE: All other systems reviewed and are negative, except as in HPI.     Objective:   Physical Exam:    Vitals: Blood pressure 124/70, pulse 81, temperature 37.3 °C (99.1 °F), height 1.651 m (5' 5\"), weight 62.4 kg (137 lb 9.6 oz), SpO2 97 %, not currently breastfeeding.   BMI: Body mass index is 22.9 kg/m².   General/Constitutional: Vitals as above, Well nourished, well developed female in no acute distress   Head/Eyes: Head is grossly normal & atraumatic, bilateral conjunctivae clear and not injected, bilateral EOMI, bilateral PERRL   ENT: Bilateral external ears grossly normal in appearance, Hearing grossly intact, External nares normal in appearance and without discharge/bleeding   Respiratory: No respiratory distress, bilateral lungs are clear to ausculation in all lung fields (anterior/lateral/posterior), no wheezing/rhonchi/rales   Cardiovascular: Regular rate and rhythm without murmur/gallops/rubs, distal pulses are intact and equal bilaterally (radial, posterior tibial), no bilateral lower extremity edema   MSK: Gait grossly normal & not antalgic   Integumentary: No apparent rashes   Psych: Judgment grossly appropriate, no apparent depression/anxiety    Health Maintenance:     - Discussed vaccinations, patient will consider.    Imaging/Labs:     - 09/11/18 -- Elevated total & LDL (bad) cholesterol, otherwise labs are normal or are as reviewed in HPI above    - 07/17/18 -- DR. MCKEON --> Multiple thyroid nodules --> asx --> CPM --> u/s when able, RTC in 6mos    Assessment and Plan:   1. Hair loss  Chronic, uncontrolled, patient may have telogen effluvium given that her ferritin is below 75 ng/mL.  Patient advised to take over-the-counter iron supplementation, such as ferrous sulfate 325 mg p.o. twice daily with meals.   - FERRITIN; Future    2. Essential hypertension  Chronic, stable, well-controlled.  " Continue taking medication as directed.    - LIPID PROFILE; Future    3. Pure hypercholesterolemia  Chronic, uncontrolled, patient advised to work on lifestyle changes.   - LIPID PROFILE; Future        RTC: in 6months for Medicare annual wellness visit, also follow-up on hair loss    PLEASE NOTE: This dictation was created using voice recognition software. I have made every reasonable attempt to correct obvious errors, but I expect that there are errors of grammar and possibly content that I did not discover before finalizing the note.

## 2018-09-19 NOTE — ASSESSMENT & PLAN NOTE
Chronic, uncontrolled, reviewed previous labs, which essentially demonstrated that she does not have any apparent hormonal abnormalities, but does have lower ferritin levels and are required for healthy care maintenance.  Therefore, she may have to vision effluvium.  Patient is advised to use iron supplements in order to boost her iron levels, and an effort to reduce hair loss/improve her maintenance.    Patient without trichotillomania, without signs of anemia.    ROS is NEGATIVE for generalized weakness/fatigue, generalized pallor, dizziness, lightheadedness, blurred vision, chest pain/pressure, palpitations, tachycardia, dyspnea, hematemesis, hemoptysis, diarrhea, hematochezia, melena, hematuria, vaginal bleeding, hand and foot tingling.

## 2018-09-19 NOTE — ASSESSMENT & PLAN NOTE
Patient and I discussed recent labs (see below; pure hypercholesterolemia, mildly worsened from previous) and that ASCVD risk is increased based on most recent lipid panel, current blood pressure (hypertensive, with medication), diabetes status (non-diabetic), and smoking status (non-smoker).    Patient and I then discussed necessary dietary changes to make to address dyslipidemia.  Patient is NOT currently taking cholesterol lowering medication.  Patient verbalized understanding.    ROS is NEGATIVE for dizziness, generalized weakness/fatigue, vision/hearing changes, jaw pain/paresthesias, BUE pain/paresthesias/numbness/weakness, chest pain/pressure, palpitations, dyspnea, RUQ abdominal pain, oliguria/anuria, BLE edema.    Lab Results   Component Value Date/Time    CHOLSTRLTOT 217 (H) 09/06/2018 07:17 AM    CHOLSTRLTOT 200 (H) 02/08/2010 07:10 AM     (H) 09/06/2018 07:17 AM     (H) 02/08/2010 07:10 AM    HDL 56 09/06/2018 07:17 AM    HDL 54 02/08/2010 07:10 AM    TRIGLYCERIDE 144 09/06/2018 07:17 AM    TRIGLYCERIDE 121 02/08/2010 07:10 AM

## 2018-09-19 NOTE — ASSESSMENT & PLAN NOTE
Chronic, stable, well-controlled, taking medication as directed.     ROS is NEGATIVE for dizziness, generalized weakness/fatigue, cold sweats,  vision/hearing changes, jaw pain/paresthesias, BUE pain/paresthesias/numbness/weakness, chest pain/pressure, palpitations, dyspnea, nausea, RUQ abdominal pain, oliguria/anuria, BLE edema.

## 2019-01-15 ENCOUNTER — OFFICE VISIT (OUTPATIENT)
Dept: ENDOCRINOLOGY | Facility: MEDICAL CENTER | Age: 84
End: 2019-01-15
Payer: MEDICARE

## 2019-01-15 VITALS
BODY MASS INDEX: 22.66 KG/M2 | RESPIRATION RATE: 16 BRPM | OXYGEN SATURATION: 97 % | SYSTOLIC BLOOD PRESSURE: 136 MMHG | WEIGHT: 136 LBS | DIASTOLIC BLOOD PRESSURE: 70 MMHG | HEART RATE: 92 BPM | HEIGHT: 65 IN

## 2019-01-15 DIAGNOSIS — E04.2 MULTIPLE THYROID NODULES: ICD-10-CM

## 2019-01-15 PROCEDURE — 99213 OFFICE O/P EST LOW 20 MIN: CPT | Performed by: INTERNAL MEDICINE

## 2019-01-15 NOTE — PROGRESS NOTES
"Chief Complaint   Patient presents with   • Follow-Up     Thyroid nodules        HPI:    See assessment and recommendations below    ROS:  Doing very much better now.  Her back has essentially healed and she is up and about and active once again.    Getting very good care and attention from Dr. Martinez.  She is pleased    Scalp hair loss is still a concern.  There seems to be no hormonal or biochemical reason except for age.  She should be careful to get adequate protein daily which she may not be by description.  She should be getting 50-60 g of protein daily which might help her hair growth.  She can do this easily with the readily available protein supplements.  She will look into this.      Allergies:   Allergies   Allergen Reactions   • Amoxicillin      Unsure of reaction/or allergy   • Hydralazine Unspecified     Pt reported severe hypotensive reaction   • Morphine Vomiting and Nausea   • Penicillins      1970 reaction       Current medicines including changes today:  Current Outpatient Prescriptions   Medication Sig Dispense Refill   • NON SPECIFIED Take 1 Tab by mouth every day. \"[place]\"     • Calcium-Magnesium 500-250 MG Tab Take  by mouth.     • Cholecalciferol (VITAMIN D) 2000 units Cap Take  by mouth.     • Biotin 1000 MCG Tab Take  by mouth.     • terazosin (HYTRIN) 2 MG Cap Take 2 mg by mouth 2 times a day.     • metoprolol (LOPRESSOR) 25 MG Tab Take 25 mg by mouth 2 times a day.     • aspirin EC (ECOTRIN) 81 MG TBEC Take 81 mg by mouth every evening.     • valsartan (DIOVAN) 320 MG tablet Take 320 mg by mouth every morning.     • ondansetron (ZOFRAN ODT) 4 MG TABLET DISPERSIBLE Take 1 Tab by mouth every four hours as needed (give PO if IV route is unavailable. May give per feeding tube.). (Patient not taking: Reported on 1/15/2019) 20 Tab 0     No current facility-administered medications for this visit.         Past Medical History:   Diagnosis Date   • CHI (closed head injury)    • Elevated cortisol " "level (HCC)     unknown etiology   • Hypertension     medicated   • Insomnia    • Multiple thyroid nodules 2008   • Thyrotoxicosis     history in 2013 / euthyroid 2015   • Urinary tract infection        PHYSICAL EXAM:    /70 (BP Location: Right arm, Patient Position: Sitting, BP Cuff Size: Adult)   Pulse 92   Resp 16   Ht 1.651 m (5' 5\")   Wt 61.7 kg (136 lb)   SpO2 97%   BMI 22.63 kg/m²     Gen.   appears healthy     Skin   appropriate for sex and age            Her scalp is healthy but hair has thinned out    HEENT  unremarkable    Neck   thyroid gland is difficult to palpate.  It is either beginning to atrophy or substernal or both    Heart  regular    Extremities  no edema    Neuro  gait and station normal    Psych  appropriate    ASSESSMENT AND RECOMMENDATIONS    1. Multiple thyroid nodules             She has had 2 significant nodules.  The nodule on the right that was 3.7 cm has been biopsied in the past with a benign diagnosis.  She had a tough time with that biopsy.  She has had a 2.6 nodule in the left lobe that has not been biopsied because she did not want to go through that procedure.  Therefore we have agreed to follow the nodule by ultrasound except she missed last year because of her painful back.  Her last ultrasound was June 2017 which showed no changes.  We will update her thyroid ultrasound now and report by telephone               She does not take thyroid hormone.  In September she had a TSH of 1.4.  We will call that good for now      DISPOSITION: Follow-up thyroid ultrasound by telephone.  If stable repeat in 1 year      José Miguel Zimmer M.D.    Copies to: Amos Martinez M.D. [phone]  "

## 2019-01-28 ENCOUNTER — APPOINTMENT (OUTPATIENT)
Dept: RADIOLOGY | Facility: MEDICAL CENTER | Age: 84
End: 2019-01-28
Attending: INTERNAL MEDICINE
Payer: MEDICARE

## 2019-02-01 ENCOUNTER — HOSPITAL ENCOUNTER (OUTPATIENT)
Dept: RADIOLOGY | Facility: MEDICAL CENTER | Age: 84
End: 2019-02-01
Attending: INTERNAL MEDICINE
Payer: MEDICARE

## 2019-02-01 DIAGNOSIS — E04.2 MULTIPLE THYROID NODULES: ICD-10-CM

## 2019-02-01 PROCEDURE — 76536 US EXAM OF HEAD AND NECK: CPT

## 2019-03-12 ENCOUNTER — TELEPHONE (OUTPATIENT)
Dept: MEDICAL GROUP | Facility: MEDICAL CENTER | Age: 84
End: 2019-03-12

## 2019-03-12 NOTE — TELEPHONE ENCOUNTER
Future Appointments       Provider Department Center    3/14/2019 2:00 PM Amos Martinez M.D.; [facility]    1/15/2020 1:00 PM José Miguel Zimmer M.D. [facility]          ANNUAL WELLNESS VISIT PRE-VISIT PLANNING WITHOUT OUTREACH    1.  Reviewed note from last office visit with PCP: YES    2.  If any orders were placed at last visit, do we have Results/Consult Notes?        •  Labs - Labs ordered, NOT completed. Patient advised to complete prior to next appointment.  Note: If patient appointment is for lab review and patient did not complete labs, check with provider if OK to reschedule patient until labs completed.       •  Imaging - Imaging was not ordered at last office visit.       •  Referrals - No referrals were ordered at last office visit.    3.  Immunizations were updated in Epic using WebIZ?: Epic matches WebIZ       •  WebIZ Recommendations: PNEUMOVAX (PPSV23), TDAP, SHINGRIX (Shingles) and MMR        •  Is patient due for Tdap? YES. Patient was notified of copay/out of pocket cost.       •  Is patient due for Shingrix? YES. Patient was notified of copay/out of pocket cost.     4.  Patient is due for the following Health Maintenance Topics:   Health Maintenance Due   Topic Date Due   • IMM DTaP/Tdap/Td Vaccine (1 - Tdap) 05/23/1954   • IMM ZOSTER VACCINES (1 of 2) 05/23/1987   • IMM PNEUMOCOCCAL 65+ (ADULT) LOW/MEDIUM RISK SERIES (2 of 2 - PPSV23) 07/06/2017   • Annual Wellness Visit  07/14/2018       - Patient declines Immunizations: PNEUMOVAX (PPSV23), TDAP and SHINGRIX (Shingles).    5.  Reviewed/Updated the following with patient:       •   Preferred Pharmacy? YES       •   Preferred Lab? YES       •   Preferred Communication? YES       •   Allergies? YES       •   Medications? YES. Was Abstract Encounter opened and chart updated? YES       •   Social History? YES. Was Abstract Encounter opened  and chart updated? YES       •   Family History (document living status of immediate family members and if + hx of  cancer, diabetes, hypertension, hyperlipidemia, heart attack, stroke) YES. Was Abstract Encounter opened and chart updated? YES    6.  Care Team Updated:       •   DME Company (gait device, O2, CPAP, etc.): NO       •   Other Specialists (eye doctor, derm, GYN, cardiology, endo, etc): YES    7. Orders for overdue Health Maintenance topics pended in Pre-Charting? NO    8.  Patient has the following Care Path diagnoses on Problem List:  DEPRESSION     Is patient seeing a counselor, psychiatrist or other healthcare provider regarding their mental health? No, and not interested.     Depression Screen (PHQ-2/PHQ-9) 5/19/2018 5/21/2018 5/22/2018   PHQ-2 Total Score 0 0 0   PHQ-2 Total Score - - -       Interpretation of PHQ-9 Total Score   Score Severity   1-4 No Depression   5-9 Mild Depression   10-14 Moderate Depression   15-19 Moderately Severe Depression   20-27 Severe Depression      9.  Patient was advised: “This is a free wellness visit. The provider will screen for medical conditions to help you stay healthy. If you have other concerns to address you may be asked to discuss these at a separate visit or there may be an additional fee.”     10.  Patient was informed to arrive 15 min prior to their scheduled appointment and bring in their medication bottles.

## 2019-03-14 ENCOUNTER — OFFICE VISIT (OUTPATIENT)
Dept: MEDICAL GROUP | Facility: MEDICAL CENTER | Age: 84
End: 2019-03-14
Payer: MEDICARE

## 2019-03-14 DIAGNOSIS — S32.030D CLOSED COMPRESSION FRACTURE OF L3 LUMBAR VERTEBRA WITH ROUTINE HEALING, SUBSEQUENT ENCOUNTER: ICD-10-CM

## 2019-03-14 DIAGNOSIS — L65.9 HAIR LOSS: ICD-10-CM

## 2019-03-14 DIAGNOSIS — I10 ESSENTIAL HYPERTENSION: ICD-10-CM

## 2019-03-14 DIAGNOSIS — Z00.00 MEDICARE ANNUAL WELLNESS VISIT, SUBSEQUENT: Primary | ICD-10-CM

## 2019-03-14 DIAGNOSIS — F51.01 PRIMARY INSOMNIA: ICD-10-CM

## 2019-03-14 DIAGNOSIS — E78.00 PURE HYPERCHOLESTEROLEMIA: ICD-10-CM

## 2019-03-14 DIAGNOSIS — M81.0 AGE-RELATED OSTEOPOROSIS WITHOUT CURRENT PATHOLOGICAL FRACTURE: ICD-10-CM

## 2019-03-14 DIAGNOSIS — E04.2 MULTIPLE THYROID NODULES: ICD-10-CM

## 2019-03-14 PROBLEM — Z78.0 POSTMENOPAUSAL ESTROGEN DEFICIENCY: Status: RESOLVED | Noted: 2017-03-08 | Resolved: 2019-03-14

## 2019-03-14 PROCEDURE — G0439 PPPS, SUBSEQ VISIT: HCPCS | Performed by: FAMILY MEDICINE

## 2019-03-14 ASSESSMENT — PAIN SCALES - GENERAL: PAINLEVEL: NO PAIN

## 2019-03-14 ASSESSMENT — PATIENT HEALTH QUESTIONNAIRE - PHQ9: CLINICAL INTERPRETATION OF PHQ2 SCORE: 0

## 2019-03-14 ASSESSMENT — ACTIVITIES OF DAILY LIVING (ADL): BATHING_REQUIRES_ASSISTANCE: 0

## 2019-03-14 ASSESSMENT — ENCOUNTER SYMPTOMS: GENERAL WELL-BEING: GOOD

## 2019-03-14 NOTE — ASSESSMENT & PLAN NOTE
Chronic, uncontrolled, is trying Melatonin, can achieve sleep but has difficulty maintaining sleep longer than 4hours.  Patient does watch TV for several hours prior to sleeping, and is able to go to sleep directly after watching TV but tends to stay awake for a few hours in the middle of the night with difficulty to achieve sleep once more.    No napping, no caffeine intake, no use of drugs, no alcohol.    No panic attacks, no anxiety.    ROS is NEGATIVE for generalized weakness/fatigue, dizziness, vision/hearing changes, chest pain/pressure, palpitations, dyspnea, tachypnea, intense feelings of dread, insomnia, decreased appetite, persistent nausea.

## 2019-03-14 NOTE — PROGRESS NOTES
Chief Complaint   Patient presents with   • Annual Wellness Visit         HPI:  Prabha is a 83 y.o. here for Medicare Annual Wellness Visit    Insomnia  Chronic, uncontrolled, is trying Melatonin, can achieve sleep but has difficulty maintaining sleep longer than 4hours.  Patient does watch TV for several hours prior to sleeping, and is able to go to sleep directly after watching TV but tends to stay awake for a few hours in the middle of the night with difficulty to achieve sleep once more.    No napping, no caffeine intake, no use of drugs, no alcohol.    No panic attacks, no anxiety.    ROS is NEGATIVE for generalized weakness/fatigue, dizziness, vision/hearing changes, chest pain/pressure, palpitations, dyspnea, tachypnea, intense feelings of dread, insomnia, decreased appetite, persistent nausea.    Essential hypertension  This problem is chronic, stable, and monitored appropriately by history/labs/imaging as needed, and is well-controlled on the current regimen.  Please continue current regimen    (HCC) Closed compression fracture of L3 lumbar vertebra  This problem is chronic, stable, and monitored appropriately by history/labs/imaging as needed, and is well-controlled on the current regimen.  Please continue current regimen including new x-rays of lumbar spine.    Osteoporosis  This problem is chronic, stable, and monitored appropriately by history/labs/imaging as needed, and is well-controlled on the current regimen.  Please continue current regimen including new DEXA scan to update the previous one.    Pure hypercholesterolemia  Chronic, mildly uncontrolled, patient advised to continue working on lifestyle changes.  Monitor this with labs, as necessary.    Multiple thyroid nodules  This problem is chronic, stable, and monitored appropriately by history/labs/imaging as needed, and is well-controlled on the current regimen.  Please continue current regimen    Hair loss  Chronic, uncontrolled, patient aware  "that she can to dermatologist.  Patient declines at this time.      Patient Active Problem List    Diagnosis Date Noted   • (HCC) Closed compression fracture of L3 lumbar vertebra 05/18/2018     Priority: High   • Essential hypertension 02/16/2010     Priority: Medium   • Pure hypercholesterolemia 01/15/2018   • Osteoporosis 04/13/2017   • Hair loss 02/08/2017   • Multiple thyroid nodules    • Insomnia        Current Outpatient Prescriptions   Medication Sig Dispense Refill   • NON SPECIFIED Take 1 Tab by mouth every day. \"Colestof\"     • Calcium-Magnesium 500-250 MG Tab Take  by mouth.     • Cholecalciferol (VITAMIN D) 2000 units Cap Take  by mouth.     • Biotin 1000 MCG Tab Take  by mouth.     • terazosin (HYTRIN) 2 MG Cap Take 2 mg by mouth 2 times a day.     • metoprolol (LOPRESSOR) 25 MG Tab Take 25 mg by mouth 2 times a day.     • aspirin EC (ECOTRIN) 81 MG TBEC Take 81 mg by mouth every evening.     • valsartan (DIOVAN) 320 MG tablet Take 320 mg by mouth every morning.       No current facility-administered medications for this visit.         Patient is taking medications as noted in medication list.  Current supplements as per medication list.     Allergies: Amoxicillin; Hydralazine; Morphine; and Penicillins    Current social contact/activities:      - Pt goes out to dinner.      - Pt visits with friends.      - Pt watches a lot of sports on tv.     Is patient current with immunizations? No, due for PNEUMOVAX (PPSV23), TDAP and SHINGRIX (Shingles). Patient is interested in receiving NONE today.    She  reports that she has never smoked. She has never used smokeless tobacco. She reports that she does not drink alcohol or use drugs.  Counseling given: Not Answered        DPA/Advanced directive: Patient does not have an Advanced Directive.  A packet and workshop information was given on Advanced Directives.    ROS:    Gait: Uses no assistive device   Ostomy: No   Other tubes: No   Amputations: No   Chronic " oxygen use No   Last eye exam a year ago   Wears hearing aids: No   : Denies any urinary leakage during the last 6 months      Screening:    DEPRESSION     Is patient seeing a counselor, psychiatrist or other healthcare provider regarding their mental health? No, and not interested.     Depression Screen (PHQ-2/PHQ-9) 5/21/2018 5/22/2018 3/14/2019   PHQ-2 Total Score 0 0 -   PHQ-2 Total Score - - 0       Interpretation of PHQ-9 Total Score   Score Severity   1-4 No Depression   5-9 Mild Depression   10-14 Moderate Depression   15-19 Moderately Severe Depression   20-27 Severe Depression      Depression Screening    Little interest or pleasure in doing things?  0 - not at all  Feeling down, depressed, or hopeless? 0 - not at all  Patient Health Questionnaire Score: 0    If depressive symptoms identified deferred to follow up visit unless specifically addressed in assessment and plan.    Interpretation of PHQ-9 Total Score   Score Severity   1-4 No Depression   5-9 Mild Depression   10-14 Moderate Depression   15-19 Moderately Severe Depression   20-27 Severe Depression    Screening for Cognitive Impairment    Three Minute Recall (leader, season, table)  3/3    [place] clock face with all 12 numbers and set the hands to show 10 past 11.  Yes 3/5  If cognitive concerns identified, deferred for follow up unless specifically addressed in assessment and plan.    Fall Risk Assessment    Has the patient had two or more falls in the last year or any fall with injury in the last year?  No  If fall risk identified, deferred for follow up unless specifically addressed in assessment and plan.    Safety Assessment    Throw rugs on floor.  No  Handrails on all stairs.  No  Good lighting in all hallways.  Yes  Difficulty hearing.  No  Patient counseled about all safety risks that were identified.    Functional Assessment ADLs    Are there any barriers preventing you from cooking for yourself or meeting nutritional needs?  No.    Are  there any barriers preventing you from driving safely or obtaining transportation?  No.    Are there any barriers preventing you from using a telephone or calling for help?  No.    Are there any barriers preventing you from shopping?  No.    Are there any barriers preventing you from taking care of your own finances?  No.    Are there any barriers preventing you from managing your medications?  No.    Are there any barriers preventing you from showering, bathing or dressing yourself?  No.    Are you currently engaging in any exercise or physical activity?  Yes.  Minimal exercise w/20 minute walk everyday. 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  What is your perception of your health?  Good.    Health Maintenance Summary                IMM DTaP/Tdap/Td Vaccine Overdue 5/23/1954     IMM ZOSTER VACCINES Overdue 5/23/1987     IMM PNEUMOCOCCAL 65+ (ADULT) LOW/MEDIUM RISK SERIES Overdue 7/6/2017      Done 7/6/2016 Imm Admin: Pneumococcal Conjugate Vaccine (Prevnar/PCV-13)    Annual Wellness Visit Next Due 3/14/2020      Done 3/14/2019 SUBSEQUENT ANNUAL WELLNESS VISIT-INCLUDES PPPS ()     Patient has more history with this topic...    BONE DENSITY Next Due 3/16/2022      Done 3/16/2017 DS-BONE DENSITY STUDY (DEXA)     Patient has more history with this topic...          Patient Care Team:  Amos Martinez M.D. as PCP - General (Family Medicine)  José Miguel Zimmer M.D. as Consulting Physician (Endocrinology)  Michael J Bloch, M.D. as Consulting Physician (Internal Medicine)  Afia Kulkarni O.D. as Consulting Physician (Optometry)  [facility] as Home Health Provider    Social History   Substance Use Topics   • Smoking status: Never Smoker   • Smokeless tobacco: Never Used   • Alcohol use No     Family History   Problem Relation Age of Onset   • Heart Disease Mother    • Heart Disease Father    • Hypertension Sister    • Arthritis Sister    • Thyroid Sister      She  has a past medical history of CHI (closed head  "injury); Elevated cortisol level (HCC); Hypertension; Insomnia; Multiple thyroid nodules (2008); Thyrotoxicosis; and Urinary tract infection.   Past Surgical History:   Procedure Laterality Date   • APPENDECTOMY     • TONSILLECTOMY             Exam:     Blood pressure 140/60, pulse 93, temperature 37.1 °C (98.8 °F), temperature source Temporal, height 1.651 m (5' 5\"), weight 60.3 kg (133 lb), SpO2 99 %, not currently breastfeeding. Body mass index is 22.13 kg/m².    Hearing excellent.    Dentition good  Alert, oriented in no acute distress.  Eye contact is good, speech goal directed, affect calm      - 02/01/19 -- Dr. MCKEON -- Thyroid nodules unchanged, rpt u/s in 1year      Assessment and Plan. The following treatment and monitoring plan is recommended:    1. Medicare annual wellness visit, subsequent  Subsequent Annual Wellness Visit - Includes PPPS ()    Comp Metabolic Panel    MICROALBUMIN CREAT RATIO URINE   2. Primary insomnia  Subsequent Annual Wellness Visit - Includes PPPS ()    Comp Metabolic Panel   3. Essential hypertension  Subsequent Annual Wellness Visit - Includes PPPS ()    Comp Metabolic Panel    MICROALBUMIN CREAT RATIO URINE   4. Closed compression fracture of L3 lumbar vertebra with routine healing, subsequent encounter  DS-BONE DENSITY STUDY (DEXA)    Subsequent Annual Wellness Visit - Includes PPPS ()    DX-LUMBAR SPINE-2 OR 3 VIEWS   5. Age-related osteoporosis without current pathological fracture  DS-BONE DENSITY STUDY (DEXA)    Subsequent Annual Wellness Visit - Includes PPPS ()   6. Pure hypercholesterolemia  Subsequent Annual Wellness Visit - Includes PPPS ()   7. Multiple thyroid nodules  Subsequent Annual Wellness Visit - Includes PPPS ()   8. Hair loss  Subsequent Annual Wellness Visit - Includes PPPS ()         Services suggested: No services needed at this time  Health Care Screening recommendations as per orders if indicated.  Referrals offered: " PT/OT/Nutrition counseling/Behavioral Health/Smoking cessation as per orders if indicated.    Discussion today about general wellness and lifestyle habits:    · Prevent falls and reduce trip hazards; Cautioned about securing or removing rugs.  · Have a working fire alarm and carbon monoxide detector;   · Engage in regular physical activity and social activities       Follow-up: Return in about 6 months (around 9/14/2019) for Labs follow-up, General Checkup.

## 2019-03-19 VITALS
TEMPERATURE: 98.8 F | WEIGHT: 133 LBS | HEIGHT: 65 IN | BODY MASS INDEX: 22.16 KG/M2 | SYSTOLIC BLOOD PRESSURE: 140 MMHG | HEART RATE: 93 BPM | OXYGEN SATURATION: 99 % | DIASTOLIC BLOOD PRESSURE: 60 MMHG

## 2019-03-19 NOTE — ASSESSMENT & PLAN NOTE
This problem is chronic, stable, and monitored appropriately by history/labs/imaging as needed, and is well-controlled on the current regimen.  Please continue current regimen including new DEXA scan to update the previous one.

## 2019-03-19 NOTE — ASSESSMENT & PLAN NOTE
Chronic, uncontrolled, patient aware that she can to dermatologist.  Patient declines at this time.

## 2019-03-19 NOTE — ASSESSMENT & PLAN NOTE
Chronic, mildly uncontrolled, patient advised to continue working on lifestyle changes.  Monitor this with labs, as necessary.

## 2019-05-03 LAB
ALBUMIN SERPL-MCNC: 4.3 G/DL (ref 3.5–4.7)
ALBUMIN/CREAT UR: <13.7 MG/G CREAT (ref 0–30)
ALBUMIN/GLOB SERPL: 2 {RATIO} (ref 1.2–2.2)
ALP SERPL-CCNC: 48 IU/L (ref 39–117)
ALT SERPL-CCNC: 22 IU/L (ref 0–32)
AST SERPL-CCNC: 17 IU/L (ref 0–40)
BILIRUB SERPL-MCNC: 0.4 MG/DL (ref 0–1.2)
BUN SERPL-MCNC: 12 MG/DL (ref 8–27)
BUN/CREAT SERPL: 13 (ref 12–28)
CALCIUM SERPL-MCNC: 9.9 MG/DL (ref 8.7–10.3)
CHLORIDE SERPL-SCNC: 105 MMOL/L (ref 96–106)
CHOLEST SERPL-MCNC: 205 MG/DL (ref 100–199)
CO2 SERPL-SCNC: 23 MMOL/L (ref 20–29)
CREAT SERPL-MCNC: 0.91 MG/DL (ref 0.57–1)
CREAT UR-MCNC: 21.9 MG/DL
FERRITIN SERPL-MCNC: 61 NG/ML (ref 15–150)
GLOBULIN SER CALC-MCNC: 2.2 G/DL (ref 1.5–4.5)
GLUCOSE SERPL-MCNC: 112 MG/DL (ref 65–99)
HDLC SERPL-MCNC: 67 MG/DL
LABORATORY COMMENT REPORT: ABNORMAL
LDLC SERPL CALC-MCNC: 116 MG/DL (ref 0–99)
MICROALBUMIN UR-MCNC: <3 UG/ML
POTASSIUM SERPL-SCNC: 4.1 MMOL/L (ref 3.5–5.2)
PROT SERPL-MCNC: 6.5 G/DL (ref 6–8.5)
SODIUM SERPL-SCNC: 142 MMOL/L (ref 134–144)
TRIGL SERPL-MCNC: 112 MG/DL (ref 0–149)
VLDLC SERPL CALC-MCNC: 22 MG/DL (ref 5–40)

## 2019-05-09 ENCOUNTER — HOSPITAL ENCOUNTER (OUTPATIENT)
Dept: RADIOLOGY | Facility: MEDICAL CENTER | Age: 84
End: 2019-05-09
Attending: FAMILY MEDICINE
Payer: MEDICARE

## 2019-05-09 DIAGNOSIS — S32.030D CLOSED COMPRESSION FRACTURE OF L3 LUMBAR VERTEBRA WITH ROUTINE HEALING, SUBSEQUENT ENCOUNTER: ICD-10-CM

## 2019-05-09 PROCEDURE — 72100 X-RAY EXAM L-S SPINE 2/3 VWS: CPT

## 2019-05-16 ENCOUNTER — TELEPHONE (OUTPATIENT)
Dept: MEDICAL GROUP | Facility: MEDICAL CENTER | Age: 84
End: 2019-05-16

## 2019-09-25 ENCOUNTER — APPOINTMENT (OUTPATIENT)
Dept: MEDICAL GROUP | Facility: MEDICAL CENTER | Age: 84
End: 2019-09-25
Payer: MEDICARE

## 2020-01-15 ENCOUNTER — OFFICE VISIT (OUTPATIENT)
Dept: ENDOCRINOLOGY | Facility: MEDICAL CENTER | Age: 85
End: 2020-01-15
Payer: MEDICARE

## 2020-01-15 VITALS
SYSTOLIC BLOOD PRESSURE: 156 MMHG | HEART RATE: 101 BPM | BODY MASS INDEX: 23.03 KG/M2 | WEIGHT: 138.2 LBS | DIASTOLIC BLOOD PRESSURE: 78 MMHG | HEIGHT: 65 IN | OXYGEN SATURATION: 98 %

## 2020-01-15 DIAGNOSIS — E04.2 MULTIPLE THYROID NODULES: ICD-10-CM

## 2020-01-15 DIAGNOSIS — S32.030D COMPRESSION FRACTURE OF L3 VERTEBRA WITH ROUTINE HEALING, SUBSEQUENT ENCOUNTER: ICD-10-CM

## 2020-01-15 DIAGNOSIS — I10 ESSENTIAL HYPERTENSION: ICD-10-CM

## 2020-01-15 DIAGNOSIS — Z78.0 MENOPAUSE: ICD-10-CM

## 2020-01-15 PROCEDURE — 99214 OFFICE O/P EST MOD 30 MIN: CPT | Performed by: INTERNAL MEDICINE

## 2020-01-15 RX ORDER — TELMISARTAN 80 MG/1
80 TABLET ORAL EVERY MORNING
COMMUNITY
End: 2021-10-19 | Stop reason: SDUPTHER

## 2020-01-15 RX ORDER — METOPROLOL SUCCINATE 50 MG/1
50 TABLET, EXTENDED RELEASE ORAL EVERY EVENING
Status: ON HOLD | COMMUNITY
Start: 2020-01-05 | End: 2020-09-04

## 2020-01-15 NOTE — PROGRESS NOTES
Chief Complaint   Patient presents with   • Follow-Up     Thyroid nodules   • Osteoporosis   • Hypertension        HPI:      1. Multiple thyroid nodules.    The patient is in for regularly scheduled appointment to follow up on her thyroid nodules.  There has been no symptomatic change in her thyroid and I have difficulty feeling it because it is becoming substernal as time goes by.  She did have a large nodule in the right node that was biopsied in 2014 and said to be benign.  There has been no change in her nodules over time and I couldn’t feel it well today so we will get another thyroid ultrasound.    However, our visit today was not well organized because she had other things on her mind.  This was primarily prompted by the sudden loss of her primary care doctor, Dr. Martinez.  She has had great aracelis in him.  However her last routine appointment was cancelled and no substitute primary was suggested and so she feels she is left drifting.  Dr. Bloch has been monitoring her blood pressure and she brought in several readings for me to look at which I did.  She has been monitoring at home.  She usually starts out with a relatively high blood pressure in the morning and it goes down during the day.  She is asymptomatic without headache or dizziness or cardiovascular symptoms otherwise.  We talked about home monitoring and that she should take her blood pressure 2-3 times in a single sitting to let it relax.  The other thing she has noticed is her heart rate has moved up.  I got a heart rate of 100 today and I am not sure why.  She does not feel ill and does not feel particularly nervous although I think she is still upset about the disruption of not having a primary care doctor.    I brought up the question of a bone density because I notice her chart says that she has a compression fracture of her L3 and her previous bone density in 2017 showed a significant osteoporosis. She is not being treated for that.  She does  "take calcium and vitamin D but the last vitamin D blood level I have is from 2018 when it was 31.  So I told her she needed to catch up with a bone density and vitamin D level and general chemistries and probably consider treatment for her osteoporosis.  She did not seem particularly interested in that.  In any event, that is how our meeting went, jumping from one potential problem to the next without much coherence.  So I will get a look at her thyroid ultrasound and bone density and some general labs and hopefully by that time she will be establishing or at least assigned to a new PCP.      ROS:  Upset because her PCP has suddenly quit his practice and moved.  No current PCP      Allergies:   Allergies   Allergen Reactions   • Amoxicillin      Unsure of reaction/or allergy   • Hydralazine Unspecified     Pt reported severe hypotensive reaction   • Morphine Vomiting and Nausea   • Penicillins      1970 reaction       Current medicines including changes today:  Current Outpatient Medications   Medication Sig Dispense Refill   • metoprolol SR (TOPROL XL) 50 MG TABLET SR 24 HR Take  by mouth every day. Take 1 tablet by mouth every day     • telmisartan (MICARDIS) 80 MG Tab Take 80 mg by mouth every day.     • NON SPECIFIED Take 1 Tab by mouth every day. \"Colestof\"     • Calcium-Magnesium 500-250 MG Tab Take  by mouth.     • Cholecalciferol (VITAMIN D) 2000 units Cap Take  by mouth.     • Biotin 1000 MCG Tab Take  by mouth.     • terazosin (HYTRIN) 2 MG Cap Take 2 mg by mouth 2 times a day.     • aspirin EC (ECOTRIN) 81 MG TBEC Take 81 mg by mouth every evening.     • metoprolol (LOPRESSOR) 25 MG Tab Take 25 mg by mouth 2 times a day.     • valsartan (DIOVAN) 320 MG tablet Take 320 mg by mouth every morning.       No current facility-administered medications for this visit.         Past Medical History:   Diagnosis Date   • CHI (closed head injury)    • Elevated cortisol level (HCC)     unknown etiology   • " "Hypertension     medicated   • Insomnia    • Multiple thyroid nodules 2008   • Thyrotoxicosis     history in 2013 / euthyroid 2015   • Urinary tract infection        PHYSICAL EXAM:    /78 (BP Location: Left arm, Patient Position: Sitting, BP Cuff Size: Adult)   Pulse (!) 101   Ht 1.651 m (5' 5\")   Wt 62.7 kg (138 lb 3.2 oz)   SpO2 98%   BMI 23.00 kg/m²     Gen.   appears healthy, good posture    Skin   appropriate for sex and age    HEENT  unremarkable    Neck   I can only feel the upper part of her thyroid gland now.  It is becoming substernal.  It feels a little bit enlarged and bosselated.  No satellite nodules elsewhere in the neck    Heart  regular    Extremities  no edema    Neuro  gait and station normal    Psych  appropriate    ASSESSMENT AND RECOMMENDATIONS    1. Multiple thyroid nodules            See HPI  - TSH; Future  - FREE THYROXINE; Future  - US-SOFT TISSUES OF HEAD - NECK; Future    2. Essential hypertension          Blood pressure somewhat labile managed by Dr. Emerson  - Comp Metabolic Panel; Future    3. Compression fracture of L3 vertebra with routine healing, subsequent encounter           Has osteoporosis by 2017 bone density           No current treatment apart from calcium and vitamin D supplements  - VITAMIN D,25 HYDROXY; Future    - DS-BONE DENSITY STUDY (DEXA); Future      DISPOSITION: Follow-up lab, thyroid ultrasound and bone density by telephone                            Needs to establish new PCP for follow-up      José Miguel Zimmer M.D.  "

## 2020-01-26 DIAGNOSIS — M81.0 AGE-RELATED OSTEOPOROSIS WITHOUT CURRENT PATHOLOGICAL FRACTURE: ICD-10-CM

## 2020-01-26 DIAGNOSIS — E04.2 MULTIPLE THYROID NODULES: ICD-10-CM

## 2020-02-08 LAB
ALBUMIN SERPL-MCNC: 4 G/DL (ref 3.6–4.6)
ALBUMIN/GLOB SERPL: 1.7 {RATIO} (ref 1.2–2.2)
ALP SERPL-CCNC: 60 IU/L (ref 39–117)
ALT SERPL-CCNC: 23 IU/L (ref 0–32)
AST SERPL-CCNC: 21 IU/L (ref 0–40)
BILIRUB SERPL-MCNC: 0.6 MG/DL (ref 0–1.2)
BUN SERPL-MCNC: 12 MG/DL (ref 8–27)
BUN/CREAT SERPL: 15 (ref 12–28)
CALCIUM SERPL-MCNC: 9.5 MG/DL (ref 8.7–10.3)
CHLORIDE SERPL-SCNC: 103 MMOL/L (ref 96–106)
CO2 SERPL-SCNC: 22 MMOL/L (ref 20–29)
CREAT SERPL-MCNC: 0.82 MG/DL (ref 0.57–1)
GLOBULIN SER CALC-MCNC: 2.4 G/DL (ref 1.5–4.5)
GLUCOSE SERPL-MCNC: 105 MG/DL (ref 65–99)
POTASSIUM SERPL-SCNC: 4.2 MMOL/L (ref 3.5–5.2)
PROT SERPL-MCNC: 6.4 G/DL (ref 6–8.5)
SODIUM SERPL-SCNC: 140 MMOL/L (ref 134–144)
T4 FREE SERPL-MCNC: 1.63 NG/DL (ref 0.82–1.77)
TSH SERPL DL<=0.005 MIU/L-ACNC: 0.81 UIU/ML (ref 0.45–4.5)

## 2020-02-11 ENCOUNTER — TELEPHONE (OUTPATIENT)
Dept: ENDOCRINOLOGY | Facility: MEDICAL CENTER | Age: 85
End: 2020-02-11

## 2020-02-11 NOTE — TELEPHONE ENCOUNTER
Phone Number Called: [phone] (home)       Call outcome: Spoke to patient regarding message below.    Message: Spoke to patient to let her know about normal labs for thyroid. She did eat that morning explaining the elevated blood sugar levels.   She ahs her bone density and ultrasound scheduled for this up coming Friday.

## 2020-02-11 NOTE — TELEPHONE ENCOUNTER
----- Message from José Miguel Zimmer M.D. sent at 2/8/2020 12:29 PM PST -----  Please tell patient her thyroid blood tests are normal.  Blood sugar is marginal if she was fasting.  If she was not fasting it is okay.    Her thyroid ultrasound is pending.  She should schedule an appointment for that if she has not been scheduled already.

## 2020-02-14 ENCOUNTER — HOSPITAL ENCOUNTER (OUTPATIENT)
Dept: RADIOLOGY | Facility: MEDICAL CENTER | Age: 85
End: 2020-02-14
Attending: INTERNAL MEDICINE
Payer: MEDICARE

## 2020-02-14 DIAGNOSIS — E04.2 MULTIPLE THYROID NODULES: ICD-10-CM

## 2020-02-14 DIAGNOSIS — S32.030D COMPRESSION FRACTURE OF L3 VERTEBRA WITH ROUTINE HEALING, SUBSEQUENT ENCOUNTER: ICD-10-CM

## 2020-02-14 DIAGNOSIS — Z78.0 MENOPAUSE: ICD-10-CM

## 2020-02-14 PROCEDURE — 76536 US EXAM OF HEAD AND NECK: CPT

## 2020-02-14 PROCEDURE — 77080 DXA BONE DENSITY AXIAL: CPT

## 2020-02-24 ENCOUNTER — TELEPHONE (OUTPATIENT)
Dept: ENDOCRINOLOGY | Facility: MEDICAL CENTER | Age: 85
End: 2020-02-24

## 2020-02-24 NOTE — TELEPHONE ENCOUNTER
----- Message from José Miguel Zimmer M.D. sent at 2/23/2020  1:33 PM PST -----  Please tell patient the thyroid nodules are stable.  No change indicated.  Lab tests look good also.

## 2020-08-26 ENCOUNTER — APPOINTMENT (OUTPATIENT)
Dept: RADIOLOGY | Facility: MEDICAL CENTER | Age: 85
DRG: 305 | End: 2020-08-26
Attending: EMERGENCY MEDICINE
Payer: MEDICARE

## 2020-08-26 ENCOUNTER — HOSPITAL ENCOUNTER (INPATIENT)
Facility: MEDICAL CENTER | Age: 85
LOS: 9 days | DRG: 305 | End: 2020-09-04
Attending: EMERGENCY MEDICINE | Admitting: INTERNAL MEDICINE
Payer: MEDICARE

## 2020-08-26 DIAGNOSIS — M79.10 MYALGIA: ICD-10-CM

## 2020-08-26 DIAGNOSIS — S32.030D COMPRESSION FRACTURE OF L3 VERTEBRA WITH ROUTINE HEALING, SUBSEQUENT ENCOUNTER: ICD-10-CM

## 2020-08-26 DIAGNOSIS — R42 DIZZINESS: ICD-10-CM

## 2020-08-26 DIAGNOSIS — I16.0 HYPERTENSIVE URGENCY: ICD-10-CM

## 2020-08-26 LAB
ALBUMIN SERPL BCP-MCNC: 3.7 G/DL (ref 3.2–4.9)
ALBUMIN/GLOB SERPL: 1.5 G/DL
ALP SERPL-CCNC: 63 U/L (ref 30–99)
ALT SERPL-CCNC: 20 U/L (ref 2–50)
ANION GAP SERPL CALC-SCNC: 13 MMOL/L (ref 7–16)
APPEARANCE UR: CLEAR
AST SERPL-CCNC: 12 U/L (ref 12–45)
BASOPHILS # BLD AUTO: 0.6 % (ref 0–1.8)
BASOPHILS # BLD: 0.03 K/UL (ref 0–0.12)
BILIRUB SERPL-MCNC: 0.5 MG/DL (ref 0.1–1.5)
BILIRUB UR QL STRIP.AUTO: NEGATIVE
BUN SERPL-MCNC: 11 MG/DL (ref 8–22)
CALCIUM SERPL-MCNC: 9.3 MG/DL (ref 8.5–10.5)
CHLORIDE SERPL-SCNC: 103 MMOL/L (ref 96–112)
CO2 SERPL-SCNC: 22 MMOL/L (ref 20–33)
COLOR UR: YELLOW
COVID ORDER STATUS COVID19: NORMAL
CREAT SERPL-MCNC: 0.63 MG/DL (ref 0.5–1.4)
EKG IMPRESSION: NORMAL
EOSINOPHIL # BLD AUTO: 0.07 K/UL (ref 0–0.51)
EOSINOPHIL NFR BLD: 1.4 % (ref 0–6.9)
ERYTHROCYTE [DISTWIDTH] IN BLOOD BY AUTOMATED COUNT: 42.9 FL (ref 35.9–50)
GLOBULIN SER CALC-MCNC: 2.5 G/DL (ref 1.9–3.5)
GLUCOSE SERPL-MCNC: 107 MG/DL (ref 65–99)
GLUCOSE UR STRIP.AUTO-MCNC: NEGATIVE MG/DL
HCT VFR BLD AUTO: 43 % (ref 37–47)
HGB BLD-MCNC: 14.9 G/DL (ref 12–16)
IMM GRANULOCYTES # BLD AUTO: 0.01 K/UL (ref 0–0.11)
IMM GRANULOCYTES NFR BLD AUTO: 0.2 % (ref 0–0.9)
KETONES UR STRIP.AUTO-MCNC: NEGATIVE MG/DL
LEUKOCYTE ESTERASE UR QL STRIP.AUTO: NEGATIVE
LYMPHOCYTES # BLD AUTO: 1.11 K/UL (ref 1–4.8)
LYMPHOCYTES NFR BLD: 23 % (ref 22–41)
MAGNESIUM SERPL-MCNC: 2.1 MG/DL (ref 1.5–2.5)
MCH RBC QN AUTO: 32.5 PG (ref 27–33)
MCHC RBC AUTO-ENTMCNC: 34.7 G/DL (ref 33.6–35)
MCV RBC AUTO: 93.9 FL (ref 81.4–97.8)
MICRO URNS: NORMAL
MONOCYTES # BLD AUTO: 0.59 K/UL (ref 0–0.85)
MONOCYTES NFR BLD AUTO: 12.2 % (ref 0–13.4)
NEUTROPHILS # BLD AUTO: 3.02 K/UL (ref 2–7.15)
NEUTROPHILS NFR BLD: 62.6 % (ref 44–72)
NITRITE UR QL STRIP.AUTO: NEGATIVE
NRBC # BLD AUTO: 0 K/UL
NRBC BLD-RTO: 0 /100 WBC
NT-PROBNP SERPL IA-MCNC: 136 PG/ML (ref 0–125)
PH UR STRIP.AUTO: 8 [PH] (ref 5–8)
PLATELET # BLD AUTO: 257 K/UL (ref 164–446)
PMV BLD AUTO: 10.3 FL (ref 9–12.9)
POTASSIUM SERPL-SCNC: 3.5 MMOL/L (ref 3.6–5.5)
PROT SERPL-MCNC: 6.2 G/DL (ref 6–8.2)
PROT UR QL STRIP: NEGATIVE MG/DL
RBC # BLD AUTO: 4.58 M/UL (ref 4.2–5.4)
RBC UR QL AUTO: NEGATIVE
SARS-COV-2 RNA RESP QL NAA+PROBE: NOTDETECTED
SODIUM SERPL-SCNC: 138 MMOL/L (ref 135–145)
SP GR UR STRIP.AUTO: 1
SPECIMEN SOURCE: NORMAL
TROPONIN T SERPL-MCNC: <6 NG/L (ref 6–19)
UROBILINOGEN UR STRIP.AUTO-MCNC: 0.2 MG/DL
WBC # BLD AUTO: 4.8 K/UL (ref 4.8–10.8)

## 2020-08-26 PROCEDURE — 99291 CRITICAL CARE FIRST HOUR: CPT

## 2020-08-26 PROCEDURE — C9803 HOPD COVID-19 SPEC COLLECT: HCPCS | Performed by: HOSPITALIST

## 2020-08-26 PROCEDURE — A9270 NON-COVERED ITEM OR SERVICE: HCPCS | Performed by: INTERNAL MEDICINE

## 2020-08-26 PROCEDURE — 700102 HCHG RX REV CODE 250 W/ 637 OVERRIDE(OP): Performed by: INTERNAL MEDICINE

## 2020-08-26 PROCEDURE — 700101 HCHG RX REV CODE 250: Performed by: EMERGENCY MEDICINE

## 2020-08-26 PROCEDURE — 71045 X-RAY EXAM CHEST 1 VIEW: CPT

## 2020-08-26 PROCEDURE — U0003 INFECTIOUS AGENT DETECTION BY NUCLEIC ACID (DNA OR RNA); SEVERE ACUTE RESPIRATORY SYNDROME CORONAVIRUS 2 (SARS-COV-2) (CORONAVIRUS DISEASE [COVID-19]), AMPLIFIED PROBE TECHNIQUE, MAKING USE OF HIGH THROUGHPUT TECHNOLOGIES AS DESCRIBED BY CMS-2020-01-R: HCPCS

## 2020-08-26 PROCEDURE — 96376 TX/PRO/DX INJ SAME DRUG ADON: CPT

## 2020-08-26 PROCEDURE — 700111 HCHG RX REV CODE 636 W/ 250 OVERRIDE (IP): Performed by: EMERGENCY MEDICINE

## 2020-08-26 PROCEDURE — 96375 TX/PRO/DX INJ NEW DRUG ADDON: CPT

## 2020-08-26 PROCEDURE — 81003 URINALYSIS AUTO W/O SCOPE: CPT

## 2020-08-26 PROCEDURE — 80053 COMPREHEN METABOLIC PANEL: CPT

## 2020-08-26 PROCEDURE — 700102 HCHG RX REV CODE 250 W/ 637 OVERRIDE(OP): Performed by: PSYCHIATRY & NEUROLOGY

## 2020-08-26 PROCEDURE — 99291 CRITICAL CARE FIRST HOUR: CPT | Performed by: INTERNAL MEDICINE

## 2020-08-26 PROCEDURE — 700105 HCHG RX REV CODE 258: Performed by: INTERNAL MEDICINE

## 2020-08-26 PROCEDURE — 84484 ASSAY OF TROPONIN QUANT: CPT

## 2020-08-26 PROCEDURE — 85025 COMPLETE CBC W/AUTO DIFF WBC: CPT

## 2020-08-26 PROCEDURE — 700101 HCHG RX REV CODE 250: Performed by: PSYCHIATRY & NEUROLOGY

## 2020-08-26 PROCEDURE — 36415 COLL VENOUS BLD VENIPUNCTURE: CPT

## 2020-08-26 PROCEDURE — 93005 ELECTROCARDIOGRAM TRACING: CPT

## 2020-08-26 PROCEDURE — 93005 ELECTROCARDIOGRAM TRACING: CPT | Performed by: EMERGENCY MEDICINE

## 2020-08-26 PROCEDURE — 770022 HCHG ROOM/CARE - ICU (200)

## 2020-08-26 PROCEDURE — 700111 HCHG RX REV CODE 636 W/ 250 OVERRIDE (IP): Performed by: INTERNAL MEDICINE

## 2020-08-26 PROCEDURE — A9270 NON-COVERED ITEM OR SERVICE: HCPCS | Performed by: PSYCHIATRY & NEUROLOGY

## 2020-08-26 PROCEDURE — 96374 THER/PROPH/DIAG INJ IV PUSH: CPT

## 2020-08-26 PROCEDURE — 83735 ASSAY OF MAGNESIUM: CPT

## 2020-08-26 PROCEDURE — 700101 HCHG RX REV CODE 250: Performed by: INTERNAL MEDICINE

## 2020-08-26 PROCEDURE — 83880 ASSAY OF NATRIURETIC PEPTIDE: CPT

## 2020-08-26 PROCEDURE — 70450 CT HEAD/BRAIN W/O DYE: CPT

## 2020-08-26 RX ORDER — METOPROLOL SUCCINATE 50 MG/1
50 TABLET, EXTENDED RELEASE ORAL
Status: DISCONTINUED | OUTPATIENT
Start: 2020-08-26 | End: 2020-08-26

## 2020-08-26 RX ORDER — METOPROLOL SUCCINATE 25 MG/1
25 TABLET, EXTENDED RELEASE ORAL ONCE
Status: DISCONTINUED | OUTPATIENT
Start: 2020-08-26 | End: 2020-08-26

## 2020-08-26 RX ORDER — LABETALOL HYDROCHLORIDE 5 MG/ML
10 INJECTION, SOLUTION INTRAVENOUS ONCE
Status: COMPLETED | OUTPATIENT
Start: 2020-08-26 | End: 2020-08-26

## 2020-08-26 RX ORDER — METOPROLOL SUCCINATE 25 MG/1
25 TABLET, EXTENDED RELEASE ORAL ONCE
Status: COMPLETED | OUTPATIENT
Start: 2020-08-26 | End: 2020-08-26

## 2020-08-26 RX ORDER — ACETAMINOPHEN 325 MG/1
650 TABLET ORAL EVERY 4 HOURS PRN
Status: DISCONTINUED | OUTPATIENT
Start: 2020-08-26 | End: 2020-09-04 | Stop reason: HOSPADM

## 2020-08-26 RX ORDER — TERAZOSIN 2 MG/1
2 CAPSULE ORAL EVERY EVENING
Status: DISCONTINUED | OUTPATIENT
Start: 2020-08-26 | End: 2020-08-27

## 2020-08-26 RX ORDER — AMOXICILLIN 250 MG
2 CAPSULE ORAL 2 TIMES DAILY
Status: DISCONTINUED | OUTPATIENT
Start: 2020-08-27 | End: 2020-09-04 | Stop reason: HOSPADM

## 2020-08-26 RX ORDER — METOPROLOL SUCCINATE 50 MG/1
50 TABLET, EXTENDED RELEASE ORAL EVERY EVENING
Status: DISCONTINUED | OUTPATIENT
Start: 2020-08-27 | End: 2020-08-28

## 2020-08-26 RX ORDER — METOPROLOL SUCCINATE 50 MG/1
50 TABLET, EXTENDED RELEASE ORAL EVERY EVENING
Status: DISCONTINUED | OUTPATIENT
Start: 2020-08-26 | End: 2020-08-26

## 2020-08-26 RX ORDER — BISACODYL 10 MG
10 SUPPOSITORY, RECTAL RECTAL
Status: DISCONTINUED | OUTPATIENT
Start: 2020-08-26 | End: 2020-09-04 | Stop reason: HOSPADM

## 2020-08-26 RX ORDER — ONDANSETRON 4 MG/1
4 TABLET, ORALLY DISINTEGRATING ORAL EVERY 4 HOURS PRN
Status: DISCONTINUED | OUTPATIENT
Start: 2020-08-26 | End: 2020-09-04 | Stop reason: HOSPADM

## 2020-08-26 RX ORDER — HEPARIN SODIUM 5000 [USP'U]/ML
5000 INJECTION, SOLUTION INTRAVENOUS; SUBCUTANEOUS EVERY 8 HOURS
Status: DISCONTINUED | OUTPATIENT
Start: 2020-08-26 | End: 2020-09-04 | Stop reason: HOSPADM

## 2020-08-26 RX ORDER — ANALGESIC BALM 1.74; 4.06 G/29G; G/29G
OINTMENT TOPICAL 3 TIMES DAILY PRN
Status: DISCONTINUED | OUTPATIENT
Start: 2020-08-26 | End: 2020-09-04 | Stop reason: HOSPADM

## 2020-08-26 RX ORDER — LABETALOL HYDROCHLORIDE 5 MG/ML
10 INJECTION, SOLUTION INTRAVENOUS EVERY 4 HOURS PRN
Status: DISCONTINUED | OUTPATIENT
Start: 2020-08-26 | End: 2020-08-28

## 2020-08-26 RX ORDER — TELMISARTAN 80 MG/1
80 TABLET ORAL
Status: DISCONTINUED | OUTPATIENT
Start: 2020-08-27 | End: 2020-09-04 | Stop reason: HOSPADM

## 2020-08-26 RX ORDER — POLYETHYLENE GLYCOL 3350 17 G/17G
1 POWDER, FOR SOLUTION ORAL
Status: DISCONTINUED | OUTPATIENT
Start: 2020-08-26 | End: 2020-09-04 | Stop reason: HOSPADM

## 2020-08-26 RX ORDER — METOPROLOL SUCCINATE 50 MG/1
50 TABLET, EXTENDED RELEASE ORAL
Status: DISCONTINUED | OUTPATIENT
Start: 2020-08-27 | End: 2020-08-26

## 2020-08-26 RX ORDER — ONDANSETRON 2 MG/ML
4 INJECTION INTRAMUSCULAR; INTRAVENOUS EVERY 4 HOURS PRN
Status: DISCONTINUED | OUTPATIENT
Start: 2020-08-26 | End: 2020-08-30

## 2020-08-26 RX ORDER — LORAZEPAM 2 MG/ML
0.5 INJECTION INTRAMUSCULAR ONCE
Status: COMPLETED | OUTPATIENT
Start: 2020-08-26 | End: 2020-08-26

## 2020-08-26 RX ADMIN — LABETALOL HYDROCHLORIDE 10 MG: 5 INJECTION INTRAVENOUS at 08:50

## 2020-08-26 RX ADMIN — METOPROLOL SUCCINATE 25 MG: 25 TABLET, EXTENDED RELEASE ORAL at 18:09

## 2020-08-26 RX ADMIN — LORAZEPAM 0.5 MG: 2 INJECTION INTRAMUSCULAR; INTRAVENOUS at 08:50

## 2020-08-26 RX ADMIN — HEPARIN SODIUM 5000 UNITS: 5000 INJECTION, SOLUTION INTRAVENOUS; SUBCUTANEOUS at 14:35

## 2020-08-26 RX ADMIN — MENTHOL AND METHYL SALICYLATE: 7.6; 29 OINTMENT TOPICAL at 23:57

## 2020-08-26 RX ADMIN — ASPIRIN 81 MG: 81 TABLET, COATED ORAL at 18:08

## 2020-08-26 RX ADMIN — LABETALOL HYDROCHLORIDE 10 MG: 5 INJECTION, SOLUTION INTRAVENOUS at 22:21

## 2020-08-26 RX ADMIN — SODIUM CHLORIDE 5 MG/HR: 9 INJECTION, SOLUTION INTRAVENOUS at 10:44

## 2020-08-26 RX ADMIN — LABETALOL HYDROCHLORIDE 10 MG: 5 INJECTION INTRAVENOUS at 07:44

## 2020-08-26 RX ADMIN — HEPARIN SODIUM 5000 UNITS: 5000 INJECTION, SOLUTION INTRAVENOUS; SUBCUTANEOUS at 22:16

## 2020-08-26 RX ADMIN — TERAZOSIN HYDROCHLORIDE 2 MG: 2 CAPSULE ORAL at 18:08

## 2020-08-26 ASSESSMENT — ENCOUNTER SYMPTOMS
FOCAL WEAKNESS: 0
HEMOPTYSIS: 0
LOSS OF CONSCIOUSNESS: 0
SENSORY CHANGE: 0
MYALGIAS: 0
DIARRHEA: 0
VOMITING: 0
CONSTIPATION: 0
EYE PAIN: 0
BLURRED VISION: 0
TINGLING: 0
SHORTNESS OF BREATH: 0
NAUSEA: 0
DIAPHORESIS: 0
DIZZINESS: 1
ABDOMINAL PAIN: 0
HALLUCINATIONS: 0
DOUBLE VISION: 0
TREMORS: 0
NECK PAIN: 0
FEVER: 0
SEIZURES: 0
SPUTUM PRODUCTION: 0
WEAKNESS: 1
PALPITATIONS: 0
COUGH: 0
HEADACHES: 0
BACK PAIN: 0
CHILLS: 0
WHEEZING: 0
ORTHOPNEA: 0
SPEECH CHANGE: 0

## 2020-08-26 ASSESSMENT — LIFESTYLE VARIABLES
DO YOU DRINK ALCOHOL: NO
AVERAGE NUMBER OF DAYS PER WEEK YOU HAVE A DRINK CONTAINING ALCOHOL: 0
ALCOHOL_USE: NO
ON A TYPICAL DAY WHEN YOU DRINK ALCOHOL HOW MANY DRINKS DO YOU HAVE: 0
TOTAL SCORE: 0
DOES PATIENT WANT TO STOP DRINKING: NO
SUBSTANCE_ABUSE: 0
EVER FELT BAD OR GUILTY ABOUT YOUR DRINKING: NO
TOTAL SCORE: 0
HOW MANY TIMES IN THE PAST YEAR HAVE YOU HAD 5 OR MORE DRINKS IN A DAY: 0
EVER HAD A DRINK FIRST THING IN THE MORNING TO STEADY YOUR NERVES TO GET RID OF A HANGOVER: NO
HAVE YOU EVER FELT YOU SHOULD CUT DOWN ON YOUR DRINKING: NO
EVER_SMOKED: NEVER
HAVE PEOPLE ANNOYED YOU BY CRITICIZING YOUR DRINKING: NO
CONSUMPTION TOTAL: NEGATIVE
TOTAL SCORE: 0

## 2020-08-26 ASSESSMENT — PATIENT HEALTH QUESTIONNAIRE - PHQ9
8. MOVING OR SPEAKING SO SLOWLY THAT OTHER PEOPLE COULD HAVE NOTICED. OR THE OPPOSITE, BEING SO FIGETY OR RESTLESS THAT YOU HAVE BEEN MOVING AROUND A LOT MORE THAN USUAL: NOT AT ALL
4. FEELING TIRED OR HAVING LITTLE ENERGY: SEVERAL DAYS
3. TROUBLE FALLING OR STAYING ASLEEP OR SLEEPING TOO MUCH: NOT AT ALL
7. TROUBLE CONCENTRATING ON THINGS, SUCH AS READING THE NEWSPAPER OR WATCHING TELEVISION: SEVERAL DAYS
SUM OF ALL RESPONSES TO PHQ9 QUESTIONS 1 AND 2: 1
2. FEELING DOWN, DEPRESSED, IRRITABLE, OR HOPELESS: SEVERAL DAYS
1. LITTLE INTEREST OR PLEASURE IN DOING THINGS: NOT AT ALL
5. POOR APPETITE OR OVEREATING: SEVERAL DAYS
6. FEELING BAD ABOUT YOURSELF - OR THAT YOU ARE A FAILURE OR HAVE LET YOURSELF OR YOUR FAMILY DOWN: NOT AL ALL
SUM OF ALL RESPONSES TO PHQ QUESTIONS 1-9: 4
9. THOUGHTS THAT YOU WOULD BE BETTER OFF DEAD, OR OF HURTING YOURSELF: NOT AT ALL

## 2020-08-26 ASSESSMENT — COGNITIVE AND FUNCTIONAL STATUS - GENERAL
HELP NEEDED FOR BATHING: A LITTLE
HELP NEEDED FOR BATHING: A LITTLE
MOVING FROM LYING ON BACK TO SITTING ON SIDE OF FLAT BED: A LOT
TOILETING: A LOT
WALKING IN HOSPITAL ROOM: A LOT
SUGGESTED CMS G CODE MODIFIER MOBILITY: CK
CLIMB 3 TO 5 STEPS WITH RAILING: A LOT
SUGGESTED CMS G CODE MODIFIER DAILY ACTIVITY: CK
SUGGESTED CMS G CODE MODIFIER DAILY ACTIVITY: CJ
DRESSING REGULAR UPPER BODY CLOTHING: A LITTLE
STANDING UP FROM CHAIR USING ARMS: A LITTLE
MOBILITY SCORE: 16
DAILY ACTIVITIY SCORE: 20
MOVING TO AND FROM BED TO CHAIR: A LITTLE
CLIMB 3 TO 5 STEPS WITH RAILING: A LOT
TOILETING: A LITTLE
PERSONAL GROOMING: A LITTLE
WALKING IN HOSPITAL ROOM: A LOT
DRESSING REGULAR LOWER BODY CLOTHING: A LITTLE
PERSONAL GROOMING: A LITTLE
STANDING UP FROM CHAIR USING ARMS: A LITTLE
DRESSING REGULAR UPPER BODY CLOTHING: A LITTLE
DAILY ACTIVITIY SCORE: 18

## 2020-08-26 ASSESSMENT — FIBROSIS 4 INDEX
FIB4 SCORE: 0.87
FIB4 SCORE: 1.19

## 2020-08-26 ASSESSMENT — COPD QUESTIONNAIRES
IN THE PAST 12 MONTHS DO YOU DO LESS THAN YOU USED TO BECAUSE OF YOUR BREATHING PROBLEMS: DISAGREE/UNSURE
COPD SCREENING SCORE: 2
DO YOU EVER COUGH UP ANY MUCUS OR PHLEGM?: NO/ONLY WITH OCCASIONAL COLDS OR INFECTIONS
DURING THE PAST 4 WEEKS HOW MUCH DID YOU FEEL SHORT OF BREATH: NONE/LITTLE OF THE TIME
HAVE YOU SMOKED AT LEAST 100 CIGARETTES IN YOUR ENTIRE LIFE: NO/DON'T KNOW

## 2020-08-26 NOTE — ED NOTES
Assisted to bedside commode and urine sample sent to lab. Back to [facility] and positioned for comfort.

## 2020-08-26 NOTE — ED NOTES
Assumed care of patient, resting on gurney. Spouse at bedside. Aware of need for urine sample. Call light within reach.

## 2020-08-26 NOTE — ED PROVIDER NOTES
ED Provider Note    Scribed for Shahzad Anders M.D. by Tim Goodman. 8/26/2020  5:52 AM    Primary care provider: Pcp Pt States None  Means of arrival: Ambulance  History obtained from: Patient  History limited by: None    CHIEF COMPLAINT  Chief Complaint   Patient presents with   • Dizziness     started 4 days ago    • Hypertension     started 4 days ago pt has hx of HTN, is complaint w/ medication, and BP is normally 130's systolic       HPI  Prabha Lizama is a 85 y.o. female with a history of hypertension who presents to the Emergency Department for evaluation of dizziness onset 4 days ago. The patient reports that 4 days ago, she awoke in the morning and began experiencing intermittent dizziness which she describes as lightheadedness in quality. She reports that her episodes of dizziness are typically onset after standing from sitting which is new for her as she has been able to get out of bed without prior difficulty. Additionally, she endorses associated bilateral lower extremity weakness and upper extremity tingling and notes that she needs assistance to go to the bathroom at home because of this. She also reports some mild chills, but she denies any other associated symptoms including chest pain, shortness of breath, or headaches. Additionally, no alleviating or exacerbating factors were identified for the patient's lightheadedness.     The patient's dizziness also onset concurrently with hypertension although she reports full compliance with her multiple anti-hypertensive medications. Her blood pressure is typically in the 130 systolics, but she notes higher values yesterday in the 160s which is abnormal. She has a history of a prior head injury without known complications, but she denies any other long-term medical history, surgeries, drug use, or alcohol consumption.    PPE Note: I personally donned full PPE for all patient encounters during this visit, including being clean-shaven with an  N95 respirator mask. Scribe remained outside the patient's room and did not have any contact with the patient for the duration of patient encounter.      REVIEW OF SYSTEMS  Pertinent positives include dizziness, lightheadedness, bilateral lower extremity weakness, bilateral upper extremity tingling, chills.   Pertinent negatives include no chest pain, shortness of breath, headaches.    All other systems reviewed and negative. See HPI for further details.       PAST MEDICAL HISTORY   has a past medical history of CHI (closed head injury), Elevated cortisol level, Hypertension, Insomnia, Multiple thyroid nodules (2008), Thyrotoxicosis, and Urinary tract infection.    SURGICAL HISTORY   has a past surgical history that includes tonsillectomy and appendectomy.    SOCIAL HISTORY  Social History     Tobacco Use   • Smoking status: Never Smoker   • Smokeless tobacco: Never Used   Substance Use Topics   • Alcohol use: No   • Drug use: No      Social History     Substance and Sexual Activity   Drug Use No       FAMILY HISTORY  Family History   Problem Relation Age of Onset   • Heart Disease Mother    • Heart Disease Father    • Hypertension Sister    • Arthritis Sister    • Thyroid Sister        CURRENT MEDICATIONS  Home Medications     Reviewed by Claudia Gallardo (Pharmacy Tech) on 08/26/20 at 0819  Med List Status: Complete   Medication Last Dose Status   aspirin EC (ECOTRIN) 81 MG TBEC 8/25/2020 Active   metoprolol SR (TOPROL XL) 50 MG TABLET SR 24 HR 8/25/2020 Active   telmisartan (MICARDIS) 80 MG Tab 8/25/2020 Active   terazosin (HYTRIN) 2 MG Cap 8/25/2020 Active                ALLERGIES  Allergies   Allergen Reactions   • Amoxicillin      Unsure of reaction/or allergy   • Hydralazine Unspecified     Pt reported severe hypotensive reaction   • Morphine Vomiting and Nausea   • Penicillins      1970 reaction       PHYSICAL EXAM  VITAL SIGNS: BP (!) 223/91   Pulse 70   Temp 36.5 °C (97.7 °F) (Temporal)   Resp 18   " Ht 1.651 m (5' 5\")   Wt 61.2 kg (135 lb)   SpO2 98%   BMI 22.47 kg/m²     Nursing note and vitals reviewed.  Constitutional: Well-developed and well-nourished. No distress.   HENT: Head is normocephalic and atraumatic. Oropharynx is clear and moist without exudate or erythema.   Eyes: Pupils are equal, round, and reactive to light. Conjunctiva are normal.   Cardiovascular: Normal rate and regular rhythm. No murmur heard. Normal radial pulses.  Pulmonary/Chest: Breath sounds normal. No wheezes or rales.   Abdominal: Soft and non-tender. No distention    Musculoskeletal: Anterior shins tender to palpation. Extremities exhibit normal range of motion without edema.   Neurological: Awake, alert and oriented to person, place, and time. No focal deficits noted.  Skin: Skin is warm and dry. No rash.   Psychiatric: Normal mood and affect. Appropriate for clinical situation.    DIAGNOSTIC STUDIES / PROCEDURES    EKG Interpretation  Interpreted by me as below    LABS  Results for orders placed or performed during the hospital encounter of 08/26/20   CBC WITH DIFFERENTIAL   Result Value Ref Range    WBC 4.8 4.8 - 10.8 K/uL    RBC 4.58 4.20 - 5.40 M/uL    Hemoglobin 14.9 12.0 - 16.0 g/dL    Hematocrit 43.0 37.0 - 47.0 %    MCV 93.9 81.4 - 97.8 fL    MCH 32.5 27.0 - 33.0 pg    MCHC 34.7 33.6 - 35.0 g/dL    RDW 42.9 35.9 - 50.0 fL    Platelet Count 257 164 - 446 K/uL    MPV 10.3 9.0 - 12.9 fL    Neutrophils-Polys 62.60 44.00 - 72.00 %    Lymphocytes 23.00 22.00 - 41.00 %    Monocytes 12.20 0.00 - 13.40 %    Eosinophils 1.40 0.00 - 6.90 %    Basophils 0.60 0.00 - 1.80 %    Immature Granulocytes 0.20 0.00 - 0.90 %    Nucleated RBC 0.00 /100 WBC    Neutrophils (Absolute) 3.02 2.00 - 7.15 K/uL    Lymphs (Absolute) 1.11 1.00 - 4.80 K/uL    Monos (Absolute) 0.59 0.00 - 0.85 K/uL    Eos (Absolute) 0.07 0.00 - 0.51 K/uL    Baso (Absolute) 0.03 0.00 - 0.12 K/uL    Immature Granulocytes (abs) 0.01 0.00 - 0.11 K/uL    NRBC (Absolute) " 0.00 K/uL   COMP METABOLIC PANEL   Result Value Ref Range    Sodium 138 135 - 145 mmol/L    Potassium 3.5 (L) 3.6 - 5.5 mmol/L    Chloride 103 96 - 112 mmol/L    Co2 22 20 - 33 mmol/L    Anion Gap 13.0 7.0 - 16.0    Glucose 107 (H) 65 - 99 mg/dL    Bun 11 8 - 22 mg/dL    Creatinine 0.63 0.50 - 1.40 mg/dL    Calcium 9.3 8.5 - 10.5 mg/dL    AST(SGOT) 12 12 - 45 U/L    ALT(SGPT) 20 2 - 50 U/L    Alkaline Phosphatase 63 30 - 99 U/L    Total Bilirubin 0.5 0.1 - 1.5 mg/dL    Albumin 3.7 3.2 - 4.9 g/dL    Total Protein 6.2 6.0 - 8.2 g/dL    Globulin 2.5 1.9 - 3.5 g/dL    A-G Ratio 1.5 g/dL   TROPONIN   Result Value Ref Range    Troponin T <6 6 - 19 ng/L   proBrain Natriuretic Peptide, NT   Result Value Ref Range    NT-proBNP 136 (H) 0 - 125 pg/mL   ESTIMATED GFR   Result Value Ref Range    GFR If African American >60 >60 mL/min/1.73 m 2    GFR If Non African American >60 >60 mL/min/1.73 m 2   URINALYSIS,CULTURE IF INDICATED    Specimen: Urine, Clean Catch   Result Value Ref Range    Color Yellow     Character Clear     Specific Gravity 1.004 <1.035    Ph 8.0 5.0 - 8.0    Glucose Negative Negative mg/dL    Ketones Negative Negative mg/dL    Protein Negative Negative mg/dL    Bilirubin Negative Negative    Urobilinogen, Urine 0.2 Negative    Nitrite Negative Negative    Leukocyte Esterase Negative Negative    Occult Blood Negative Negative    Micro Urine Req see below    EKG   Result Value Ref Range    Report       [facility] Emergency Dept.    Test Date:  2020  Pt Name:    QUINTIN JOHNSTON               Department: ER  MRN:        [id number]                      Room:       RD 06  Gender:     Female                       Technician: [id number]  :        [id number]                   Requested By:ER TRIAGE PROTOCOL  Order #:    [id number]                    Reading MD: MARVIN DORAN MD    Measurements  Intervals                                Axis  Rate:       74                           P:           57  CA:         156                          QRS:        40  QRSD:       96                           T:          75  QT:         432  QTc:        480    Interpretive Statements  SINUS RHYTHM  VENTRICULAR PREMATURE COMPLEX  BORDERLINE INFERIOR Q WAVES  MINIMAL ST DEPRESSION, ANTEROLATERAL LEADS  Compared to ECG 07/06/2016 05:43:11  Ventricular premature complex(es) now present  ST (T wave) deviation now present  Electronically Signed On 8- 5:51:11 PDT by MARVIN DORAN MD        All labs reviewed by me.    RADIOLOGY  DX-CHEST-PORTABLE (1 VIEW)   Final Result         1.  Interstitial pulmonary parenchymal prominence suggest chronic underlying lung disease, component of interstitial edema and/or infiltrates not excluded.   2.  Perihilar interstitial prominence and bronchial wall cuffing, appearance suggests changes of underlying bronchial inflammation, consider bronchitis.      CT-HEAD W/O   Final Result         1.  No acute intracranial abnormality is identified, there are nonspecific white matter changes, commonly associated with small vessel ischemic disease.  Associated mild cerebral atrophy is noted.   2.  Atherosclerosis.        The radiologist's interpretation of all radiological studies have been reviewed by me.    COURSE & MEDICAL DECISION MAKING  Nursing notes, VS, PMSFHx reviewed in chart.     Review of past medical records shows the patient has a recent diagnosis of hypertension and is compliant with medications to manage this.     5:52 AM - Patient seen and examined at bedside. Ordered DX-Chest, CT-Head, UA Culture, Estimated GFR, CBC Diff, CMP, Troponin, BNP, and an EKG to evaluate her symptoms. The differential diagnoses include but are not limited to: UTI, Anxiety, CHF, Hypertensive emergency versus urgency, Electrolyte abnormality     6:53 AM - The patient was informed that her CT-Head and DX-Chest appear normal. We are awaiting a urine sample for her UA culture.     7:41 AM - Nursing staff  informed me that the patient's blood pressure remains persistently elevated with systolics in the 230s and her symptoms have not improved. I will treat the patient with 10 mg labetalol and plan to hospitalize the patient for hypertensive urgency.    7:42 AM - Paged hospitalist    7:45 AM - Patient was reevaluated at bedside. Discussed lab and radiology  results with the patient and informed them that the results are unremarkable. I also examined the patient's ears which appeared normal. I discussed that the patient's blood pressures are too elevated and I updated her on the plan for hospitalization, which she was agreeable and understanding to.    8:07 AM - I discussed the patient's case and the above findings with Dr. Lo (Hospitalist) who agrees to evaluate the patient for hospitalization.     8:46 AM - The patient's blood pressure was too elevated for telemetry and Dr. Lo refused to hospitalize the patient. We will hospitalize the patient in the ICU. Paged ICU.    8:55 AM - I discussed the patient's case and the above findings with Dr. Rivers (Intensivist) who agrees to evaluate the patient for hospitalization.    CRITICAL CARE  I provided critical care services, which included medication orders, frequent reevaluations of the patient's condition and response to treatment, ordering and reviewing test results, and discussing the case with various consultants. The patient was given multiple doses of labetalol but remained significantly hypertensive. The critical care time associated with the care of the patient was 35 minutes. Review chart for interventions. This time is exclusive of any other billable procedures.      DISPOSITION:  Patient will be hospitalized by Dr. Rivers (Intensivist) in critical condition.    FINAL IMPRESSION  1. Hypertensive urgency    2. Dizziness    The critical care time associated with the care of the patient was 35 minutes.     Tim MIKE (Scriblexie), am scribing for, and in  the presence of, Shahzad Anders M.D..    Electronically signed by: Tim Goodman (Scribe), 8/26/2020    I, Shahzad Anders M.D. personally performed the services described in this documentation, as scribed by Tim Goodman in my presence, and it is both accurate and complete.    C    The note accurately reflects work and decisions made by me.  Shahzad Anders M.D.  8/26/2020  11:05 AM

## 2020-08-26 NOTE — ED NOTES
Med Rec complete per Pt at bedside  Allergies Reviewed  No ABX in the last 14 days    Pt takes ASPRIN

## 2020-08-26 NOTE — PROGRESS NOTES
2 RN skin check complete.   Devices in place scd's, iv.  Skin assessed under devices yes.  Confirmed pressure ulcers found on none.  New potential pressure ulcers noted on none. Wound consult placed no.  The following interventions in place extra pillows, low air loss mattress. Left heel red but blanching.

## 2020-08-26 NOTE — CONSULTS
Critical Care Consultation    Date of consult: 8/26/2020    Referring Physician  Shahzad Anders M.D.    Reason for Consultation  Hypertensive urgency    History of Presenting Illness  85 y.o. female who presented 8/26/2020 with general malaise for the past couple days as well as lightheadedness and weakness of her lower extremities.  She denies changes in vision, headache, fevers, chills, chest pain, shortness of breath.  Her symptoms of been constant, nothing makes it better make makes it worse.  She has never felt this way before.  She reports compliance with her antihypertensive medications.  She denies difficulty speaking, swallowing, or loss of consciousness.    In the emergency department she was found to be hypertensive in the 200s systolic despite IV labetalol, I have been consulted to manage her hypertensive urgency.    Chest x-ray, head CT, EKG, troponin, BNP, BMP are reassuring in the ER.    Code Status  Full Code    Review of Systems  Review of Systems   Constitutional: Positive for malaise/fatigue. Negative for chills, diaphoresis and fever.   HENT: Negative for congestion and nosebleeds.    Eyes: Negative for blurred vision, double vision and pain.   Respiratory: Negative for cough, hemoptysis, sputum production, shortness of breath and wheezing.    Cardiovascular: Negative for chest pain, palpitations, orthopnea and leg swelling.   Gastrointestinal: Negative for abdominal pain, constipation, diarrhea, nausea and vomiting.   Genitourinary: Negative for dysuria and urgency.   Musculoskeletal: Negative for back pain, myalgias and neck pain.   Skin: Negative for itching and rash.   Neurological: Positive for dizziness and weakness. Negative for tingling, tremors, sensory change, speech change, focal weakness, seizures, loss of consciousness and headaches.   Psychiatric/Behavioral: Negative for hallucinations and substance abuse.       Past Medical History   has a past medical history of CHI (closed  head injury), Elevated cortisol level, Hypertension, Insomnia, Multiple thyroid nodules (2008), Thyrotoxicosis, and Urinary tract infection.    Surgical History   has a past surgical history that includes tonsillectomy and appendectomy.    Family History  family history includes Arthritis in her sister; Heart Disease in her father and mother; Hypertension in her sister; Thyroid in her sister.    Social History   reports that she has never smoked. She has never used smokeless tobacco. She reports that she does not drink alcohol or use drugs.    Medications  Home Medications     Reviewed by Claudia Gallardo (Pharmacy Tech) on 08/26/20 at 0819  Med List Status: Complete   Medication Last Dose Status   aspirin EC (ECOTRIN) 81 MG TBEC 8/25/2020 Active   metoprolol SR (TOPROL XL) 50 MG TABLET SR 24 HR 8/25/2020 Active   telmisartan (MICARDIS) 80 MG Tab 8/25/2020 Active   terazosin (HYTRIN) 2 MG Cap 8/25/2020 Active              Current Facility-Administered Medications   Medication Dose Route Frequency Provider Last Rate Last Dose   • aspirin EC (ECOTRIN) tablet 81 mg  81 mg Oral Q EVENING Nathanael Rivers M.D.       • niCARdipine (CARDENE) 25 mg in  mL Infusion  0-15 mg/hr Intravenous Continuous Jaime Raymundo M.D.   Stopped at 08/26/20 1229   • [START ON 8/27/2020] senna-docusate (PERICOLACE or SENOKOT S) 8.6-50 MG per tablet 2 Tab  2 Tab Oral BID Nathanael Rivers M.D.        And   • polyethylene glycol/lytes (MIRALAX) PACKET 1 Packet  1 Packet Oral QDAY PRN Nathanael Rivers M.D.        And   • magnesium hydroxide (MILK OF MAGNESIA) suspension 30 mL  30 mL Oral QDAY PRN Nathanael Rivers M.D.        And   • bisacodyl (DULCOLAX) suppository 10 mg  10 mg Rectal QDAY PRN Nathanael Rivers M.D.       • Respiratory Therapy Consult   Nebulization Continuous RT Nathanael Rivers M.D.       • heparin injection 5,000 Units  5,000 Units Subcutaneous Q8HRS Nathanael Rivers M.D.   5,000 Units at 08/26/20 1435   •  ondansetron (ZOFRAN) syringe/vial injection 4 mg  4 mg Intravenous Q4HRS PRN Nathanael Rivers M.D.       • ondansetron (ZOFRAN ODT) dispertab 4 mg  4 mg Oral Q4HRS PRN Nathanael Rivers M.D.       • MD Alert...ICU Electrolyte Replacement per Pharmacy   Other PHARMACY TO DOSE Nathanael Rivers M.D.       • [START ON 8/27/2020] telmisartan (MICARDIS) tablet 80 mg  80 mg Oral Q DAY Jaime Raymundo M.D.       • terazosin (HYTRIN) capsule 2 mg  2 mg Oral Q EVENING Jaime Raymundo M.D.       • labetalol (NORMODYNE/TRANDATE) injection 10 mg  10 mg Intravenous Q4HRS PRN Jaime Raymundo M.D.       • metoprolol SR (TOPROL XL) tablet 25 mg  25 mg Oral Once Jaime Raymundo M.D.       • [START ON 8/27/2020] metoprolol SR (TOPROL XL) tablet 50 mg  50 mg Oral Q EVENING Jaime Raymundo M.D.           Allergies  Allergies   Allergen Reactions   • Amoxicillin      Unsure of reaction/or allergy   • Hydralazine Unspecified     Pt reported severe hypotensive reaction   • Morphine Vomiting and Nausea   • Penicillins      1970 reaction       Vital Signs last 24 hours  Temp:  [36.2 °C (97.2 °F)-37 °C (98.6 °F)] 36.7 °C (98 °F)  Pulse:  [69-95] 81  Resp:  [16-61] 27  BP: (139-228)/() 144/71  SpO2:  [96 %-98 %] 98 %    Physical Exam  Physical Exam  Constitutional:       General: She is not in acute distress.     Appearance: Normal appearance. She is not toxic-appearing.   HENT:      Head: Normocephalic and atraumatic.      Nose: Nose normal.      Mouth/Throat:      Mouth: Mucous membranes are moist.   Eyes:      Conjunctiva/sclera: Conjunctivae normal.      Pupils: Pupils are equal, round, and reactive to light.   Neck:      Musculoskeletal: Normal range of motion and neck supple.   Cardiovascular:      Rate and Rhythm: Normal rate and regular rhythm.      Pulses: Normal pulses.      Heart sounds: Normal heart sounds. No murmur. No friction rub. No gallop.    Pulmonary:      Effort: Pulmonary effort is normal. No respiratory distress.       Breath sounds: No stridor. No wheezing, rhonchi or rales.   Chest:      Chest wall: No tenderness.   Abdominal:      General: Abdomen is flat. There is no distension.      Tenderness: There is no guarding or rebound.   Musculoskeletal: Normal range of motion.         General: No swelling, tenderness, deformity or signs of injury.      Right lower leg: No edema.      Left lower leg: No edema.   Skin:     General: Skin is warm and dry.      Capillary Refill: Capillary refill takes less than 2 seconds.   Neurological:      General: No focal deficit present.      Mental Status: She is alert and oriented to person, place, and time.      Cranial Nerves: No cranial nerve deficit.      Sensory: No sensory deficit.      Motor: No weakness.      Coordination: Coordination normal.      Gait: Gait normal.   Psychiatric:         Mood and Affect: Mood normal.         Behavior: Behavior normal.         Fluids    Intake/Output Summary (Last 24 hours) at 2020 1710  Last data filed at 2020 1600  Gross per 24 hour   Intake 253.83 ml   Output 625 ml   Net -371.17 ml       Laboratory  Recent Results (from the past 48 hour(s))   EKG    Collection Time: 20  5:34 AM   Result Value Ref Range    Report       [facility]    Test Date:  2020  Pt Name:    QUINTIN JOHNSTON               Department: ER  MRN:        [id number]                      Room:        06  Gender:     Female                       Technician: [id number]  :        [id number]                   Requested By:ER TRIAGE PROTOCOL  Order #:    [id number]                    Reading MD: MARVIN DORAN MD    Measurements  Intervals                                Axis  Rate:       74                           P:          57  MA:         156                          QRS:        40  QRSD:       96                           T:          75  QT:         432  QTc:        480    Interpretive Statements  SINUS RHYTHM  VENTRICULAR  PREMATURE COMPLEX  BORDERLINE INFERIOR Q WAVES  MINIMAL ST DEPRESSION, ANTEROLATERAL LEADS  Compared to ECG 07/06/2016 05:43:11  Ventricular premature complex(es) now present  ST (T wave) deviation now present  Electronically Signed On 8- 5:51:11 PDT by MARVIN DORAN MD      CBC WITH DIFFERENTIAL    Collection Time: 08/26/20  5:54 AM   Result Value Ref Range    WBC 4.8 4.8 - 10.8 K/uL    RBC 4.58 4.20 - 5.40 M/uL    Hemoglobin 14.9 12.0 - 16.0 g/dL    Hematocrit 43.0 37.0 - 47.0 %    MCV 93.9 81.4 - 97.8 fL    MCH 32.5 27.0 - 33.0 pg    MCHC 34.7 33.6 - 35.0 g/dL    RDW 42.9 35.9 - 50.0 fL    Platelet Count 257 164 - 446 K/uL    MPV 10.3 9.0 - 12.9 fL    Neutrophils-Polys 62.60 44.00 - 72.00 %    Lymphocytes 23.00 22.00 - 41.00 %    Monocytes 12.20 0.00 - 13.40 %    Eosinophils 1.40 0.00 - 6.90 %    Basophils 0.60 0.00 - 1.80 %    Immature Granulocytes 0.20 0.00 - 0.90 %    Nucleated RBC 0.00 /100 WBC    Neutrophils (Absolute) 3.02 2.00 - 7.15 K/uL    Lymphs (Absolute) 1.11 1.00 - 4.80 K/uL    Monos (Absolute) 0.59 0.00 - 0.85 K/uL    Eos (Absolute) 0.07 0.00 - 0.51 K/uL    Baso (Absolute) 0.03 0.00 - 0.12 K/uL    Immature Granulocytes (abs) 0.01 0.00 - 0.11 K/uL    NRBC (Absolute) 0.00 K/uL   COMP METABOLIC PANEL    Collection Time: 08/26/20  5:54 AM   Result Value Ref Range    Sodium 138 135 - 145 mmol/L    Potassium 3.5 (L) 3.6 - 5.5 mmol/L    Chloride 103 96 - 112 mmol/L    Co2 22 20 - 33 mmol/L    Anion Gap 13.0 7.0 - 16.0    Glucose 107 (H) 65 - 99 mg/dL    Bun 11 8 - 22 mg/dL    Creatinine 0.63 0.50 - 1.40 mg/dL    Calcium 9.3 8.5 - 10.5 mg/dL    AST(SGOT) 12 12 - 45 U/L    ALT(SGPT) 20 2 - 50 U/L    Alkaline Phosphatase 63 30 - 99 U/L    Total Bilirubin 0.5 0.1 - 1.5 mg/dL    Albumin 3.7 3.2 - 4.9 g/dL    Total Protein 6.2 6.0 - 8.2 g/dL    Globulin 2.5 1.9 - 3.5 g/dL    A-G Ratio 1.5 g/dL   TROPONIN    Collection Time: 08/26/20  5:54 AM   Result Value Ref Range    Troponin T <6 6 - 19 ng/L    proBrain Natriuretic Peptide, NT    Collection Time: 08/26/20  5:54 AM   Result Value Ref Range    NT-proBNP 136 (H) 0 - 125 pg/mL   ESTIMATED GFR    Collection Time: 08/26/20  5:54 AM   Result Value Ref Range    GFR If African American >60 >60 mL/min/1.73 m 2    GFR If Non African American >60 >60 mL/min/1.73 m 2   Magnesium    Collection Time: 08/26/20  5:54 AM   Result Value Ref Range    Magnesium 2.1 1.5 - 2.5 mg/dL   URINALYSIS,CULTURE IF INDICATED    Collection Time: 08/26/20  7:28 AM    Specimen: Urine, Clean Catch   Result Value Ref Range    Color Yellow     Character Clear     Specific Gravity 1.004 <1.035    Ph 8.0 5.0 - 8.0    Glucose Negative Negative mg/dL    Ketones Negative Negative mg/dL    Protein Negative Negative mg/dL    Bilirubin Negative Negative    Urobilinogen, Urine 0.2 Negative    Nitrite Negative Negative    Leukocyte Esterase Negative Negative    Occult Blood Negative Negative    Micro Urine Req see below    Routine (COVID/SARS COV-2 In-House PCR up to 24 hours)    Collection Time: 08/26/20 12:35 PM    Specimen: Nasopharyngeal; Respirate   Result Value Ref Range    COVID Order Status Received        Imaging  DX-CHEST-PORTABLE (1 VIEW)   Final Result         1.  Interstitial pulmonary parenchymal prominence suggest chronic underlying lung disease, component of interstitial edema and/or infiltrates not excluded.   2.  Perihilar interstitial prominence and bronchial wall cuffing, appearance suggests changes of underlying bronchial inflammation, consider bronchitis.      CT-HEAD W/O   Final Result         1.  No acute intracranial abnormality is identified, there are nonspecific white matter changes, commonly associated with small vessel ischemic disease.  Associated mild cerebral atrophy is noted.   2.  Atherosclerosis.      EC-ECHOCARDIOGRAM COMPLETE W/O CONT    (Results Pending)       Assessment/Plan  Hypertensive urgency  Assessment & Plan  Patient reports compliance with medications  but her presenting pressure was 220s/70s.  Will initiate nicardipine infusion and titrate to blood pressure systolic 25% of 220 for the first 8 hours and then once stable begin implementing her p.o. medications   EKG, troponins, BNP, and chest x-ray are reassuring  CT head has artifact of the posterior fossa but is otherwise unremarkable  Her neurologic exam is normal    85-year-old female here with hypertensive urgency requiring titrations of nicardipine to manage her blood pressure.    Discussed patient condition and risk of morbidity and/or mortality with Family, RN, RT, Pharmacy and Patient.    The patient remains critically ill.  Critical care time = 32 minutes in directly providing and coordinating critical care and extensive data review.  No time overlap and excludes procedures.

## 2020-08-26 NOTE — PROGRESS NOTES
Triage officer note    I have discussed case with Dr. Anders    Chief complaint: dizziness, htn urgency uncontrol.     ER evaluation: htn urgency, trop neg, bnp mildly elevated. bp continues to be >220 after iv labetalol discussed with ERP patient will probably need to get admitted to ICU for drip.     ER physician requesting hospitalist group admission    Admission reason: htn urgency.    Admitting physician: probably ICU admission

## 2020-08-26 NOTE — ASSESSMENT & PLAN NOTE
Patient reports compliance with medications but her presenting pressure was 220s/70s.  s/Post nicardipine drip    Patient Lopressor changed over to p.o. Coreg for better blood pressure control    Hydrochlorothiazide stopped on 8/31 due to low sodium    Continue terazosin, carvedilol, telmisartan

## 2020-08-26 NOTE — CARE PLAN
Problem: Safety  Goal: Will remain free from injury  Intervention: Educate patient and significant other/support system about adaptive mobility strategies and safe transfers  Note: Call light within reach, commode in room, bed alarm in use.      Problem: Venous Thromboembolism (VTW)/Deep Vein Thrombosis (DVT) Prevention:  Goal: Patient will participate in Venous Thrombosis (VTE)/Deep Vein Thrombosis (DVT)Prevention Measures  Intervention: Encourage ambulation/mobilization at level directed by Physical Therapy in collaboration with Interdisciplinary Team  Note: SCD's in place.

## 2020-08-26 NOTE — ED TRIAGE NOTES
"Chief Complaint   Patient presents with   • Dizziness     started 4 days ago    • Hypertension     started 4 days ago pt has hx of HTN, is complaint w/ medication, and BP is normally 130's systolic     Pt BIB EMS for above complaint. Pt also complaining of bilateral numbness/tingling in hands and feet. Pt denies any vision changes or weakness. Pt has been having difficulty walking due to the dizziness. Pt denies any contact w/ suspected or positive Covid pt. Pt placed in gown, EKG performed, IV started, and labs collected and sent. Pt educated on ER process.    BP (!) 223/91   Pulse 70   Temp 36.5 °C (97.7 °F) (Temporal)   Resp 18   Ht 1.651 m (5' 5\")   Wt 61.2 kg (135 lb)   SpO2 98%   BMI 22.47 kg/m²     "

## 2020-08-27 ENCOUNTER — APPOINTMENT (OUTPATIENT)
Dept: CARDIOLOGY | Facility: MEDICAL CENTER | Age: 85
DRG: 305 | End: 2020-08-27
Attending: PSYCHIATRY & NEUROLOGY
Payer: MEDICARE

## 2020-08-27 LAB
ANION GAP SERPL CALC-SCNC: 14 MMOL/L (ref 7–16)
BUN SERPL-MCNC: 10 MG/DL (ref 8–22)
CALCIUM SERPL-MCNC: 9.1 MG/DL (ref 8.5–10.5)
CHLORIDE SERPL-SCNC: 104 MMOL/L (ref 96–112)
CO2 SERPL-SCNC: 21 MMOL/L (ref 20–33)
CREAT SERPL-MCNC: 0.65 MG/DL (ref 0.5–1.4)
ERYTHROCYTE [DISTWIDTH] IN BLOOD BY AUTOMATED COUNT: 45.3 FL (ref 35.9–50)
GLUCOSE SERPL-MCNC: 97 MG/DL (ref 65–99)
HCT VFR BLD AUTO: 43.1 % (ref 37–47)
HGB BLD-MCNC: 14.5 G/DL (ref 12–16)
LV EJECT FRACT  99904: 75
LV EJECT FRACT MOD 2C 99903: 60.36
LV EJECT FRACT MOD 4C 99902: 72.55
LV EJECT FRACT MOD BP 99901: 70.25
MCH RBC QN AUTO: 32.4 PG (ref 27–33)
MCHC RBC AUTO-ENTMCNC: 33.6 G/DL (ref 33.6–35)
MCV RBC AUTO: 96.4 FL (ref 81.4–97.8)
PLATELET # BLD AUTO: 239 K/UL (ref 164–446)
PMV BLD AUTO: 10.3 FL (ref 9–12.9)
POTASSIUM SERPL-SCNC: 3.8 MMOL/L (ref 3.6–5.5)
RBC # BLD AUTO: 4.47 M/UL (ref 4.2–5.4)
SODIUM SERPL-SCNC: 139 MMOL/L (ref 135–145)
WBC # BLD AUTO: 6.4 K/UL (ref 4.8–10.8)

## 2020-08-27 PROCEDURE — 700111 HCHG RX REV CODE 636 W/ 250 OVERRIDE (IP): Performed by: INTERNAL MEDICINE

## 2020-08-27 PROCEDURE — 700105 HCHG RX REV CODE 258: Performed by: PSYCHIATRY & NEUROLOGY

## 2020-08-27 PROCEDURE — 93306 TTE W/DOPPLER COMPLETE: CPT | Mod: 26 | Performed by: INTERNAL MEDICINE

## 2020-08-27 PROCEDURE — 97165 OT EVAL LOW COMPLEX 30 MIN: CPT

## 2020-08-27 PROCEDURE — 99291 CRITICAL CARE FIRST HOUR: CPT | Performed by: PSYCHIATRY & NEUROLOGY

## 2020-08-27 PROCEDURE — 700102 HCHG RX REV CODE 250 W/ 637 OVERRIDE(OP): Performed by: INTERNAL MEDICINE

## 2020-08-27 PROCEDURE — 700101 HCHG RX REV CODE 250: Performed by: PSYCHIATRY & NEUROLOGY

## 2020-08-27 PROCEDURE — A9270 NON-COVERED ITEM OR SERVICE: HCPCS | Performed by: INTERNAL MEDICINE

## 2020-08-27 PROCEDURE — 93306 TTE W/DOPPLER COMPLETE: CPT

## 2020-08-27 PROCEDURE — 700102 HCHG RX REV CODE 250 W/ 637 OVERRIDE(OP): Performed by: PSYCHIATRY & NEUROLOGY

## 2020-08-27 PROCEDURE — 80048 BASIC METABOLIC PNL TOTAL CA: CPT

## 2020-08-27 PROCEDURE — A9270 NON-COVERED ITEM OR SERVICE: HCPCS | Performed by: PSYCHIATRY & NEUROLOGY

## 2020-08-27 PROCEDURE — 770022 HCHG ROOM/CARE - ICU (200)

## 2020-08-27 PROCEDURE — 97161 PT EVAL LOW COMPLEX 20 MIN: CPT

## 2020-08-27 PROCEDURE — 85027 COMPLETE CBC AUTOMATED: CPT

## 2020-08-27 RX ORDER — ENALAPRILAT 1.25 MG/ML
1.25 INJECTION INTRAVENOUS EVERY 6 HOURS PRN
Status: DISCONTINUED | OUTPATIENT
Start: 2020-08-27 | End: 2020-08-28

## 2020-08-27 RX ORDER — TERAZOSIN 2 MG/1
2 CAPSULE ORAL TWICE DAILY
Status: DISCONTINUED | OUTPATIENT
Start: 2020-08-27 | End: 2020-08-28

## 2020-08-27 RX ORDER — HYDROCHLOROTHIAZIDE 12.5 MG/1
12.5 TABLET ORAL
Status: DISCONTINUED | OUTPATIENT
Start: 2020-08-27 | End: 2020-08-28

## 2020-08-27 RX ORDER — POTASSIUM CHLORIDE 20 MEQ/1
40 TABLET, EXTENDED RELEASE ORAL ONCE
Status: COMPLETED | OUTPATIENT
Start: 2020-08-27 | End: 2020-08-27

## 2020-08-27 RX ADMIN — HYDROCHLOROTHIAZIDE 12.5 MG: 12.5 TABLET ORAL at 08:29

## 2020-08-27 RX ADMIN — LABETALOL HYDROCHLORIDE 10 MG: 5 INJECTION, SOLUTION INTRAVENOUS at 16:02

## 2020-08-27 RX ADMIN — HEPARIN SODIUM 5000 UNITS: 5000 INJECTION, SOLUTION INTRAVENOUS; SUBCUTANEOUS at 14:00

## 2020-08-27 RX ADMIN — TELMISARTAN 80 MG: 80 TABLET ORAL at 05:22

## 2020-08-27 RX ADMIN — METOPROLOL SUCCINATE 50 MG: 50 TABLET, EXTENDED RELEASE ORAL at 17:26

## 2020-08-27 RX ADMIN — TERAZOSIN HYDROCHLORIDE 2 MG: 2 CAPSULE ORAL at 11:13

## 2020-08-27 RX ADMIN — DOCUSATE SODIUM 50 MG AND SENNOSIDES 8.6 MG 2 TABLET: 8.6; 5 TABLET, FILM COATED ORAL at 05:22

## 2020-08-27 RX ADMIN — SODIUM CHLORIDE 1 MG/HR: 9 INJECTION, SOLUTION INTRAVENOUS at 13:05

## 2020-08-27 RX ADMIN — HEPARIN SODIUM 5000 UNITS: 5000 INJECTION, SOLUTION INTRAVENOUS; SUBCUTANEOUS at 20:52

## 2020-08-27 RX ADMIN — MENTHOL AND METHYL SALICYLATE: 7.6; 29 OINTMENT TOPICAL at 19:33

## 2020-08-27 RX ADMIN — POTASSIUM CHLORIDE 40 MEQ: 1500 TABLET, EXTENDED RELEASE ORAL at 08:29

## 2020-08-27 RX ADMIN — ENALAPRILAT 1.25 MG: 1.25 INJECTION INTRAVENOUS at 04:02

## 2020-08-27 RX ADMIN — ENALAPRILAT 1.25 MG: 1.25 INJECTION INTRAVENOUS at 16:33

## 2020-08-27 RX ADMIN — TERAZOSIN HYDROCHLORIDE 2 MG: 2 CAPSULE ORAL at 17:25

## 2020-08-27 RX ADMIN — ASPIRIN 81 MG: 81 TABLET, COATED ORAL at 17:25

## 2020-08-27 RX ADMIN — MENTHOL AND METHYL SALICYLATE: 7.6; 29 OINTMENT TOPICAL at 08:33

## 2020-08-27 ASSESSMENT — COGNITIVE AND FUNCTIONAL STATUS - GENERAL
STANDING UP FROM CHAIR USING ARMS: A LITTLE
SUGGESTED CMS G CODE MODIFIER MOBILITY: CJ
DAILY ACTIVITIY SCORE: 24
MOBILITY SCORE: 20
MOVING FROM LYING ON BACK TO SITTING ON SIDE OF FLAT BED: A LITTLE
WALKING IN HOSPITAL ROOM: A LITTLE
SUGGESTED CMS G CODE MODIFIER DAILY ACTIVITY: CH
CLIMB 3 TO 5 STEPS WITH RAILING: A LITTLE

## 2020-08-27 ASSESSMENT — ENCOUNTER SYMPTOMS
NEUROLOGICAL NEGATIVE: 1
CHILLS: 0
MUSCULOSKELETAL NEGATIVE: 1
RESPIRATORY NEGATIVE: 1
EYES NEGATIVE: 1
GASTROINTESTINAL NEGATIVE: 1
CARDIOVASCULAR NEGATIVE: 1
FEVER: 0
PSYCHIATRIC NEGATIVE: 1

## 2020-08-27 ASSESSMENT — ACTIVITIES OF DAILY LIVING (ADL): TOILETING: INDEPENDENT

## 2020-08-27 ASSESSMENT — GAIT ASSESSMENTS
DEVIATION: BRADYKINETIC
DISTANCE (FEET): 150
GAIT LEVEL OF ASSIST: SUPERVISED

## 2020-08-27 ASSESSMENT — FIBROSIS 4 INDEX
FIB4 SCORE: 0.93
FIB4 SCORE: 0.93

## 2020-08-27 NOTE — CARE PLAN
Problem: Safety  Goal: Will remain free from falls  Intervention: Implement fall precautions  Flowsheets (Taken 8/27/2020 6165)  Bed Alarm: Yes - Alarm On  Note: Call light within reach.      Problem: Knowledge Deficit  Goal: Knowledge of the prescribed therapeutic regimen will improve  Note: Explain new medications, expalin plan of are.

## 2020-08-27 NOTE — PROGRESS NOTES
Critical Care Progress Note    Date of admission  8/26/2020    Chief Complaint  85 y.o. female admitted 8/26/2020 with HTN urgency    Hospital Course    8/26 cardene gtt      Interval Problem Update  Reviewed last 24 hour events:  Briefly on cardene overnight  Afebrile  HR 60-80s  -160s  GCS 15  Good urine output  Taking PO    Review of Systems  Review of Systems   Constitutional: Negative for chills and fever.   HENT: Negative.    Eyes: Negative.    Respiratory: Negative.    Cardiovascular: Negative.    Gastrointestinal: Negative.    Musculoskeletal: Negative.    Neurological: Negative.    Psychiatric/Behavioral: Negative.         Vital Signs for last 24 hours   Temp:  [36.2 °C (97.2 °F)-37 °C (98.6 °F)] 36.4 °C (97.6 °F)  Pulse:  [63-95] 79  Resp:  [14-74] 50  BP: (105-228)/() 161/65  SpO2:  [92 %-98 %] 95 %    Hemodynamic parameters for last 24 hours       Respiratory Information for the last 24 hours       Physical Exam   Physical Exam  Constitutional:       General: She is not in acute distress.  HENT:      Head: Normocephalic and atraumatic.      Right Ear: External ear normal.      Left Ear: External ear normal.      Nose: Nose normal.      Mouth/Throat:      Mouth: Mucous membranes are moist.      Pharynx: Oropharynx is clear.   Eyes:      Extraocular Movements: Extraocular movements intact.      Pupils: Pupils are equal, round, and reactive to light.   Cardiovascular:      Rate and Rhythm: Normal rate and regular rhythm.      Pulses: Normal pulses.   Pulmonary:      Effort: Pulmonary effort is normal.   Abdominal:      General: Abdomen is flat.   Musculoskeletal:         General: No swelling.   Neurological:      General: No focal deficit present.      Mental Status: She is alert and oriented to person, place, and time.      Cranial Nerves: No cranial nerve deficit.      Motor: No weakness.   Psychiatric:         Mood and Affect: Mood normal.         Behavior: Behavior normal.          Medications  Current Facility-Administered Medications   Medication Dose Route Frequency Provider Last Rate Last Dose   • enalaprilat (VASOTEC) injection 1.25 mg  1.25 mg Intravenous Q6HRS PRN Grey Flannery M.D.   1.25 mg at 08/27/20 0402   • aspirin EC (ECOTRIN) tablet 81 mg  81 mg Oral Q EVENING Nathanael Rivers M.D.   81 mg at 08/26/20 1808   • niCARdipine (CARDENE) 25 mg in  mL Infusion  0-15 mg/hr Intravenous Continuous Jaime Raymundo M.D. 20 mL/hr at 08/27/20 0600 2 mg/hr at 08/27/20 0600   • senna-docusate (PERICOLACE or SENOKOT S) 8.6-50 MG per tablet 2 Tab  2 Tab Oral BID Nathanael Rivers M.D.   2 Tab at 08/27/20 0522    And   • polyethylene glycol/lytes (MIRALAX) PACKET 1 Packet  1 Packet Oral QDAY PRN Nathanael Rivers M.D.        And   • magnesium hydroxide (MILK OF MAGNESIA) suspension 30 mL  30 mL Oral QDAY PRN Nathanael Rivers M.D.        And   • bisacodyl (DULCOLAX) suppository 10 mg  10 mg Rectal QDAY PRN Nathanael Rivers M.D.       • Respiratory Therapy Consult   Nebulization Continuous RT Nathanael Rivers M.D.       • heparin injection 5,000 Units  5,000 Units Subcutaneous Q8HRS Nathanael Rivers M.D.   5,000 Units at 08/26/20 2216   • ondansetron (ZOFRAN) syringe/vial injection 4 mg  4 mg Intravenous Q4HRS PRN Nathanael Rivers M.D.       • ondansetron (ZOFRAN ODT) dispertab 4 mg  4 mg Oral Q4HRS PRN Nathanael Rivers M.D.       • MD Alert...ICU Electrolyte Replacement per Pharmacy   Other PHARMACY TO DOSE Nathanael Rivers M.D.       • telmisartan (MICARDIS) tablet 80 mg  80 mg Oral Q DAY Jaime Raymundo M.D.   80 mg at 08/27/20 0522   • terazosin (HYTRIN) capsule 2 mg  2 mg Oral Q EVENING Jaime Raymundo M.D.   2 mg at 08/26/20 1808   • labetalol (NORMODYNE/TRANDATE) injection 10 mg  10 mg Intravenous Q4HRS PRN Jaime Raymundo M.D.   10 mg at 08/26/20 2221   • metoprolol SR (TOPROL XL) tablet 50 mg  50 mg Oral Q EVENING Jaime Raymundo M.D.       • acetaminophen (TYLENOL)  tablet 650 mg  650 mg Oral Q4HRS PRN Grey Flannery M.D.       • menthol-methyl salicylate (IcyHot\BENGAY) ointment   Topical TID PRN Grey Flannery M.D.           Fluids    Intake/Output Summary (Last 24 hours) at 8/27/2020 0650  Last data filed at 8/27/2020 0600  Gross per 24 hour   Intake 1312.17 ml   Output 1925 ml   Net -612.83 ml       Laboratory          Recent Labs     08/26/20  0554 08/27/20  0345   SODIUM 138 139   POTASSIUM 3.5* 3.8   CHLORIDE 103 104   CO2 22 21   BUN 11 10   CREATININE 0.63 0.65   MAGNESIUM 2.1  --    CALCIUM 9.3 9.1     Recent Labs     08/26/20  0554 08/27/20  0345   ALTSGPT 20  --    ASTSGOT 12  --    ALKPHOSPHAT 63  --    TBILIRUBIN 0.5  --    GLUCOSE 107* 97     Recent Labs     08/26/20  0554 08/27/20  0345   WBC 4.8 6.4   NEUTSPOLYS 62.60  --    LYMPHOCYTES 23.00  --    MONOCYTES 12.20  --    EOSINOPHILS 1.40  --    BASOPHILS 0.60  --    ASTSGOT 12  --    ALTSGPT 20  --    ALKPHOSPHAT 63  --    TBILIRUBIN 0.5  --      Recent Labs     08/26/20  0554 08/27/20  0345   RBC 4.58 4.47   HEMOGLOBIN 14.9 14.5   HEMATOCRIT 43.0 43.1   PLATELETCT 257 239       Imaging  CT:    Reviewed    Assessment/Plan  Hypertensive urgency  Assessment & Plan  Wean Cardene  Goal SBP < 150  resumed home meds; add HCTZ   Follow up echo       VTE:  Heparin  Ulcer: Not Indicated  Lines: None    I have performed a physical exam and reviewed and updated ROS and Plan today (8/27/2020). In review of yesterday's note (8/26/2020), there are no changes except as documented above.     Discussed patient condition and risk of morbidity and/or mortality with RN, RT, Pharmacy, Code status disscussed and Patient  The patient remains critically ill.  Critical care time = 32 minutes in directly providing and coordinating critical care and extensive data review.  No time overlap and excludes procedures.

## 2020-08-27 NOTE — THERAPY
"Physical Therapy   Initial Evaluation     Patient Name: Prabha Lizama  Age:  85 y.o., Sex:  female  Medical Record #: [id number]  Today's Date: 8/27/2020     Precautions: (P) Fall Risk    Assessment  Patient is 85 y.o. female admitted with a hypertensive emergency. She lives with her  in a [facility] and was indep PTA. Patient able to demo all mobility at a supervision level today. Her balance is slightly impaired most likely 2/2 being in bed for several days. Provided education about taking positional changes slowly and monitoring for signs and symptoms of dizziness. Patient in agreement with this plan. Patient is safe for DC home with assist from her  as needed. She has expressed that she doesn't want HHPT.    Plan    DC Equipment Recommendations:  None  Discharge Recommendations:  Anticipate that the patient will have no further physical therapy needs after discharge from the hospital       Subjective    \"I haven't been feeling that well, but I can try\"     Objective       08/27/20 0901   Precautions   Precautions Fall Risk   Comments HTN emergency    Vitals   Blood Pressure  (!) 180/81  (after ambulation. RN aware)   Pain 0 - 10 Group   Location Foot   Location Orientation Left   Pain Rating Scale (NPRS) 2   Therapist Pain Assessment Post Activity Pain Same as Prior to Activity   Prior Living Situation   Prior Services None   Housing / Facility 1 Story House   Steps Into Home 1   Steps In Home 0   Bathroom Set up Walk In Shower   Equipment Owned None   Lives with - Patient's Self Care Capacity Spouse   Comments spouse can provide assist as needed   Prior Level of Functional Mobility   Bed Mobility Independent   Transfer Status Independent   Ambulation Independent   Distance Ambulation (Feet)   (community distances )   Assistive Devices Used None   Stairs Independent   History of Falls   History of Falls Yes   Date of Last Fall   (had a fall 2 years ago)   Cognition    Cognition / Consciousness WDL "   Level of Consciousness Alert   Comments patient anxious, but motivated   Active ROM Lower Body    Active ROM Lower Body  WDL   Strength Lower Body   Lower Body Strength  WDL   Sensation Lower Body   Lower Extremity Sensation   X   Comments pt c/o N/T in B hands for the last several days   Strength Upper Body   Upper Body Strength  WDL   Vision   Vision Comments wears glasses for reading   Balance Assessment   Sitting Balance (Static) Good   Sitting Balance (Dynamic) Good   Standing Balance (Static) Fair   Standing Balance (Dynamic) Fair -   Weight Shift Sitting Good   Weight Shift Standing Fair   Gait Analysis   Gait Level Of Assist Supervised   Assistive Device None   Distance (Feet) 150   Deviation Bradykinetic   Weight Bearing Status no restrictions   Vision Deficits Impacting Mobility no   Bed Mobility    Supine to Sit Supervised   Sit to Supine Supervised   Scooting Supervised   Functional Mobility   Sit to Stand Supervised   How much difficulty does the patient currently have...   Turning over in bed (including adjusting bedclothes, sheets and blankets)? 4   Sitting down on and standing up from a chair with arms (e.g., wheelchair, bedside commode, etc.) 3   Moving from lying on back to sitting on the side of the bed? 4   How much help from another person does the patient currently need...   Moving to and from a bed to a chair (including a wheelchair)? 3   Need to walk in a hospital room? 3   Climbing 3-5 steps with a railing? 3   6 clicks Mobility Score 20   Activity Tolerance   Sitting Edge of Bed 10 min   Standing 15 min   Education Group   Education Provided Gait Training   Gait Training Patient Response Patient;Acceptance;Explanation;Verbal Demonstration;Action Demonstration   Anticipated Discharge Equipment and Recommendations   DC Equipment Recommendations None   Discharge Recommendations Anticipate that the patient will have no further physical therapy needs after discharge from the hospital     Maite  Rafaelas, PT, DPT, GCS

## 2020-08-27 NOTE — CARE PLAN
Problem: Knowledge Deficit  Goal: Knowledge of disease process/condition, treatment plan, diagnostic tests, and medications will improve  Intervention: Explain information regarding disease process/condition, treatment plan, diagnostic tests, and medications and document in education  Note: Pt educated on plan of care. Reinforcement provided as needed     Problem: Psychosocial Needs:  Goal: Level of anxiety will decrease  Intervention: Identify and develop with patient strategies to cope with anxiety triggers  Note: Pt educated on anxiety. Coping strategies discussed. Reinforced as needed

## 2020-08-27 NOTE — THERAPY
Occupational Therapy   Initial Evaluation     Patient Name: Prabha Lizama  Age:  85 y.o., Sex:  female  Medical Record #: [id number]  Today's Date: 8/27/2020     Precautions  Precautions: Fall Risk  Comments: HTN emergency     Assessment  Patient is 85 y.o. female with a diagnosis of High blood pressure.   Pt is at or near his/her functional baseline. Pt with no further skilled OT needs in the acute care setting at this time.       Plan     Occupational Therapy for Evaluation only.       Discharge Recommendations: (P) Anticipate that the patient will have no further occupational therapy needs after discharge from the hospital          08/27/20 0851   Prior Living Situation   Prior Services None   Housing / Facility 1 Story House   Steps Into Home 1   Steps In Home 0   Bathroom Set up Walk In Shower   Equipment Owned None   Lives with - Patient's Self Care Capacity Spouse   Prior Level of ADL Function   Self Feeding Independent   Grooming / Hygiene Independent   Bathing Independent   Dressing Independent   Toileting Independent   ADL Assessment   Grooming Supervision   Upper Body Dressing Supervision   Lower Body Dressing Supervision   Toileting Supervision   Functional Mobility   Sit to Stand Supervised   Bed, Chair, Wheelchair Transfer Supervised

## 2020-08-28 PROBLEM — Z86.79 HISTORY OF HYPERTENSION: Status: ACTIVE | Noted: 2020-08-28

## 2020-08-28 PROBLEM — G93.40 ACUTE ENCEPHALOPATHY: Status: ACTIVE | Noted: 2020-08-28

## 2020-08-28 LAB
ANION GAP SERPL CALC-SCNC: 13 MMOL/L (ref 7–16)
BUN SERPL-MCNC: 11 MG/DL (ref 8–22)
CALCIUM SERPL-MCNC: 9.4 MG/DL (ref 8.5–10.5)
CHLORIDE SERPL-SCNC: 97 MMOL/L (ref 96–112)
CO2 SERPL-SCNC: 21 MMOL/L (ref 20–33)
CREAT SERPL-MCNC: 0.56 MG/DL (ref 0.5–1.4)
ERYTHROCYTE [DISTWIDTH] IN BLOOD BY AUTOMATED COUNT: 46.5 FL (ref 35.9–50)
GLUCOSE SERPL-MCNC: 101 MG/DL (ref 65–99)
HCT VFR BLD AUTO: 48.2 % (ref 37–47)
HGB BLD-MCNC: 15.8 G/DL (ref 12–16)
MCH RBC QN AUTO: 32.5 PG (ref 27–33)
MCHC RBC AUTO-ENTMCNC: 32.8 G/DL (ref 33.6–35)
MCV RBC AUTO: 99.2 FL (ref 81.4–97.8)
PLATELET # BLD AUTO: 269 K/UL (ref 164–446)
PMV BLD AUTO: 10 FL (ref 9–12.9)
POTASSIUM SERPL-SCNC: 3.7 MMOL/L (ref 3.6–5.5)
RBC # BLD AUTO: 4.86 M/UL (ref 4.2–5.4)
SODIUM SERPL-SCNC: 131 MMOL/L (ref 135–145)
WBC # BLD AUTO: 8.4 K/UL (ref 4.8–10.8)

## 2020-08-28 PROCEDURE — 99223 1ST HOSP IP/OBS HIGH 75: CPT | Performed by: HOSPITALIST

## 2020-08-28 PROCEDURE — 700102 HCHG RX REV CODE 250 W/ 637 OVERRIDE(OP): Performed by: INTERNAL MEDICINE

## 2020-08-28 PROCEDURE — 770020 HCHG ROOM/CARE - TELE (206)

## 2020-08-28 PROCEDURE — 700111 HCHG RX REV CODE 636 W/ 250 OVERRIDE (IP): Performed by: INTERNAL MEDICINE

## 2020-08-28 PROCEDURE — 700102 HCHG RX REV CODE 250 W/ 637 OVERRIDE(OP): Performed by: HOSPITALIST

## 2020-08-28 PROCEDURE — 700101 HCHG RX REV CODE 250: Performed by: PSYCHIATRY & NEUROLOGY

## 2020-08-28 PROCEDURE — A9270 NON-COVERED ITEM OR SERVICE: HCPCS | Performed by: PSYCHIATRY & NEUROLOGY

## 2020-08-28 PROCEDURE — 700102 HCHG RX REV CODE 250 W/ 637 OVERRIDE(OP): Performed by: PSYCHIATRY & NEUROLOGY

## 2020-08-28 PROCEDURE — 85027 COMPLETE CBC AUTOMATED: CPT

## 2020-08-28 PROCEDURE — A9270 NON-COVERED ITEM OR SERVICE: HCPCS | Performed by: HOSPITALIST

## 2020-08-28 PROCEDURE — 80048 BASIC METABOLIC PNL TOTAL CA: CPT

## 2020-08-28 PROCEDURE — A9270 NON-COVERED ITEM OR SERVICE: HCPCS | Performed by: INTERNAL MEDICINE

## 2020-08-28 RX ORDER — POTASSIUM CHLORIDE 20 MEQ/1
40 TABLET, EXTENDED RELEASE ORAL ONCE
Status: COMPLETED | OUTPATIENT
Start: 2020-08-28 | End: 2020-08-28

## 2020-08-28 RX ORDER — HYDROCHLOROTHIAZIDE 12.5 MG/1
12.5 TABLET ORAL ONCE
Status: COMPLETED | OUTPATIENT
Start: 2020-08-28 | End: 2020-08-28

## 2020-08-28 RX ORDER — HYDROCHLOROTHIAZIDE 25 MG/1
25 TABLET ORAL
Status: DISCONTINUED | OUTPATIENT
Start: 2020-08-29 | End: 2020-08-30

## 2020-08-28 RX ORDER — CARVEDILOL 25 MG/1
25 TABLET ORAL 2 TIMES DAILY WITH MEALS
Status: DISCONTINUED | OUTPATIENT
Start: 2020-08-28 | End: 2020-08-30

## 2020-08-28 RX ORDER — ENALAPRILAT 1.25 MG/ML
2.5 INJECTION INTRAVENOUS EVERY 6 HOURS PRN
Status: DISCONTINUED | OUTPATIENT
Start: 2020-08-28 | End: 2020-08-30

## 2020-08-28 RX ORDER — LABETALOL HYDROCHLORIDE 5 MG/ML
10-20 INJECTION, SOLUTION INTRAVENOUS EVERY 4 HOURS PRN
Status: DISCONTINUED | OUTPATIENT
Start: 2020-08-28 | End: 2020-08-30

## 2020-08-28 RX ORDER — ENALAPRILAT 1.25 MG/ML
2.5 INJECTION INTRAVENOUS EVERY 6 HOURS PRN
Status: DISCONTINUED | OUTPATIENT
Start: 2020-08-28 | End: 2020-08-28

## 2020-08-28 RX ADMIN — CARVEDILOL 25 MG: 25 TABLET, FILM COATED ORAL at 16:38

## 2020-08-28 RX ADMIN — HEPARIN SODIUM 5000 UNITS: 5000 INJECTION, SOLUTION INTRAVENOUS; SUBCUTANEOUS at 21:32

## 2020-08-28 RX ADMIN — POTASSIUM CHLORIDE 40 MEQ: 1500 TABLET, EXTENDED RELEASE ORAL at 09:19

## 2020-08-28 RX ADMIN — TELMISARTAN 80 MG: 80 TABLET ORAL at 08:15

## 2020-08-28 RX ADMIN — ACETAMINOPHEN 650 MG: 325 TABLET, FILM COATED ORAL at 20:11

## 2020-08-28 RX ADMIN — ASPIRIN 81 MG: 81 TABLET, COATED ORAL at 16:37

## 2020-08-28 RX ADMIN — TERAZOSIN HYDROCHLORIDE 3 MG: 2 CAPSULE ORAL at 22:10

## 2020-08-28 RX ADMIN — LABETALOL HYDROCHLORIDE 10 MG: 5 INJECTION, SOLUTION INTRAVENOUS at 13:02

## 2020-08-28 RX ADMIN — HYDROCHLOROTHIAZIDE 12.5 MG: 12.5 TABLET ORAL at 08:14

## 2020-08-28 RX ADMIN — TERAZOSIN HYDROCHLORIDE 2 MG: 2 CAPSULE ORAL at 08:17

## 2020-08-28 ASSESSMENT — ENCOUNTER SYMPTOMS
PALPITATIONS: 0
SPUTUM PRODUCTION: 0
WEIGHT LOSS: 0
FEVER: 0
SPEECH CHANGE: 0
HEMOPTYSIS: 0
CLAUDICATION: 0
TREMORS: 0
HEARTBURN: 0
PHOTOPHOBIA: 0
TINGLING: 0
NAUSEA: 0
NECK PAIN: 0
DIZZINESS: 1
HALLUCINATIONS: 1
BACK PAIN: 0
ABDOMINAL PAIN: 0
EYE PAIN: 0
ORTHOPNEA: 0
SENSORY CHANGE: 0
BLURRED VISION: 0
VOMITING: 0
MYALGIAS: 0
COUGH: 0
BRUISES/BLEEDS EASILY: 0
DOUBLE VISION: 0
DEPRESSION: 0

## 2020-08-28 ASSESSMENT — COGNITIVE AND FUNCTIONAL STATUS - GENERAL
MOBILITY SCORE: 19
MOVING FROM LYING ON BACK TO SITTING ON SIDE OF FLAT BED: A LITTLE
SUGGESTED CMS G CODE MODIFIER MOBILITY: CK
WALKING IN HOSPITAL ROOM: A LITTLE
MOVING TO AND FROM BED TO CHAIR: A LITTLE
SUGGESTED CMS G CODE MODIFIER DAILY ACTIVITY: CJ
DRESSING REGULAR LOWER BODY CLOTHING: A LITTLE
DAILY ACTIVITIY SCORE: 21
DRESSING REGULAR UPPER BODY CLOTHING: A LITTLE
TURNING FROM BACK TO SIDE WHILE IN FLAT BAD: A LITTLE
CLIMB 3 TO 5 STEPS WITH RAILING: A LITTLE
HELP NEEDED FOR BATHING: A LITTLE

## 2020-08-28 ASSESSMENT — FIBROSIS 4 INDEX
FIB4 SCORE: 0.83
FIB4 SCORE: 0.83

## 2020-08-28 NOTE — PROGRESS NOTES
Pt transferred from -[facility]. Report received, tele box in place, monitor room notified. Family at bedside, belongings transferred with pt.

## 2020-08-28 NOTE — PROGRESS NOTES
Triage officer:  Dr. Raymundo has evaluated Ms. Lizama and determined that she is stable for transfer out of the ICU. She was admitted with hypertensive urgency and is s/p a nicardipine drip.  Dr. Pearl to consult from the hospitalist service.

## 2020-08-28 NOTE — CONSULTS
Hospital Medicine Consultation    Date of Service  8/28/2020    Referring Physician  Dr reed    Consulting Physician  Óscar Pearl M.D.    Reason for Consultation  El;evated BP    History of Presenting Illness  85 y.o. female who presented 8/26/2020 with possible history of hypertension comes emergency room stating for the last couple days she has not been feeling well she has had some weakness in her lower extremities she is been lightheaded.  Emergency department patient's blood pressure elevated as high as 240.  She was referred to the intensivist for management of this hypertensive emergency.. Patient is admitted to the ICU and placed on IV antihypertensive.  As her blood pressure improved she was able to come off this medication and transition to oral antihypertensive medication.. In the ICU patient blood pressure still slightly elevated with a systolic blood pressure 183.. She is tachycardic when she gets up.. She denies any chest pain or shortness of breath or severe headache she did state that she still feels not right in her head.  And that she is noted that she has been hallucinating.. CT of the head that was done on admission is unremarkable.      She has no focal deficits        Review of Systems  Review of Systems   Constitutional: Positive for malaise/fatigue. Negative for fever and weight loss.   HENT: Negative for ear discharge, ear pain, hearing loss and tinnitus.    Eyes: Negative for blurred vision, double vision, photophobia and pain.   Respiratory: Negative for cough, hemoptysis and sputum production.    Cardiovascular: Negative for chest pain, palpitations, orthopnea and claudication.   Gastrointestinal: Negative for abdominal pain, heartburn, nausea and vomiting.   Genitourinary: Negative for dysuria, frequency and urgency.   Musculoskeletal: Negative for back pain, myalgias and neck pain.   Neurological: Positive for dizziness. Negative for tingling, tremors, sensory change and speech  change.   Endo/Heme/Allergies: Negative for environmental allergies. Does not bruise/bleed easily.   Psychiatric/Behavioral: Positive for hallucinations. Negative for depression and suicidal ideas.       Past Medical History   has a past medical history of CHI (closed head injury), Elevated cortisol level, Hypertension, Insomnia, Multiple thyroid nodules (2008), Thyrotoxicosis, and Urinary tract infection.    Surgical History   has a past surgical history that includes tonsillectomy and appendectomy.    Family History  family history includes Arthritis in her sister; Heart Disease in her father and mother; Hypertension in her sister; Thyroid in her sister.    Social History   reports that she has never smoked. She has never used smokeless tobacco. She reports that she does not drink alcohol or use drugs.    Medications  Current Facility-Administered Medications   Medication Dose Route Frequency Provider Last Rate Last Dose   • [START ON 8/29/2020] hydroCHLOROthiazide (HYDRODIURIL) tablet 25 mg  25 mg Oral Q DAY Jaime Raymundo M.D.       • enalaprilat (VASOTEC) injection 2.5 mg  2.5 mg Intravenous Q6HRS PRN Jaime Raymundo M.D.       • labetalol (NORMODYNE/TRANDATE) injection 10-20 mg  10-20 mg Intravenous Q4HRS PRN Jaime Raymundo M.D.   10 mg at 08/28/20 1302   • terazosin (HYTRIN) capsule 3 mg  3 mg Oral TWICE DAILY Jaime Raymundo M.D.       • carvedilol (COREG) tablet 25 mg  25 mg Oral BID WITH MEALS Óscar Pearl M.D.       • aspirin EC (ECOTRIN) tablet 81 mg  81 mg Oral Q EVENING Nathanael Rivers M.D.   81 mg at 08/27/20 1725   • senna-docusate (PERICOLACE or SENOKOT S) 8.6-50 MG per tablet 2 Tab  2 Tab Oral BID Nathanael Rivers M.D.   Stopped at 08/27/20 1800    And   • polyethylene glycol/lytes (MIRALAX) PACKET 1 Packet  1 Packet Oral QDAY PRN Nathanael Rivers M.D.        And   • magnesium hydroxide (MILK OF MAGNESIA) suspension 30 mL  30 mL Oral QDAY PRN Nathanael Rivers M.D.        And   •  bisacodyl (DULCOLAX) suppository 10 mg  10 mg Rectal QDAY PRN Nathanael Rivers M.D.       • Respiratory Therapy Consult   Nebulization Continuous RT Nathanael Rivers M.D.       • heparin injection 5,000 Units  5,000 Units Subcutaneous Q8HRS Nathanael Rivers M.D.   5,000 Units at 08/27/20 2052   • ondansetron (ZOFRAN) syringe/vial injection 4 mg  4 mg Intravenous Q4HRS PRN Nathanael Rivers M.D.       • ondansetron (ZOFRAN ODT) dispertab 4 mg  4 mg Oral Q4HRS PRN Nathanael Rivers M.D.       • MD Alert...ICU Electrolyte Replacement per Pharmacy   Other PHARMACY TO DOSE Nathanael Rivers M.D.       • telmisartan (MICARDIS) tablet 80 mg  80 mg Oral Q DAY Jaime Raymundo M.D.   80 mg at 08/28/20 0815   • acetaminophen (TYLENOL) tablet 650 mg  650 mg Oral Q4HRS PRN Grey Flannery M.D.       • menthol-methyl salicylate (IcyHot\BENGAY) ointment   Topical TID PRN Grey Flannery M.D.           Allergies  Allergies   Allergen Reactions   • Amoxicillin      Unsure of reaction/or allergy   • Hydralazine Unspecified     Pt reported severe hypotensive reaction   • Morphine Vomiting and Nausea   • Penicillins      1970 reaction       Physical Exam  Temp:  [36.6 °C (97.8 °F)-36.8 °C (98.3 °F)] 36.7 °C (98.1 °F)  Pulse:  [] 83  Resp:  [12-59] 19  BP: ()/() 164/72  SpO2:  [92 %-98 %] 97 %    Physical Exam  Constitutional:       General: She is not in acute distress.     Appearance: Normal appearance. She is ill-appearing. She is not toxic-appearing or diaphoretic.   HENT:      Head: Normocephalic.      Nose: Nose normal.      Mouth/Throat:      Mouth: Mucous membranes are moist.   Eyes:      General: No scleral icterus.     Extraocular Movements: Extraocular movements intact.      Pupils: Pupils are equal, round, and reactive to light.   Neck:      Musculoskeletal: Normal range of motion. No neck rigidity or muscular tenderness.   Cardiovascular:      Rate and Rhythm: Normal rate and regular rhythm.       Pulses: Normal pulses.      Heart sounds: No murmur. No gallop.    Pulmonary:      Effort: Pulmonary effort is normal. No respiratory distress.      Breath sounds: No stridor. No rhonchi.   Chest:      Chest wall: No tenderness.   Abdominal:      General: Abdomen is flat. There is no distension.      Palpations: There is no mass.      Tenderness: There is no abdominal tenderness. There is no guarding or rebound.      Hernia: No hernia is present.   Musculoskeletal: Normal range of motion.         General: No swelling, tenderness, deformity or signs of injury.   Lymphadenopathy:      Cervical: No cervical adenopathy.   Skin:     General: Skin is warm.      Capillary Refill: Capillary refill takes 2 to 3 seconds.      Coloration: Skin is not jaundiced or pale.      Findings: No bruising or erythema.   Neurological:      General: No focal deficit present.      Mental Status: She is alert and oriented to person, place, and time.      Cranial Nerves: No cranial nerve deficit.      Motor: No weakness.   Psychiatric:         Mood and Affect: Mood normal.         Fluids      Laboratory  Recent Labs     08/26/20  0554 08/27/20  0345 08/28/20  0400   WBC 4.8 6.4 8.4   RBC 4.58 4.47 4.86   HEMOGLOBIN 14.9 14.5 15.8   HEMATOCRIT 43.0 43.1 48.2*   MCV 93.9 96.4 99.2*   MCH 32.5 32.4 32.5   MCHC 34.7 33.6 32.8*   RDW 42.9 45.3 46.5   PLATELETCT 257 239 269   MPV 10.3 10.3 10.0     Recent Labs     08/26/20  0554 08/27/20  0345 08/28/20  0400   SODIUM 138 139 131*   POTASSIUM 3.5* 3.8 3.7   CHLORIDE 103 104 97   CO2 22 21 21   GLUCOSE 107* 97 101*   BUN 11 10 11   CREATININE 0.63 0.65 0.56   CALCIUM 9.3 9.1 9.4                     Imaging  EC-ECHOCARDIOGRAM COMPLETE W/O CONT   Final Result      DX-CHEST-PORTABLE (1 VIEW)   Final Result         1.  Interstitial pulmonary parenchymal prominence suggest chronic underlying lung disease, component of interstitial edema and/or infiltrates not excluded.   2.  Perihilar interstitial  prominence and bronchial wall cuffing, appearance suggests changes of underlying bronchial inflammation, consider bronchitis.      CT-HEAD W/O   Final Result         1.  No acute intracranial abnormality is identified, there are nonspecific white matter changes, commonly associated with small vessel ischemic disease.  Associated mild cerebral atrophy is noted.   2.  Atherosclerosis.      MR-BRAIN-W/O    (Results Pending)       Assessment/Plan  Acute encephalopathy- (present on admission)  Assessment & Plan  Due to marked elevated blood pressure    CT head negative    To have MRI of the brain to rule out for CVA        History of hypertension- (present on admission)  Assessment & Plan  Echocardiogram done 2 days ago noted    Hypertensive urgency  Assessment & Plan  Patient reports compliance with medications but her presenting pressure was 220s/70s.  s/Post nicardipine drip    Patient Lopressor changed over to p.o. Coreg for better blood pressure control    Continue hydrochlorothiazide    Continue Hytrin    Continue myocardis      Continue to titrate blood pressure medications as needed

## 2020-08-28 NOTE — PROGRESS NOTES
"Pt anxious expressing concerns of medications she has been receiving causing her to feel \"drugged\". Pt refusing any medication to reduce blood pressure. Dr Flannery notified.  "

## 2020-08-28 NOTE — ASSESSMENT & PLAN NOTE
Due to marked elevated blood pressure  CT head negative  Mri negative for cva  Avoid  narcotics  and sedatives  Ammonia normal.  Likely secondary to hyponatremia.  Continue to monitor.  Secondary to hyponatremia, blood pressure, and disorientation

## 2020-08-28 NOTE — PROGRESS NOTES
1510 pt transferred to T812 with monitor and RN.  at bedside, belongings with pt. Monico CANCHOLA was given report.

## 2020-08-29 ENCOUNTER — APPOINTMENT (OUTPATIENT)
Dept: RADIOLOGY | Facility: MEDICAL CENTER | Age: 85
DRG: 305 | End: 2020-08-29
Attending: HOSPITALIST
Payer: MEDICARE

## 2020-08-29 PROBLEM — R10.13 DYSPEPSIA: Status: ACTIVE | Noted: 2020-08-29

## 2020-08-29 PROCEDURE — A9270 NON-COVERED ITEM OR SERVICE: HCPCS | Performed by: PSYCHIATRY & NEUROLOGY

## 2020-08-29 PROCEDURE — 700102 HCHG RX REV CODE 250 W/ 637 OVERRIDE(OP): Performed by: NURSE PRACTITIONER

## 2020-08-29 PROCEDURE — 99233 SBSQ HOSP IP/OBS HIGH 50: CPT | Performed by: HOSPITALIST

## 2020-08-29 PROCEDURE — 700102 HCHG RX REV CODE 250 W/ 637 OVERRIDE(OP): Performed by: PSYCHIATRY & NEUROLOGY

## 2020-08-29 PROCEDURE — 700111 HCHG RX REV CODE 636 W/ 250 OVERRIDE (IP): Performed by: INTERNAL MEDICINE

## 2020-08-29 PROCEDURE — A9270 NON-COVERED ITEM OR SERVICE: HCPCS | Performed by: INTERNAL MEDICINE

## 2020-08-29 PROCEDURE — 770020 HCHG ROOM/CARE - TELE (206)

## 2020-08-29 PROCEDURE — 700111 HCHG RX REV CODE 636 W/ 250 OVERRIDE (IP): Performed by: PSYCHIATRY & NEUROLOGY

## 2020-08-29 PROCEDURE — 700102 HCHG RX REV CODE 250 W/ 637 OVERRIDE(OP): Performed by: HOSPITALIST

## 2020-08-29 PROCEDURE — 700101 HCHG RX REV CODE 250: Performed by: PSYCHIATRY & NEUROLOGY

## 2020-08-29 PROCEDURE — A9270 NON-COVERED ITEM OR SERVICE: HCPCS | Performed by: NURSE PRACTITIONER

## 2020-08-29 PROCEDURE — 700102 HCHG RX REV CODE 250 W/ 637 OVERRIDE(OP): Performed by: INTERNAL MEDICINE

## 2020-08-29 PROCEDURE — A9270 NON-COVERED ITEM OR SERVICE: HCPCS | Performed by: HOSPITALIST

## 2020-08-29 PROCEDURE — 70551 MRI BRAIN STEM W/O DYE: CPT

## 2020-08-29 RX ORDER — OMEPRAZOLE 20 MG/1
20 CAPSULE, DELAYED RELEASE ORAL 2 TIMES DAILY
Status: DISCONTINUED | OUTPATIENT
Start: 2020-08-29 | End: 2020-09-04 | Stop reason: HOSPADM

## 2020-08-29 RX ORDER — HYDROXYZINE HYDROCHLORIDE 25 MG/1
25 TABLET, FILM COATED ORAL 3 TIMES DAILY PRN
Status: DISCONTINUED | OUTPATIENT
Start: 2020-08-29 | End: 2020-08-30

## 2020-08-29 RX ORDER — CLONIDINE 0.1 MG/24H
1 PATCH, EXTENDED RELEASE TRANSDERMAL
Status: DISCONTINUED | OUTPATIENT
Start: 2020-08-29 | End: 2020-08-30

## 2020-08-29 RX ADMIN — HYDROCHLOROTHIAZIDE 25 MG: 25 TABLET ORAL at 04:32

## 2020-08-29 RX ADMIN — CARVEDILOL 25 MG: 25 TABLET, FILM COATED ORAL at 08:31

## 2020-08-29 RX ADMIN — ASPIRIN 81 MG: 81 TABLET, COATED ORAL at 17:33

## 2020-08-29 RX ADMIN — TELMISARTAN 80 MG: 80 TABLET ORAL at 04:32

## 2020-08-29 RX ADMIN — CLONIDINE 1 PATCH: 0.1 PATCH TRANSDERMAL at 14:45

## 2020-08-29 RX ADMIN — LABETALOL HYDROCHLORIDE 10 MG: 5 INJECTION, SOLUTION INTRAVENOUS at 23:16

## 2020-08-29 RX ADMIN — TERAZOSIN HYDROCHLORIDE 3 MG: 2 CAPSULE ORAL at 21:18

## 2020-08-29 RX ADMIN — ACETAMINOPHEN 650 MG: 325 TABLET, FILM COATED ORAL at 21:51

## 2020-08-29 RX ADMIN — OMEPRAZOLE 20 MG: 20 CAPSULE, DELAYED RELEASE ORAL at 17:33

## 2020-08-29 RX ADMIN — ENALAPRILAT 2.5 MG: 1.25 INJECTION INTRAVENOUS at 22:23

## 2020-08-29 RX ADMIN — TERAZOSIN HYDROCHLORIDE 3 MG: 2 CAPSULE ORAL at 04:31

## 2020-08-29 RX ADMIN — HEPARIN SODIUM 5000 UNITS: 5000 INJECTION, SOLUTION INTRAVENOUS; SUBCUTANEOUS at 04:31

## 2020-08-29 RX ADMIN — OMEPRAZOLE 20 MG: 20 CAPSULE, DELAYED RELEASE ORAL at 11:31

## 2020-08-29 RX ADMIN — LIDOCAINE HYDROCHLORIDE 30 ML: 20 SOLUTION OROPHARYNGEAL at 11:31

## 2020-08-29 RX ADMIN — HYDROXYZINE HYDROCHLORIDE 25 MG: 25 TABLET, FILM COATED ORAL at 23:16

## 2020-08-29 RX ADMIN — ACETAMINOPHEN 650 MG: 325 TABLET, FILM COATED ORAL at 13:44

## 2020-08-29 RX ADMIN — ACETAMINOPHEN 650 MG: 325 TABLET, FILM COATED ORAL at 17:33

## 2020-08-29 RX ADMIN — CARVEDILOL 25 MG: 25 TABLET, FILM COATED ORAL at 17:33

## 2020-08-29 RX ADMIN — DOCUSATE SODIUM 50 MG AND SENNOSIDES 8.6 MG 2 TABLET: 8.6; 5 TABLET, FILM COATED ORAL at 04:32

## 2020-08-29 ASSESSMENT — ENCOUNTER SYMPTOMS
WEIGHT LOSS: 0
HALLUCINATIONS: 0
NECK PAIN: 0
PHOTOPHOBIA: 0
FOCAL WEAKNESS: 0
SENSORY CHANGE: 0
MYALGIAS: 0
FEVER: 0
BACK PAIN: 0
DIZZINESS: 0
DOUBLE VISION: 0
SPUTUM PRODUCTION: 0
NAUSEA: 1
HEMOPTYSIS: 0
BRUISES/BLEEDS EASILY: 0
EYE PAIN: 0
CHILLS: 0
BLURRED VISION: 0
DEPRESSION: 0
HEADACHES: 0
TREMORS: 0
PALPITATIONS: 0
COUGH: 0
HEARTBURN: 1
SPEECH CHANGE: 0

## 2020-08-29 ASSESSMENT — LIFESTYLE VARIABLES: SUBSTANCE_ABUSE: 0

## 2020-08-29 ASSESSMENT — FIBROSIS 4 INDEX: FIB4 SCORE: 0.83

## 2020-08-29 NOTE — PROGRESS NOTES
[facility] Medicine Daily Progress Note    Date of Service  8/29/2020    Chief Complaint  85 y.o. female admitted 8/26/2020 with elevated BP    Interval Problem Update    Patient states that she is not feeling well she feels that she has a stomachache because she took her medications on empty stomach    She is out of the ICU now on telemetry for blood pressure is better than it was but still elevated    I advised her on why we switch from Lopressor to Coreg is currently has better overall blood pressure management properties    Patient states that she feels unsteady on her feet  MRI brain ordered not yet done      Consultants/Specialty  intensivist    Code Status  Full Code    Disposition  home    Review of Systems  Review of Systems   Constitutional: Negative for chills, fever and weight loss.   HENT: Negative for ear pain, hearing loss and tinnitus.    Eyes: Negative for blurred vision, double vision, photophobia and pain.   Respiratory: Negative for cough, hemoptysis and sputum production.    Cardiovascular: Negative for chest pain and palpitations.   Gastrointestinal: Positive for heartburn and nausea.   Genitourinary: Negative for dysuria, frequency and urgency.   Musculoskeletal: Negative for back pain, myalgias and neck pain.   Neurological: Negative for dizziness, tremors, sensory change, speech change, focal weakness and headaches.        Unsteady gait   Endo/Heme/Allergies: Negative for environmental allergies. Does not bruise/bleed easily.   Psychiatric/Behavioral: Negative for depression, hallucinations, substance abuse and suicidal ideas.        Physical Exam  Temp:  [36.1 °C (97 °F)-36.9 °C (98.5 °F)] 36.1 °C (97 °F)  Pulse:  [62-94] 62  Resp:  [18-81] 18  BP: (137-173)/(67-91) 153/80  SpO2:  [96 %-98 %] 98 %    Physical Exam  Constitutional:       General: She is not in acute distress.     Appearance: Normal appearance. She is not ill-appearing, toxic-appearing or diaphoretic.   HENT:      Head:  Atraumatic.      Nose: Nose normal.      Mouth/Throat:      Mouth: Mucous membranes are moist.      Pharynx: No posterior oropharyngeal erythema.   Eyes:      General: No scleral icterus.        Left eye: No discharge.   Neck:      Musculoskeletal: Normal range of motion. No neck rigidity or muscular tenderness.      Vascular: No carotid bruit.   Cardiovascular:      Rate and Rhythm: Normal rate.      Pulses: Normal pulses.   Pulmonary:      Effort: No respiratory distress.      Breath sounds: No stridor. No wheezing, rhonchi or rales.   Abdominal:      General: Abdomen is flat. There is no distension.      Palpations: There is no mass.      Tenderness: There is no guarding or rebound.      Hernia: No hernia is present.   Musculoskeletal: Normal range of motion.         General: No swelling, tenderness, deformity or signs of injury.      Right lower leg: No edema.      Left lower leg: No edema.   Lymphadenopathy:      Cervical: No cervical adenopathy.   Skin:     General: Skin is warm.      Capillary Refill: Capillary refill takes 2 to 3 seconds.      Coloration: Skin is not jaundiced or pale.      Findings: No erythema or lesion.   Neurological:      General: No focal deficit present.      Mental Status: She is alert and oriented to person, place, and time.   Psychiatric:         Mood and Affect: Mood normal.         Fluids    Intake/Output Summary (Last 24 hours) at 8/29/2020 1034  Last data filed at 8/28/2020 1200  Gross per 24 hour   Intake 250 ml   Output --   Net 250 ml       Laboratory  Recent Labs     08/27/20  0345 08/28/20  0400   WBC 6.4 8.4   RBC 4.47 4.86   HEMOGLOBIN 14.5 15.8   HEMATOCRIT 43.1 48.2*   MCV 96.4 99.2*   MCH 32.4 32.5   MCHC 33.6 32.8*   RDW 45.3 46.5   PLATELETCT 239 269   MPV 10.3 10.0     Recent Labs     08/27/20  0345 08/28/20  0400   SODIUM 139 131*   POTASSIUM 3.8 3.7   CHLORIDE 104 97   CO2 21 21   GLUCOSE 97 101*   BUN 10 11   CREATININE 0.65 0.56   CALCIUM 9.1 9.4                    Imaging  EC-ECHOCARDIOGRAM COMPLETE W/O CONT   Final Result      DX-CHEST-PORTABLE (1 VIEW)   Final Result         1.  Interstitial pulmonary parenchymal prominence suggest chronic underlying lung disease, component of interstitial edema and/or infiltrates not excluded.   2.  Perihilar interstitial prominence and bronchial wall cuffing, appearance suggests changes of underlying bronchial inflammation, consider bronchitis.      CT-HEAD W/O   Final Result         1.  No acute intracranial abnormality is identified, there are nonspecific white matter changes, commonly associated with small vessel ischemic disease.  Associated mild cerebral atrophy is noted.   2.  Atherosclerosis.      MR-BRAIN-W/O    (Results Pending)        Assessment/Plan  Hypertensive urgency  Assessment & Plan  Patient reports compliance with medications but her presenting pressure was 220s/70s.  s/Post nicardipine drip    Patient Lopressor changed over to p.o. Coreg for better blood pressure control    Continue hydrochlorothiazide    Continue Hytrin    Continue myocardis    Added clonidine patch      Continue to titrate blood pressure medications as needed      Dyspepsia  Assessment & Plan  GI cocktail      Started Prilosec twice daily        Acute encephalopathy- (present on admission)  Assessment & Plan  Due to marked elevated blood pressure    CT head negative    To have MRI of the brain to rule out for CVA        History of hypertension- (present on admission)  Assessment & Plan  Echocardiogram done 2 days ago noted       VTE prophylaxis: lovenox      ambulate

## 2020-08-29 NOTE — PROGRESS NOTES
Report received from day shift Monico CANCHOLA. Bed locked and in lowest position, pt appears resting in bed, no signs of distress or discomfort, Q4 neuro checks, alert and oriented x 4, RA, complaints of pain 4/10 , will check MAR for interventions, call light in reach, pt is stable at this moment, will continue to monitor.

## 2020-08-30 PROBLEM — F41.9 ANXIETY: Status: ACTIVE | Noted: 2020-08-30

## 2020-08-30 PROBLEM — M79.10 MYALGIA: Status: ACTIVE | Noted: 2020-08-30

## 2020-08-30 LAB
CK SERPL-CCNC: 69 U/L (ref 0–154)
GLUCOSE BLD-MCNC: 91 MG/DL (ref 65–99)
MAGNESIUM SERPL-MCNC: 1.9 MG/DL (ref 1.5–2.5)
PHOSPHATE SERPL-MCNC: 4.7 MG/DL (ref 2.5–4.5)

## 2020-08-30 PROCEDURE — A9270 NON-COVERED ITEM OR SERVICE: HCPCS | Performed by: HOSPITALIST

## 2020-08-30 PROCEDURE — 700102 HCHG RX REV CODE 250 W/ 637 OVERRIDE(OP): Performed by: HOSPITALIST

## 2020-08-30 PROCEDURE — 700102 HCHG RX REV CODE 250 W/ 637 OVERRIDE(OP): Performed by: PSYCHIATRY & NEUROLOGY

## 2020-08-30 PROCEDURE — 99233 SBSQ HOSP IP/OBS HIGH 50: CPT | Performed by: HOSPITALIST

## 2020-08-30 PROCEDURE — 700111 HCHG RX REV CODE 636 W/ 250 OVERRIDE (IP): Performed by: INTERNAL MEDICINE

## 2020-08-30 PROCEDURE — 700102 HCHG RX REV CODE 250 W/ 637 OVERRIDE(OP): Performed by: INTERNAL MEDICINE

## 2020-08-30 PROCEDURE — 82550 ASSAY OF CK (CPK): CPT

## 2020-08-30 PROCEDURE — 700111 HCHG RX REV CODE 636 W/ 250 OVERRIDE (IP): Performed by: HOSPITALIST

## 2020-08-30 PROCEDURE — A9270 NON-COVERED ITEM OR SERVICE: HCPCS | Performed by: PSYCHIATRY & NEUROLOGY

## 2020-08-30 PROCEDURE — A9270 NON-COVERED ITEM OR SERVICE: HCPCS | Performed by: INTERNAL MEDICINE

## 2020-08-30 PROCEDURE — 36415 COLL VENOUS BLD VENIPUNCTURE: CPT

## 2020-08-30 PROCEDURE — 84100 ASSAY OF PHOSPHORUS: CPT

## 2020-08-30 PROCEDURE — 83735 ASSAY OF MAGNESIUM: CPT

## 2020-08-30 PROCEDURE — A9270 NON-COVERED ITEM OR SERVICE: HCPCS | Performed by: NURSE PRACTITIONER

## 2020-08-30 PROCEDURE — 700102 HCHG RX REV CODE 250 W/ 637 OVERRIDE(OP): Performed by: NURSE PRACTITIONER

## 2020-08-30 PROCEDURE — 770020 HCHG ROOM/CARE - TELE (206)

## 2020-08-30 PROCEDURE — 82962 GLUCOSE BLOOD TEST: CPT

## 2020-08-30 RX ORDER — NALOXONE HYDROCHLORIDE 0.4 MG/ML
0.4 INJECTION, SOLUTION INTRAMUSCULAR; INTRAVENOUS; SUBCUTANEOUS ONCE
Status: COMPLETED | OUTPATIENT
Start: 2020-08-30 | End: 2020-08-30

## 2020-08-30 RX ORDER — ENALAPRILAT 1.25 MG/ML
1.25 INJECTION INTRAVENOUS EVERY 6 HOURS PRN
Status: DISCONTINUED | OUTPATIENT
Start: 2020-08-30 | End: 2020-09-04 | Stop reason: HOSPADM

## 2020-08-30 RX ORDER — OXYCODONE HYDROCHLORIDE 5 MG/1
5 TABLET ORAL ONCE
Status: COMPLETED | OUTPATIENT
Start: 2020-08-30 | End: 2020-08-30

## 2020-08-30 RX ORDER — POTASSIUM CHLORIDE 20 MEQ/1
20 TABLET, EXTENDED RELEASE ORAL DAILY
Status: DISCONTINUED | OUTPATIENT
Start: 2020-08-30 | End: 2020-08-31

## 2020-08-30 RX ORDER — CARVEDILOL 12.5 MG/1
12.5 TABLET ORAL 2 TIMES DAILY WITH MEALS
Status: DISCONTINUED | OUTPATIENT
Start: 2020-08-30 | End: 2020-08-31

## 2020-08-30 RX ORDER — HYDROCHLOROTHIAZIDE 25 MG/1
12.5 TABLET ORAL
Status: DISCONTINUED | OUTPATIENT
Start: 2020-08-31 | End: 2020-08-31

## 2020-08-30 RX ADMIN — HEPARIN SODIUM 5000 UNITS: 5000 INJECTION, SOLUTION INTRAVENOUS; SUBCUTANEOUS at 16:55

## 2020-08-30 RX ADMIN — OXYCODONE HYDROCHLORIDE 5 MG: 5 TABLET ORAL at 04:15

## 2020-08-30 RX ADMIN — ACETAMINOPHEN 650 MG: 325 TABLET, FILM COATED ORAL at 16:46

## 2020-08-30 RX ADMIN — TERAZOSIN HYDROCHLORIDE 3 MG: 2 CAPSULE ORAL at 04:13

## 2020-08-30 RX ADMIN — ONDANSETRON 4 MG: 4 TABLET, ORALLY DISINTEGRATING ORAL at 03:24

## 2020-08-30 RX ADMIN — OMEPRAZOLE 20 MG: 20 CAPSULE, DELAYED RELEASE ORAL at 04:15

## 2020-08-30 RX ADMIN — CARVEDILOL 25 MG: 25 TABLET, FILM COATED ORAL at 04:15

## 2020-08-30 RX ADMIN — ACETAMINOPHEN 650 MG: 325 TABLET, FILM COATED ORAL at 21:59

## 2020-08-30 RX ADMIN — ASPIRIN 81 MG: 81 TABLET, COATED ORAL at 16:47

## 2020-08-30 RX ADMIN — OMEPRAZOLE 20 MG: 20 CAPSULE, DELAYED RELEASE ORAL at 16:47

## 2020-08-30 RX ADMIN — POTASSIUM CHLORIDE 20 MEQ: 1500 TABLET, EXTENDED RELEASE ORAL at 16:47

## 2020-08-30 RX ADMIN — CARVEDILOL 12.5 MG: 12.5 TABLET, FILM COATED ORAL at 16:46

## 2020-08-30 RX ADMIN — DOCUSATE SODIUM 50 MG AND SENNOSIDES 8.6 MG 2 TABLET: 8.6; 5 TABLET, FILM COATED ORAL at 16:46

## 2020-08-30 RX ADMIN — HEPARIN SODIUM 5000 UNITS: 5000 INJECTION, SOLUTION INTRAVENOUS; SUBCUTANEOUS at 21:59

## 2020-08-30 RX ADMIN — NALOXONE HYDROCHLORIDE 0.4 MG: 0.4 INJECTION, SOLUTION INTRAMUSCULAR; INTRAVENOUS; SUBCUTANEOUS at 11:19

## 2020-08-30 RX ADMIN — HYDROCHLOROTHIAZIDE 25 MG: 25 TABLET ORAL at 04:14

## 2020-08-30 RX ADMIN — TERAZOSIN HYDROCHLORIDE 3 MG: 2 CAPSULE ORAL at 21:59

## 2020-08-30 RX ADMIN — TELMISARTAN 80 MG: 80 TABLET ORAL at 04:15

## 2020-08-30 RX ADMIN — LABETALOL HYDROCHLORIDE 20 MG: 5 INJECTION, SOLUTION INTRAVENOUS at 03:17

## 2020-08-30 ASSESSMENT — ENCOUNTER SYMPTOMS
COUGH: 0
SPEECH CHANGE: 0
BRUISES/BLEEDS EASILY: 0
NAUSEA: 1
EYE PAIN: 0
DIZZINESS: 0
NECK PAIN: 0
DEPRESSION: 0
FEVER: 0
SPUTUM PRODUCTION: 0
PALPITATIONS: 0
WEIGHT LOSS: 0
DOUBLE VISION: 0
FOCAL WEAKNESS: 0
CHILLS: 0
PHOTOPHOBIA: 0
TREMORS: 0
MYALGIAS: 1
HEMOPTYSIS: 0
SENSORY CHANGE: 0
HALLUCINATIONS: 0
BLURRED VISION: 0
HEADACHES: 0
BACK PAIN: 0
HEARTBURN: 1

## 2020-08-30 ASSESSMENT — LIFESTYLE VARIABLES: SUBSTANCE_ABUSE: 0

## 2020-08-30 NOTE — PROGRESS NOTES
[facility] Medicine Daily Progress Note    Date of Service  8/30/2020    Chief Complaint  85 y.o. female admitted 8/26/2020 with elevated BP    Interval Problem Update    Patient's blood pressure was elevated all last night    Last night she apparently could not sleep she is very anxious she was given 1 dose of oxycodone and some hydroxyzine for anxiety now the patient is extremely lethargic    Patient complaining of muscle aches and pains bilaterally    I checked a stat glucose that was 91    Telemetry noting significant  bradycardia when patient is sleeping but  when she is awake heart rate is 63.      With her current regiment of blood pressure medication with a clonidine patch on her blood pressure was excellent however the patient is very lethargic and I cannot exclude the clonidine patch as a potential cause.    Consultants/Specialty  intensivist    Code Status  Full Code    Disposition  home    Review of Systems  Review of Systems   Constitutional: Positive for malaise/fatigue. Negative for chills, fever and weight loss.   HENT: Negative for ear pain, hearing loss and tinnitus.    Eyes: Negative for blurred vision, double vision, photophobia and pain.   Respiratory: Negative for cough, hemoptysis and sputum production.    Cardiovascular: Negative for chest pain and palpitations.   Gastrointestinal: Positive for heartburn and nausea.   Genitourinary: Negative for dysuria, frequency and urgency.   Musculoskeletal: Positive for myalgias. Negative for back pain and neck pain.   Neurological: Negative for dizziness, tremors, sensory change, speech change, focal weakness and headaches.        Unsteady gait   Endo/Heme/Allergies: Negative for environmental allergies. Does not bruise/bleed easily.   Psychiatric/Behavioral: Negative for depression, hallucinations, substance abuse and suicidal ideas.        Physical Exam  Temp:  [36 °C (96.8 °F)-36.6 °C (97.8 °F)] 36 °C (96.8 °F)  Pulse:  [61-79] 79  Resp:  [16-24]  16  BP: (108-199)/(55-97) 125/69  SpO2:  [94 %-97 %] 97 %    Physical Exam  Constitutional:       General: She is not in acute distress.     Appearance: Normal appearance. She is not ill-appearing, toxic-appearing or diaphoretic.      Comments: Severe lethargy   HENT:      Head: Atraumatic.      Nose: Nose normal.      Mouth/Throat:      Mouth: Mucous membranes are moist.      Pharynx: No posterior oropharyngeal erythema.   Eyes:      General: No scleral icterus.        Left eye: No discharge.   Neck:      Musculoskeletal: Normal range of motion. No neck rigidity or muscular tenderness.      Vascular: No carotid bruit.   Cardiovascular:      Rate and Rhythm: Normal rate.      Pulses: Normal pulses.   Pulmonary:      Effort: No respiratory distress.      Breath sounds: No stridor. No wheezing, rhonchi or rales.   Abdominal:      General: Abdomen is flat. There is no distension.      Palpations: There is no mass.      Tenderness: There is no guarding or rebound.      Hernia: No hernia is present.   Musculoskeletal: Normal range of motion.         General: Tenderness (both legs) present. No swelling, deformity or signs of injury.      Right lower leg: No edema.      Left lower leg: No edema.   Lymphadenopathy:      Cervical: No cervical adenopathy.   Skin:     General: Skin is warm.      Capillary Refill: Capillary refill takes 2 to 3 seconds.      Coloration: Skin is not jaundiced or pale.      Findings: No erythema or lesion.   Neurological:      General: No focal deficit present.      Mental Status: She is oriented to person, place, and time.   Psychiatric:         Mood and Affect: Mood normal.         Fluids    Intake/Output Summary (Last 24 hours) at 8/30/2020 1041  Last data filed at 8/30/2020 0330  Gross per 24 hour   Intake 200 ml   Output --   Net 200 ml       Laboratory  Recent Labs     08/28/20  0400   WBC 8.4   RBC 4.86   HEMOGLOBIN 15.8   HEMATOCRIT 48.2*   MCV 99.2*   MCH 32.5   MCHC 32.8*   RDW 46.5    PLATELETCT 269   MPV 10.0     Recent Labs     08/28/20  0400   SODIUM 131*   POTASSIUM 3.7   CHLORIDE 97   CO2 21   GLUCOSE 101*   BUN 11   CREATININE 0.56   CALCIUM 9.4                   Imaging  MR-BRAIN-W/O   Final Result      1.  Mild cerebral atrophy. Age-appropriate.   2.  Moderate supratentorial white matter disease most consistent with microvascular ischemic change.   3.  No evidence of acute infarction, hemorrhage, or mass lesion.      EC-ECHOCARDIOGRAM COMPLETE W/O CONT   Final Result      DX-CHEST-PORTABLE (1 VIEW)   Final Result         1.  Interstitial pulmonary parenchymal prominence suggest chronic underlying lung disease, component of interstitial edema and/or infiltrates not excluded.   2.  Perihilar interstitial prominence and bronchial wall cuffing, appearance suggests changes of underlying bronchial inflammation, consider bronchitis.      CT-HEAD W/O   Final Result         1.  No acute intracranial abnormality is identified, there are nonspecific white matter changes, commonly associated with small vessel ischemic disease.  Associated mild cerebral atrophy is noted.   2.  Atherosclerosis.           Assessment/Plan  Hypertensive urgency  Assessment & Plan  Patient reports compliance with medications but her presenting pressure was 220s/70s.  s/Post nicardipine drip    Patient Lopressor changed over to p.o. Coreg for better blood pressure control    Continue hydrochlorothiazide    Continue Hytrin    Continue myocardis    Added clonidine patch(but held 1 day later due to patient's change in mental status)      Continue to titrate blood pressure medications as needed      Anxiety  Assessment & Plan  Do not give this patient any medication for anxiety at this time    Myalgia  Assessment & Plan  Check potassium magnesium phosphorus level    Check CPK level    Dyspepsia  Assessment & Plan  GI cocktail       Prilosec twice daily        Acute encephalopathy- (present on admission)  Assessment &  Plan  Due to marked elevated blood pressure    CT head negative    Mri negative for cva    Avoid  narcotics  and sedatives        History of hypertension- (present on admission)  Assessment & Plan  Echocardiogram done 2 days ago noted       VTE prophylaxis: lovenox      ambulate      Check am cbc, bmp

## 2020-08-30 NOTE — CARE PLAN
Problem: Communication  Goal: The ability to communicate needs accurately and effectively will improve  Outcome: PROGRESSING AS EXPECTED  Note: Pt educated regarding plan of care and medications. All questions answered.        Problem: Safety  Goal: Will remain free from injury  Outcome: PROGRESSING AS EXPECTED  Note: Fall precautions in place. Bed in lowest position. Non-skid socks in place. Personal possessions within reach. Mobility sign on door. Bed-alarm on. Call light within reach. Pt educated regarding fall prevention and states understanding.     Goal: Will remain free from falls  Outcome: PROGRESSING AS EXPECTED  Note: Fall precautions in place. Bed in lowest position. Non-skid socks in place. Personal possessions within reach. Mobility sign on door. Bed-alarm on. Call light within reach. Pt educated regarding fall prevention and states understanding.

## 2020-08-30 NOTE — PROGRESS NOTES
Received report from SUSHANT Marc. Reviewed POC, no questions at this time. Call light is within reach, bed is in lowest/locked position.

## 2020-08-31 PROBLEM — E87.1 HYPONATREMIA: Status: ACTIVE | Noted: 2020-08-31

## 2020-08-31 LAB
ANION GAP SERPL CALC-SCNC: 11 MMOL/L (ref 7–16)
ANION GAP SERPL CALC-SCNC: 12 MMOL/L (ref 7–16)
BUN SERPL-MCNC: 15 MG/DL (ref 8–22)
BUN SERPL-MCNC: 19 MG/DL (ref 8–22)
CALCIUM SERPL-MCNC: 8.6 MG/DL (ref 8.5–10.5)
CALCIUM SERPL-MCNC: 9 MG/DL (ref 8.5–10.5)
CHLORIDE SERPL-SCNC: 87 MMOL/L (ref 96–112)
CHLORIDE SERPL-SCNC: 88 MMOL/L (ref 96–112)
CO2 SERPL-SCNC: 23 MMOL/L (ref 20–33)
CO2 SERPL-SCNC: 23 MMOL/L (ref 20–33)
CREAT SERPL-MCNC: 0.76 MG/DL (ref 0.5–1.4)
CREAT SERPL-MCNC: 1 MG/DL (ref 0.5–1.4)
ERYTHROCYTE [DISTWIDTH] IN BLOOD BY AUTOMATED COUNT: 39.6 FL (ref 35.9–50)
ERYTHROCYTE [SEDIMENTATION RATE] IN BLOOD BY WESTERGREN METHOD: 5 MM/HOUR (ref 0–30)
GLUCOSE SERPL-MCNC: 139 MG/DL (ref 65–99)
GLUCOSE SERPL-MCNC: 99 MG/DL (ref 65–99)
HCT VFR BLD AUTO: 35.9 % (ref 37–47)
HGB BLD-MCNC: 12.9 G/DL (ref 12–16)
MCH RBC QN AUTO: 32.3 PG (ref 27–33)
MCHC RBC AUTO-ENTMCNC: 35.9 G/DL (ref 33.6–35)
MCV RBC AUTO: 90 FL (ref 81.4–97.8)
PLATELET # BLD AUTO: 243 K/UL (ref 164–446)
PMV BLD AUTO: 10.5 FL (ref 9–12.9)
POTASSIUM SERPL-SCNC: 3.8 MMOL/L (ref 3.6–5.5)
POTASSIUM SERPL-SCNC: 4.4 MMOL/L (ref 3.6–5.5)
RBC # BLD AUTO: 3.99 M/UL (ref 4.2–5.4)
SODIUM SERPL-SCNC: 122 MMOL/L (ref 135–145)
SODIUM SERPL-SCNC: 122 MMOL/L (ref 135–145)
WBC # BLD AUTO: 7.9 K/UL (ref 4.8–10.8)

## 2020-08-31 PROCEDURE — A9270 NON-COVERED ITEM OR SERVICE: HCPCS | Performed by: INTERNAL MEDICINE

## 2020-08-31 PROCEDURE — 700102 HCHG RX REV CODE 250 W/ 637 OVERRIDE(OP): Performed by: PSYCHIATRY & NEUROLOGY

## 2020-08-31 PROCEDURE — 700111 HCHG RX REV CODE 636 W/ 250 OVERRIDE (IP): Performed by: INTERNAL MEDICINE

## 2020-08-31 PROCEDURE — 700105 HCHG RX REV CODE 258: Performed by: HOSPITALIST

## 2020-08-31 PROCEDURE — A9270 NON-COVERED ITEM OR SERVICE: HCPCS | Performed by: HOSPITALIST

## 2020-08-31 PROCEDURE — 700102 HCHG RX REV CODE 250 W/ 637 OVERRIDE(OP): Performed by: INTERNAL MEDICINE

## 2020-08-31 PROCEDURE — 80048 BASIC METABOLIC PNL TOTAL CA: CPT | Mod: 91

## 2020-08-31 PROCEDURE — 36415 COLL VENOUS BLD VENIPUNCTURE: CPT

## 2020-08-31 PROCEDURE — 85027 COMPLETE CBC AUTOMATED: CPT

## 2020-08-31 PROCEDURE — 700111 HCHG RX REV CODE 636 W/ 250 OVERRIDE (IP): Performed by: HOSPITALIST

## 2020-08-31 PROCEDURE — 700102 HCHG RX REV CODE 250 W/ 637 OVERRIDE(OP): Performed by: HOSPITALIST

## 2020-08-31 PROCEDURE — 770020 HCHG ROOM/CARE - TELE (206)

## 2020-08-31 PROCEDURE — 99233 SBSQ HOSP IP/OBS HIGH 50: CPT | Performed by: HOSPITALIST

## 2020-08-31 PROCEDURE — 85652 RBC SED RATE AUTOMATED: CPT

## 2020-08-31 PROCEDURE — A9270 NON-COVERED ITEM OR SERVICE: HCPCS | Performed by: PSYCHIATRY & NEUROLOGY

## 2020-08-31 RX ORDER — CARVEDILOL 12.5 MG/1
12.5 TABLET ORAL ONCE
Status: COMPLETED | OUTPATIENT
Start: 2020-08-31 | End: 2020-08-31

## 2020-08-31 RX ORDER — CARVEDILOL 25 MG/1
25 TABLET ORAL 2 TIMES DAILY WITH MEALS
Status: DISCONTINUED | OUTPATIENT
Start: 2020-09-01 | End: 2020-09-01

## 2020-08-31 RX ORDER — PREDNISONE 20 MG/1
20 TABLET ORAL ONCE
Status: COMPLETED | OUTPATIENT
Start: 2020-08-31 | End: 2020-08-31

## 2020-08-31 RX ORDER — SODIUM CHLORIDE 9 MG/ML
INJECTION, SOLUTION INTRAVENOUS CONTINUOUS
Status: DISCONTINUED | OUTPATIENT
Start: 2020-08-31 | End: 2020-09-01

## 2020-08-31 RX ADMIN — HEPARIN SODIUM 5000 UNITS: 5000 INJECTION, SOLUTION INTRAVENOUS; SUBCUTANEOUS at 14:38

## 2020-08-31 RX ADMIN — HYDROCHLOROTHIAZIDE 12.5 MG: 25 TABLET ORAL at 06:30

## 2020-08-31 RX ADMIN — ACETAMINOPHEN 650 MG: 325 TABLET, FILM COATED ORAL at 06:59

## 2020-08-31 RX ADMIN — HEPARIN SODIUM 5000 UNITS: 5000 INJECTION, SOLUTION INTRAVENOUS; SUBCUTANEOUS at 06:31

## 2020-08-31 RX ADMIN — ASPIRIN 81 MG: 81 TABLET, COATED ORAL at 16:38

## 2020-08-31 RX ADMIN — HEPARIN SODIUM 5000 UNITS: 5000 INJECTION, SOLUTION INTRAVENOUS; SUBCUTANEOUS at 20:25

## 2020-08-31 RX ADMIN — OMEPRAZOLE 20 MG: 20 CAPSULE, DELAYED RELEASE ORAL at 16:39

## 2020-08-31 RX ADMIN — CARVEDILOL 12.5 MG: 12.5 TABLET, FILM COATED ORAL at 16:39

## 2020-08-31 RX ADMIN — POTASSIUM CHLORIDE 20 MEQ: 1500 TABLET, EXTENDED RELEASE ORAL at 06:31

## 2020-08-31 RX ADMIN — CARVEDILOL 12.5 MG: 12.5 TABLET, FILM COATED ORAL at 08:11

## 2020-08-31 RX ADMIN — TERAZOSIN HYDROCHLORIDE 3 MG: 2 CAPSULE ORAL at 16:39

## 2020-08-31 RX ADMIN — TERAZOSIN HYDROCHLORIDE 3 MG: 2 CAPSULE ORAL at 06:39

## 2020-08-31 RX ADMIN — CARVEDILOL 12.5 MG: 12.5 TABLET, FILM COATED ORAL at 22:48

## 2020-08-31 RX ADMIN — SODIUM CHLORIDE: 9 INJECTION, SOLUTION INTRAVENOUS at 22:48

## 2020-08-31 RX ADMIN — OMEPRAZOLE 20 MG: 20 CAPSULE, DELAYED RELEASE ORAL at 06:31

## 2020-08-31 RX ADMIN — PREDNISONE 20 MG: 20 TABLET ORAL at 08:11

## 2020-08-31 RX ADMIN — TELMISARTAN 80 MG: 80 TABLET ORAL at 06:31

## 2020-08-31 ASSESSMENT — FIBROSIS 4 INDEX: FIB4 SCORE: 0.92

## 2020-08-31 ASSESSMENT — ENCOUNTER SYMPTOMS
PALPITATIONS: 0
HALLUCINATIONS: 0
DEPRESSION: 0
NECK PAIN: 0
COUGH: 0
WEIGHT LOSS: 0
BACK PAIN: 0
SPEECH CHANGE: 0
EYE PAIN: 0
HEARTBURN: 0
HEADACHES: 0
DIZZINESS: 0
NAUSEA: 0
FOCAL WEAKNESS: 0
CHILLS: 0
PHOTOPHOBIA: 0
DOUBLE VISION: 0
FEVER: 0
HEMOPTYSIS: 0
BLURRED VISION: 0
SENSORY CHANGE: 0
SPUTUM PRODUCTION: 0
MYALGIAS: 1
BRUISES/BLEEDS EASILY: 0
TREMORS: 0

## 2020-08-31 ASSESSMENT — LIFESTYLE VARIABLES: SUBSTANCE_ABUSE: 0

## 2020-08-31 NOTE — PROGRESS NOTES
Assumed care at 645, bedside report received from SUSHANT Mitchell. Patient is AO x 4 and is resting in bed. Initial assessment completed and orders reviewed. POC discussed with patient. Call light within reach and hourly rounding in place. No further questions at this time. Fall precautions in place.

## 2020-08-31 NOTE — DISCHARGE PLANNING
"Hospital Care Management Discharge Planning       Anticipated Discharge Disposition:   · Home     Action:   · Discussed patient in IDT rounds. Per Doctor Dae, patient not medically cleared for discharge at this time as her labs need to stabilize  · Per chart review and MD report, no apparent D/C planning needs identified at this time.   · The HCM team will continue to follow throughout patient's hospital stay.      Barriers to Discharge:   · Medical Clearance.     Plan:   · Patient to discharge home with no CM needs once medically cleared.   · Hospital Care Management Team to continue to provide support services and assistance with discharge planning as needed.       Current Expected Day of Discharge: 9/1/2020       For further assistance please contact the assigned RN Case Manager or  at the extension listed under \"Treatment Teams\" or signed into \"[employer]\".      "

## 2020-09-01 LAB
AMMONIA PLAS-SCNC: 15 UMOL/L (ref 11–45)
ANION GAP SERPL CALC-SCNC: 12 MMOL/L (ref 7–16)
ANION GAP SERPL CALC-SCNC: 13 MMOL/L (ref 7–16)
BUN SERPL-MCNC: 13 MG/DL (ref 8–22)
BUN SERPL-MCNC: 14 MG/DL (ref 8–22)
CALCIUM SERPL-MCNC: 9.1 MG/DL (ref 8.5–10.5)
CALCIUM SERPL-MCNC: 9.3 MG/DL (ref 8.5–10.5)
CHLORIDE SERPL-SCNC: 90 MMOL/L (ref 96–112)
CHLORIDE SERPL-SCNC: 91 MMOL/L (ref 96–112)
CO2 SERPL-SCNC: 21 MMOL/L (ref 20–33)
CO2 SERPL-SCNC: 23 MMOL/L (ref 20–33)
CREAT SERPL-MCNC: 0.64 MG/DL (ref 0.5–1.4)
CREAT SERPL-MCNC: 0.83 MG/DL (ref 0.5–1.4)
CREAT UR-MCNC: 216.79 MG/DL
ERYTHROCYTE [DISTWIDTH] IN BLOOD BY AUTOMATED COUNT: 40.3 FL (ref 35.9–50)
GLUCOSE SERPL-MCNC: 100 MG/DL (ref 65–99)
GLUCOSE SERPL-MCNC: 143 MG/DL (ref 65–99)
HCT VFR BLD AUTO: 38.4 % (ref 37–47)
HGB BLD-MCNC: 13.5 G/DL (ref 12–16)
MAGNESIUM SERPL-MCNC: 2.1 MG/DL (ref 1.5–2.5)
MCH RBC QN AUTO: 32.2 PG (ref 27–33)
MCHC RBC AUTO-ENTMCNC: 35.2 G/DL (ref 33.6–35)
MCV RBC AUTO: 91.6 FL (ref 81.4–97.8)
OSMOLALITY SERPL: 271 MOSM/KG H2O (ref 278–298)
OSMOLALITY UR: 643 MOSM/KG H2O (ref 300–900)
PHOSPHATE SERPL-MCNC: 2.9 MG/DL (ref 2.5–4.5)
PLATELET # BLD AUTO: 240 K/UL (ref 164–446)
PMV BLD AUTO: 10.5 FL (ref 9–12.9)
POTASSIUM SERPL-SCNC: 3.9 MMOL/L (ref 3.6–5.5)
POTASSIUM SERPL-SCNC: 4.6 MMOL/L (ref 3.6–5.5)
RBC # BLD AUTO: 4.19 M/UL (ref 4.2–5.4)
SODIUM SERPL-SCNC: 124 MMOL/L (ref 135–145)
SODIUM SERPL-SCNC: 126 MMOL/L (ref 135–145)
SODIUM UR-SCNC: <20 MMOL/L
WBC # BLD AUTO: 8.7 K/UL (ref 4.8–10.8)

## 2020-09-01 PROCEDURE — A9270 NON-COVERED ITEM OR SERVICE: HCPCS | Performed by: INTERNAL MEDICINE

## 2020-09-01 PROCEDURE — 80048 BASIC METABOLIC PNL TOTAL CA: CPT

## 2020-09-01 PROCEDURE — 700102 HCHG RX REV CODE 250 W/ 637 OVERRIDE(OP): Performed by: PSYCHIATRY & NEUROLOGY

## 2020-09-01 PROCEDURE — 83735 ASSAY OF MAGNESIUM: CPT

## 2020-09-01 PROCEDURE — A9270 NON-COVERED ITEM OR SERVICE: HCPCS | Performed by: NURSE PRACTITIONER

## 2020-09-01 PROCEDURE — 84300 ASSAY OF URINE SODIUM: CPT

## 2020-09-01 PROCEDURE — A9270 NON-COVERED ITEM OR SERVICE: HCPCS | Performed by: HOSPITALIST

## 2020-09-01 PROCEDURE — 700102 HCHG RX REV CODE 250 W/ 637 OVERRIDE(OP): Performed by: INTERNAL MEDICINE

## 2020-09-01 PROCEDURE — 82140 ASSAY OF AMMONIA: CPT

## 2020-09-01 PROCEDURE — 83935 ASSAY OF URINE OSMOLALITY: CPT

## 2020-09-01 PROCEDURE — A9270 NON-COVERED ITEM OR SERVICE: HCPCS | Performed by: PSYCHIATRY & NEUROLOGY

## 2020-09-01 PROCEDURE — 99232 SBSQ HOSP IP/OBS MODERATE 35: CPT | Performed by: INTERNAL MEDICINE

## 2020-09-01 PROCEDURE — 700102 HCHG RX REV CODE 250 W/ 637 OVERRIDE(OP): Performed by: NURSE PRACTITIONER

## 2020-09-01 PROCEDURE — 36415 COLL VENOUS BLD VENIPUNCTURE: CPT

## 2020-09-01 PROCEDURE — 700111 HCHG RX REV CODE 636 W/ 250 OVERRIDE (IP): Performed by: INTERNAL MEDICINE

## 2020-09-01 PROCEDURE — 700102 HCHG RX REV CODE 250 W/ 637 OVERRIDE(OP): Performed by: HOSPITALIST

## 2020-09-01 PROCEDURE — 82570 ASSAY OF URINE CREATININE: CPT

## 2020-09-01 PROCEDURE — 85027 COMPLETE CBC AUTOMATED: CPT

## 2020-09-01 PROCEDURE — 770020 HCHG ROOM/CARE - TELE (206)

## 2020-09-01 PROCEDURE — 83930 ASSAY OF BLOOD OSMOLALITY: CPT

## 2020-09-01 PROCEDURE — 84100 ASSAY OF PHOSPHORUS: CPT

## 2020-09-01 PROCEDURE — 700101 HCHG RX REV CODE 250: Performed by: INTERNAL MEDICINE

## 2020-09-01 RX ORDER — LIDOCAINE 50 MG/G
1 PATCH TOPICAL EVERY 24 HOURS
Status: DISCONTINUED | OUTPATIENT
Start: 2020-09-01 | End: 2020-09-04 | Stop reason: HOSPADM

## 2020-09-01 RX ORDER — SODIUM CHLORIDE 1 G/1
1 TABLET ORAL
Status: DISCONTINUED | OUTPATIENT
Start: 2020-09-01 | End: 2020-09-04 | Stop reason: HOSPADM

## 2020-09-01 RX ORDER — CARVEDILOL 25 MG/1
25 TABLET ORAL 2 TIMES DAILY WITH MEALS
Status: DISCONTINUED | OUTPATIENT
Start: 2020-09-01 | End: 2020-09-04 | Stop reason: HOSPADM

## 2020-09-01 RX ADMIN — TERAZOSIN HYDROCHLORIDE 3 MG: 2 CAPSULE ORAL at 06:37

## 2020-09-01 RX ADMIN — CARVEDILOL 25 MG: 25 TABLET, FILM COATED ORAL at 05:30

## 2020-09-01 RX ADMIN — OMEPRAZOLE 20 MG: 20 CAPSULE, DELAYED RELEASE ORAL at 06:36

## 2020-09-01 RX ADMIN — HEPARIN SODIUM 5000 UNITS: 5000 INJECTION, SOLUTION INTRAVENOUS; SUBCUTANEOUS at 21:39

## 2020-09-01 RX ADMIN — OMEPRAZOLE 20 MG: 20 CAPSULE, DELAYED RELEASE ORAL at 17:27

## 2020-09-01 RX ADMIN — CARVEDILOL 25 MG: 25 TABLET, FILM COATED ORAL at 17:27

## 2020-09-01 RX ADMIN — HEPARIN SODIUM 5000 UNITS: 5000 INJECTION, SOLUTION INTRAVENOUS; SUBCUTANEOUS at 06:37

## 2020-09-01 RX ADMIN — LIDOCAINE 1 PATCH: 50 PATCH TOPICAL at 19:41

## 2020-09-01 RX ADMIN — ACETAMINOPHEN 650 MG: 325 TABLET, FILM COATED ORAL at 10:10

## 2020-09-01 RX ADMIN — Medication 1 G: at 17:36

## 2020-09-01 RX ADMIN — ACETAMINOPHEN 650 MG: 325 TABLET, FILM COATED ORAL at 15:35

## 2020-09-01 RX ADMIN — ACETAMINOPHEN 650 MG: 325 TABLET, FILM COATED ORAL at 19:41

## 2020-09-01 RX ADMIN — ASPIRIN 81 MG: 81 TABLET, COATED ORAL at 17:27

## 2020-09-01 RX ADMIN — TELMISARTAN 80 MG: 80 TABLET ORAL at 06:37

## 2020-09-01 RX ADMIN — TERAZOSIN HYDROCHLORIDE 3 MG: 2 CAPSULE ORAL at 21:38

## 2020-09-01 ASSESSMENT — ENCOUNTER SYMPTOMS
DIARRHEA: 0
HALLUCINATIONS: 1
SORE THROAT: 0
NAUSEA: 0
FALLS: 0
CONSTIPATION: 0
HEADACHES: 0
WEAKNESS: 0
COUGH: 0
CHILLS: 0
VOMITING: 0
FEVER: 0
ABDOMINAL PAIN: 0
DIZZINESS: 0
PALPITATIONS: 0
DEPRESSION: 0
BLURRED VISION: 0
NERVOUS/ANXIOUS: 0
SHORTNESS OF BREATH: 0

## 2020-09-01 ASSESSMENT — PAIN DESCRIPTION - PAIN TYPE: TYPE: ACUTE PAIN

## 2020-09-01 ASSESSMENT — FIBROSIS 4 INDEX: FIB4 SCORE: 0.93

## 2020-09-01 NOTE — PROGRESS NOTES
"Paging MD Cleaning, checked on pt as bed alarm was getting of, per pt was leaning over the bedside because a \"big critter was pushing the rubber band\" there is no critter in the room paging MD for possible hallucinations. Pt still A/O x 4.   "

## 2020-09-01 NOTE — PROGRESS NOTES
[facility] Medicine Daily Progress Note    Date of Service  8/31/2020    Chief Complaint  85 y.o. female admitted 8/26/2020 with elevated BP    Interval Problem Update    Patient's blood pressure better today    soidium drop to 122    Patient confirms drinking a lot of water    Pt on hctz    Still c/o muscle aches and pains- all lab daa except low sodium wnl    Patient complaining of muscle aches and pains bilaterally  .    Consultants/Specialty  intensivist    Code Status  Full Code    Disposition  home    Review of Systems  Review of Systems   Constitutional: Positive for malaise/fatigue. Negative for chills, fever and weight loss.   HENT: Negative for ear pain, hearing loss and tinnitus.    Eyes: Negative for blurred vision, double vision, photophobia and pain.   Respiratory: Negative for cough, hemoptysis and sputum production.    Cardiovascular: Negative for chest pain and palpitations.   Gastrointestinal: Negative for heartburn and nausea.   Genitourinary: Negative for dysuria, frequency and urgency.   Musculoskeletal: Positive for joint pain and myalgias. Negative for back pain and neck pain.   Neurological: Negative for dizziness, tremors, sensory change, speech change, focal weakness and headaches.        Unsteady gait   Endo/Heme/Allergies: Negative for environmental allergies. Does not bruise/bleed easily.   Psychiatric/Behavioral: Negative for depression, hallucinations, substance abuse and suicidal ideas.        Physical Exam  Temp:  [36.1 °C (97 °F)-36.9 °C (98.5 °F)] 36.9 °C (98.5 °F)  Pulse:  [62-72] 72  Resp:  [14-16] 16  BP: (117-161)/(60-82) 161/82  SpO2:  [94 %-98 %] 96 %    Physical Exam  Constitutional:       General: She is not in acute distress.     Appearance: Normal appearance. She is not ill-appearing, toxic-appearing or diaphoretic.   HENT:      Head: Atraumatic.      Nose: Nose normal.      Mouth/Throat:      Mouth: Mucous membranes are moist.      Pharynx: No posterior oropharyngeal  erythema.   Eyes:      General: No scleral icterus.        Left eye: No discharge.   Neck:      Musculoskeletal: Normal range of motion. No neck rigidity or muscular tenderness.      Vascular: No carotid bruit.   Cardiovascular:      Rate and Rhythm: Normal rate.      Pulses: Normal pulses.   Pulmonary:      Effort: No respiratory distress.      Breath sounds: No stridor. No wheezing, rhonchi or rales.   Abdominal:      General: Abdomen is flat. There is no distension.      Palpations: There is no mass.      Tenderness: There is no guarding or rebound.      Hernia: No hernia is present.   Musculoskeletal: Normal range of motion.         General: Tenderness (both legs) present. No swelling, deformity or signs of injury.      Right lower leg: No edema.      Left lower leg: No edema.   Lymphadenopathy:      Cervical: No cervical adenopathy.   Skin:     General: Skin is warm.      Capillary Refill: Capillary refill takes 2 to 3 seconds.      Coloration: Skin is not jaundiced or pale.      Findings: No erythema or lesion.   Neurological:      General: No focal deficit present.      Mental Status: She is oriented to person, place, and time.   Psychiatric:         Mood and Affect: Mood normal.         Fluids  No intake or output data in the 24 hours ending 08/31/20 2107    Laboratory  Recent Labs     08/31/20  0247   WBC 7.9   RBC 3.99*   HEMOGLOBIN 12.9   HEMATOCRIT 35.9*   MCV 90.0   MCH 32.3   MCHC 35.9*   RDW 39.6   PLATELETCT 243   MPV 10.5     Recent Labs     08/31/20  0247 08/31/20  1234   SODIUM 122* 122*   POTASSIUM 3.8 4.4   CHLORIDE 88* 87*   CO2 23 23   GLUCOSE 99 139*   BUN 19 15   CREATININE 1.00 0.76   CALCIUM 8.6 9.0                   Imaging  MR-BRAIN-W/O   Final Result      1.  Mild cerebral atrophy. Age-appropriate.   2.  Moderate supratentorial white matter disease most consistent with microvascular ischemic change.   3.  No evidence of acute infarction, hemorrhage, or mass lesion.       EC-ECHOCARDIOGRAM COMPLETE W/O CONT   Final Result      DX-CHEST-PORTABLE (1 VIEW)   Final Result         1.  Interstitial pulmonary parenchymal prominence suggest chronic underlying lung disease, component of interstitial edema and/or infiltrates not excluded.   2.  Perihilar interstitial prominence and bronchial wall cuffing, appearance suggests changes of underlying bronchial inflammation, consider bronchitis.      CT-HEAD W/O   Final Result         1.  No acute intracranial abnormality is identified, there are nonspecific white matter changes, commonly associated with small vessel ischemic disease.  Associated mild cerebral atrophy is noted.   2.  Atherosclerosis.           Assessment/Plan  Hypertensive urgency  Assessment & Plan  Patient reports compliance with medications but her presenting pressure was 220s/70s.  s/Post nicardipine drip    Patient Lopressor changed over to p.o. Coreg for better blood pressure control    Hydrochlorothiazide stopped on 8/31 due to low sodium    Continue Hytrin    Continue myocardis    Added clonidine patch(but held 1 day later due to patient's change in mental status)      Continue to titrate blood pressure medications as needed      Hyponatremia  Assessment & Plan  Due to excess water drinking and hctz    Saline IVF for 500cc.    Follow bmp    Anxiety  Assessment & Plan  Do not give this patient any medication for anxiety at this time    Myalgia  Assessment & Plan  Only low sodium noted    Correction as above    Dyspepsia  Assessment & Plan  GI cocktail       Prilosec twice daily        Acute encephalopathy- (present on admission)  Assessment & Plan  Due to marked elevated blood pressure    CT head negative    Mri negative for cva    Avoid  narcotics  and sedatives        History of hypertension- (present on admission)  Assessment & Plan  Echocardiogram done 2 days ago noted       VTE prophylaxis: lovenox      ambulate      Check am cbc, bmp

## 2020-09-01 NOTE — PROGRESS NOTES
[facility] Medicine Daily Progress Note    Date of Service  9/1/2020    Chief Complaint  85 y.o. female admitted 8/26/2020 with malaise, lightheadedness, weakness    Hospital Course    Ms. Prabha Lizama is a 85 y.o. female with a history of hypertension who presented on 8/26/2020 with malaise, lightheadedness, weakness of lower extremities.  On presentation she was found to have hypertensive emergency with systolics up to 240.  She was admitted to the ICU and transition from IV antihypertensives to oral.  Also found to be encephalopathic with low sodium.  She was on hydrochlorothiazide as an outpatient which has been held.  CT head showed no acute intracranial disease.  Ammonia level normal.  Patient was also found to be drinking too much water.        Interval Problem Update  Patient was seen and examined at bedside.  I have personally reviewed vitals, labs, and imaging.    9/1.  Afebrile.  Was hypertensive last night.  On room air.  Denies fever, chills, chest pains, shortness of breath.  Does report chronic back and right lower extremity pain.  Hyponatremia noted.  Discontinued IV fluids and started on salt tabs.  Patient reports seeing hallucinations.  Ammonia level within normal limits.  Likely secondary to hyponatremia.    Consultants/Specialty  None    Code Status  Full Code    Disposition  Anticipate discharge in the next 1-2 days    Review of Systems  Review of Systems   Constitutional: Positive for malaise/fatigue. Negative for chills and fever.   HENT: Negative for congestion and sore throat.    Eyes: Negative for blurred vision.   Respiratory: Negative for cough and shortness of breath.    Cardiovascular: Negative for chest pain, palpitations and leg swelling.   Gastrointestinal: Negative for abdominal pain, constipation, diarrhea, nausea and vomiting.   Genitourinary: Negative for dysuria, frequency and urgency.   Musculoskeletal: Negative for falls.   Skin: Negative for rash.   Neurological:  Negative for dizziness, weakness and headaches.   Psychiatric/Behavioral: Positive for hallucinations. Negative for depression. The patient is not nervous/anxious.    All other systems reviewed and are negative.       Physical Exam  Temp:  [36 °C (96.8 °F)-36.9 °C (98.5 °F)] 36 °C (96.8 °F)  Pulse:  [66-87] 66  Resp:  [15-18] 17  BP: (108-174)/(48-94) 108/48  SpO2:  [94 %-98 %] 96 %    Physical Exam  Vitals signs and nursing note reviewed.   Constitutional:       General: She is not in acute distress.     Appearance: Normal appearance. She is normal weight.   HENT:      Head: Normocephalic and atraumatic.      Nose: Nose normal.      Mouth/Throat:      Mouth: Mucous membranes are moist.      Pharynx: Oropharynx is clear. No oropharyngeal exudate or posterior oropharyngeal erythema.   Eyes:      Extraocular Movements: Extraocular movements intact.      Conjunctiva/sclera: Conjunctivae normal.   Neck:      Musculoskeletal: Normal range of motion and neck supple.   Cardiovascular:      Rate and Rhythm: Normal rate and regular rhythm.      Pulses: Normal pulses.      Heart sounds: Normal heart sounds. No murmur.   Pulmonary:      Effort: Pulmonary effort is normal. No respiratory distress.      Breath sounds: Normal breath sounds. No stridor. No wheezing or rales.   Abdominal:      General: Abdomen is flat. Bowel sounds are normal. There is no distension.      Palpations: Abdomen is soft. There is no mass.      Tenderness: There is no abdominal tenderness.   Musculoskeletal:      Right lower leg: Edema present.      Left lower leg: Edema present.      Comments: Mild lower extremity edema.   Skin:     General: Skin is warm.      Capillary Refill: Capillary refill takes less than 2 seconds.   Neurological:      General: No focal deficit present.      Mental Status: She is alert and oriented to person, place, and time. Mental status is at baseline.      Cranial Nerves: No cranial nerve deficit.      Motor: No weakness.    Psychiatric:         Mood and Affect: Mood normal.         Behavior: Behavior normal.         Fluids  No intake or output data in the 24 hours ending 09/01/20 1307    Laboratory  Recent Labs     08/31/20  0247 09/01/20  0309   WBC 7.9 8.7   RBC 3.99* 4.19*   HEMOGLOBIN 12.9 13.5   HEMATOCRIT 35.9* 38.4   MCV 90.0 91.6   MCH 32.3 32.2   MCHC 35.9* 35.2*   RDW 39.6 40.3   PLATELETCT 243 240   MPV 10.5 10.5     Recent Labs     08/31/20  0247 08/31/20  1234 09/01/20  0309   SODIUM 122* 122* 124*   POTASSIUM 3.8 4.4 3.9   CHLORIDE 88* 87* 91*   CO2 23 23 21   GLUCOSE 99 139* 100*   BUN 19 15 13   CREATININE 1.00 0.76 0.64   CALCIUM 8.6 9.0 9.1                   Imaging  MR-BRAIN-W/O   Final Result      1.  Mild cerebral atrophy. Age-appropriate.   2.  Moderate supratentorial white matter disease most consistent with microvascular ischemic change.   3.  No evidence of acute infarction, hemorrhage, or mass lesion.      EC-ECHOCARDIOGRAM COMPLETE W/O CONT   Final Result      DX-CHEST-PORTABLE (1 VIEW)   Final Result         1.  Interstitial pulmonary parenchymal prominence suggest chronic underlying lung disease, component of interstitial edema and/or infiltrates not excluded.   2.  Perihilar interstitial prominence and bronchial wall cuffing, appearance suggests changes of underlying bronchial inflammation, consider bronchitis.      CT-HEAD W/O   Final Result         1.  No acute intracranial abnormality is identified, there are nonspecific white matter changes, commonly associated with small vessel ischemic disease.  Associated mild cerebral atrophy is noted.   2.  Atherosclerosis.           Assessment/Plan  Hypertensive urgency- (present on admission)  Assessment & Plan  Patient reports compliance with medications but her presenting pressure was 220s/70s.  s/Post nicardipine drip    Patient Lopressor changed over to p.o. Coreg for better blood pressure control    Hydrochlorothiazide stopped on 8/31 due to low  sodium    Continue terazosin, carvedilol, telmisartan    Hyponatremia  Assessment & Plan  Due to excess water drinking and hctz  Saline IVF for 500cc.   Started on salt tabs  Follow bmp    Anxiety  Assessment & Plan  Do not give this patient any medication for anxiety at this time  Continue to monitor.    Myalgia  Assessment & Plan  Switch from normal saline to salt tabs    Dyspepsia  Assessment & Plan  GI cocktail  Prilosec twice daily        Acute encephalopathy- (present on admission)  Assessment & Plan  Due to marked elevated blood pressure  CT head negative  Mri negative for cva  Avoid  narcotics  and sedatives  Ammonia normal.  Likely secondary to hyponatremia.  Continue to monitor.    History of hypertension- (present on admission)  Assessment & Plan  Echocardiogram done 2 days ago noted  Blood pressure control as above       VTE prophylaxis: Heparin

## 2020-09-01 NOTE — CARE PLAN
Problem: Communication  Goal: The ability to communicate needs accurately and effectively will improve  Outcome: PROGRESSING AS EXPECTED     Problem: Safety  Goal: Will remain free from injury  Outcome: PROGRESSING AS EXPECTED  Goal: Will remain free from falls  Outcome: PROGRESSING AS EXPECTED     Problem: Infection  Goal: Will remain free from infection  Outcome: PROGRESSING AS EXPECTED     Problem: Bowel/Gastric:  Goal: Normal bowel function is maintained or improved  Outcome: PROGRESSING AS EXPECTED     Problem: Knowledge Deficit  Goal: Knowledge of disease process/condition, treatment plan, diagnostic tests, and medications will improve  Outcome: PROGRESSING AS EXPECTED

## 2020-09-01 NOTE — PROGRESS NOTES
Page Isamar Lai for elevated bp and pt expressing anxiety over taking vasotech PRN. Ming placing orders.

## 2020-09-02 ENCOUNTER — APPOINTMENT (OUTPATIENT)
Dept: RADIOLOGY | Facility: MEDICAL CENTER | Age: 85
DRG: 305 | End: 2020-09-02
Attending: INTERNAL MEDICINE
Payer: MEDICARE

## 2020-09-02 LAB
ANION GAP SERPL CALC-SCNC: 11 MMOL/L (ref 7–16)
BASOPHILS # BLD AUTO: 0.5 % (ref 0–1.8)
BASOPHILS # BLD: 0.05 K/UL (ref 0–0.12)
BUN SERPL-MCNC: 18 MG/DL (ref 8–22)
CALCIUM SERPL-MCNC: 8.7 MG/DL (ref 8.5–10.5)
CHLORIDE SERPL-SCNC: 92 MMOL/L (ref 96–112)
CO2 SERPL-SCNC: 22 MMOL/L (ref 20–33)
CREAT SERPL-MCNC: 0.88 MG/DL (ref 0.5–1.4)
EOSINOPHIL # BLD AUTO: 0.11 K/UL (ref 0–0.51)
EOSINOPHIL NFR BLD: 1.2 % (ref 0–6.9)
ERYTHROCYTE [DISTWIDTH] IN BLOOD BY AUTOMATED COUNT: 42.2 FL (ref 35.9–50)
GLUCOSE SERPL-MCNC: 104 MG/DL (ref 65–99)
HCT VFR BLD AUTO: 35.7 % (ref 37–47)
HGB BLD-MCNC: 12.5 G/DL (ref 12–16)
IMM GRANULOCYTES # BLD AUTO: 0.05 K/UL (ref 0–0.11)
IMM GRANULOCYTES NFR BLD AUTO: 0.5 % (ref 0–0.9)
LYMPHOCYTES # BLD AUTO: 2.01 K/UL (ref 1–4.8)
LYMPHOCYTES NFR BLD: 21 % (ref 22–41)
MAGNESIUM SERPL-MCNC: 2.1 MG/DL (ref 1.5–2.5)
MCH RBC QN AUTO: 32.6 PG (ref 27–33)
MCHC RBC AUTO-ENTMCNC: 35 G/DL (ref 33.6–35)
MCV RBC AUTO: 93 FL (ref 81.4–97.8)
MONOCYTES # BLD AUTO: 1.51 K/UL (ref 0–0.85)
MONOCYTES NFR BLD AUTO: 15.8 % (ref 0–13.4)
NEUTROPHILS # BLD AUTO: 5.82 K/UL (ref 2–7.15)
NEUTROPHILS NFR BLD: 61 % (ref 44–72)
NRBC # BLD AUTO: 0 K/UL
NRBC BLD-RTO: 0 /100 WBC
PHOSPHATE SERPL-MCNC: 3 MG/DL (ref 2.5–4.5)
PLATELET # BLD AUTO: 237 K/UL (ref 164–446)
PMV BLD AUTO: 10.4 FL (ref 9–12.9)
POTASSIUM SERPL-SCNC: 4 MMOL/L (ref 3.6–5.5)
RBC # BLD AUTO: 3.84 M/UL (ref 4.2–5.4)
SODIUM SERPL-SCNC: 125 MMOL/L (ref 135–145)
WBC # BLD AUTO: 9.6 K/UL (ref 4.8–10.8)

## 2020-09-02 PROCEDURE — 700102 HCHG RX REV CODE 250 W/ 637 OVERRIDE(OP): Performed by: INTERNAL MEDICINE

## 2020-09-02 PROCEDURE — 85025 COMPLETE CBC W/AUTO DIFF WBC: CPT

## 2020-09-02 PROCEDURE — A9270 NON-COVERED ITEM OR SERVICE: HCPCS | Performed by: INTERNAL MEDICINE

## 2020-09-02 PROCEDURE — 700101 HCHG RX REV CODE 250: Performed by: INTERNAL MEDICINE

## 2020-09-02 PROCEDURE — 73501 X-RAY EXAM HIP UNI 1 VIEW: CPT | Mod: RT

## 2020-09-02 PROCEDURE — 770020 HCHG ROOM/CARE - TELE (206)

## 2020-09-02 PROCEDURE — 80048 BASIC METABOLIC PNL TOTAL CA: CPT

## 2020-09-02 PROCEDURE — 700102 HCHG RX REV CODE 250 W/ 637 OVERRIDE(OP): Performed by: HOSPITALIST

## 2020-09-02 PROCEDURE — A9270 NON-COVERED ITEM OR SERVICE: HCPCS | Performed by: NURSE PRACTITIONER

## 2020-09-02 PROCEDURE — A9270 NON-COVERED ITEM OR SERVICE: HCPCS | Performed by: PSYCHIATRY & NEUROLOGY

## 2020-09-02 PROCEDURE — 700102 HCHG RX REV CODE 250 W/ 637 OVERRIDE(OP): Performed by: NURSE PRACTITIONER

## 2020-09-02 PROCEDURE — 36415 COLL VENOUS BLD VENIPUNCTURE: CPT

## 2020-09-02 PROCEDURE — A9270 NON-COVERED ITEM OR SERVICE: HCPCS | Performed by: HOSPITALIST

## 2020-09-02 PROCEDURE — 99232 SBSQ HOSP IP/OBS MODERATE 35: CPT | Performed by: INTERNAL MEDICINE

## 2020-09-02 PROCEDURE — 84100 ASSAY OF PHOSPHORUS: CPT

## 2020-09-02 PROCEDURE — 700111 HCHG RX REV CODE 636 W/ 250 OVERRIDE (IP): Performed by: NURSE PRACTITIONER

## 2020-09-02 PROCEDURE — 700102 HCHG RX REV CODE 250 W/ 637 OVERRIDE(OP): Performed by: PSYCHIATRY & NEUROLOGY

## 2020-09-02 PROCEDURE — 700111 HCHG RX REV CODE 636 W/ 250 OVERRIDE (IP): Performed by: INTERNAL MEDICINE

## 2020-09-02 PROCEDURE — 83735 ASSAY OF MAGNESIUM: CPT

## 2020-09-02 RX ORDER — KETOROLAC TROMETHAMINE 30 MG/ML
15 INJECTION, SOLUTION INTRAMUSCULAR; INTRAVENOUS EVERY 6 HOURS PRN
Status: DISCONTINUED | OUTPATIENT
Start: 2020-09-02 | End: 2020-09-04 | Stop reason: HOSPADM

## 2020-09-02 RX ADMIN — OMEPRAZOLE 20 MG: 20 CAPSULE, DELAYED RELEASE ORAL at 06:42

## 2020-09-02 RX ADMIN — ASPIRIN 81 MG: 81 TABLET, COATED ORAL at 17:10

## 2020-09-02 RX ADMIN — HEPARIN SODIUM 5000 UNITS: 5000 INJECTION, SOLUTION INTRAVENOUS; SUBCUTANEOUS at 13:10

## 2020-09-02 RX ADMIN — LIDOCAINE 1 PATCH: 50 PATCH TOPICAL at 20:19

## 2020-09-02 RX ADMIN — HEPARIN SODIUM 5000 UNITS: 5000 INJECTION, SOLUTION INTRAVENOUS; SUBCUTANEOUS at 06:42

## 2020-09-02 RX ADMIN — DOCUSATE SODIUM 50 MG AND SENNOSIDES 8.6 MG 2 TABLET: 8.6; 5 TABLET, FILM COATED ORAL at 06:42

## 2020-09-02 RX ADMIN — ACETAMINOPHEN 650 MG: 325 TABLET, FILM COATED ORAL at 07:24

## 2020-09-02 RX ADMIN — Medication 1 G: at 17:10

## 2020-09-02 RX ADMIN — HEPARIN SODIUM 5000 UNITS: 5000 INJECTION, SOLUTION INTRAVENOUS; SUBCUTANEOUS at 20:22

## 2020-09-02 RX ADMIN — OMEPRAZOLE 20 MG: 20 CAPSULE, DELAYED RELEASE ORAL at 17:10

## 2020-09-02 RX ADMIN — Medication 1 G: at 06:41

## 2020-09-02 RX ADMIN — TERAZOSIN HYDROCHLORIDE 3 MG: 2 CAPSULE ORAL at 06:41

## 2020-09-02 RX ADMIN — KETOROLAC TROMETHAMINE 15 MG: 30 INJECTION, SOLUTION INTRAMUSCULAR at 01:19

## 2020-09-02 RX ADMIN — ACETAMINOPHEN 650 MG: 325 TABLET, FILM COATED ORAL at 13:14

## 2020-09-02 RX ADMIN — Medication 1 G: at 13:10

## 2020-09-02 RX ADMIN — CARVEDILOL 25 MG: 25 TABLET, FILM COATED ORAL at 20:15

## 2020-09-02 RX ADMIN — TELMISARTAN 80 MG: 80 TABLET ORAL at 08:00

## 2020-09-02 RX ADMIN — KETOROLAC TROMETHAMINE 15 MG: 30 INJECTION, SOLUTION INTRAMUSCULAR at 17:10

## 2020-09-02 RX ADMIN — ACETAMINOPHEN 650 MG: 325 TABLET, FILM COATED ORAL at 20:15

## 2020-09-02 RX ADMIN — KETOROLAC TROMETHAMINE 15 MG: 30 INJECTION, SOLUTION INTRAMUSCULAR at 23:52

## 2020-09-02 RX ADMIN — TERAZOSIN HYDROCHLORIDE 3 MG: 2 CAPSULE ORAL at 17:10

## 2020-09-02 ASSESSMENT — ENCOUNTER SYMPTOMS
FEVER: 0
VOMITING: 0
NERVOUS/ANXIOUS: 0
HEADACHES: 0
DIARRHEA: 0
SHORTNESS OF BREATH: 0
HALLUCINATIONS: 1
BLURRED VISION: 0
WEAKNESS: 0
DEPRESSION: 0
ABDOMINAL PAIN: 0
DIZZINESS: 0
CHILLS: 0
FALLS: 0
CONSTIPATION: 0
NAUSEA: 0
SORE THROAT: 0
PALPITATIONS: 0
COUGH: 0

## 2020-09-02 ASSESSMENT — PAIN DESCRIPTION - PAIN TYPE
TYPE: ACUTE PAIN

## 2020-09-02 ASSESSMENT — FIBROSIS 4 INDEX: FIB4 SCORE: 0.94

## 2020-09-02 NOTE — PROGRESS NOTES
Paged hosp on call for BP 90/50 manually, Pt is drowsy but oriented and easily arousable. Orders to monitor and encourage pts oral intake.  Pt is resting in bed, complains of muscle aches to right thigh, warm pack applied and assisted to reposition in bed. Will cont to monitor. Call light and possessions within reach, fall and safety precautions maintained. Bed and strip alarm on.

## 2020-09-02 NOTE — PROGRESS NOTES
While rounding on patient to recheck BP pt awoke with confusion reporting that she is not at the hospital and refused to answer orientation questions and demonstrating paranoia. Attempted to reorient patient but pt requested to speak with her  on the phone. After speaking with her  pt is calm and cooperative and oriented x4. I  spoke with her  Phil as well, pt reports aching pain in her right leg, Paged hosp for orders for pain, orders given. Also notified Aprn hosp of patients bought of confusion and its resolution and improvement of low bp to 158/75. Orders to cont to monitor. Waffle mattress applied to patients bed, Iv to left wrist occluded, New IV to Left FA obtained and PRN Toradol given for pain in right hip leg knee. Call light and possessions within reach, fall and safety precautions maintained. Strip and bed alarm on.

## 2020-09-02 NOTE — PROGRESS NOTES
[facility] Medicine Daily Progress Note    Date of Service  9/2/2020    Chief Complaint  85 y.o. female admitted 8/26/2020 with malaise, lightheadedness, weakness    Hospital Course    Ms. Prabha Lizama is a 85 y.o. female with a history of hypertension who presented on 8/26/2020 with malaise, lightheadedness, weakness of lower extremities.  On presentation she was found to have hypertensive emergency with systolics up to 240.  She was admitted to the ICU and transition from IV antihypertensives to oral.  Echocardiogram showed ejection fraction 75%, normal regional wall motion, normal diastolic function, no significant valve disease.  Also found to be encephalopathic with low sodium.  She was on hydrochlorothiazide as an outpatient which has been held.  CT head showed no acute intracranial disease.  MRI brain showed mild cerebral atrophy, microvascular ischemic change, no acute infarction hemorrhage or mass lesion.  Ammonia level normal.  Patient was also found to be drinking too much water.        Interval Problem Update  Patient was seen and examined at bedside.  I have personally reviewed vitals, labs, and imaging.    9/1.  Afebrile.  Was hypertensive last night.  On room air.  Denies fever, chills, chest pains, shortness of breath.  Does report chronic back and right lower extremity pain.  Hyponatremia noted.  Discontinued IV fluids and started on salt tabs.  Patient reports seeing hallucinations.  Ammonia level within normal limits.  Likely secondary to hyponatremia.  9/2.  Afebrile.  Episode of hypotension last night while sleeping which resolved.  On room air.  Episodes of confusion overnight.  Denies fevers, chills, chest pains, shortness of breath.  I did speak with her  Diony over the phone.  I feel like the patient's mental status is around her baseline.  She does get delirious and disoriented in new environment with frequent vitals and labs and difficulty sleeping.  Will focus on  reorientation.  She does report right hip and leg pain which she has trouble giving history, quantifying/qualifying.  Believe this is consistent with her chronic back pain.  Will get plain films to rule out acute injury.    Consultants/Specialty  None    Code Status  Full Code    Disposition  Anticipate discharge in the next 1-2 days    Review of Systems  Review of Systems   Constitutional: Positive for malaise/fatigue. Negative for chills and fever.   HENT: Negative for congestion and sore throat.    Eyes: Negative for blurred vision.   Respiratory: Negative for cough and shortness of breath.    Cardiovascular: Negative for chest pain, palpitations and leg swelling.   Gastrointestinal: Negative for abdominal pain, constipation, diarrhea, nausea and vomiting.   Genitourinary: Negative for dysuria, frequency and urgency.   Musculoskeletal: Positive for joint pain (Right Hip). Negative for falls.   Skin: Negative for rash.   Neurological: Negative for dizziness, weakness and headaches.   Psychiatric/Behavioral: Positive for hallucinations. Negative for depression. The patient is not nervous/anxious.    All other systems reviewed and are negative.       Physical Exam  Temp:  [35.9 °C (96.7 °F)-36.6 °C (97.8 °F)] 35.9 °C (96.7 °F)  Pulse:  [58-70] 61  Resp:  [16-18] 16  BP: ()/(46-75) 125/60  SpO2:  [93 %-98 %] 98 %    Physical Exam  Vitals signs and nursing note reviewed.   Constitutional:       General: She is not in acute distress.     Appearance: Normal appearance. She is normal weight.   HENT:      Head: Normocephalic and atraumatic.      Nose: Nose normal.      Mouth/Throat:      Mouth: Mucous membranes are moist.      Pharynx: Oropharynx is clear. No oropharyngeal exudate or posterior oropharyngeal erythema.   Eyes:      Extraocular Movements: Extraocular movements intact.      Conjunctiva/sclera: Conjunctivae normal.   Neck:      Musculoskeletal: Normal range of motion and neck supple.   Cardiovascular:       Rate and Rhythm: Normal rate and regular rhythm.      Pulses: Normal pulses.      Heart sounds: Normal heart sounds. No murmur.   Pulmonary:      Effort: Pulmonary effort is normal. No respiratory distress.      Breath sounds: Normal breath sounds. No stridor. No wheezing or rales.   Abdominal:      General: Abdomen is flat. Bowel sounds are normal. There is no distension.      Palpations: Abdomen is soft. There is no mass.      Tenderness: There is no abdominal tenderness.   Musculoskeletal:      Right lower leg: Edema present.      Left lower leg: Edema present.      Comments: Mild lower extremity edema.   Skin:     General: Skin is warm.      Capillary Refill: Capillary refill takes less than 2 seconds.   Neurological:      General: No focal deficit present.      Mental Status: She is alert and oriented to person, place, and time. Mental status is at baseline.      Cranial Nerves: No cranial nerve deficit.      Motor: No weakness.   Psychiatric:         Mood and Affect: Mood normal.         Behavior: Behavior normal.         Fluids    Intake/Output Summary (Last 24 hours) at 9/2/2020 0822  Last data filed at 9/2/2020 0200  Gross per 24 hour   Intake --   Output 1 ml   Net -1 ml       Laboratory  Recent Labs     08/31/20  0247 09/01/20  0309 09/02/20  0158   WBC 7.9 8.7 9.6   RBC 3.99* 4.19* 3.84*   HEMOGLOBIN 12.9 13.5 12.5   HEMATOCRIT 35.9* 38.4 35.7*   MCV 90.0 91.6 93.0   MCH 32.3 32.2 32.6   MCHC 35.9* 35.2* 35.0   RDW 39.6 40.3 42.2   PLATELETCT 243 240 237   MPV 10.5 10.5 10.4     Recent Labs     09/01/20  0309 09/01/20  1351 09/02/20  0158   SODIUM 124* 126* 125*   POTASSIUM 3.9 4.6 4.0   CHLORIDE 91* 90* 92*   CO2 21 23 22   GLUCOSE 100* 143* 104*   BUN 13 14 18   CREATININE 0.64 0.83 0.88   CALCIUM 9.1 9.3 8.7                   Imaging  MR-BRAIN-W/O   Final Result      1.  Mild cerebral atrophy. Age-appropriate.   2.  Moderate supratentorial white matter disease most consistent with microvascular  ischemic change.   3.  No evidence of acute infarction, hemorrhage, or mass lesion.      EC-ECHOCARDIOGRAM COMPLETE W/O CONT   Final Result      DX-CHEST-PORTABLE (1 VIEW)   Final Result         1.  Interstitial pulmonary parenchymal prominence suggest chronic underlying lung disease, component of interstitial edema and/or infiltrates not excluded.   2.  Perihilar interstitial prominence and bronchial wall cuffing, appearance suggests changes of underlying bronchial inflammation, consider bronchitis.      CT-HEAD W/O   Final Result         1.  No acute intracranial abnormality is identified, there are nonspecific white matter changes, commonly associated with small vessel ischemic disease.  Associated mild cerebral atrophy is noted.   2.  Atherosclerosis.           Assessment/Plan  Hypertensive urgency- (present on admission)  Assessment & Plan  Patient reports compliance with medications but her presenting pressure was 220s/70s.  s/Post nicardipine drip    Patient Lopressor changed over to p.o. Coreg for better blood pressure control    Hydrochlorothiazide stopped on 8/31 due to low sodium    Continue terazosin, carvedilol, telmisartan    Hyponatremia  Assessment & Plan  Due to excess water drinking and hctz  Saline IVF for 500cc.   Started on salt tabs  Follow bmp    Anxiety  Assessment & Plan  Do not give this patient any medication for anxiety at this time  Continue to monitor.    Myalgia  Assessment & Plan  Switch from normal saline to salt tabs    Dyspepsia  Assessment & Plan  GI cocktail  Prilosec twice daily        Acute encephalopathy- (present on admission)  Assessment & Plan  Due to marked elevated blood pressure  CT head negative  Mri negative for cva  Avoid  narcotics  and sedatives  Ammonia normal.  Likely secondary to hyponatremia.  Continue to monitor.  Secondary to hyponatremia and disorientation    History of hypertension- (present on admission)  Assessment & Plan  Echocardiogram done 2 days ago  noted  Blood pressure control as above       VTE prophylaxis: Heparin

## 2020-09-03 LAB
ANION GAP SERPL CALC-SCNC: 10 MMOL/L (ref 7–16)
BASOPHILS # BLD AUTO: 0.5 % (ref 0–1.8)
BASOPHILS # BLD: 0.05 K/UL (ref 0–0.12)
BUN SERPL-MCNC: 17 MG/DL (ref 8–22)
CALCIUM SERPL-MCNC: 8.7 MG/DL (ref 8.5–10.5)
CHLORIDE SERPL-SCNC: 92 MMOL/L (ref 96–112)
CO2 SERPL-SCNC: 23 MMOL/L (ref 20–33)
CREAT SERPL-MCNC: 0.8 MG/DL (ref 0.5–1.4)
EOSINOPHIL # BLD AUTO: 0.15 K/UL (ref 0–0.51)
EOSINOPHIL NFR BLD: 1.5 % (ref 0–6.9)
ERYTHROCYTE [DISTWIDTH] IN BLOOD BY AUTOMATED COUNT: 41.6 FL (ref 35.9–50)
GLUCOSE SERPL-MCNC: 101 MG/DL (ref 65–99)
HCT VFR BLD AUTO: 32.8 % (ref 37–47)
HGB BLD-MCNC: 11.6 G/DL (ref 12–16)
IMM GRANULOCYTES # BLD AUTO: 0.05 K/UL (ref 0–0.11)
IMM GRANULOCYTES NFR BLD AUTO: 0.5 % (ref 0–0.9)
LYMPHOCYTES # BLD AUTO: 1.87 K/UL (ref 1–4.8)
LYMPHOCYTES NFR BLD: 18.4 % (ref 22–41)
MAGNESIUM SERPL-MCNC: 2 MG/DL (ref 1.5–2.5)
MCH RBC QN AUTO: 32.6 PG (ref 27–33)
MCHC RBC AUTO-ENTMCNC: 35.4 G/DL (ref 33.6–35)
MCV RBC AUTO: 92.1 FL (ref 81.4–97.8)
MONOCYTES # BLD AUTO: 1.34 K/UL (ref 0–0.85)
MONOCYTES NFR BLD AUTO: 13.2 % (ref 0–13.4)
NEUTROPHILS # BLD AUTO: 6.73 K/UL (ref 2–7.15)
NEUTROPHILS NFR BLD: 65.9 % (ref 44–72)
NRBC # BLD AUTO: 0 K/UL
NRBC BLD-RTO: 0 /100 WBC
PHOSPHATE SERPL-MCNC: 2.9 MG/DL (ref 2.5–4.5)
PLATELET # BLD AUTO: 215 K/UL (ref 164–446)
PMV BLD AUTO: 10.2 FL (ref 9–12.9)
POTASSIUM SERPL-SCNC: 4.1 MMOL/L (ref 3.6–5.5)
RBC # BLD AUTO: 3.56 M/UL (ref 4.2–5.4)
SODIUM SERPL-SCNC: 125 MMOL/L (ref 135–145)
WBC # BLD AUTO: 10.2 K/UL (ref 4.8–10.8)

## 2020-09-03 PROCEDURE — A9270 NON-COVERED ITEM OR SERVICE: HCPCS | Performed by: INTERNAL MEDICINE

## 2020-09-03 PROCEDURE — 700102 HCHG RX REV CODE 250 W/ 637 OVERRIDE(OP): Performed by: PSYCHIATRY & NEUROLOGY

## 2020-09-03 PROCEDURE — 700102 HCHG RX REV CODE 250 W/ 637 OVERRIDE(OP): Performed by: INTERNAL MEDICINE

## 2020-09-03 PROCEDURE — A9270 NON-COVERED ITEM OR SERVICE: HCPCS | Performed by: HOSPITALIST

## 2020-09-03 PROCEDURE — 700111 HCHG RX REV CODE 636 W/ 250 OVERRIDE (IP): Performed by: NURSE PRACTITIONER

## 2020-09-03 PROCEDURE — A9270 NON-COVERED ITEM OR SERVICE: HCPCS | Performed by: PSYCHIATRY & NEUROLOGY

## 2020-09-03 PROCEDURE — 84100 ASSAY OF PHOSPHORUS: CPT

## 2020-09-03 PROCEDURE — 80048 BASIC METABOLIC PNL TOTAL CA: CPT

## 2020-09-03 PROCEDURE — 700111 HCHG RX REV CODE 636 W/ 250 OVERRIDE (IP): Performed by: INTERNAL MEDICINE

## 2020-09-03 PROCEDURE — 36415 COLL VENOUS BLD VENIPUNCTURE: CPT

## 2020-09-03 PROCEDURE — 99232 SBSQ HOSP IP/OBS MODERATE 35: CPT | Performed by: INTERNAL MEDICINE

## 2020-09-03 PROCEDURE — 770020 HCHG ROOM/CARE - TELE (206)

## 2020-09-03 PROCEDURE — A9270 NON-COVERED ITEM OR SERVICE: HCPCS | Performed by: NURSE PRACTITIONER

## 2020-09-03 PROCEDURE — 700102 HCHG RX REV CODE 250 W/ 637 OVERRIDE(OP): Performed by: NURSE PRACTITIONER

## 2020-09-03 PROCEDURE — 97164 PT RE-EVAL EST PLAN CARE: CPT

## 2020-09-03 PROCEDURE — 700101 HCHG RX REV CODE 250: Performed by: INTERNAL MEDICINE

## 2020-09-03 PROCEDURE — 700102 HCHG RX REV CODE 250 W/ 637 OVERRIDE(OP): Performed by: HOSPITALIST

## 2020-09-03 PROCEDURE — 85025 COMPLETE CBC W/AUTO DIFF WBC: CPT

## 2020-09-03 PROCEDURE — 83735 ASSAY OF MAGNESIUM: CPT

## 2020-09-03 RX ORDER — METHOCARBAMOL 500 MG/1
500 TABLET, FILM COATED ORAL 4 TIMES DAILY
Status: DISCONTINUED | OUTPATIENT
Start: 2020-09-03 | End: 2020-09-04 | Stop reason: HOSPADM

## 2020-09-03 RX ADMIN — ACETAMINOPHEN 650 MG: 325 TABLET, FILM COATED ORAL at 06:04

## 2020-09-03 RX ADMIN — HEPARIN SODIUM 5000 UNITS: 5000 INJECTION, SOLUTION INTRAVENOUS; SUBCUTANEOUS at 14:31

## 2020-09-03 RX ADMIN — HEPARIN SODIUM 5000 UNITS: 5000 INJECTION, SOLUTION INTRAVENOUS; SUBCUTANEOUS at 06:04

## 2020-09-03 RX ADMIN — KETOROLAC TROMETHAMINE 15 MG: 30 INJECTION, SOLUTION INTRAMUSCULAR at 07:59

## 2020-09-03 RX ADMIN — KETOROLAC TROMETHAMINE 15 MG: 30 INJECTION, SOLUTION INTRAMUSCULAR at 14:31

## 2020-09-03 RX ADMIN — ACETAMINOPHEN 650 MG: 325 TABLET, FILM COATED ORAL at 12:28

## 2020-09-03 RX ADMIN — Medication 1 G: at 07:59

## 2020-09-03 RX ADMIN — ACETAMINOPHEN 650 MG: 325 TABLET, FILM COATED ORAL at 20:18

## 2020-09-03 RX ADMIN — Medication 1 G: at 12:29

## 2020-09-03 RX ADMIN — TERAZOSIN HYDROCHLORIDE 3 MG: 2 CAPSULE ORAL at 17:20

## 2020-09-03 RX ADMIN — OMEPRAZOLE 20 MG: 20 CAPSULE, DELAYED RELEASE ORAL at 06:04

## 2020-09-03 RX ADMIN — CARVEDILOL 25 MG: 25 TABLET, FILM COATED ORAL at 17:20

## 2020-09-03 RX ADMIN — TELMISARTAN 80 MG: 80 TABLET ORAL at 06:02

## 2020-09-03 RX ADMIN — LIDOCAINE 1 PATCH: 50 PATCH TOPICAL at 20:18

## 2020-09-03 RX ADMIN — CARVEDILOL 25 MG: 25 TABLET, FILM COATED ORAL at 07:59

## 2020-09-03 RX ADMIN — METHOCARBAMOL TABLETS 500 MG: 500 TABLET, COATED ORAL at 17:21

## 2020-09-03 RX ADMIN — METHOCARBAMOL TABLETS 500 MG: 500 TABLET, COATED ORAL at 20:18

## 2020-09-03 RX ADMIN — Medication 1 G: at 17:20

## 2020-09-03 RX ADMIN — OMEPRAZOLE 20 MG: 20 CAPSULE, DELAYED RELEASE ORAL at 17:19

## 2020-09-03 RX ADMIN — TERAZOSIN HYDROCHLORIDE 3 MG: 2 CAPSULE ORAL at 06:02

## 2020-09-03 RX ADMIN — DOCUSATE SODIUM 50 MG AND SENNOSIDES 8.6 MG 2 TABLET: 8.6; 5 TABLET, FILM COATED ORAL at 06:04

## 2020-09-03 RX ADMIN — ASPIRIN 81 MG: 81 TABLET, COATED ORAL at 17:20

## 2020-09-03 ASSESSMENT — ENCOUNTER SYMPTOMS
NERVOUS/ANXIOUS: 0
HEADACHES: 0
FALLS: 0
WEAKNESS: 0
SORE THROAT: 0
ABDOMINAL PAIN: 0
BLURRED VISION: 0
HALLUCINATIONS: 1
PALPITATIONS: 0
DIARRHEA: 0
NAUSEA: 0
FEVER: 0
CHILLS: 0
COUGH: 0
VOMITING: 0
DIZZINESS: 0
MYALGIAS: 1
DEPRESSION: 0
SHORTNESS OF BREATH: 0
CONSTIPATION: 0

## 2020-09-03 ASSESSMENT — COGNITIVE AND FUNCTIONAL STATUS - GENERAL
MOVING FROM LYING ON BACK TO SITTING ON SIDE OF FLAT BED: A LITTLE
MOBILITY SCORE: 18
CLIMB 3 TO 5 STEPS WITH RAILING: A LITTLE
MOVING TO AND FROM BED TO CHAIR: A LOT
TURNING FROM BACK TO SIDE WHILE IN FLAT BAD: A LOT
SUGGESTED CMS G CODE MODIFIER MOBILITY: CK

## 2020-09-03 ASSESSMENT — GAIT ASSESSMENTS
GAIT LEVEL OF ASSIST: SUPERVISED
DEVIATION: OTHER (COMMENT);ANTALGIC
ASSISTIVE DEVICE: FRONT WHEEL WALKER
DISTANCE (FEET): 120

## 2020-09-03 ASSESSMENT — PAIN DESCRIPTION - PAIN TYPE
TYPE: ACUTE PAIN

## 2020-09-03 NOTE — PROGRESS NOTES
Received bedside report from Ewa Nails RN. Pt laying in bed, alert and awake. Expressing some pain on the right hip, R leg area. Pt has some anxiety regarding the pain.

## 2020-09-03 NOTE — THERAPY
Physical Therapy   Re-Evaluation     Patient Name: Prabha Lizama  Age:  85 y.o., Sex:  female  Medical Record #: [id number]  Today's Date: 9/3/2020     Precautions:  Fall Risk    Assessment  Pt presents to PT for re-evaluation. She is mobilizing fair at this time with use of FWW, though is primarily limited 2' to new RLE hip and groin pain. Given subjective hx and current objective findings, anticipate that current pain is in part 2' to muscle strain/connective tissue disorder as well as acute inflammation. She will benefit from continued acute PT while here though anticipate that she will be functionally capable of dc to home once medically cleared given current objective findings and spouse support. Will continue to visit with recs and details below.     Plan    Recommend Physical Therapy 3 times per week until therapy goals are met for the following treatments:  Bed Mobility, Community Re-integration, Equipment, Gait Training, Manual Therapy, Neuro Re-Education / Balance, Self Care/Home Evaluation, Stair Training, Therapeutic Activities and Therapeutic Exercises    DC Equipment Recommendations:  Unable to determine at this time, None  Discharge Recommendations:  Recommend home health for continued physical therapy services        09/03/20 1601   Pain 0 - 10 Group   Location Leg;Hip   Location Orientation Right;Lateral;Anterior;Proximal   Description Sharp;Sore;Shooting   Therapist Pain Assessment During Activity;Post Activity Pain Same as Prior to Activity;Nurse Notified  (significant pain with AROM and ambulatory activity)   Cognition    Cognition / Consciousness WDL   Level of Consciousness Alert   Comments pleasant and cooperative; appears anxious 2' to pain (this appears likely baseline behavior)   Passive ROM Lower Body   Passive ROM Lower Body WDL   Comments grossly WFL, less guarding than with AROM, though still painful at RLE hip/groin   Active ROM Lower Body    Active ROM Lower Body  X   Comments  required AAROM for hip flexion, IR, ER, abduction 2' to pain; very guarded with initaition of AROM; pain does appear to at least in part originate from RLE hip musculature (as improves somewhat with PROM and when isolating knee able to improve knee movement without hip involvement)   Strength Lower Body   Lower Body Strength  X   Comments as above; pain limiting; grossly 3-/5 at RLE hip flexion, IR, ER, abduction 2' pain   Sensation Lower Body   Lower Extremity Sensation   X   Comments c/o parasthesias at proximal/lateral RLE thigh   Balance   Sitting Balance (Static) Fair +   Sitting Balance (Dynamic) Fair +   Standing Balance (Static) Fair   Standing Balance (Dynamic) Fair   Weight Shift Sitting Good   Weight Shift Standing Fair   Skilled Intervention Verbal Cuing;Compensatory Strategies   Comments no izzy LOB with FWW   Gait Analysis   Gait Level Of Assist Supervised   Assistive Device Front Wheel Walker   Distance (Feet) 120   # of Times Distance was Traveled 1   Deviation Other (Comment);Antalgic  (reciprocal gait; dec ludmila/clearance; dec stance at RLE)   # of Stairs Climbed 0   Weight Bearing Status no restrictions   Skilled Intervention Compensatory Strategies   Comments see balance; antalgic   Bed Mobility    Supine to Sit Modified Independent   Sit to Supine Minimal Assist   Rolling Modified Independent   Comments HOB flat + rails; cueing and facilitation of logrolling   Functional Mobility   Sit to Stand Supervised   Bed, Chair, Wheelchair Transfer Supervised   Transfer Method Other (Comments)  (walks to EOB)   Mobility with FWW   Activity Tolerance   Comments no overt/acute fatigue; pain appears limiting factor   Short Term Goals    Short Term Goal # 1 Pt will be able to perform supine<>sit with HOB flat/no rails with Spv in 6tx to promote dc to home plan   Short Term Goal # 2 Pt will be able to ambulate 250ft with no AD with Spv in 6tx to promote fnx progression to I    Short Term Goal # 3 Pt will be  able to ambulate up/down 3 steps with rail with SPv in 6tx to promote increased access to community at time of dc    Education Group   Additional Comments education re: possible sources of etiology of current LE and groin pain; spoke with pt and spouse at length re: positions of comfort, acute inflammation, positining and activity recs and dc planning concerns

## 2020-09-03 NOTE — CARE PLAN
Problem: Communication  Goal: The ability to communicate needs accurately and effectively will improve  Outcome: PROGRESSING SLOWER THAN EXPECTED     Problem: Safety  Goal: Will remain free from injury  Outcome: PROGRESSING SLOWER THAN EXPECTED  Goal: Will remain free from falls  Outcome: PROGRESSING SLOWER THAN EXPECTED     Problem: Infection  Goal: Will remain free from infection  Outcome: PROGRESSING SLOWER THAN EXPECTED     Problem: Venous Thromboembolism (VTW)/Deep Vein Thrombosis (DVT) Prevention:  Goal: Patient will participate in Venous Thrombosis (VTE)/Deep Vein Thrombosis (DVT)Prevention Measures  Outcome: PROGRESSING SLOWER THAN EXPECTED     Problem: Bowel/Gastric:  Goal: Normal bowel function is maintained or improved  Outcome: PROGRESSING SLOWER THAN EXPECTED  Goal: Will not experience complications related to bowel motility  Outcome: PROGRESSING SLOWER THAN EXPECTED     Problem: Knowledge Deficit  Goal: Knowledge of disease process/condition, treatment plan, diagnostic tests, and medications will improve  Outcome: PROGRESSING SLOWER THAN EXPECTED  Goal: Knowledge of the prescribed therapeutic regimen will improve  Outcome: PROGRESSING SLOWER THAN EXPECTED     Problem: Discharge Barriers/Planning  Goal: Patient's continuum of care needs will be met  Outcome: PROGRESSING SLOWER THAN EXPECTED     Problem: Pain Management  Goal: Pain level will decrease to patient's comfort goal  Outcome: PROGRESSING SLOWER THAN EXPECTED     Problem: Psychosocial Needs:  Goal: Level of anxiety will decrease  Outcome: PROGRESSING SLOWER THAN EXPECTED     Problem: Respiratory:  Goal: Respiratory status will improve  Outcome: PROGRESSING SLOWER THAN EXPECTED     Problem: Skin Integrity  Goal: Risk for impaired skin integrity will decrease  Outcome: PROGRESSING SLOWER THAN EXPECTED

## 2020-09-03 NOTE — PROGRESS NOTES
[facility] Medicine Daily Progress Note    Date of Service  9/3/2020    Chief Complaint  85 y.o. female admitted 8/26/2020 with malaise, lightheadedness, weakness    Hospital Course    Ms. Prabha Lizama is a 85 y.o. female with a history of hypertension who presented on 8/26/2020 with malaise, lightheadedness, weakness of lower extremities.  On presentation she was found to have hypertensive emergency with systolics up to 240.  She was admitted to the ICU and transition from IV antihypertensives to oral.  Echocardiogram showed ejection fraction 75%, normal regional wall motion, normal diastolic function, no significant valve disease.  Also found to be encephalopathic with low sodium.  She was on hydrochlorothiazide as an outpatient which has been held.  CT head showed no acute intracranial disease.  MRI brain showed mild cerebral atrophy, microvascular ischemic change, no acute infarction hemorrhage or mass lesion.  Ammonia level normal.  Patient was also found to be drinking too much water.        Interval Problem Update  Patient was seen and examined at bedside.  I have personally reviewed vitals, labs, and imaging.    9/1.  Afebrile.  Was hypertensive last night.  On room air.  Denies fever, chills, chest pains, shortness of breath.  Does report chronic back and right lower extremity pain.  Hyponatremia noted.  Discontinued IV fluids and started on salt tabs.  Patient reports seeing hallucinations.  Ammonia level within normal limits.  Likely secondary to hyponatremia.  9/2.  Afebrile.  Episode of hypotension last night while sleeping which resolved.  On room air.  Episodes of confusion overnight.  Denies fevers, chills, chest pains, shortness of breath.  I did speak with her  Diony over the phone.  I feel like the patient's mental status is around her baseline.  She does get delirious and disoriented in new environment with frequent vitals and labs and difficulty sleeping.  Will focus on  reorientation.  She does report right hip and leg pain which she has trouble giving history, quantifying/qualifying.  Believe this is consistent with her chronic back pain.  Will get plain films to rule out acute injury.  9/3.  Afebrile.  Stable vitals.  On room air.  Hip x-ray showed osteopenia, no acute fracture or dislocation.  Denies fever, chills, chest pain, shortness of breath.  Continues to report right hip and leg pain.  Started on muscle relaxants.  Spoke with patient and  at bedside.    Consultants/Specialty  None    Code Status  Full Code    Disposition  Anticipate discharge in the next 1-2 days    Review of Systems  Review of Systems   Constitutional: Positive for malaise/fatigue. Negative for chills and fever.   HENT: Negative for congestion and sore throat.    Eyes: Negative for blurred vision.   Respiratory: Negative for cough and shortness of breath.    Cardiovascular: Negative for chest pain, palpitations and leg swelling.   Gastrointestinal: Negative for abdominal pain, constipation, diarrhea, nausea and vomiting.   Genitourinary: Negative for dysuria, frequency and urgency.   Musculoskeletal: Positive for joint pain (Right Hip) and myalgias. Negative for falls.   Skin: Negative for rash.   Neurological: Negative for dizziness, weakness and headaches.   Psychiatric/Behavioral: Positive for hallucinations. Negative for depression. The patient is not nervous/anxious.    All other systems reviewed and are negative.       Physical Exam  Temp:  [36.1 °C (96.9 °F)-37.6 °C (99.7 °F)] 36.3 °C (97.4 °F)  Pulse:  [71-79] 77  Resp:  [14-17] 17  BP: (120-159)/(60-81) 146/73  SpO2:  [95 %-99 %] 95 %    Physical Exam  Vitals signs and nursing note reviewed.   Constitutional:       General: She is not in acute distress.     Appearance: Normal appearance. She is normal weight.   HENT:      Head: Normocephalic and atraumatic.      Nose: Nose normal.      Mouth/Throat:      Mouth: Mucous membranes are moist.       Pharynx: Oropharynx is clear. No oropharyngeal exudate or posterior oropharyngeal erythema.   Eyes:      Extraocular Movements: Extraocular movements intact.      Conjunctiva/sclera: Conjunctivae normal.   Neck:      Musculoskeletal: Normal range of motion and neck supple.   Cardiovascular:      Rate and Rhythm: Normal rate and regular rhythm.      Pulses: Normal pulses.      Heart sounds: Normal heart sounds. No murmur.   Pulmonary:      Effort: Pulmonary effort is normal. No respiratory distress.      Breath sounds: Normal breath sounds. No stridor. No wheezing or rales.   Abdominal:      General: Abdomen is flat. Bowel sounds are normal. There is no distension.      Palpations: Abdomen is soft. There is no mass.      Tenderness: There is no abdominal tenderness.   Musculoskeletal:      Right lower leg: Edema present.      Left lower leg: Edema present.      Comments: Mild lower extremity edema.   Skin:     General: Skin is warm.      Capillary Refill: Capillary refill takes less than 2 seconds.   Neurological:      General: No focal deficit present.      Mental Status: She is alert and oriented to person, place, and time. Mental status is at baseline.      Cranial Nerves: No cranial nerve deficit.      Motor: No weakness (5/5 muscle strength of lower extremities right lower extremity slightly weaker than left but still 5/5.  Sensation intact through lower extremities.).   Psychiatric:         Mood and Affect: Mood normal.         Behavior: Behavior normal.         Fluids    Intake/Output Summary (Last 24 hours) at 9/3/2020 0751  Last data filed at 9/2/2020 1400  Gross per 24 hour   Intake 480 ml   Output --   Net 480 ml       Laboratory  Recent Labs     09/01/20  0309 09/02/20  0158 09/03/20  0150   WBC 8.7 9.6 10.2   RBC 4.19* 3.84* 3.56*   HEMOGLOBIN 13.5 12.5 11.6*   HEMATOCRIT 38.4 35.7* 32.8*   MCV 91.6 93.0 92.1   MCH 32.2 32.6 32.6   MCHC 35.2* 35.0 35.4*   RDW 40.3 42.2 41.6   PLATELETCT 240 237 215    MPV 10.5 10.4 10.2     Recent Labs     09/01/20  1351 09/02/20  0158 09/03/20  0150   SODIUM 126* 125* 125*   POTASSIUM 4.6 4.0 4.1   CHLORIDE 90* 92* 92*   CO2 23 22 23   GLUCOSE 143* 104* 101*   BUN 14 18 17   CREATININE 0.83 0.88 0.80   CALCIUM 9.3 8.7 8.7                   Imaging  DX-HIP-UNILATERAL-WITH PELVIS-1 VIEW RIGHT   Final Result      No RIGHT hip or pelvic fracture.      MR-BRAIN-W/O   Final Result      1.  Mild cerebral atrophy. Age-appropriate.   2.  Moderate supratentorial white matter disease most consistent with microvascular ischemic change.   3.  No evidence of acute infarction, hemorrhage, or mass lesion.      EC-ECHOCARDIOGRAM COMPLETE W/O CONT   Final Result      DX-CHEST-PORTABLE (1 VIEW)   Final Result         1.  Interstitial pulmonary parenchymal prominence suggest chronic underlying lung disease, component of interstitial edema and/or infiltrates not excluded.   2.  Perihilar interstitial prominence and bronchial wall cuffing, appearance suggests changes of underlying bronchial inflammation, consider bronchitis.      CT-HEAD W/O   Final Result         1.  No acute intracranial abnormality is identified, there are nonspecific white matter changes, commonly associated with small vessel ischemic disease.  Associated mild cerebral atrophy is noted.   2.  Atherosclerosis.           Assessment/Plan  Hypertensive urgency- (present on admission)  Assessment & Plan  Patient reports compliance with medications but her presenting pressure was 220s/70s.  s/Post nicardipine drip    Patient Lopressor changed over to p.o. Coreg for better blood pressure control    Hydrochlorothiazide stopped on 8/31 due to low sodium    Continue terazosin, carvedilol, telmisartan    Hyponatremia  Assessment & Plan  Due to excess water drinking and hctz  Saline IVF for 500cc.   Started on salt tabs  Follow bmp    Anxiety  Assessment & Plan  Do not give this patient any medication for anxiety at this time  Continue to  monitor.    Myalgia  Assessment & Plan  Switch from normal saline to salt tabs    Dyspepsia  Assessment & Plan  GI cocktail  Prilosec twice daily    Acute encephalopathy- (present on admission)  Assessment & Plan  Due to marked elevated blood pressure  CT head negative  Mri negative for cva  Avoid  narcotics  and sedatives  Ammonia normal.  Likely secondary to hyponatremia.  Continue to monitor.  Secondary to hyponatremia, blood pressure, and disorientation    History of hypertension- (present on admission)  Assessment & Plan  Echocardiogram done 2 days ago noted  Blood pressure control as above       VTE prophylaxis: Heparin

## 2020-09-03 NOTE — CARE PLAN
Problem: Communication  Goal: The ability to communicate needs accurately and effectively will improve  Outcome: PROGRESSING AS EXPECTED  Intervention: Educate patient and significant other/support system about the plan of care, procedures, treatments, medications and allow for questions  Note: White board updated with POC and care team information during bedside report.      Problem: Safety  Goal: Will remain free from falls  Outcome: PROGRESSING AS EXPECTED  Intervention: Assess risk factors for falls  Note: Fall precautions in place. Bed in lowest position. Non-skid socks in place. Personal possessions within reach. Mobility sign on door. Bed-alarm on. Call light within reach. Pt educated regarding fall prevention and states understanding.

## 2020-09-04 VITALS
WEIGHT: 141.09 LBS | DIASTOLIC BLOOD PRESSURE: 69 MMHG | OXYGEN SATURATION: 99 % | SYSTOLIC BLOOD PRESSURE: 142 MMHG | HEIGHT: 65 IN | HEART RATE: 74 BPM | RESPIRATION RATE: 18 BRPM | TEMPERATURE: 98.4 F | BODY MASS INDEX: 23.51 KG/M2

## 2020-09-04 PROBLEM — G93.40 ACUTE ENCEPHALOPATHY: Status: RESOLVED | Noted: 2020-08-28 | Resolved: 2020-09-04

## 2020-09-04 PROBLEM — I16.0 HYPERTENSIVE URGENCY: Status: RESOLVED | Noted: 2020-08-26 | Resolved: 2020-09-04

## 2020-09-04 LAB
ANION GAP SERPL CALC-SCNC: 11 MMOL/L (ref 7–16)
BASOPHILS # BLD AUTO: 0.6 % (ref 0–1.8)
BASOPHILS # BLD: 0.04 K/UL (ref 0–0.12)
BUN SERPL-MCNC: 13 MG/DL (ref 8–22)
CALCIUM SERPL-MCNC: 8.7 MG/DL (ref 8.5–10.5)
CHLORIDE SERPL-SCNC: 94 MMOL/L (ref 96–112)
CK SERPL-CCNC: 737 U/L (ref 0–154)
CO2 SERPL-SCNC: 22 MMOL/L (ref 20–33)
CREAT SERPL-MCNC: 0.61 MG/DL (ref 0.5–1.4)
EOSINOPHIL # BLD AUTO: 0.11 K/UL (ref 0–0.51)
EOSINOPHIL NFR BLD: 1.5 % (ref 0–6.9)
ERYTHROCYTE [DISTWIDTH] IN BLOOD BY AUTOMATED COUNT: 42.1 FL (ref 35.9–50)
GLUCOSE SERPL-MCNC: 99 MG/DL (ref 65–99)
HCT VFR BLD AUTO: 32 % (ref 37–47)
HGB BLD-MCNC: 11.1 G/DL (ref 12–16)
IMM GRANULOCYTES # BLD AUTO: 0.04 K/UL (ref 0–0.11)
IMM GRANULOCYTES NFR BLD AUTO: 0.6 % (ref 0–0.9)
LYMPHOCYTES # BLD AUTO: 1.58 K/UL (ref 1–4.8)
LYMPHOCYTES NFR BLD: 21.8 % (ref 22–41)
MAGNESIUM SERPL-MCNC: 1.9 MG/DL (ref 1.5–2.5)
MCH RBC QN AUTO: 32.5 PG (ref 27–33)
MCHC RBC AUTO-ENTMCNC: 34.7 G/DL (ref 33.6–35)
MCV RBC AUTO: 93.6 FL (ref 81.4–97.8)
MONOCYTES # BLD AUTO: 0.95 K/UL (ref 0–0.85)
MONOCYTES NFR BLD AUTO: 13.1 % (ref 0–13.4)
NEUTROPHILS # BLD AUTO: 4.53 K/UL (ref 2–7.15)
NEUTROPHILS NFR BLD: 62.4 % (ref 44–72)
NRBC # BLD AUTO: 0 K/UL
NRBC BLD-RTO: 0 /100 WBC
PHOSPHATE SERPL-MCNC: 3 MG/DL (ref 2.5–4.5)
PLATELET # BLD AUTO: 217 K/UL (ref 164–446)
PMV BLD AUTO: 10.7 FL (ref 9–12.9)
POTASSIUM SERPL-SCNC: 3.9 MMOL/L (ref 3.6–5.5)
RBC # BLD AUTO: 3.42 M/UL (ref 4.2–5.4)
SODIUM SERPL-SCNC: 127 MMOL/L (ref 135–145)
WBC # BLD AUTO: 7.3 K/UL (ref 4.8–10.8)

## 2020-09-04 PROCEDURE — 700102 HCHG RX REV CODE 250 W/ 637 OVERRIDE(OP): Performed by: NURSE PRACTITIONER

## 2020-09-04 PROCEDURE — 700102 HCHG RX REV CODE 250 W/ 637 OVERRIDE(OP): Performed by: INTERNAL MEDICINE

## 2020-09-04 PROCEDURE — 83735 ASSAY OF MAGNESIUM: CPT

## 2020-09-04 PROCEDURE — A9270 NON-COVERED ITEM OR SERVICE: HCPCS | Performed by: PSYCHIATRY & NEUROLOGY

## 2020-09-04 PROCEDURE — A9270 NON-COVERED ITEM OR SERVICE: HCPCS | Performed by: NURSE PRACTITIONER

## 2020-09-04 PROCEDURE — 99239 HOSP IP/OBS DSCHRG MGMT >30: CPT | Performed by: INTERNAL MEDICINE

## 2020-09-04 PROCEDURE — 84100 ASSAY OF PHOSPHORUS: CPT

## 2020-09-04 PROCEDURE — 700111 HCHG RX REV CODE 636 W/ 250 OVERRIDE (IP): Performed by: INTERNAL MEDICINE

## 2020-09-04 PROCEDURE — 700102 HCHG RX REV CODE 250 W/ 637 OVERRIDE(OP): Performed by: HOSPITALIST

## 2020-09-04 PROCEDURE — A9270 NON-COVERED ITEM OR SERVICE: HCPCS | Performed by: INTERNAL MEDICINE

## 2020-09-04 PROCEDURE — 80048 BASIC METABOLIC PNL TOTAL CA: CPT

## 2020-09-04 PROCEDURE — 36415 COLL VENOUS BLD VENIPUNCTURE: CPT

## 2020-09-04 PROCEDURE — 85025 COMPLETE CBC W/AUTO DIFF WBC: CPT

## 2020-09-04 PROCEDURE — 97168 OT RE-EVAL EST PLAN CARE: CPT

## 2020-09-04 PROCEDURE — 82550 ASSAY OF CK (CPK): CPT

## 2020-09-04 PROCEDURE — A9270 NON-COVERED ITEM OR SERVICE: HCPCS | Performed by: HOSPITALIST

## 2020-09-04 PROCEDURE — 700102 HCHG RX REV CODE 250 W/ 637 OVERRIDE(OP): Performed by: PSYCHIATRY & NEUROLOGY

## 2020-09-04 RX ORDER — METHOCARBAMOL 500 MG/1
500 TABLET, FILM COATED ORAL 4 TIMES DAILY PRN
Qty: 120 TAB | Refills: 0 | Status: SHIPPED | OUTPATIENT
Start: 2020-09-04 | End: 2022-01-25

## 2020-09-04 RX ORDER — SODIUM CHLORIDE 1 G/1
1 TABLET ORAL DAILY
Qty: 90 TAB | Refills: 0 | Status: SHIPPED | OUTPATIENT
Start: 2020-09-04 | End: 2021-07-28

## 2020-09-04 RX ORDER — CARVEDILOL 25 MG/1
25 TABLET ORAL 2 TIMES DAILY WITH MEALS
Qty: 120 TAB | Refills: 0 | Status: SHIPPED | OUTPATIENT
Start: 2020-09-04 | End: 2021-10-01

## 2020-09-04 RX ORDER — TERAZOSIN 1 MG/1
3 CAPSULE ORAL 2 TIMES DAILY
Qty: 540 CAP | Refills: 0 | Status: SHIPPED | OUTPATIENT
Start: 2020-09-04 | End: 2020-12-03

## 2020-09-04 RX ORDER — OMEPRAZOLE 20 MG/1
20 CAPSULE, DELAYED RELEASE ORAL DAILY
Qty: 90 CAP | Refills: 0 | Status: SHIPPED | OUTPATIENT
Start: 2020-09-04 | End: 2021-07-28

## 2020-09-04 RX ADMIN — CARVEDILOL 25 MG: 25 TABLET, FILM COATED ORAL at 08:54

## 2020-09-04 RX ADMIN — TERAZOSIN HYDROCHLORIDE 3 MG: 2 CAPSULE ORAL at 05:06

## 2020-09-04 RX ADMIN — METHOCARBAMOL TABLETS 500 MG: 500 TABLET, COATED ORAL at 13:31

## 2020-09-04 RX ADMIN — OMEPRAZOLE 20 MG: 20 CAPSULE, DELAYED RELEASE ORAL at 05:07

## 2020-09-04 RX ADMIN — Medication 1 G: at 13:31

## 2020-09-04 RX ADMIN — HEPARIN SODIUM 5000 UNITS: 5000 INJECTION, SOLUTION INTRAVENOUS; SUBCUTANEOUS at 05:07

## 2020-09-04 RX ADMIN — Medication 1 G: at 08:54

## 2020-09-04 RX ADMIN — TELMISARTAN 80 MG: 80 TABLET ORAL at 05:06

## 2020-09-04 RX ADMIN — METHOCARBAMOL TABLETS 500 MG: 500 TABLET, COATED ORAL at 08:54

## 2020-09-04 ASSESSMENT — PAIN DESCRIPTION - PAIN TYPE
TYPE: ACUTE PAIN

## 2020-09-04 ASSESSMENT — COGNITIVE AND FUNCTIONAL STATUS - GENERAL
HELP NEEDED FOR BATHING: A LITTLE
TOILETING: A LITTLE
DAILY ACTIVITIY SCORE: 21
SUGGESTED CMS G CODE MODIFIER DAILY ACTIVITY: CJ
DRESSING REGULAR LOWER BODY CLOTHING: A LITTLE

## 2020-09-04 ASSESSMENT — ACTIVITIES OF DAILY LIVING (ADL): TOILETING: INDEPENDENT

## 2020-09-04 NOTE — PROGRESS NOTES
Received bedside report form Ewa Nails RN. Pt alert and awake. No distress noted. States having aching pain on R hip, but able to rest comfortably right now in bed. Denies any other needs at this time. Call light within reach, bed in lowest position. Bed alarm on.

## 2020-09-04 NOTE — CARE PLAN
Problem: Communication  Goal: The ability to communicate needs accurately and effectively will improve  Outcome: PROGRESSING AS EXPECTED  Intervention: Educate patient and significant other/support system about the plan of care, procedures, treatments, medications and allow for questions  Note: White board updated with POC and care team information during bedside report.      Problem: Safety  Goal: Will remain free from falls  Outcome: PROGRESSING AS EXPECTED  Intervention: Implement fall precautions  Note: Fall precautions in place. Bed in lowest position. Non-skid socks in place. Personal possessions within reach. Mobility sign on door. Bed-alarm on. Call light within reach. Pt educated regarding fall prevention and states understanding.

## 2020-09-04 NOTE — DISCHARGE PLANNING
"Hospital Care Management Discharge Planning       Anticipated Discharge Disposition:   · Home with Home Health     Action:   · This RN CM spoke to [facility]'s Admissions Coordinator and verified that patient has been accepted onto their service with a hospital discharge date of 9/4/2020.  · Referral status changed in Epic.  · Patient, BS RN and MD updated.   · No further CM needs identified at this time.      Barriers to Discharge:   · None.     Plan:   · Patient to discharge home with [facility] today. No further CM needs.    · Hospital Care Management Team to continue to provide support services and assistance with discharge planning as needed.       Current Expected Day of Discharge: 9/4/2020      Thank you for allowing me the pleasure of participating in this patient's care coordination and discharge planning.       For further assistance please contact the assigned RN Case Manager or  at the extension listed under \"Treatment Teams\".      "

## 2020-09-04 NOTE — CARE PLAN
Problem: Communication  Goal: The ability to communicate needs accurately and effectively will improve  Outcome: PROGRESSING AS EXPECTED     Problem: Safety  Goal: Will remain free from injury  Outcome: PROGRESSING AS EXPECTED  Goal: Will remain free from falls  Outcome: PROGRESSING AS EXPECTED     Problem: Infection  Goal: Will remain free from infection  Outcome: PROGRESSING AS EXPECTED     Problem: Venous Thromboembolism (VTW)/Deep Vein Thrombosis (DVT) Prevention:  Goal: Patient will participate in Venous Thrombosis (VTE)/Deep Vein Thrombosis (DVT)Prevention Measures  Outcome: PROGRESSING AS EXPECTED     Problem: Bowel/Gastric:  Goal: Normal bowel function is maintained or improved  Outcome: PROGRESSING AS EXPECTED  Goal: Will not experience complications related to bowel motility  Outcome: PROGRESSING AS EXPECTED     Problem: Knowledge Deficit  Goal: Knowledge of disease process/condition, treatment plan, diagnostic tests, and medications will improve  Outcome: PROGRESSING AS EXPECTED  Goal: Knowledge of the prescribed therapeutic regimen will improve  Outcome: PROGRESSING AS EXPECTED     Problem: Discharge Barriers/Planning  Goal: Patient's continuum of care needs will be met  Outcome: PROGRESSING AS EXPECTED     Problem: Pain Management  Goal: Pain level will decrease to patient's comfort goal  Outcome: PROGRESSING AS EXPECTED     Problem: Psychosocial Needs:  Goal: Level of anxiety will decrease  Outcome: PROGRESSING AS EXPECTED     Problem: Respiratory:  Goal: Respiratory status will improve  Outcome: PROGRESSING AS EXPECTED     Problem: Skin Integrity  Goal: Risk for impaired skin integrity will decrease  Outcome: PROGRESSING AS EXPECTED

## 2020-09-04 NOTE — THERAPY
"Occupational Therapy   Re-Evaluation     Patient Name: Prabha Lizama  Age:  85 y.o., Sex:  female  Medical Record #: [id number]  Today's Date: 9/4/2020     Precautions  Precautions: (P) Fall Risk  Comments: (P) recent HTN emergency; new R hip/groin pain (imaging negative for acute fx)    Assessment  Patient is 85 y.o. female who presents to acute due to hypertensive crisis and new onset of R LE pain. Pt's spouse present, both appeared very anxious about returning home. Ed/trained pt and spouse on home safety. Pt continues to perform BADLs at SPV level.     Plan    Recommend Occupational Therapy Re-Eval completed  DC Equipment Recommendations: (P) None  Discharge Recommendations: (P) Recommend home health for continued occupational therapy services     Subjective    \"What should I do for my leg?\"     Objective       09/04/20 1415   Total Time Spent   Total Time Spent (Mins) 25   Charge Group   OT Evaluation OT Re-evaluation   Initial Contact Note    Initial Contact Note Order Received and Verified, Evaluation Only - Patient Does Not Require Further Acute Occupational Therapy at this Time.  However, May Benefit from Post Acute Therapy for Higher Level Functional Deficits.   Prior Living Situation   Prior Services None   Housing / Facility [address]   Bathroom Set up Walk In Shower;Shower Chair   Equipment Owned Tub / Shower Seat   Lives with - Patient's Self Care Capacity Spouse   Comments spouse present, very supportive, reports he will assist PRN   Prior Level of ADL Function   Self Feeding Independent   Grooming / Hygiene Independent   Bathing Independent   Dressing Independent   Toileting Independent   Prior Level of IADL Function   Medication Management Independent   Laundry Independent   Kitchen Mobility Independent   Finances Independent   Home Management Independent   Shopping Independent   Prior Level Of Mobility Independent Without Device in Community   History of Falls   History of Falls Yes "   Precautions   Precautions Fall Risk   Comments recent HTN emergency; new R hip/groin pain (imaging negative for acute fx)   Vitals   O2 (LPM) 0   O2 Delivery Device None - Room Air   Pain 0 - 10 Group   Therapist Pain Assessment Post Activity Pain Same as Prior to Activity;Nurse Notified  (c/o R LE pain )   Cognition    Cognition / Consciousness WDL   Level of Consciousness Alert   Comments pleasent and cooperative, appears to be anxious   Active ROM Upper Body   Active ROM Upper Body  WDL   Strength Upper Body   Upper Body Strength  WDL   Upper Body Muscle Tone   Upper Body Muscle Tone  WDL   Balance Assessment   Sitting Balance (Static) Fair +   Sitting Balance (Dynamic) Fair +   Standing Balance (Static) Fair   Standing Balance (Dynamic) Fair   Weight Shift Sitting Good   Weight Shift Standing Fair   Comments w/ and w/out AD   Bed Mobility    Scooting Supervised   ADL Assessment   Grooming Supervision;Standing   Lower Body Dressing Supervision   Toileting Supervision   How much help from another person does the patient currently need...   Putting on and taking off regular lower body clothing? 3   Bathing (including washing, rinsing, and drying)? 3   Toileting, which includes using a toilet, bedpan, or urinal? 3   Putting on and taking off regular upper body clothing? 4   Taking care of personal grooming such as brushing teeth? 4   Eating meals? 4   6 Clicks Daily Activity Score 21   Functional Mobility   Sit to Stand Supervised   Bed, Chair, Wheelchair Transfer Supervised   Mobility within room and bathroom w/ FWW   Activity Tolerance   Sitting in Chair 20+ min (up pre/post)   Sitting Edge of Bed NT   Standing 8 min   Comments no overt s/s of fatigue    Education Group   Role of Occupational Therapist Patient Response Patient;Acceptance;Explanation;Demonstration;Verbal Demonstration;Action Demonstration   Additional Comments Ed/trained pt and spouse on home safety and return to activities   Problem List    Problem List None   Anticipated Discharge Equipment and Recommendations   DC Equipment Recommendations None   Discharge Recommendations Recommend home health for continued occupational therapy services   Interdisciplinary Plan of Care Collaboration   IDT Collaboration with  Nursing;Family / Caregiver   Patient Position at End of Therapy Seated;Call Light within Reach;Tray Table within Reach;Phone within Reach;Family / Friend in Room   Collaboration Comments report given   Session Information   Date / Session Number  9/4, Re-eval, 1x only

## 2020-09-04 NOTE — DISCHARGE INSTRUCTIONS
Discharge Instructions per Dr. Cong Cleaning, D.O.    DIET: Diet Order Cardiac    ACTIVITY: As tolerated    A proper diet that is low in grease, fat, and salt, along with 30 minutes of exercise per day will lead to weight loss, and better controlled blood sugar and blood pressure.    DIAGNOSIS: Hypertensive urgency, metabolic encephalopathy    Follow up with your Primary Care Provider as scheduled or sooner if your symptoms persist or worsen.  Return to Emergency Room for sever chest pain, shortness of breath, signs of a stroke, or any other emergencies.          Discharge Instructions    Discharged to home by car with relative. Discharged via wheelchair, hospital escort: Yes.  Special equipment needed: Not Applicable    Be sure to schedule a follow-up appointment with your primary care doctor or any specialists as instructed.     Discharge Plan:   Diet Plan: Discussed  Activity Level: Discussed  Confirmed Follow up Appointment: Patient to Call and Schedule Appointment  Confirmed Symptoms Management: Discussed  Medication Reconciliation Updated: Yes    I understand that a diet low in cholesterol, fat, and sodium is recommended for good health. Unless I have been given specific instructions below for another diet, I accept this instruction as my diet prescription.   Other diet: See above    Special Instructions: None    · Is patient discharged on Warfarin / Coumadin?   No     Depression / Suicide Risk    As you are discharged from this [facility] facility, it is important to learn how to keep safe from harming yourself.    Recognize the warning signs:  · Abrupt changes in personality, positive or negative- including increase in energy   · Giving away possessions  · Change in eating patterns- significant weight changes-  positive or negative  · Change in sleeping patterns- unable to sleep or sleeping all the time   · Unwillingness or inability to communicate  · Depression  · Unusual sadness, discouragement and  loneliness  · Talk of wanting to die  · Neglect of personal appearance   · Rebelliousness- reckless behavior  · Withdrawal from people/activities they love  · Confusion- inability to concentrate     If you or a loved one observes any of these behaviors or has concerns about self-harm, here's what you can do:  · Talk about it- your feelings and reasons for harming yourself  · Remove any means that you might use to hurt yourself (examples: pills, rope, extension cords, firearm)  · Get professional help from the community (Mental Health, Substance Abuse, psychological counseling)  · Do not be alone:Call your Safe Contact- someone whom you trust who will be there for you.  · Call your local CRISIS HOTLINE 836-4611 or 024-622-7296  · Call your local Children's Mobile Crisis Response Team [place] (145) 455-2996 or www.[employer]  · Call the toll free National Suicide Prevention Hotlines   · National Suicide Prevention Lifeline 612-732-DNRV (5840)  · Donor[employer] Line Network 800-SUICIDE (934-7799)    Omeprazole capsules (sprinkle caps) - Rx  What is this medicine?  OMEPRAZOLE (oh ME pray zol) prevents the production of acid in the stomach. It is used to treat gastroesophageal reflux disease (GERD), ulcers, certain bacteria in the stomach, inflammation of the esophagus, and Zollinger-Grider Syndrome. It is also used to treat other conditions that cause too much stomach acid.  This medicine may be used for other purposes; ask your health care provider or pharmacist if you have questions.  COMMON BRAND NAME(S): Prilosec  What should I tell my health care provider before I take this medicine?  They need to know if you have any of these conditions:  · liver disease  · low levels of magnesium in the blood  · lupus  · an unusual or allergic reaction to omeprazole, other medicines, foods, dyes, or preservatives  · pregnant or trying to get pregnant  · breast-feeding  How should I use this medicine?  Take this  medicine by mouth with a glass of water. Follow the directions on the prescription label. Do not cut, crush or chew this medicine. Swallow the capsules whole. You may open the capsule and put the contents in 1 tablespoon of applesauce. Swallow the medicine and applesauce right away. Do not chew the medicine or applesauce. Take this medicine before a meal. Take your medicine at regular intervals. Do not take your medicine more often than directed. Do not stop taking except on your doctor's advice.  A special MedGuide will be given to you by the pharmacist with each prescription and refill. Be sure to read this information carefully each time.  Talk to your pediatrician regarding the use of this medicine in children. While this drug may be prescribed for children as young as 2 years for selected conditions, precautions do apply.  Overdosage: If you think you have taken too much of this medicine contact a poison control center or emergency room at once.  NOTE: This medicine is only for you. Do not share this medicine with others.  What if I miss a dose?  If you miss a dose, take it as soon as you can. If it is almost time for your next dose, take only that dose. Do not take double or extra doses.  What may interact with this medicine?  Do not take this medicine with any of the following medications:  · atazanavir  · clopidogrel  · nelfinavir  · rilpivirine  This medicine may also interact with the following medications:  · antifungals like itraconazole, ketoconazole, and voriconazole  · certain antivirals for HIV or hepatitis  · certain medicines that treat or prevent blood clots like warfarin  · cilostazol  · citalopram  · cyclosporine  · dasatinib  · digoxin  · disulfiram  · diuretics  · erlotinib  · iron supplements  · medicines for anxiety, panic, and sleep like diazepam  · medicines for seizures like carbamazepine, phenobarbital, phenytoin  · methotrexate  · mycophenolate mofetil  · nilotinib  · rifampin  · St.  Aguilar's wort  · tacrolimus  · vitamin B12  This list may not describe all possible interactions. Give your health care provider a list of all the medicines, herbs, non-prescription drugs, or dietary supplements you use. Also tell them if you smoke, drink alcohol, or use illegal drugs. Some items may interact with your medicine.  What should I watch for while using this medicine?  Visit your healthcare professional for regular checks on your progress. Tell your healthcare professional if your symptoms do not start to get better or if they get worse. You may need blood work done while taking this medicine.  This medicine may cause a decrease in vitamin B12. You should make sure that you get enough vitamin B12 while you are taking this medicine. Discuss the foods you eat and the vitamins you take with your health care professional.  What side effects may I notice from receiving this medicine?  Side effects that you should report to your doctor or health care professional as soon as possible:  · allergic reactions like skin rash, itching or hives, swelling of the face, lips, or tongue  · bone pain  · breathing problems  · fever or sore throat  · joint pain  · rash on cheeks or arms that gets worse in the sun  · redness, blistering, peeling, or loosening of the skin, including inside the mouth  · severe diarrhea  · signs and symptoms of kidney injury like trouble passing urine or change in the amount of urine  · signs and symptoms of low magnesium like muscle cramps; muscle pain; muscle weakness; tremors; seizures; or fast, irregular heartbeat  · stomach polyps  · unusual bleeding or bruising  Side effects that usually do not require medical attention (report to your doctor or health care professional if they continue or are bothersome):  · diarrhea  · dry mouth  · gas  · headache  · nausea  · stomach pain  This list may not describe all possible side effects. Call your doctor for medical advice about side effects. You  may report side effects to FDA at 4-644-FDA-6538.  Where should I keep my medicine?  Keep out of the reach of children.  Store at room temperature between 15 and 30 degrees C (59 and 86 degrees F). Protect from light and moisture. Throw away any unused medicine after the expiration date.  NOTE: This sheet is a summary. It may not cover all possible information. If you have questions about this medicine, talk to your doctor, pharmacist, or health care provider.  © 2020 Elsevier/Gold Standard (2019-10-09 13:30:14)  Methocarbamol tablets  What is this medicine?  METHOCARBAMOL (meth oh COBY ba mole) helps to relieve pain and stiffness in muscles caused by strains, sprains, or other injury to your muscles.  This medicine may be used for other purposes; ask your health care provider or pharmacist if you have questions.  COMMON BRAND NAME(S): Robaxin  What should I tell my health care provider before I take this medicine?  They need to know if you have any of these conditions:  · kidney disease  · seizures  · an unusual or allergic reaction to methocarbamol, other medicines, foods, dyes, or preservatives  · pregnant or trying to get pregnant  · breast-feeding  How should I use this medicine?  Take this medicine by mouth with a full glass of water. Follow the directions on the prescription label. Take your medicine at regular intervals. Do not take your medicine more often than directed.  Talk to your pediatrician regarding the use of this medicine in children. Special care may be needed.  Overdosage: If you think you have taken too much of this medicine contact a poison control center or emergency room at once.  NOTE: This medicine is only for you. Do not share this medicine with others.  What if I miss a dose?  If you miss a dose, take it as soon as you can. If it is almost time for your next dose, take only the next dose. Do not take double or extra doses.  What may interact with this medicine?  Do not take this  medication with any of the following medicines:  · narcotic medicines for cough  This medicine may also interact with the following medications:  · alcohol  · antihistamines for allergy, cough and cold  · certain medicines for anxiety or sleep  · certain medicines for depression like amitriptyline, fluoxetine, sertraline  · certain medicines for seizures like phenobarbital, primidone  · cholinesterase inhibitors like neostigmine, ambenonium, and pyridostigmine bromide  · general anesthetics like halothane, isoflurane, methoxyflurane, propofol  · local anesthetics like lidocaine, pramoxine, tetracaine  · medicines that relax muscles for surgery  · narcotic medicines for pain  · phenothiazines like chlorpromazine, mesoridazine, prochlorperazine, thioridazine  This list may not describe all possible interactions. Give your health care provider a list of all the medicines, herbs, non-prescription drugs, or dietary supplements you use. Also tell them if you smoke, drink alcohol, or use illegal drugs. Some items may interact with your medicine.  What should I watch for while using this medicine?  Tell your doctor or health care professional if your symptoms do not start to get better or if they get worse.  You may get drowsy or dizzy. Do not drive, use machinery, or do anything that needs mental alertness until you know how this medicine affects you. Do not stand or sit up quickly, especially if you are an older patient. This reduces the risk of dizzy or fainting spells. Alcohol may interfere with the effect of this medicine. Avoid alcoholic drinks.  If you are taking another medicine that also causes drowsiness, you may have more side effects. Give your health care provider a list of all medicines you use. Your doctor will tell you how much medicine to take. Do not take more medicine than directed. Call emergency for help if you have problems breathing or unusual sleepiness.  What side effects may I notice from receiving  this medicine?  Side effects that you should report to your doctor or health care professional as soon as possible:  · allergic reactions like skin rash, itching or hives, swelling of the face, lips, or tongue  · breathing problems  · confusion  · seizures  · unusually weak or tired  Side effects that usually do not require medical attention (report to your doctor or health care professional if they continue or are bothersome):  · dizziness  · headache  · metallic taste  · tiredness  · upset stomach  This list may not describe all possible side effects. Call your doctor for medical advice about side effects. You may report side effects to FDA at 0-896-IVQ-2691.  Where should I keep my medicine?  Keep out of the reach of children.  Store at room temperature between 20 and 25 degrees C (68 and 77 degrees F). Keep container tightly closed. Throw away any unused medicine after the expiration date.  NOTE: This sheet is a summary. It may not cover all possible information. If you have questions about this medicine, talk to your doctor, pharmacist, or health care provider.  © 2020 Elsevier/Gold Standard (2016-09-27 13:11:54)  Carvedilol tablets  What is this medicine?  CARVEDILOL (COBY ve dil ol) is a beta-blocker. Beta-blockers reduce the workload on the heart and help it to beat more regularly. This medicine is used to treat high blood pressure and heart failure.  This medicine may be used for other purposes; ask your health care provider or pharmacist if you have questions.  COMMON BRAND NAME(S): Coreg  What should I tell my health care provider before I take this medicine?  They need to know if you have any of these conditions:  · circulation problems  · diabetes  · history of heart attack or heart disease  · liver disease  · lung or breathing disease, like asthma or emphysema  · pheochromocytoma  · slow or irregular heartbeat  · thyroid disease  · an unusual or allergic reaction to carvedilol, other beta-blockers,  medicines, foods, dyes, or preservatives  · pregnant or trying to get pregnant  · breast-feeding  How should I use this medicine?  Take this medicine by mouth with a glass of water. Follow the directions on the prescription label. It is best to take the tablets with food. Take your doses at regular intervals. Do not take your medicine more often than directed. Do not stop taking except on the advice of your doctor or health care professional.  Talk to your pediatrician regarding the use of this medicine in children. Special care may be needed.  Overdosage: If you think you have taken too much of this medicine contact a poison control center or emergency room at once.  NOTE: This medicine is only for you. Do not share this medicine with others.  What if I miss a dose?  If you miss a dose, take it as soon as you can. If it is almost time for your next dose, take only that dose. Do not take double or extra doses.  What may interact with this medicine?  This medicine may interact with the following medications:  · certain medicines for blood pressure, heart disease, irregular heart beat  · certain medicines for depression, like fluoxetine or paroxetine  · certain medicines for diabetes, like glipizide or glyburide  · cimetidine  · clonidine  · cyclosporine  · digoxin  · MAOIs like Carbex, Eldepryl, Marplan, Nardil, and Parnate  · reserpine  · rifampin  This list may not describe all possible interactions. Give your health care provider a list of all the medicines, herbs, non-prescription drugs, or dietary supplements you use. Also tell them if you smoke, drink alcohol, or use illegal drugs. Some items may interact with your medicine.  What should I watch for while using this medicine?  Check your heart rate and blood pressure regularly while you are taking this medicine. Ask your doctor or health care professional what your heart rate and blood pressure should be, and when you should contact him or her. Do not stop  taking this medicine suddenly. This could lead to serious heart-related effects.  Contact your doctor or health care professional if you have difficulty breathing while taking this drug.  Check your weight daily. Ask your doctor or health care professional when you should notify him/her of any weight gain.  You may get drowsy or dizzy. Do not drive, use machinery, or do anything that requires mental alertness until you know how this medicine affects you. To reduce the risk of dizzy or fainting spells, do not sit or stand up quickly. Alcohol can make you more drowsy, and increase flushing and rapid heartbeats. Avoid alcoholic drinks.  This medicine may increase blood sugar. Ask your healthcare provider if changes in diet or medicines are needed if you have diabetes.  If you are going to have surgery, tell your doctor or health care professional that you are taking this medicine.  What side effects may I notice from receiving this medicine?  Side effects that you should report to your doctor or health care professional as soon as possible:  · allergic reactions like skin rash, itching or hives, swelling of the face, lips, or tongue  · breathing problems  · dark urine  · irregular heartbeat  ·   signs and symptoms of high blood sugar such as being more thirsty or hungry or having to urinate more than normal. You may also feel very tired or have blurry vision.  · swollen legs or ankles  · vomiting  · yellowing of the eyes or skin  Side effects that usually do not require medical attention (report to your doctor or health care professional if they continue or are bothersome):  · change in sex drive or performance  · diarrhea  · dry eyes (especially if wearing contact lenses)  · dry, itching skin  · headache  · nausea  · unusually tired  This list may not describe all possible side effects. Call your doctor for medical advice about side effects. You may report side effects to FDA at 1-803-FDA-9199.  Where should I keep my  medicine?  Keep out of the reach of children.  Store at room temperature below 30 degrees C (86 degrees F). Protect from moisture. Keep container tightly closed. Throw away any unused medicine after the expiration date.  NOTE: This sheet is a summary. It may not cover all possible information. If you have questions about this medicine, talk to your doctor, pharmacist, or health care provider.  © 2020 Elsevier/Gold Standard (2019-10-09 09:13:57)      Hepatic Encephalopathy    Hepatic encephalopathy is a loss of brain function due to advanced liver disease. When the liver is damaged, harmful substances (toxins) can build up in the body. Some of these toxins, such as ammonia, can harm the brain.  The effects of the condition depend on the type of liver damage and how severe it is. In some cases, hepatic encephalopathy can be reversed.  What are the causes?  Certain things can trigger or worsen hepatic encephalopathy, such as:  Infection.  Constipation.  Taking certain medicines, such as benzodiazepines.  Alcohol use.  Bleeding into the intestinal tract.  Imbalances in minerals (electrolytes) in the body.  Dehydration.  Hepatic encephalopathy can sometimes be reversed if these triggers are resolved.  What increases the risk?  You are at risk of developing this condition if you have advanced liver disease (cirrhosis). Conditions that can cause liver disease include:  Infections in the liver, such as hepatitis C.  Infections in the blood.  Drinking a lot of alcohol over a long period of time.  Taking certain medicines, including tranquilizers, diuretics, antidepressants, sleeping pills, or acetaminophen.  Genetic diseases, such as Te's disease.  What are the signs or symptoms?  Symptoms may develop suddenly. Or, they may develop slowly and get worse gradually. Symptoms can range from mild to severe.  Mild symptoms include:  Mild confusion.  Shortened attention span.  Personality and mood changes.  Anxiety and  agitation.  Drowsiness.  Symptoms of worsening or severe hepatic encephalopathy include:  Extreme confusion (disorientation).  Slowed movement.  Slurred speech.  Extreme personality changes.  Abnormal shaking or flapping of the hands.  Coma.  How is this diagnosed?  This condition may be diagnosed based on:  A physical exam.  Your symptoms and medical history.  Blood tests. These may be done to check levels of ammonia in your blood, measure how long it takes your blood to clot, or check for infection.  Liver function tests. These may be done to check how well your liver is working.  MRI and CT scans. These may be done to check for a brain disorder and to check for problems with your liver.  Electroencephalogram (EEG). This test measures the electrical activity in your brain.  How is this treated?  The first step in treatment is to identify and treat the cause of your liver damage or triggering illness, if possible. The next step is taking medicine to lower the level of toxins in your body and prevent ammonia from building up. Treatment will depend on how severe your encephalopathy is, and may include:  Medicine to lower your ammonia level (lactulose).  Antibiotic medicine to reduce the amount of ammonia-producing bacteria in your gut.  Close monitoring of your blood pressure, heart rate, breathing, and oxygen levels.  Removal of fluid from your abdomen.  Close monitoring of how you think, feel, and act (mental status).  Dietary changes.  Liver transplant, in severe cases.  Follow these instructions at home:  Eating and drinking    Work with a dietitian or with your health care provider to make sure you are getting the right balance of protein and minerals.  Drink enough fluids to keep your urine pale yellow.  Do not drink alcohol or use drugs.  General instructions  If you were prescribed an antibiotic, take it as told by your health care provider. Do not stop taking the antibiotic even if your condition  improves.  Take other over-the-counter and prescription medicines only as told by your health care provider.  Do not start taking any new medicines, including over-the-counter medicines, without first checking with your health care provider.  Keep all follow-up visits as told by your health care provider. This is important.  Contact a health care provider if:  You develop new symptoms.  Your symptoms change or get worse.  You have a fever.  You are constipated. Signs of constipation include having:  Fewer bowel movements in a week than normal.  Trouble having a bowel movement.  Stools that are dry, hard, or larger than normal.  You have persistent nausea, vomiting, or diarrhea.  Get help right away if:  You become very confused or drowsy.  You vomit blood or material that looks like coffee grounds.  Your stool is bloody, black, or looks like tar.  Summary  Hepatic encephalopathy is a loss of brain function due to advanced liver disease. When the liver is damaged, harmful substances (toxins) can build up in your body. Some of these toxins, such as ammonia, can harm your brain.  Certain things can trigger or worsen hepatic encephalopathy. Hepatic encephalopathy can sometimes be reversed if these triggers are resolved.  The first step in treatment is to identify and treat the cause of your liver damage or triggering illness, if possible. The next step is taking medicine to lower the level of toxins in your body and prevent ammonia from building up.  Your treatment will depend on how severe your hepatic encephalopathy is.  This information is not intended to replace advice given to you by your health care provider. Make sure you discuss any questions you have with your health care provider.  Document Released: 02/27/2008 Document Revised: 11/30/2018 Document Reviewed: 09/18/2018  Elsevier Patient Education © 2020 [employer].    Hypertension, Adult  High blood pressure (hypertension) is when the force of blood pumping  "through the arteries is too strong. The arteries are the blood vessels that carry blood from the heart throughout the body. Hypertension forces the heart to work harder to pump blood and may cause arteries to become narrow or stiff. Untreated or uncontrolled hypertension can cause a heart attack, heart failure, a stroke, kidney disease, and other problems.  A blood pressure reading consists of a higher number over a lower number. Ideally, your blood pressure should be below 120/80. The first (\"top\") number is called the systolic pressure. It is a measure of the pressure in your arteries as your heart beats. The second (\"bottom\") number is called the diastolic pressure. It is a measure of the pressure in your arteries as the heart relaxes.  What are the causes?  The exact cause of this condition is not known. There are some conditions that result in or are related to high blood pressure.  What increases the risk?  Some risk factors for high blood pressure are under your control. The following factors may make you more likely to develop this condition:  · Smoking.  · Having type 2 diabetes mellitus, high cholesterol, or both.  · Not getting enough exercise or physical activity.  · Being overweight.  · Having too much fat, sugar, calories, or salt (sodium) in your diet.  · Drinking too much alcohol.  Some risk factors for high blood pressure may be difficult or impossible to change. Some of these factors include:  · Having chronic kidney disease.  · Having a family history of high blood pressure.  · Age. Risk increases with age.  · Race. You may be at higher risk if you are .  · Gender. Men are at higher risk than women before age 45. After age 65, women are at higher risk than men.  · Having obstructive sleep apnea.  · Stress.  What are the signs or symptoms?  High blood pressure may not cause symptoms. Very high blood pressure (hypertensive crisis) may cause:  · Headache.  · Anxiety.  · Shortness of " breath.  · Nosebleed.  · Nausea and vomiting.  · Vision changes.  · Severe chest pain.  · Seizures.  How is this diagnosed?  This condition is diagnosed by measuring your blood pressure while you are seated, with your arm resting on a flat surface, your legs uncrossed, and your feet flat on the floor. The cuff of the blood pressure monitor will be placed directly against the skin of your upper arm at the level of your heart. It should be measured at least twice using the same arm. Certain conditions can cause a difference in blood pressure between your right and left arms.  Certain factors can cause blood pressure readings to be lower or higher than normal for a short period of time:  · When your blood pressure is higher when you are in a health care provider's office than when you are at home, this is called white coat hypertension. Most people with this condition do not need medicines.  · When your blood pressure is higher at home than when you are in a health care provider's office, this is called masked hypertension. Most people with this condition may need medicines to control blood pressure.  If you have a high blood pressure reading during one visit or you have normal blood pressure with other risk factors, you may be asked to:  · Return on a different day to have your blood pressure checked again.  · Monitor your blood pressure at home for 1 week or longer.  If you are diagnosed with hypertension, you may have other blood or imaging tests to help your health care provider understand your overall risk for other conditions.  How is this treated?  This condition is treated by making healthy lifestyle changes, such as eating healthy foods, exercising more, and reducing your alcohol intake. Your health care provider may prescribe medicine if lifestyle changes are not enough to get your blood pressure under control, and if:  · Your systolic blood pressure is above 130.  · Your diastolic blood pressure is above  80.  Your personal target blood pressure may vary depending on your medical conditions, your age, and other factors.  Follow these instructions at home:  Eating and drinking    · Eat a diet that is high in fiber and potassium, and low in sodium, added sugar, and fat. An example eating plan is called the DASH (Dietary Approaches to Stop Hypertension) diet. To eat this way:  ? Eat plenty of fresh fruits and vegetables. Try to fill one half of your plate at each meal with fruits and vegetables.  ? Eat whole grains, such as whole-wheat pasta, brown rice, or whole-grain bread. Fill about one fourth of your plate with whole grains.  ? Eat or drink low-fat dairy products, such as skim milk or low-fat yogurt.  ? Avoid fatty cuts of meat, processed or cured meats, and poultry with skin. Fill about one fourth of your plate with lean proteins, such as fish, chicken without skin, beans, eggs, or tofu.  ? Avoid pre-made and processed foods. These tend to be higher in sodium, added sugar, and fat.  · Reduce your daily sodium intake. Most people with hypertension should eat less than 1,500 mg of sodium a day.  · Do not drink alcohol if:  ? Your health care provider tells you not to drink.  ? You are pregnant, may be pregnant, or are planning to become pregnant.  · If you drink alcohol:  ? Limit how much you use to:  § 0-1 drink a day for women.  § 0-2 drinks a day for men.  ? Be aware of how much alcohol is in your drink. In the [place] one drink equals one 12 oz bottle of beer (355 mL), one 5 oz glass of wine (148 mL), or one 1½ oz glass of hard liquor (44 mL).  Lifestyle    · Work with your health care provider to maintain a healthy body weight or to lose weight. Ask what an ideal weight is for you.  · Get at least 30 minutes of exercise most days of the week. Activities may include walking, swimming, or biking.  · Include exercise to strengthen your muscles (resistance exercise), such as Pilates or lifting weights, as part of  your weekly exercise routine. Try to do these types of exercises for 30 minutes at least 3 days a week.  · Do not use any products that contain nicotine or tobacco, such as cigarettes, e-cigarettes, and chewing tobacco. If you need help quitting, ask your health care provider.  · Monitor your blood pressure at home as told by your health care provider.  · Keep all follow-up visits as told by your health care provider. This is important.  Medicines  · Take over-the-counter and prescription medicines only as told by your health care provider. Follow directions carefully. Blood pressure medicines must be taken as prescribed.  · Do not skip doses of blood pressure medicine. Doing this puts you at risk for problems and can make the medicine less effective.  · Ask your health care provider about side effects or reactions to medicines that you should watch for.  Contact a health care provider if you:  · Think you are having a reaction to a medicine you are taking.  · Have headaches that keep coming back (recurring).  · Feel dizzy.  · Have swelling in your ankles.  · Have trouble with your vision.  Get help right away if you:  · Develop a severe headache or confusion.  · Have unusual weakness or numbness.  · Feel faint.  · Have severe pain in your chest or abdomen.  · Vomit repeatedly.  · Have trouble breathing.  Summary  · Hypertension is when the force of blood pumping through your arteries is too strong. If this condition is not controlled, it may put you at risk for serious complications.  · Your personal target blood pressure may vary depending on your medical conditions, your age, and other factors. For most people, a normal blood pressure is less than 120/80.  · Hypertension is treated with lifestyle changes, medicines, or a combination of both. Lifestyle changes include losing weight, eating a healthy, low-sodium diet, exercising more, and limiting alcohol.  This information is not intended to replace advice given  to you by your health care provider. Make sure you discuss any questions you have with your health care provider.  Document Released: 12/18/2006 Document Revised: 08/28/2019 Document Reviewed: 08/28/2019  Elsevier Patient Education © 2020 [employer]

## 2020-09-04 NOTE — FACE TO FACE
Face to Face Supporting Documentation - Home Health    The encounter with this patient was in whole or in part the primary reason for home health admission.    Date of encounter:   Patient:                    MRN:                       YOB: 2018  Prabha Mathews Lizama  [id number]  1935     Home health to see patient for:  Skilled Nursing care for assessment, interventions & education    Skilled need for:  Recent Deterioration of Health Status Chronic back pain exacerbated by trauma      Homebound status evidenced by:  Needs the assistance of another person in order to leave the home. Leaving home requires a considerable and taxing effort. There is a normal inability to leave the home.    Community Physician to provide follow up care: Pcp Pt States None       I certify the face to face encounter for this home health care referral meets the CMS requirements and the encounter/clinical assessment with the patient was, in whole, or in part, for the medical condition(s) listed above, which is the primary reason for home health care. Based on my clinical findings: the service(s) are medically necessary, support the need for home health care, and the homebound criteria are met.  I certify that this patient has had a face to face encounter by myself.  Cong Cleaning D.O. - NPI: [id number]

## 2020-09-04 NOTE — DISCHARGE SUMMARY
Discharge Summary    CHIEF COMPLAINT ON ADMISSION  Chief Complaint   Patient presents with   • Dizziness     started 4 days ago    • Hypertension     started 4 days ago pt has hx of HTN, is complaint w/ medication, and BP is normally 130's systolic       Reason for Admission  EMS     Admission Date  8/26/2020    CODE STATUS  Full Code    HPI & HOSPITAL COURSE   Ms. Prabha Lizama is a 85 y.o. female with a history of hypertension who presented on 8/26/2020 with malaise, lightheadedness, weakness of lower extremities.  On presentation she was found to have hypertensive emergency with systolics up to 240.  She was admitted to the ICU and transition from IV antihypertensives to oral.  Echocardiogram showed ejection fraction 75%, normal regional wall motion, normal diastolic function, no significant valve disease.  Also found to be encephalopathic with low sodium.  She was on hydrochlorothiazide as an outpatient which has been held.  CT head showed no acute intracranial disease.  MRI brain showed mild cerebral atrophy, microvascular ischemic change, no acute infarction hemorrhage or mass lesion.  Ammonia level normal.  Patient was also found to be drinking too much water.  Patient does have chronic back pain.  She began complaining of right hip and leg pain he states was from when she was being transferred in and out of CT scanner and from bed to bed.  No significant weakness on exam but pain is exacerbated on movement of right leg.  Plain films showed no fracture or dislocation.  She was started on muscle relaxants.  Possibly secondary to spasms from electrolyte disturbances.  Physical therapy recommended home health patient was accepted.  She ready has a walker at home she uses for chronic back pain.  Counseled extensively about diet, exercise, fluid intake, blood pressure.  Follow-up in 2 weeks with primary care provider.       Therefore, she is discharged in fair and stable condition to home with organized  home healthcare and close outpatient follow-up.    The patient met 2-midnight criteria for an inpatient stay at the time of discharge.    Discharge Date  9/4/2020    FOLLOW UP ITEMS POST DISCHARGE  None    DISCHARGE DIAGNOSES  Active Problems:    History of hypertension POA: Yes    Dyspepsia POA: No    Myalgia POA: No    Anxiety POA: Yes    Hyponatremia POA: No  Resolved Problems:    Hypertensive urgency POA: Yes    Acute encephalopathy POA: Yes      FOLLOW UP  Future Appointments   Date Time Provider Department Center   1/13/2021  1:00 PM [facility]     No follow-up provider specified.    MEDICATIONS ON DISCHARGE     Medication List      START taking these medications      Instructions   carvedilol 25 MG Tabs  Commonly known as: COREG   Take 1 Tab by mouth 2 times a day, with meals.  Dose: 25 mg     methocarbamol 500 MG Tabs  Commonly known as: ROBAXIN   Take 1 Tab by mouth 4 times a day as needed (muscle spasms).  Dose: 500 mg     omeprazole 20 MG delayed-release capsule  Commonly known as: PRILOSEC   Take 1 Cap by mouth every day.  Dose: 20 mg     sodium chloride 1 GM Tabs  Commonly known as: SALT   Take 1 Tab by mouth every day.  Dose: 1 g        CHANGE how you take these medications      Instructions   terazosin 1 MG Caps  What changed:   · medication strength  · how much to take  · when to take this  Commonly known as: HYTRIN   Take 3 Caps by mouth 2 Times a Day for 90 days.  Dose: 3 mg        CONTINUE taking these medications      Instructions   aspirin EC 81 MG Tbec  Commonly known as: ECOTRIN   Take 81 mg by mouth every evening.  Dose: 81 mg     Micardis 80 MG Tabs  Generic drug: telmisartan   Take 80 mg by mouth every morning.  Dose: 80 mg        STOP taking these medications    metoprolol SR 50 MG Tb24  Commonly known as: TOPROL XL            Allergies  Allergies   Allergen Reactions   • Amoxicillin      Unsure of reaction/or allergy   • Hydralazine Unspecified     Pt reported  severe hypotensive reaction   • Morphine Vomiting and Nausea   • Penicillins      1970 reaction       DIET  Orders Placed This Encounter   Procedures   • Diet Order Cardiac     Standing Status:   Standing     Number of Occurrences:   1     Order Specific Question:   Diet:     Answer:   Cardiac [6]       ACTIVITY  As tolerated.  Weight bearing as tolerated    CONSULTATIONS  None    PROCEDURES  None    LABORATORY  Lab Results   Component Value Date    SODIUM 127 (L) 09/04/2020    POTASSIUM 3.9 09/04/2020    CHLORIDE 94 (L) 09/04/2020    CO2 22 09/04/2020    GLUCOSE 99 09/04/2020    BUN 13 09/04/2020    CREATININE 0.61 09/04/2020    CREATININE 0.87 07/07/2011    GLOMRATE >59 08/04/2010        Lab Results   Component Value Date    WBC 7.3 09/04/2020    WBC 4.0 08/04/2010    HEMOGLOBIN 11.1 (L) 09/04/2020    HEMATOCRIT 32.0 (L) 09/04/2020    PLATELETCT 217 09/04/2020        I discussed medications and side effects with the patient.  I discussed prognosis and importance of medical compliance with the patient.  I counseled the patient about diet, exercise, weight loss, and life style modifications.  All questions and concerns have been addressed.  Total time of the discharge process was 34 minutes.

## 2020-09-04 NOTE — DISCHARGE PLANNING
"Hospital Care Management Discharge Planning       Anticipated Discharge Disposition:   · Home with Home Health     Action:   · This RN CM met with patient at bedside to obtain home health choice.   · Per choice form, patient's preference is as follows:   · (1) [facility]  · (2) [facility]     Barriers to Discharge:   · Home Health acceptance.     Plan:   · Patient to discharge home with home health once patient has been accepted.    · Hospital Care Management Team to continue to provide support services and assistance with discharge planning as needed.       Current Expected Day of Discharge: 9/4/2020      For further assistance please contact the assigned RN Case Manager or  at the extension listed under \"Treatment Teams\".      "

## 2020-09-04 NOTE — DISCHARGE PLANNING
Received Choice form at [phone]  Agency/Facility Name: [facility]  Referral sent per Choice form @ [phone]

## 2020-09-28 ENCOUNTER — HOSPITAL ENCOUNTER (OUTPATIENT)
Dept: RADIOLOGY | Facility: MEDICAL CENTER | Age: 85
End: 2020-09-28
Attending: INTERNAL MEDICINE
Payer: MEDICARE

## 2020-09-28 DIAGNOSIS — I10 HYPERTENSION, ESSENTIAL: ICD-10-CM

## 2020-09-28 PROCEDURE — 93975 VASCULAR STUDY: CPT

## 2020-11-10 DIAGNOSIS — R03.0 WHITE COAT SYNDROME WITH HIGH BLOOD PRESSURE BUT WITHOUT HYPERTENSION: ICD-10-CM

## 2020-12-07 ENCOUNTER — APPOINTMENT (OUTPATIENT)
Dept: VASCULAR LAB | Facility: MEDICAL CENTER | Age: 85
End: 2020-12-07
Payer: MEDICARE

## 2021-01-11 ENCOUNTER — TELEPHONE (OUTPATIENT)
Dept: ENDOCRINOLOGY | Facility: MEDICAL CENTER | Age: 86
End: 2021-01-11

## 2021-01-11 NOTE — TELEPHONE ENCOUNTER
Patient is scheduled for 1/13/21 for a f/v and has not had labs ordered by our office in awhile. Does she need new lab orders before this f/v?

## 2021-01-12 DIAGNOSIS — Z86.79 HISTORY OF HYPERTENSION: ICD-10-CM

## 2021-01-12 DIAGNOSIS — D64.9 ANEMIA, UNSPECIFIED TYPE: ICD-10-CM

## 2021-01-12 DIAGNOSIS — E04.2 MULTIPLE THYROID NODULES: ICD-10-CM

## 2021-01-12 DIAGNOSIS — E87.1 HYPONATREMIA: ICD-10-CM

## 2021-01-12 DIAGNOSIS — M81.0 AGE-RELATED OSTEOPOROSIS WITHOUT CURRENT PATHOLOGICAL FRACTURE: ICD-10-CM

## 2021-01-12 DIAGNOSIS — Z23 NEED FOR VACCINATION: ICD-10-CM

## 2021-01-12 NOTE — TELEPHONE ENCOUNTER
Phone Number Called: [phone]    Call outcome: Spoke to patient regarding message below.    Message: Contacted patient to let her know Dr. Zimmer ordered labs. Patient preferred to have her appointment rescheduled from tomorrow to march because she prefers to come in office and she is too afraid to go to get her lab drawn because of covid. I will be mailing out her lab slip a verified the address with patient.

## 2021-01-13 ENCOUNTER — APPOINTMENT (OUTPATIENT)
Dept: ENDOCRINOLOGY | Facility: MEDICAL CENTER | Age: 86
End: 2021-01-13
Attending: INTERNAL MEDICINE
Payer: MEDICARE

## 2021-02-20 LAB
25(OH)D3+25(OH)D2 SERPL-MCNC: 45 NG/ML (ref 30–100)
ALBUMIN SERPL-MCNC: 3.9 G/DL (ref 3.6–4.6)
ALBUMIN/GLOB SERPL: 1.6 {RATIO} (ref 1.2–2.2)
ALP SERPL-CCNC: 71 IU/L (ref 39–117)
ALT SERPL-CCNC: 24 IU/L (ref 0–32)
AST SERPL-CCNC: 18 IU/L (ref 0–40)
BASOPHILS # BLD AUTO: 0 X10E3/UL (ref 0–0.2)
BASOPHILS NFR BLD AUTO: 1 %
BILIRUB SERPL-MCNC: 0.7 MG/DL (ref 0–1.2)
BUN SERPL-MCNC: 12 MG/DL (ref 8–27)
BUN/CREAT SERPL: 15 (ref 12–28)
CALCIUM SERPL-MCNC: 9.1 MG/DL (ref 8.7–10.3)
CHLORIDE SERPL-SCNC: 102 MMOL/L (ref 96–106)
CO2 SERPL-SCNC: 25 MMOL/L (ref 20–29)
CREAT SERPL-MCNC: 0.79 MG/DL (ref 0.57–1)
EOSINOPHIL # BLD AUTO: 0.1 X10E3/UL (ref 0–0.4)
EOSINOPHIL NFR BLD AUTO: 2 %
ERYTHROCYTE [DISTWIDTH] IN BLOOD BY AUTOMATED COUNT: 13 % (ref 11.7–15.4)
GLOBULIN SER CALC-MCNC: 2.5 G/DL (ref 1.5–4.5)
GLUCOSE SERPL-MCNC: 109 MG/DL (ref 65–99)
HCT VFR BLD AUTO: 42.5 % (ref 34–46.6)
HGB BLD-MCNC: 14.5 G/DL (ref 11.1–15.9)
IMM GRANULOCYTES # BLD AUTO: 0 X10E3/UL (ref 0–0.1)
IMM GRANULOCYTES NFR BLD AUTO: 0 %
IMMATURE CELLS  115398: NORMAL
LYMPHOCYTES # BLD AUTO: 0.9 X10E3/UL (ref 0.7–3.1)
LYMPHOCYTES NFR BLD AUTO: 18 %
MCH RBC QN AUTO: 32.8 PG (ref 26.6–33)
MCHC RBC AUTO-ENTMCNC: 34.1 G/DL (ref 31.5–35.7)
MCV RBC AUTO: 96 FL (ref 79–97)
MONOCYTES # BLD AUTO: 0.6 X10E3/UL (ref 0.1–0.9)
MONOCYTES NFR BLD AUTO: 12 %
MORPHOLOGY BLD-IMP: NORMAL
NEUTROPHILS # BLD AUTO: 3.5 X10E3/UL (ref 1.4–7)
NEUTROPHILS NFR BLD AUTO: 67 %
NRBC BLD AUTO-RTO: NORMAL %
PLATELET # BLD AUTO: 291 X10E3/UL (ref 150–450)
POTASSIUM SERPL-SCNC: 4.4 MMOL/L (ref 3.5–5.2)
PROT SERPL-MCNC: 6.4 G/DL (ref 6–8.5)
RBC # BLD AUTO: 4.42 X10E6/UL (ref 3.77–5.28)
SODIUM SERPL-SCNC: 138 MMOL/L (ref 134–144)
T4 FREE SERPL-MCNC: 1.58 NG/DL (ref 0.82–1.77)
TSH SERPL DL<=0.005 MIU/L-ACNC: 1 UIU/ML (ref 0.45–4.5)
VIT B12 SERPL-MCNC: 811 PG/ML (ref 232–1245)
WBC # BLD AUTO: 5.2 X10E3/UL (ref 3.4–10.8)

## 2021-03-17 ENCOUNTER — OFFICE VISIT (OUTPATIENT)
Dept: ENDOCRINOLOGY | Facility: MEDICAL CENTER | Age: 86
End: 2021-03-17
Attending: INTERNAL MEDICINE
Payer: MEDICARE

## 2021-03-17 VITALS
HEART RATE: 73 BPM | DIASTOLIC BLOOD PRESSURE: 80 MMHG | WEIGHT: 142.6 LBS | BODY MASS INDEX: 23.73 KG/M2 | SYSTOLIC BLOOD PRESSURE: 138 MMHG | OXYGEN SATURATION: 98 %

## 2021-03-17 DIAGNOSIS — Z78.0 MENOPAUSE: ICD-10-CM

## 2021-03-17 DIAGNOSIS — E04.2 MULTIPLE THYROID NODULES: ICD-10-CM

## 2021-03-17 DIAGNOSIS — M81.0 AGE-RELATED OSTEOPOROSIS WITHOUT CURRENT PATHOLOGICAL FRACTURE: ICD-10-CM

## 2021-03-17 DIAGNOSIS — S32.030D COMPRESSION FRACTURE OF L3 VERTEBRA WITH ROUTINE HEALING, SUBSEQUENT ENCOUNTER: ICD-10-CM

## 2021-03-17 PROCEDURE — 99211 OFF/OP EST MAY X REQ PHY/QHP: CPT | Performed by: INTERNAL MEDICINE

## 2021-03-17 PROCEDURE — 99214 OFFICE O/P EST MOD 30 MIN: CPT | Performed by: INTERNAL MEDICINE

## 2021-03-17 RX ORDER — AMLODIPINE BESYLATE 2.5 MG/1
TABLET ORAL
COMMUNITY
Start: 2021-03-04 | End: 2021-10-01

## 2021-03-17 ASSESSMENT — FIBROSIS 4 INDEX: FIB4 SCORE: 1.05

## 2021-03-17 NOTE — PROGRESS NOTES
"Chief Complaint   Patient presents with   • Follow-Up     Multiple thyroid nodules   • Osteoporosis        HPI:    Multiple thyroid nodules          Thyroid gland is asymptomatic. Patient is not aware of any change in her gland. No discomfort. No difficulty swallowing, breathing, or voice change.         Does not take any thyroid supplement..  Thyroid blood levels are normal with TSH 0.9 and free thyroxine upper normal at 1.5.         Ultrasound last February showed no change in her nodules.  The largest nodule on the right side with biopsy 2014 \"benign\"    Osteoporosis         Patient does have osteoporosis with T-scores of -3.5 in the lumbar spine and hip.  An old compression fracture of L3.  She definitely would qualify for medication.  She is opposed.  She is not taking a calcium supplement that is taking vitamin D and her current vitamin D level is 45.        We will update her bone density and I will have her back with the discussion about medical management.    ROS:  Hypertension is controlled with medication and she is feeling well in that regard    Having troubles with insomnia.  She goes to sleep easily for about 3 hours.  Midnight or 1 AM she is awake.  Suggested a small dose of Benadryl might be helpful and safe.    Allergies:   Allergies   Allergen Reactions   • Amoxicillin      Unsure of reaction/or allergy   • Hydralazine Unspecified     Pt reported severe hypotensive reaction   • Morphine Vomiting and Nausea   • Penicillins      1970 reaction       Current medicines including changes today:  Current Outpatient Medications   Medication Sig Dispense Refill   • amLODIPine (NORVASC) 2.5 MG Tab TAKE 1 TABLET BY MOUTH DAILY     • carvedilol (COREG) 25 MG Tab Take 1 Tab by mouth 2 times a day, with meals. 120 Tab 0   • methocarbamol (ROBAXIN) 500 MG Tab Take 1 Tab by mouth 4 times a day as needed (muscle spasms). 120 Tab 0   • omeprazole (PRILOSEC) 20 MG delayed-release capsule Take 1 Cap by mouth every " day. 90 Cap 0   • sodium chloride (SALT) 1 GM Tab Take 1 Tab by mouth every day. 90 Tab 0   • telmisartan (MICARDIS) 80 MG Tab Take 80 mg by mouth every morning.     • aspirin EC (ECOTRIN) 81 MG TBEC Take 81 mg by mouth every evening.       No current facility-administered medications for this visit.        Past Medical History:   Diagnosis Date   • CHI (closed head injury)    • Elevated cortisol level     unknown etiology   • Hypertension     medicated   • Insomnia    • Multiple thyroid nodules 2008   • Thyrotoxicosis     history in 2013 / euthyroid 2015   • Urinary tract infection        PHYSICAL EXAM:    /80 (BP Location: Left arm, Patient Position: Sitting, BP Cuff Size: Adult)   Pulse 73   Wt 64.7 kg (142 lb 9.6 oz)   SpO2 98%   BMI 23.73 kg/m²     Gen.   appears healthy, she has developed some dorsal kyphosis    Skin   appropriate for sex and age    HEENT  unremarkable    Neck   thyroid gland is difficult to palpate.  I think it is primarily substernal.    Heart  regular    Extremities  no edema    Neuro  gait and station normal    Psych  appropriate, pleasant, good spirits      ASSESSMENT AND RECOMMENDATIONS    1. Multiple thyroid nodules              Asymptomatic              Update ultrasound    2. Age-related osteoporosis without current pathological fracture                Update bone density further discuss       DISPOSITION: She does not want to have any procedures in a facility right now part from lab tests.                              3 months we will do ultrasound tomorrow density and come back to the office for discussion.      José Miguel Zimmer M.D.    Copies to: Paul Johnson M.D. [phone]

## 2021-04-01 DIAGNOSIS — Z78.0 MENOPAUSE: ICD-10-CM

## 2021-04-01 DIAGNOSIS — S32.030D COMPRESSION FRACTURE OF L3 VERTEBRA WITH ROUTINE HEALING, SUBSEQUENT ENCOUNTER: ICD-10-CM

## 2021-04-01 DIAGNOSIS — M81.0 AGE-RELATED OSTEOPOROSIS WITHOUT CURRENT PATHOLOGICAL FRACTURE: ICD-10-CM

## 2021-06-21 ENCOUNTER — APPOINTMENT (OUTPATIENT)
Dept: RADIOLOGY | Facility: MEDICAL CENTER | Age: 86
End: 2021-06-21
Attending: INTERNAL MEDICINE
Payer: MEDICARE

## 2021-06-23 ENCOUNTER — APPOINTMENT (OUTPATIENT)
Dept: ENDOCRINOLOGY | Facility: MEDICAL CENTER | Age: 86
End: 2021-06-23
Attending: INTERNAL MEDICINE
Payer: MEDICARE

## 2021-07-08 NOTE — PROGRESS NOTES
Follow-up with your urologist as discussed.  Return for any worsening symptoms.   Received report from Paula, at pt bedside. Pt A/O x 3 to 4 forgetful at times, pt BP was high overnight last , sodium is 124 up from 122 yesterday, will continue to monitor, POC discussed. Call light and belongings within reach. Bed locked and in low position. Alarm on and fall precautions in place.

## 2021-07-19 ENCOUNTER — HOSPITAL ENCOUNTER (OUTPATIENT)
Dept: RADIOLOGY | Facility: MEDICAL CENTER | Age: 86
End: 2021-07-19
Attending: INTERNAL MEDICINE
Payer: MEDICARE

## 2021-07-19 DIAGNOSIS — E04.2 MULTIPLE THYROID NODULES: ICD-10-CM

## 2021-07-19 PROCEDURE — 76536 US EXAM OF HEAD AND NECK: CPT

## 2021-07-27 RX ORDER — TRAZODONE HYDROCHLORIDE 50 MG/1
TABLET ORAL
COMMUNITY
Start: 2021-02-26 | End: 2021-07-28

## 2021-07-27 RX ORDER — TERAZOSIN 1 MG/1
CAPSULE ORAL
COMMUNITY
Start: 2020-09-15 | End: 2022-01-25

## 2021-07-27 RX ORDER — LORAZEPAM 0.5 MG/1
TABLET ORAL
COMMUNITY
Start: 2021-06-25 | End: 2021-07-28

## 2021-07-27 RX ORDER — TERAZOSIN 2 MG/1
CAPSULE ORAL
COMMUNITY
Start: 2021-05-07 | End: 2021-10-01

## 2021-07-27 RX ORDER — LORAZEPAM 0.5 MG/1
TABLET ORAL
COMMUNITY
Start: 2021-06-15 | End: 2022-03-08

## 2021-07-28 ENCOUNTER — OFFICE VISIT (OUTPATIENT)
Dept: ENDOCRINOLOGY | Facility: MEDICAL CENTER | Age: 86
End: 2021-07-28
Attending: INTERNAL MEDICINE
Payer: MEDICARE

## 2021-07-28 VITALS
OXYGEN SATURATION: 98 % | BODY MASS INDEX: 23.93 KG/M2 | SYSTOLIC BLOOD PRESSURE: 128 MMHG | HEART RATE: 77 BPM | DIASTOLIC BLOOD PRESSURE: 80 MMHG | WEIGHT: 143.8 LBS

## 2021-07-28 DIAGNOSIS — M81.0 AGE-RELATED OSTEOPOROSIS WITHOUT CURRENT PATHOLOGICAL FRACTURE: ICD-10-CM

## 2021-07-28 DIAGNOSIS — E04.2 MULTIPLE THYROID NODULES: ICD-10-CM

## 2021-07-28 PROCEDURE — 99211 OFF/OP EST MAY X REQ PHY/QHP: CPT | Performed by: INTERNAL MEDICINE

## 2021-07-28 PROCEDURE — 99214 OFFICE O/P EST MOD 30 MIN: CPT | Performed by: INTERNAL MEDICINE

## 2021-07-28 ASSESSMENT — FIBROSIS 4 INDEX: FIB4 SCORE: 1.06

## 2021-07-28 NOTE — PROGRESS NOTES
Chief Complaint   Patient presents with   • Follow-Up     Thyroid nodules   • Osteoporosis        HPI:    Thyroid gland is asymptomatic. Patient is not aware of any change in her gland. No discomfort. No difficulty swallowing, breathing, or voice change.         Thyroid ultrasound in July was unchanged.  The largest nodule in the right lobe was biopsied 2014 and was benign.  I cannot feel her thyroid at all.  I think it is substernal         Last thyroid levels done February of this year were good with TSH 0.9 and free thyroxine upper normal 1.5 taking no thyroid supplement.    Osteoporosis        Still untreated with medication.  She does take a calcium supplement about 500 mg/day and vitamin D 2000 IU/day.  In February vitamin D level was satisfactory at 45 we will check that again with next lab draw.           Remain exercises getting out and walking every day.  She does not do any weight bearing exercise per se.  She is not a gym type that would go to a exercise program.         Plan to update bone density in February 2022 which will be 2 years from previous.    ROS:  Difficulty with insomnia.  She goes to sleep easily around 10 PM but wakes up 3 or 4 hours later regularly and cannot get back to sleep.  I suggested she might try a low dose antihistamine like Benadryl      Allergies:   Allergies   Allergen Reactions   • Amoxicillin      Unsure of reaction/or allergy   • Hydralazine Unspecified     Pt reported severe hypotensive reaction   • Morphine Vomiting and Nausea   • Penicillins      1970 reaction       Current medicines including changes today:  Current Outpatient Medications   Medication Sig Dispense Refill   • LORazepam (ATIVAN) 0.5 MG Tab ATIVAN 0.5 MG TABS     • terazosin (HYTRIN) 2 MG Cap      • terazosin (HYTRIN) 1 MG Cap TERAZOSIN HCL 1 MG CAPS     • amLODIPine (NORVASC) 2.5 MG Tab TAKE 1 TABLET BY MOUTH DAILY     • carvedilol (COREG) 25 MG Tab Take 1 Tab by mouth 2 times a day, with meals. 120 Tab  0   • methocarbamol (ROBAXIN) 500 MG Tab Take 1 Tab by mouth 4 times a day as needed (muscle spasms). 120 Tab 0   • telmisartan (MICARDIS) 80 MG Tab Take 80 mg by mouth every morning.     • aspirin EC (ECOTRIN) 81 MG TBEC Take 81 mg by mouth every evening.     • LORazepam (ATIVAN) 0.5 MG Tab TAKE 0.5-1 TABLET BY MOUTH EVERY 8 HOURS (Patient not taking: Reported on 7/28/2021)     • traZODone (DESYREL) 50 MG Tab TRAZODONE HCL 50 MG TABS (Patient not taking: Reported on 7/28/2021)     • omeprazole (PRILOSEC) 20 MG delayed-release capsule Take 1 Cap by mouth every day. (Patient not taking: Reported on 7/28/2021) 90 Cap 0   • sodium chloride (SALT) 1 GM Tab Take 1 Tab by mouth every day. (Patient not taking: Reported on 7/28/2021) 90 Tab 0     No current facility-administered medications for this visit.        Past Medical History:   Diagnosis Date   • CHI (closed head injury)    • Elevated cortisol level     unknown etiology   • Hypertension     medicated   • Insomnia    • Multiple thyroid nodules 2008   • Thyrotoxicosis     history in 2013 / euthyroid 2015   • Urinary tract infection        PHYSICAL EXAM:    /80 (BP Location: Left arm, Patient Position: Sitting, BP Cuff Size: Adult)   Pulse 77   Wt 65.2 kg (143 lb 12.8 oz)   SpO2 98%   BMI 23.93 kg/m²     Gen.   appears healthy     Skin   appropriate for sex and age    HEENT  unremarkable    Neck   no adenopathy    Lungs  clear    Heart  regular    Extremities  no edema    Neuro  gait and station normal    Psych  appropriate    ASSESSMENT AND RECOMMENDATIONS    1. Multiple thyroid nodules             Stable              Repeat thyroid ultrasound July 2022    2. Age-related osteoporosis without current pathological fracture              Declines medical management.              Update bone density February 2022 and return to discuss       DISPOSITION: Return in February 2022 after bone density study      José Miguel Zimmer M.D.    Copies to: Paul MACK  [facility] [phone]

## 2021-08-08 ENCOUNTER — HOSPITAL ENCOUNTER (EMERGENCY)
Facility: MEDICAL CENTER | Age: 86
End: 2021-08-08
Attending: EMERGENCY MEDICINE
Payer: MEDICARE

## 2021-08-08 ENCOUNTER — APPOINTMENT (OUTPATIENT)
Dept: RADIOLOGY | Facility: MEDICAL CENTER | Age: 86
End: 2021-08-08
Attending: EMERGENCY MEDICINE
Payer: MEDICARE

## 2021-08-08 VITALS
TEMPERATURE: 98.8 F | SYSTOLIC BLOOD PRESSURE: 160 MMHG | WEIGHT: 138 LBS | BODY MASS INDEX: 22.99 KG/M2 | DIASTOLIC BLOOD PRESSURE: 82 MMHG | HEART RATE: 71 BPM | RESPIRATION RATE: 21 BRPM | HEIGHT: 65 IN | OXYGEN SATURATION: 96 %

## 2021-08-08 DIAGNOSIS — E86.0 DEHYDRATION: ICD-10-CM

## 2021-08-08 DIAGNOSIS — R11.0 NAUSEA: ICD-10-CM

## 2021-08-08 DIAGNOSIS — I16.0 HYPERTENSIVE URGENCY: ICD-10-CM

## 2021-08-08 DIAGNOSIS — R51.9 NONINTRACTABLE HEADACHE, UNSPECIFIED CHRONICITY PATTERN, UNSPECIFIED HEADACHE TYPE: ICD-10-CM

## 2021-08-08 LAB
ALBUMIN SERPL BCP-MCNC: 3.9 G/DL (ref 3.2–4.9)
ALBUMIN/GLOB SERPL: 1.3 G/DL
ALP SERPL-CCNC: 69 U/L (ref 30–99)
ALT SERPL-CCNC: 23 U/L (ref 2–50)
ANION GAP SERPL CALC-SCNC: 12 MMOL/L (ref 7–16)
APPEARANCE UR: CLEAR
AST SERPL-CCNC: 16 U/L (ref 12–45)
BACTERIA #/AREA URNS HPF: NEGATIVE /HPF
BASOPHILS # BLD AUTO: 1 % (ref 0–1.8)
BASOPHILS # BLD: 0.06 K/UL (ref 0–0.12)
BILIRUB SERPL-MCNC: 0.4 MG/DL (ref 0.1–1.5)
BILIRUB UR QL STRIP.AUTO: NEGATIVE
BUN SERPL-MCNC: 9 MG/DL (ref 8–22)
CALCIUM SERPL-MCNC: 9.3 MG/DL (ref 8.5–10.5)
CHLORIDE SERPL-SCNC: 104 MMOL/L (ref 96–112)
CO2 SERPL-SCNC: 22 MMOL/L (ref 20–33)
COLOR UR: YELLOW
CREAT SERPL-MCNC: 0.67 MG/DL (ref 0.5–1.4)
EKG IMPRESSION: NORMAL
EOSINOPHIL # BLD AUTO: 0.11 K/UL (ref 0–0.51)
EOSINOPHIL NFR BLD: 1.8 % (ref 0–6.9)
EPI CELLS #/AREA URNS HPF: NEGATIVE /HPF
ERYTHROCYTE [DISTWIDTH] IN BLOOD BY AUTOMATED COUNT: 44 FL (ref 35.9–50)
GLOBULIN SER CALC-MCNC: 2.9 G/DL (ref 1.9–3.5)
GLUCOSE SERPL-MCNC: 108 MG/DL (ref 65–99)
GLUCOSE UR STRIP.AUTO-MCNC: NEGATIVE MG/DL
HCT VFR BLD AUTO: 44.2 % (ref 37–47)
HGB BLD-MCNC: 15.1 G/DL (ref 12–16)
IMM GRANULOCYTES # BLD AUTO: 0.01 K/UL (ref 0–0.11)
IMM GRANULOCYTES NFR BLD AUTO: 0.2 % (ref 0–0.9)
KETONES UR STRIP.AUTO-MCNC: NEGATIVE MG/DL
LACTATE BLD-SCNC: 1.2 MMOL/L (ref 0.5–2)
LEUKOCYTE ESTERASE UR QL STRIP.AUTO: ABNORMAL
LIPASE SERPL-CCNC: 32 U/L (ref 11–82)
LYMPHOCYTES # BLD AUTO: 1.31 K/UL (ref 1–4.8)
LYMPHOCYTES NFR BLD: 22 % (ref 22–41)
MCH RBC QN AUTO: 32.4 PG (ref 27–33)
MCHC RBC AUTO-ENTMCNC: 34.2 G/DL (ref 33.6–35)
MCV RBC AUTO: 94.8 FL (ref 81.4–97.8)
MICRO URNS: ABNORMAL
MONOCYTES # BLD AUTO: 0.69 K/UL (ref 0–0.85)
MONOCYTES NFR BLD AUTO: 11.6 % (ref 0–13.4)
NEUTROPHILS # BLD AUTO: 3.78 K/UL (ref 2–7.15)
NEUTROPHILS NFR BLD: 63.4 % (ref 44–72)
NITRITE UR QL STRIP.AUTO: NEGATIVE
NRBC # BLD AUTO: 0 K/UL
NRBC BLD-RTO: 0 /100 WBC
PH UR STRIP.AUTO: 8.5 [PH] (ref 5–8)
PLATELET # BLD AUTO: 283 K/UL (ref 164–446)
PMV BLD AUTO: 10.3 FL (ref 9–12.9)
POTASSIUM SERPL-SCNC: 3.7 MMOL/L (ref 3.6–5.5)
PROT SERPL-MCNC: 6.8 G/DL (ref 6–8.2)
PROT UR QL STRIP: NEGATIVE MG/DL
RBC # BLD AUTO: 4.66 M/UL (ref 4.2–5.4)
RBC # URNS HPF: ABNORMAL /HPF
RBC UR QL AUTO: NEGATIVE
SODIUM SERPL-SCNC: 138 MMOL/L (ref 135–145)
SP GR UR STRIP.AUTO: 1.01
TROPONIN T SERPL-MCNC: <6 NG/L (ref 6–19)
TSH SERPL DL<=0.005 MIU/L-ACNC: 0.79 UIU/ML (ref 0.38–5.33)
UROBILINOGEN UR STRIP.AUTO-MCNC: 0.2 MG/DL
WBC # BLD AUTO: 6 K/UL (ref 4.8–10.8)
WBC #/AREA URNS HPF: ABNORMAL /HPF

## 2021-08-08 PROCEDURE — 36415 COLL VENOUS BLD VENIPUNCTURE: CPT

## 2021-08-08 PROCEDURE — 74177 CT ABD & PELVIS W/CONTRAST: CPT | Mod: MG

## 2021-08-08 PROCEDURE — 700102 HCHG RX REV CODE 250 W/ 637 OVERRIDE(OP): Performed by: EMERGENCY MEDICINE

## 2021-08-08 PROCEDURE — 84484 ASSAY OF TROPONIN QUANT: CPT

## 2021-08-08 PROCEDURE — 80053 COMPREHEN METABOLIC PANEL: CPT

## 2021-08-08 PROCEDURE — 81001 URINALYSIS AUTO W/SCOPE: CPT

## 2021-08-08 PROCEDURE — 96376 TX/PRO/DX INJ SAME DRUG ADON: CPT

## 2021-08-08 PROCEDURE — 85025 COMPLETE CBC W/AUTO DIFF WBC: CPT

## 2021-08-08 PROCEDURE — 70450 CT HEAD/BRAIN W/O DYE: CPT | Mod: ME

## 2021-08-08 PROCEDURE — 99285 EMERGENCY DEPT VISIT HI MDM: CPT

## 2021-08-08 PROCEDURE — 83605 ASSAY OF LACTIC ACID: CPT

## 2021-08-08 PROCEDURE — 93005 ELECTROCARDIOGRAM TRACING: CPT | Performed by: EMERGENCY MEDICINE

## 2021-08-08 PROCEDURE — 96374 THER/PROPH/DIAG INJ IV PUSH: CPT

## 2021-08-08 PROCEDURE — 700111 HCHG RX REV CODE 636 W/ 250 OVERRIDE (IP): Performed by: EMERGENCY MEDICINE

## 2021-08-08 PROCEDURE — 84443 ASSAY THYROID STIM HORMONE: CPT

## 2021-08-08 PROCEDURE — A9270 NON-COVERED ITEM OR SERVICE: HCPCS | Performed by: EMERGENCY MEDICINE

## 2021-08-08 PROCEDURE — 700105 HCHG RX REV CODE 258: Performed by: EMERGENCY MEDICINE

## 2021-08-08 PROCEDURE — 700117 HCHG RX CONTRAST REV CODE 255: Performed by: EMERGENCY MEDICINE

## 2021-08-08 PROCEDURE — 83690 ASSAY OF LIPASE: CPT

## 2021-08-08 RX ORDER — HYDROXYZINE HYDROCHLORIDE 25 MG/1
25 TABLET, FILM COATED ORAL ONCE
Status: COMPLETED | OUTPATIENT
Start: 2021-08-08 | End: 2021-08-08

## 2021-08-08 RX ORDER — ONDANSETRON 2 MG/ML
4 INJECTION INTRAMUSCULAR; INTRAVENOUS ONCE
Status: COMPLETED | OUTPATIENT
Start: 2021-08-08 | End: 2021-08-08

## 2021-08-08 RX ORDER — ONDANSETRON 4 MG/1
4 TABLET, ORALLY DISINTEGRATING ORAL EVERY 6 HOURS PRN
Qty: 10 TABLET | Refills: 0 | Status: SHIPPED | OUTPATIENT
Start: 2021-08-08 | End: 2022-01-25

## 2021-08-08 RX ORDER — SODIUM CHLORIDE 9 MG/ML
500 INJECTION, SOLUTION INTRAVENOUS ONCE
Status: COMPLETED | OUTPATIENT
Start: 2021-08-08 | End: 2021-08-08

## 2021-08-08 RX ORDER — HYDROXYZINE PAMOATE 25 MG/1
25 CAPSULE ORAL
Qty: 5 CAPSULE | Refills: 0 | Status: SHIPPED | OUTPATIENT
Start: 2021-08-08 | End: 2022-01-25

## 2021-08-08 RX ADMIN — ONDANSETRON 4 MG: 2 INJECTION INTRAMUSCULAR; INTRAVENOUS at 21:15

## 2021-08-08 RX ADMIN — SODIUM CHLORIDE 500 ML: 9 INJECTION, SOLUTION INTRAVENOUS at 19:34

## 2021-08-08 RX ADMIN — HYDROXYZINE HYDROCHLORIDE 25 MG: 25 TABLET, FILM COATED ORAL at 22:50

## 2021-08-08 RX ADMIN — IOHEXOL 100 ML: 350 INJECTION, SOLUTION INTRAVENOUS at 21:00

## 2021-08-08 RX ADMIN — ONDANSETRON 4 MG: 2 INJECTION INTRAMUSCULAR; INTRAVENOUS at 19:32

## 2021-08-08 ASSESSMENT — LIFESTYLE VARIABLES: DO YOU DRINK ALCOHOL: NO

## 2021-08-08 ASSESSMENT — FIBROSIS 4 INDEX: FIB4 SCORE: 1.06

## 2021-08-09 NOTE — ED TRIAGE NOTES
"Prabha Mathews [place]  86 y.o.  Chief Complaint   Patient presents with   • Nausea     constant for past week   • Insomnia     2hrs of sleep each night       BIBA for above complaints. Pt states she is \"nauseous all the time\" and unable to eat for the past week. States sometimes it feels like indigestion, and interrupts sleep. She is currently getting 2 hrs of sleep each night due to the nauseous feeling, waking up in cold sweats each night. BP currently 191/86. Pt reports last visit to the ED she was told she \"was in hypertensive crisis,\" reports similar presentation, pt has extensive BP medication routine. FSBS 117.  "

## 2021-08-09 NOTE — ED NOTES
Per ERP, patient ok to self administer home HTN medications, patient tolerated water well.      Blood sent to lab.

## 2021-08-09 NOTE — ED PROVIDER NOTES
ED Provider Note    ED Provider Note    Scribed for Bart Brennan MD by Bart Brennan M.D.. 8/8/2021, 7:16 PM.    Primary care provider: Paul Johnson M.D.  Means of arrival: Private  History obtained from: Patient and SO  History limited by: None    CHIEF COMPLAINT  Chief Complaint   Patient presents with   • Nausea     constant for past week   • Insomnia     2hrs of sleep each night       HPI  Prabha Lizama is a 86 y.o. female who presents to the Emergency Department for evaluation of persistent nausea.  Patient notes symptoms last week.  Initially she had 2 large bouts of diarrhea.  She has since had normal nondiarrheal bowel movements but notes since then persistent nausea without emesis.  Patient notes gradually less and less oral intake and poor appetite.  No open to the extent she has had essentially no oral intake today.  Patient relates no fever, no particular ill contacts.  Denies any abdominal pain.  Additionally, patient found to be quite hypertensive.  She does have a history of hypertension notes no change in her medication regimen.  She notes however today blood pressure was elevated at home, 100 6170 systolic.  She is hypertensive here in the ED, 200/90.  Denies any chest pain but does note apex headache.  No active dizziness or vertigo.  Remote head injury 2008 but no recent trauma, not anticoagulated.    REVIEW OF SYSTEMS  Pertinent positives include nausea, hypertension, headache. Pertinent negatives include no trauma, no vomiting, no abdominal pain, no focal numbness or weakness.  All other systems reviewed and negative.    PAST MEDICAL HISTORY   has a past medical history of CHI (closed head injury), Elevated cortisol level, Hypertension, Insomnia, Multiple thyroid nodules (2008), Thyrotoxicosis, and Urinary tract infection.    SURGICAL HISTORY   has a past surgical history that includes tonsillectomy and appendectomy.    SOCIAL HISTORY  Social History  "    Tobacco Use   • Smoking status: Never Smoker   • Smokeless tobacco: Never Used   Vaping Use   • Vaping Use: Never used   Substance Use Topics   • Alcohol use: No   • Drug use: No      Social History     Substance and Sexual Activity   Drug Use No       FAMILY HISTORY  Family History   Problem Relation Age of Onset   • Heart Disease Mother    • Heart Disease Father    • Hypertension Sister    • Arthritis Sister    • Thyroid Sister        CURRENT MEDICATIONS  Home Medications     Reviewed by Priscilla Vila R.N. (Registered Nurse) on 08/08/21 at 1827  Med List Status: Not Addressed   Medication Last Dose Status   amLODIPine (NORVASC) 2.5 MG Tab  Active   aspirin EC (ECOTRIN) 81 MG TBEC  Active   carvedilol (COREG) 25 MG Tab  Active   LORazepam (ATIVAN) 0.5 MG Tab  Active   methocarbamol (ROBAXIN) 500 MG Tab  Active   telmisartan (MICARDIS) 80 MG Tab  Active   terazosin (HYTRIN) 1 MG Cap  Active   terazosin (HYTRIN) 2 MG Cap  Active                ALLERGIES  Allergies   Allergen Reactions   • Amoxicillin      Unsure of reaction/or allergy   • Hydralazine Unspecified     Pt reported severe hypotensive reaction   • Morphine Vomiting and Nausea   • Penicillins      1970 reaction       PHYSICAL EXAM  VITAL SIGNS: /82   Pulse 71   Temp 37.1 °C (98.8 °F) (Tympanic)   Resp (!) 21   Ht 1.651 m (5' 5\")   Wt 62.6 kg (138 lb)   SpO2 96%   BMI 22.96 kg/m²     General: Alert, no acute distress  Skin: Warm, dry, normal for ethnicity  Head: Normocephalic, atraumatic  Neck: Trachea midline, no tenderness  Eye: PERRL, normal conjunctiva, extraocular movements intact without nystagmus.  ENMT: Oral mucosa pink and mildly dry, no pharyngeal erythema or exudate  Cardiovascular: Regular rate and rhythm, No murmur, Normal peripheral perfusion  Respiratory: Lungs CTA, respirations are non-labored, breath sounds are equal  Gastrointestinal: Soft, nontender, non distended.  Bowel sounds are hypoactive.  Musculoskeletal: " No swelling, no deformity  Neurological: Alert and oriented to person, place, time, and situation.  Cranial nerves II through XII are grossly intact, upper and lower extremity strength and sensation are 5 x 5 and symmetrical bilaterally.  Lymphatics: No lymphadenopathy  Psychiatric: Cooperative, mildly anxious, otherwise appropriate mood & affect      DIAGNOSTIC STUDIES/PROCEDURES    LABS  Results for orders placed or performed during the hospital encounter of 08/08/21   CBC WITH DIFFERENTIAL   Result Value Ref Range    WBC 6.0 4.8 - 10.8 K/uL    RBC 4.66 4.20 - 5.40 M/uL    Hemoglobin 15.1 12.0 - 16.0 g/dL    Hematocrit 44.2 37.0 - 47.0 %    MCV 94.8 81.4 - 97.8 fL    MCH 32.4 27.0 - 33.0 pg    MCHC 34.2 33.6 - 35.0 g/dL    RDW 44.0 35.9 - 50.0 fL    Platelet Count 283 164 - 446 K/uL    MPV 10.3 9.0 - 12.9 fL    Neutrophils-Polys 63.40 44.00 - 72.00 %    Lymphocytes 22.00 22.00 - 41.00 %    Monocytes 11.60 0.00 - 13.40 %    Eosinophils 1.80 0.00 - 6.90 %    Basophils 1.00 0.00 - 1.80 %    Immature Granulocytes 0.20 0.00 - 0.90 %    Nucleated RBC 0.00 /100 WBC    Neutrophils (Absolute) 3.78 2.00 - 7.15 K/uL    Lymphs (Absolute) 1.31 1.00 - 4.80 K/uL    Monos (Absolute) 0.69 0.00 - 0.85 K/uL    Eos (Absolute) 0.11 0.00 - 0.51 K/uL    Baso (Absolute) 0.06 0.00 - 0.12 K/uL    Immature Granulocytes (abs) 0.01 0.00 - 0.11 K/uL    NRBC (Absolute) 0.00 K/uL   COMP METABOLIC PANEL   Result Value Ref Range    Sodium 138 135 - 145 mmol/L    Potassium 3.7 3.6 - 5.5 mmol/L    Chloride 104 96 - 112 mmol/L    Co2 22 20 - 33 mmol/L    Anion Gap 12.0 7.0 - 16.0    Glucose 108 (H) 65 - 99 mg/dL    Bun 9 8 - 22 mg/dL    Creatinine 0.67 0.50 - 1.40 mg/dL    Calcium 9.3 8.5 - 10.5 mg/dL    AST(SGOT) 16 12 - 45 U/L    ALT(SGPT) 23 2 - 50 U/L    Alkaline Phosphatase 69 30 - 99 U/L    Total Bilirubin 0.4 0.1 - 1.5 mg/dL    Albumin 3.9 3.2 - 4.9 g/dL    Total Protein 6.8 6.0 - 8.2 g/dL    Globulin 2.9 1.9 - 3.5 g/dL    A-G Ratio 1.3 g/dL    LIPASE   Result Value Ref Range    Lipase 32 11 - 82 U/L   URINALYSIS    Specimen: Urine   Result Value Ref Range    Color Yellow     Character Clear     Specific Gravity 1.015 <1.035    Ph 8.5 (A) 5.0 - 8.0    Glucose Negative Negative mg/dL    Ketones Negative Negative mg/dL    Protein Negative Negative mg/dL    Bilirubin Negative Negative    Urobilinogen, Urine 0.2 Negative    Nitrite Negative Negative    Leukocyte Esterase Small (A) Negative    Occult Blood Negative Negative    Micro Urine Req Microscopic    ESTIMATED GFR   Result Value Ref Range    GFR If African American >60 >60 mL/min/1.73 m 2    GFR If Non African American >60 >60 mL/min/1.73 m 2   TROPONIN   Result Value Ref Range    Troponin T <6 6 - 19 ng/L   LACTIC ACID   Result Value Ref Range    Lactic Acid 1.2 0.5 - 2.0 mmol/L   TSH   Result Value Ref Range    TSH 0.790 0.380 - 5.330 uIU/mL   URINE MICROSCOPIC (W/UA)   Result Value Ref Range    WBC 0-2 /hpf    RBC 2-5 (A) /hpf    Bacteria Negative None /hpf    Epithelial Cells Negative /hpf   EKG (NOW)   Result Value Ref Range    Report       [facility] Emergency Dept.    Test Date:  2021  Pt Name:    QUINTIN JOHNSTON               Department: ER  MRN:        [id number]                      Room:       [facility]  Gender:     Female                       Technician: [id number]  :        [id number]                   Requested By:FLAKO AGUILAR  Order #:    [id number]                    Reading MD:    Measurements  Intervals                                Axis  Rate:       78                           P:          54  GA:         153                          QRS:        20  QRSD:       103                          T:          48  QT:         422  QTc:        481    Interpretive Statements  Sinus rhythm  Minimal ST depression, inferior leads  Artifact in lead(s) I,II,III,aVR,aVL,aVF  Compared to ECG 2020 05:34:13  Ventricular premature complex(es) no longer present  ST (T wave)  deviation still present       All labs reviewed by me.    EKG  12 Lead EKG obtained at 1901 and interpreted by me to show:  Rhythm: Normal sinus rhythm   Rate: 78  Axis: Normal  Intervals: Normal  Q Waves: Normal  No diagnostic ST segment elevation    Clinical Impression: Normal EKG  Compared to August 26, 2020, no significant change    RADIOLOGY  CT-ABDOMEN-PELVIS WITH   Final Result         1.  Hepatomegaly   2.  Diverticulosis   3.  Small left ovarian cyst   4.  Atherosclerosis      CT-HEAD W/O   Final Result         1.  No acute intracranial abnormality is identified, there are nonspecific white matter changes, commonly associated with small vessel ischemic disease.  Associated mild cerebral atrophy is noted.   2.  Atherosclerosis.        The radiologist's interpretation of all radiological studies have been reviewed by me.    COURSE & MEDICAL DECISION MAKING  Pertinent Labs & Imaging studies reviewed. (See chart for details)    7:01 PM - Patient seen and examined at bedside. Patient will be treated with Zofran 4 mg IV. Ordered metabolic work-up as well as CT imaging abdomen pelvis and CT of the brain to evaluate her symptoms. The differential diagnoses include but are not limited to: Hypertensive urgency, hypertensive emergency, intracranial hemorrhage, ACS, gastroenteritis, gastritis, pancreatitis    2049: Patient reassessed, still nauseous.  Avoid additional Zofran.  Blood pressure remains elevated.-No evidence of acute kidney injury, CT reviewed and also unremarkable.    2207: Blood pressure much improved without antihypertensive intervention, currently 147/83.    Patient Vitals for the past 24 hrs:   BP Temp Temp src Pulse Resp SpO2 Height Weight   08/08/21 2229 160/82 37.1 °C (98.8 °F) Tympanic 71 (!) 21 96 % -- --   08/08/21 2159 143/80 -- -- 69 16 96 % -- --   08/08/21 2129 (!) 173/81 -- -- 75 18 95 % -- --   08/08/21 2059 (!) 189/79 -- -- 78 18 98 % -- --   08/08/21 2047 (!) 198/91 -- -- 78 18 98 % --  "--   08/08/21 2000 (!) 203/93 -- -- 82 18 97 % -- --   08/08/21 1929 (!) 198/97 -- -- 80 18 96 % -- --   08/08/21 1924 (!) 192/85 -- -- 71 18 97 % -- --   08/08/21 1900 (!) 192/88 37.1 °C (98.8 °F) Temporal 73 18 97 % -- --   08/08/21 1824 (!) 191/86 -- -- 73 16 98 % -- --   08/08/21 1821 -- -- -- -- -- -- 1.651 m (5' 5\") 62.6 kg (138 lb)         Decision Making:  This is a 86 y.o. year old female who presents with persistent nausea for a week.  No actual vomiting but symptoms started with significant diarrhea.  Patient also notes she is hypertensive throughout the day, hypertensive here in the ED as well but obviously anxious and uncomfortable.  Blood pressure improved markedly without antihypertensive intervention.  Nausea is improved somewhat as well after ondansetron.  Patient notes a mild apex headache as well, as such given her elevated blood pressure and the nausea obtain a CT of the brain.  The neck was unremarkable.  She has an NIH stroke scale of 0 on my exam, doubt CVA or intercranial hemorrhage as such.  There is no evidence of renal insufficiency, no evidence suggestive of ACS/acute MI or he does not heart failure.  Has/no evidence of hypertensive emergency.  I suspect likely her blood pressure is elevated given her discomfort.  Toward that end, thankfully work-up also benign.  CT is unremarkable, no evidence of obstructive process or any surgical abdominal process.  I suspect likely gastroenteritis, patient treated with good effect here with Zofran, will be written for the same.  Patient also notes chronic insomnia also worse for last few days, exacerbated by the symptoms.  She requests some assistance with sleep.  She tried melatonin with no improvement so I have written her for a very attenuated course of Vistaril given her age.  She is comfortable with outpatient follow-up.    The patient will return for new or worsening symptoms and is stable at the time of discharge.    Patient has had high blood " pressure while in the emergency department, felt likely secondary to medical condition. Counseled patient to monitor blood pressure at home and follow up with primary care physician.     DISPOSITION:  Patient will be discharged home in stable condition.    FOLLOW UP:  Paul Johnson M.D.  [address] [facility] [address]  [phone]    Schedule an appointment as soon as possible for a visit         OUTPATIENT MEDICATIONS:  New Prescriptions    HYDROXYZINE PAMOATE (VISTARIL) 25 MG CAP    Take 1 capsule by mouth at bedtime.    ONDANSETRON (ZOFRAN ODT) 4 MG TABLET DISPERSIBLE    Take 1 tablet by mouth every 6 hours as needed for Nausea.         FINAL IMPRESSION  1. Dehydration    2. Nausea    3. Hypertensive urgency    4. Nonintractable headache, unspecified chronicity pattern, unspecified headache type          Bart MIKE M.D. (Scradry), am scribing for, and in the presence of, Bart Brennan MD.    Electronically signed by: Bart Brennan M.D. (Scribe), 8/8/2021    IBart MD personally performed the services described in this documentation, as scribed by Bart Brennan M.D. in my presence, and it is both accurate and complete    The note accurately reflects work and decisions made by me.  Bart Brennan M.D.  8/8/2021  11:10 PM

## 2021-08-09 NOTE — ED NOTES
Patient has been updated of results and has been discharged home in stable condition by ERP. Pt road tested prior to discharge to bathroom and ambulated just fine with steady gait.     Discharge paperwork and lab results has been provided to patient per the ERP order and gone over with patient by this nurse. Patient was given the opportunity to voice any questions or concerns and has verbalized understanding of discharge instructions with no questions or concerns. RN encouraged patient to register on [employer] to get the remaining records if needed - pt verbalized no computer at home, printer, or smart phone. Medical records number was provided to pt and highlighted for future reference.    Patient is A/Ox4 and vital signs are stable on discharge ambulatory out of ED with steady gait and significant other at side.    Discharge instructions/paperwork as well as all personal belongings are in possession of patient on discharge, no belongings were left behind in room.

## 2021-08-09 NOTE — ED NOTES
"Pt reports having \"real gushers\" of \"nose bleeds off and on for months,\" worsening in last month. Pt 80mg aspirin each day. Blood drawn and sent to lab. Pt aware of urine sample need.  "

## 2021-10-14 ENCOUNTER — OFFICE VISIT (OUTPATIENT)
Dept: VASCULAR LAB | Facility: MEDICAL CENTER | Age: 86
End: 2021-10-14
Attending: INTERNAL MEDICINE
Payer: MEDICARE

## 2021-10-14 VITALS
SYSTOLIC BLOOD PRESSURE: 161 MMHG | HEIGHT: 66 IN | WEIGHT: 142 LBS | HEART RATE: 73 BPM | BODY MASS INDEX: 22.82 KG/M2 | DIASTOLIC BLOOD PRESSURE: 80 MMHG

## 2021-10-14 DIAGNOSIS — I10 HYPERTENSION, UNSPECIFIED TYPE: ICD-10-CM

## 2021-10-14 PROCEDURE — 99213 OFFICE O/P EST LOW 20 MIN: CPT | Performed by: INTERNAL MEDICINE

## 2021-10-14 PROCEDURE — 99212 OFFICE O/P EST SF 10 MIN: CPT

## 2021-10-14 ASSESSMENT — FIBROSIS 4 INDEX: FIB4 SCORE: 0.99

## 2021-10-14 NOTE — PROGRESS NOTES
"[facility] - Follow up VISIT  10/14/21     Prabha Lizama is a 86 y.o.  male who presents today  for   Chief Complaint   Patient presents with   • Follow-Up        HPI:  Patient referred for evaluation and management of hypertensoin  Good adhernce with bp meds  BPs at home usually 120s-130s/70s-low 80s  No headaches  Rare lightheadedness   No interfering substances        Social History     Tobacco Use   • Smoking status: Never Smoker   • Smokeless tobacco: Never Used   Vaping Use   • Vaping Use: Never used   Substance Use Topics   • Alcohol use: No   • Drug use: No      DIET AND EXERCISE:  Weight Change: stable  Diet: relatively low salt  Exercise: sporadic irregular exercise      Objective:     Vitals:    10/14/21 1127 10/14/21 1131   BP: (!) 162/81 (!) 161/80   BP Location: Left arm Left arm   Patient Position: Sitting Sitting   BP Cuff Size: Adult Adult   Pulse: 74 73   Weight: 64.4 kg (142 lb)    Height: 1.676 m (5' 6\")       Physical Exam  Vitals reviewed.   Constitutional:       General: She is not in acute distress.     Appearance: She is not diaphoretic.   HENT:      Head: Normocephalic and atraumatic.   Eyes:      General: No scleral icterus.     Conjunctiva/sclera: Conjunctivae normal.   Neck:      Vascular: No carotid bruit.   Cardiovascular:      Rate and Rhythm: Normal rate and regular rhythm.      Heart sounds: Normal heart sounds. No murmur heard.     Pulmonary:      Effort: Pulmonary effort is normal. No respiratory distress.      Breath sounds: Normal breath sounds. No wheezing or rales.   Musculoskeletal:      Right lower leg: No edema.      Left lower leg: No edema.   Skin:     Coloration: Skin is not pale.   Neurological:      General: No focal deficit present.      Mental Status: She is alert and oriented to person, place, and time.      Cranial Nerves: No cranial nerve deficit.      Coordination: Coordination normal.      Gait: Gait is intact. Gait normal.   Psychiatric:    "      Mood and Affect: Mood and affect normal.         Behavior: Behavior normal.          DATA REVIEW    Lab Results   Component Value Date/Time    CHOLSTRLTOT 205 (H) 05/02/2019 06:56 AM    CHOLSTRLTOT 200 (H) 02/08/2010 07:10 AM     (H) 05/02/2019 06:56 AM     (H) 02/08/2010 07:10 AM    HDL 67 05/02/2019 06:56 AM    HDL 54 02/08/2010 07:10 AM    TRIGLYCERIDE 112 05/02/2019 06:56 AM    TRIGLYCERIDE 121 02/08/2010 07:10 AM       Lab Results   Component Value Date/Time    SODIUM 138 08/08/2021 06:37 PM    POTASSIUM 3.7 08/08/2021 06:37 PM    CHLORIDE 104 08/08/2021 06:37 PM    CO2 22 08/08/2021 06:37 PM    GLUCOSE 108 (H) 08/08/2021 06:37 PM    BUN 9 08/08/2021 06:37 PM    CREATININE 0.67 08/08/2021 06:37 PM    CREATININE 0.87 07/07/2011 03:30 PM    BUNCREATRAT 15 02/19/2021 08:28 AM    BUNCREATRAT 11 07/07/2011 03:30 PM    GLOMRATE >59 08/04/2010 08:10 AM     Lab Results   Component Value Date/Time    ALKPHOSPHAT 69 08/08/2021 06:37 PM    ASTSGOT 16 08/08/2021 06:37 PM    ALTSGPT 23 08/08/2021 06:37 PM    TBILIRUBIN 0.4 08/08/2021 06:37 PM       Lab Results   Component Value Date/Time    HBA1C 5.2 01/15/2018 02:11 PM       Lab Results   Component Value Date/Time    MICRALB <3.0 05/02/2019 06:56 AM       Medical Decision Making:  Today's Assessment / Status / Plan:     1. Hypertension, unspecified type        Patient Type: Primary Prevention    Etiology of Established CVD if Present: none    Lipid Management: Qualifies for Statin Therapy Based on 2018 ACC/AHA Guidelines: no  Calculated 10-Year Risk of ASCVD: N/A  Currently on Statin: No  Outside age range for consideration of statin therapy in primary prevention per acc/aha guidelines   - continue lifestyle mod    Blood Pressure Management:  Acc/aha (2017) Blood Pressure Goal <130/80  Long h/o difficult to control bp with white coat htn and some degree of pseudopheo  Has had hyponatremia on diuretics in past - continue to avoid  BP under reasonable  control at home  - continue current meds  - conitnue home bp monitoring    Glycemic Status: Normal  Continue lifestyle mod    Anti-Platelet/Anti-Coagulant Tx: yes  Difficult risk benefit analysis.   Given stroke risk associated with resistant hypertension and many years of good tolerance would continue low dose asa at this point - patient in agreement    Smoking: continue complete avoidance     Physical Activity: continue walking    Weight Management and Nutrition: Dietary plan was discussed with patient at this visit including overall heart healthy eating, low in sodium    Instructed to follow-up with PCP for remainder of adult medical needs: yes  We will partner with other providers in the management of established vascular disease and cardiometabolic risk factors.    Studies to Be Obtained: none  Labs to Be Obtained: per pcp    Follow up in: 6 months    Michael J Bloch, M.D.

## 2021-10-19 DIAGNOSIS — I10 HYPERTENSION, UNSPECIFIED TYPE: ICD-10-CM

## 2021-10-19 RX ORDER — TELMISARTAN 80 MG/1
80 TABLET ORAL EVERY MORNING
Qty: 90 TABLET | Refills: 1 | Status: SHIPPED | OUTPATIENT
Start: 2021-10-19 | End: 2022-04-11

## 2021-11-11 ENCOUNTER — TELEPHONE (OUTPATIENT)
Dept: VASCULAR LAB | Facility: MEDICAL CENTER | Age: 86
End: 2021-11-11

## 2021-11-11 NOTE — TELEPHONE ENCOUNTER
Called and let pt know. Pt agreed to taking the extra amlodipine at night before bed. Pt is schedule for f/u on Dec 7th with [facility].     Also talked with pt about trying to only take her blood pressure NMT 3 times a day instead of every hour.     ----- Message from Michael J Bloch, M.D. sent at 2021  1:58 PM PST -----  Regarding: RE: High BP  Have her take extra amlodipine at dinner for now.   Have her f/u with [facility] next week - I think there is one opening on monday - grab it while you can!    mb  ----- Message -----  From: Medhat Jackman, Med Ass't  Sent: 2021   8:51 AM PST  To: Michael J Bloch, M.D.  Subject: High BP                                          Hello!     Pt called and stated she has had high BP at night for the last week (worst the last few days). Pt is experiencing night sweats and dizziness when she gets up at night. Pt is very tired.    Recent BP readings from this week:   Morning 160s/70s  Lunch: 120s/70s  Evenins/80s    Pt reports no changes in meds from what she has been taking.     Morning:Carvedilol 25 mg   Terazosin 4mg   Telmisartan 80mg    Lunch : amlodapine 2mg    Dinner: Carvedilol 25 mg   Terazosin 4mg     Pt is unsure what she should do, doesn't feel like the meds are making it to the next cycle anymore.     Please advise.  LP

## 2021-11-17 ENCOUNTER — TELEPHONE (OUTPATIENT)
Dept: VASCULAR LAB | Facility: MEDICAL CENTER | Age: 86
End: 2021-11-17

## 2021-11-17 NOTE — TELEPHONE ENCOUNTER
Notified pt   ----- Message from Michael J Bloch, M.D. sent at 11/17/2021 11:55 AM PST -----  Regarding: RE: amlodipine 2.5  She can try holding it until her next appointment. See how her bp and symptoms do.  Mb  ----- Message -----  From: Skye Montague, Med Ass't  Sent: 11/17/2021  10:45 AM PST  To: Medhat Jackman, Med Ass't, #  Subject: amlodipine 2.5                                   Pt states that she has been experiencing nausea and dizziness since taking her new dose of amlodipine. Pt is unsure if she should continue taking this medication.   Please advise.  Thank you!

## 2021-12-07 ENCOUNTER — APPOINTMENT (OUTPATIENT)
Dept: VASCULAR LAB | Facility: MEDICAL CENTER | Age: 86
End: 2021-12-07
Attending: INTERNAL MEDICINE
Payer: MEDICARE

## 2022-01-25 ENCOUNTER — OFFICE VISIT (OUTPATIENT)
Dept: VASCULAR LAB | Facility: MEDICAL CENTER | Age: 87
End: 2022-01-25
Attending: INTERNAL MEDICINE
Payer: MEDICARE

## 2022-01-25 VITALS
WEIGHT: 144 LBS | HEIGHT: 66 IN | BODY MASS INDEX: 23.14 KG/M2 | SYSTOLIC BLOOD PRESSURE: 173 MMHG | DIASTOLIC BLOOD PRESSURE: 84 MMHG | HEART RATE: 71 BPM

## 2022-01-25 DIAGNOSIS — R03.0 WHITE COAT SYNDROME WITH HIGH BLOOD PRESSURE BUT WITHOUT HYPERTENSION: ICD-10-CM

## 2022-01-25 DIAGNOSIS — I10 HYPERTENSION, UNSPECIFIED TYPE: ICD-10-CM

## 2022-01-25 PROCEDURE — 99214 OFFICE O/P EST MOD 30 MIN: CPT | Performed by: NURSE PRACTITIONER

## 2022-01-25 PROCEDURE — 99212 OFFICE O/P EST SF 10 MIN: CPT

## 2022-01-25 RX ORDER — AMLODIPINE BESYLATE 2.5 MG/1
2.5 TABLET ORAL DAILY
Qty: 90 TABLET | Refills: 3 | Status: SHIPPED | OUTPATIENT
Start: 2022-01-25 | End: 2023-01-10 | Stop reason: SDUPTHER

## 2022-01-25 ASSESSMENT — FIBROSIS 4 INDEX: FIB4 SCORE: 0.99

## 2022-01-25 NOTE — PROGRESS NOTES
"VASCULAR MEDICINE CLINIC - Follow up VISIT  1/25/22    Prabha Lizama is a 86 y.o.  male who presents today  for   Chief Complaint   Patient presents with   • Follow-Up        HPI:  Patient returns for evaluation and management of hypertensoin  Good adhernce with bp meds  Keeps excellent BP logs, daily  BPs at home usually 120s/50s-60s, HR 60-70s  No headaches  No lightheadedness   Does occasionally get dizzy at night if she abruptly gets up  No interfering substances   No new labwork       Social History     Tobacco Use   • Smoking status: Never Smoker   • Smokeless tobacco: Never Used   Vaping Use   • Vaping Use: Never used   Substance Use Topics   • Alcohol use: No   • Drug use: No      DIET AND EXERCISE:  Weight Change: stable  Diet: relatively low salt  Exercise: sporadic irregular exercise      Objective:     Vitals:    01/25/22 1448 01/25/22 1453   BP: (!) 171/85 (!) 173/84   BP Location: Left arm Left arm   Patient Position: Sitting Sitting   BP Cuff Size: Adult Adult   Pulse: 71 71   Weight: 65.3 kg (144 lb)    Height: 1.676 m (5' 6\")       Physical Exam  Vitals reviewed.   Constitutional:       General: She is not in acute distress.     Appearance: She is not diaphoretic.   Cardiovascular:      Rate and Rhythm: Normal rate and regular rhythm.      Heart sounds: Normal heart sounds. No murmur heard.      Pulmonary:      Effort: Pulmonary effort is normal. No respiratory distress.      Breath sounds: Normal breath sounds. No wheezing.   Musculoskeletal:         General: No swelling. Normal range of motion.      Right lower leg: No edema.      Left lower leg: No edema.   Skin:     General: Skin is warm and dry.      Coloration: Skin is not pale.   Neurological:      Mental Status: She is alert and oriented to person, place, and time.      Motor: No weakness.      Coordination: Coordination normal.      Gait: Gait is intact. Gait normal.   Psychiatric:         Mood and Affect: Mood and affect normal.    "      Behavior: Behavior normal.         Thought Content: Thought content normal.          DATA REVIEW    Lab Results   Component Value Date/Time    CHOLSTRLTOT 205 (H) 05/02/2019 06:56 AM    CHOLSTRLTOT 200 (H) 02/08/2010 07:10 AM     (H) 05/02/2019 06:56 AM     (H) 02/08/2010 07:10 AM    HDL 67 05/02/2019 06:56 AM    HDL 54 02/08/2010 07:10 AM    TRIGLYCERIDE 112 05/02/2019 06:56 AM    TRIGLYCERIDE 121 02/08/2010 07:10 AM       Lab Results   Component Value Date/Time    SODIUM 138 08/08/2021 06:37 PM    POTASSIUM 3.7 08/08/2021 06:37 PM    CHLORIDE 104 08/08/2021 06:37 PM    CO2 22 08/08/2021 06:37 PM    GLUCOSE 108 (H) 08/08/2021 06:37 PM    BUN 9 08/08/2021 06:37 PM    CREATININE 0.67 08/08/2021 06:37 PM    CREATININE 0.87 07/07/2011 03:30 PM    BUNCREATRAT 15 02/19/2021 08:28 AM    BUNCREATRAT 11 07/07/2011 03:30 PM    GLOMRATE >59 08/04/2010 08:10 AM     Lab Results   Component Value Date/Time    ALKPHOSPHAT 69 08/08/2021 06:37 PM    ASTSGOT 16 08/08/2021 06:37 PM    ALTSGPT 23 08/08/2021 06:37 PM    TBILIRUBIN 0.4 08/08/2021 06:37 PM       Lab Results   Component Value Date/Time    HBA1C 5.2 01/15/2018 02:11 PM       Lab Results   Component Value Date/Time    MICRALB <3.0 05/02/2019 06:56 AM       Medical Decision Making:  Today's Assessment / Status / Plan:     1. Hypertension, unspecified type  Comp Metabolic Panel    MICROALBUMIN CREAT RATIO URINE    VITAMIN D,25 HYDROXY    T3 FREE    FREE THYROXINE    TSH   2. White coat syndrome with high blood pressure but without hypertension  amLODIPine (NORVASC) 2.5 MG Tab      Patient Type: Primary Prevention    Etiology of Established CVD if Present: none    Lipid Management: Qualifies for Statin Therapy Based on 2018 ACC/AHA Guidelines: no  Calculated 10-Year Risk of ASCVD: N/A  Currently on Statin: No  Outside age range for consideration of statin therapy in primary prevention per acc/aha guidelines   - continue lifestyle mod    Blood Pressure  Management:  Acc[facility]aha (2017) Blood Pressure Goal <130/80  Long h/o difficult to control bp with white coat htn and some degree of pseudopheo  Has had hyponatremia on diuretics in past - continue to avoid  BP under reasonable control at home; white coat effect noted today  - continue current meds  - conitnue excellent home bp monitoring  - check gfr/lytes and thyroid labs, as previously ordered by Dr. Zimmer    Glycemic Status: Normal  Continue lifestyle mod  -check fasting gluc    Anti-Platelet/Anti-Coagulant Tx: yes  Difficult risk benefit analysis.   Given stroke risk associated with resistant hypertension and many years of good tolerance would continue low dose asa at this point - patient in agreement    Smoking: continue complete avoidance     Physical Activity: continue walking    Weight Management and Nutrition: Dietary plan was discussed with patient at this visit including overall heart healthy eating, low in sodium    Instructed to follow-up with PCP for remainder of adult medical needs: yes  We will partner with other providers in the management of established vascular disease and cardiometabolic risk factors.    Studies to Be Obtained: none  Labs to Be Obtained: cmp, urine for MA, thyroid labs (re-ordered from  orders placed by Dr. Zimmer prior to shelter)    Follow up in: 3mo with Dr. Bloch, as scheduled    JORDYN Velazquez  [facility]

## 2022-01-27 DIAGNOSIS — F41.9 ANXIETY: ICD-10-CM

## 2022-01-27 RX ORDER — LORAZEPAM 0.5 MG/1
0.5 TABLET ORAL EVERY 4 HOURS PRN
Qty: 30 TABLET | Refills: 0 | Status: SHIPPED | OUTPATIENT
Start: 2022-01-27 | End: 2022-04-20

## 2022-02-03 ENCOUNTER — HOSPITAL ENCOUNTER (EMERGENCY)
Facility: MEDICAL CENTER | Age: 87
End: 2022-02-03
Attending: EMERGENCY MEDICINE
Payer: MEDICARE

## 2022-02-03 ENCOUNTER — APPOINTMENT (OUTPATIENT)
Dept: RADIOLOGY | Facility: MEDICAL CENTER | Age: 87
End: 2022-02-03
Attending: EMERGENCY MEDICINE
Payer: MEDICARE

## 2022-02-03 VITALS
BODY MASS INDEX: 23.14 KG/M2 | DIASTOLIC BLOOD PRESSURE: 86 MMHG | RESPIRATION RATE: 15 BRPM | OXYGEN SATURATION: 97 % | HEIGHT: 66 IN | TEMPERATURE: 97.9 F | HEART RATE: 69 BPM | SYSTOLIC BLOOD PRESSURE: 197 MMHG | WEIGHT: 143.96 LBS
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
## 2022-02-03 DIAGNOSIS — S39.012A BACK STRAIN, INITIAL ENCOUNTER: ICD-10-CM

## 2022-02-03 PROCEDURE — A9270 NON-COVERED ITEM OR SERVICE: HCPCS | Performed by: EMERGENCY MEDICINE

## 2022-02-03 PROCEDURE — 70450 CT HEAD/BRAIN W/O DYE: CPT | Mod: ME

## 2022-02-03 PROCEDURE — 700102 HCHG RX REV CODE 250 W/ 637 OVERRIDE(OP): Performed by: EMERGENCY MEDICINE

## 2022-02-03 PROCEDURE — 99284 EMERGENCY DEPT VISIT MOD MDM: CPT

## 2022-02-03 PROCEDURE — 72125 CT NECK SPINE W/O DYE: CPT | Mod: ME

## 2022-02-03 PROCEDURE — 72131 CT LUMBAR SPINE W/O DYE: CPT | Mod: ME

## 2022-02-03 PROCEDURE — 72128 CT CHEST SPINE W/O DYE: CPT | Mod: ME

## 2022-02-03 RX ORDER — ACETAMINOPHEN 325 MG/1
650 TABLET ORAL ONCE
Status: COMPLETED | OUTPATIENT
Start: 2022-02-03 | End: 2022-02-03

## 2022-02-03 RX ORDER — CARVEDILOL 25 MG/1
25 TABLET ORAL 2 TIMES DAILY
Status: DISCONTINUED | OUTPATIENT
Start: 2022-02-03 | End: 2022-02-03 | Stop reason: HOSPADM

## 2022-02-03 RX ORDER — AMLODIPINE BESYLATE 5 MG/1
2.5 TABLET ORAL DAILY
Status: DISCONTINUED | OUTPATIENT
Start: 2022-02-03 | End: 2022-02-03 | Stop reason: HOSPADM

## 2022-02-03 RX ORDER — LORAZEPAM 1 MG/1
0.5 TABLET ORAL ONCE
Status: COMPLETED | OUTPATIENT
Start: 2022-02-03 | End: 2022-02-03

## 2022-02-03 RX ORDER — TERAZOSIN 2 MG/1
2 CAPSULE ORAL ONCE
Status: COMPLETED | OUTPATIENT
Start: 2022-02-03 | End: 2022-02-03

## 2022-02-03 RX ORDER — TELMISARTAN 80 MG/1
80 TABLET ORAL ONCE
Status: COMPLETED | OUTPATIENT
Start: 2022-02-03 | End: 2022-02-03

## 2022-02-03 RX ADMIN — TERAZOSIN 2 MG: 2 CAPSULE ORAL at 06:52

## 2022-02-03 RX ADMIN — LORAZEPAM 0.5 MG: 1 TABLET ORAL at 06:12

## 2022-02-03 RX ADMIN — ACETAMINOPHEN 650 MG: 325 TABLET, FILM COATED ORAL at 06:38

## 2022-02-03 RX ADMIN — CARVEDILOL 25 MG: 25 TABLET, FILM COATED ORAL at 06:12

## 2022-02-03 RX ADMIN — TELMISARTAN 80 MG: 80 TABLET ORAL at 06:49

## 2022-02-03 ASSESSMENT — LIFESTYLE VARIABLES: DO YOU DRINK ALCOHOL: NO

## 2022-02-03 ASSESSMENT — FIBROSIS 4 INDEX: FIB4 SCORE: 0.99

## 2022-02-03 NOTE — ED NOTES
Care assumed.  PIV d/c'd with tip intact.  Purewick removed.  Pt's  bedside.  Pt given discharge instructions and medications by NOC RN.  Pt preparing and getting dressed for discharge.

## 2022-02-03 NOTE — ED TRIAGE NOTES
"Chief Complaint   Patient presents with   • T-5000 GLF     BIB EMS for GLF. Pt staes she fell back in the dark and injured \"my whole back\". Pt states she hit her head but did not losse consciousness. Prior Hx of L3-4 fracture. Pt AOx4 at this time     Pt to CT and then to [facility]. Report given to SUSHANT Stauffer  "

## 2022-02-03 NOTE — ED PROVIDER NOTES
"ED Provider Note    CHIEF COMPLAINT  Chief Complaint   Patient presents with   • T-5000 GLF     BIB EMS for GLF. Pt staes she fell back in the dark and injured \"my whole back\". Pt states she hit her head but did not losse consciousness. Prior Hx of L3-4 fracture. Pt AOx4 at this time       HPI  Prabha Lizama is a 86 y.o. female who presents to the emergency department after a ground-level fall.  She got up to use the restroom.  She was using her walker as it was dark and she could not see.  She ended up losing her balance and falling backwards landing on her back and hitting her head.  Denies loss of consciousness.  She has headache, neck pain, thoracic pain, lumbar pain.  She denies hip injury, shoulder pain, chest pain, loss of consciousness, syncope.  No focal weakness numbness.  No neurologic symptoms.  Denies abdominal pain.    REVIEW OF SYSTEMS  As per HPI, otherwise a 10 point review of systems is negative    PAST MEDICAL HISTORY  Past Medical History:   Diagnosis Date   • CHI (closed head injury)    • Elevated cortisol level     unknown etiology   • Hypertension     medicated   • Insomnia    • Multiple thyroid nodules 2008   • Thyrotoxicosis     history in 2013 / euthyroid 2015   • Urinary tract infection        SOCIAL HISTORY  Social History     Tobacco Use   • Smoking status: Never Smoker   • Smokeless tobacco: Never Used   Vaping Use   • Vaping Use: Never used   Substance Use Topics   • Alcohol use: No   • Drug use: No       SURGICAL HISTORY  Past Surgical History:   Procedure Laterality Date   • APPENDECTOMY     • TONSILLECTOMY         CURRENT MEDICATIONS  Home Medications    **Home medications have not yet been reviewed for this encounter**         ALLERGIES  Allergies   Allergen Reactions   • Amoxicillin      Unsure of reaction/or allergy   • Hydralazine Unspecified     Pt reported severe hypotensive reaction   • Morphine Vomiting and Nausea   • Penicillins      1970 reaction       PHYSICAL " "EXAM  VITAL SIGNS: BP (!) 197/86   Pulse 69   Temp 36.6 °C (97.9 °F) (Temporal)   Resp 15   Ht 1.676 m (5' 5.98\")   Wt 65.3 kg (143 lb 15.4 oz)   SpO2 97%   BMI 23.25 kg/m²    Constitutional: Awake and alert  HENT: No evidence of trauma or cephalhematoma  Eyes: Normal inspection  Neck: Diffusely tender including the midline  Cardiovascular: Normal heart rate, Normal rhythm.  Symmetric peripheral pulses.   Thorax & Lungs: No respiratory distress, No wheezing, No rales, No rhonchi, No chest tenderness.   Abdomen: Bowel sounds normal, soft, non-distended, nontender, no mass  Skin: No obvious rash.  Back: Diffusely tender thoracic and lumbar spine including the midline  Extremities: No clubbing, cyanosis, edema, no Homans or cords.  Full range of motion of all extremities particular attention to pelvis and hips.  Neurologic: Awake alert oriented.  Moves all extremities symmetrically.  Good strength.  Reports normal sensory.  Psychiatric: Anxious    RADIOLOGY/PROCEDURES  CT-TSPINE W/O PLUS RECONS   Final Result         1.  No acute traumatic bony injury of the thoracic spine.   2.  Heterogeneous right thyroid nodule, follow-up thyroid sonography for further characterization due to nodule size as clinically appropriate.   3.  Atherosclerosis   4.  Pulmonary nodule, see nodule follow-up recommendations below.      Fleischner Society pulmonary nodule recommendations:      Low Risk: No routine follow-up      High Risk: Optional CT at 12 months      Comments: Nodules less than 6 mm do not require routine follow-up, but certain patients at high risk with suspicious nodule morphology, upper lobe location, or both may warrant 12-month follow-up.      Low Risk - Minimal or absent history of smoking and of other known risk factors.      High Risk - History of smoking or of other known risk factors.      Note: These recommendations do not apply to lung cancer screening, patients with immunosuppression, or patients with known " primary cancer.      Fleischner Society 2017 Guidelines for Management of Incidentally Detected Pulmonary Nodules in Adults      CT-LSPINE W/O PLUS RECONS   Final Result         1.  No acute traumatic bony injury of the lumbar spine.   2.  Atherosclerosis      CT-HEAD W/O   Final Result         1.  No acute intracranial abnormality is identified, there are nonspecific white matter changes, commonly associated with small vessel ischemic disease.  Associated mild cerebral atrophy is noted.   2.  Atherosclerosis.         CT-CSPINE WITHOUT PLUS RECONS   Final Result         1.  Multilevel degenerative changes of the cervical spine limit diagnostic sensitivity of this examination, otherwise no acute traumatic bony injury of the cervical spine is apparent.   2.  Large heterogeneous right thyroid nodule, follow-up thyroid sonography for further characterization due to nodule size as clinically appropriate.   3.  Atherosclerosis           Imaging is interpreted by radiologist      Medications   amLODIPine (NORVASC) tablet 2.5 mg (0 mg Oral Held 2/3/22 0600)   carvedilol (COREG) tablet 25 mg (25 mg Oral Given 2/3/22 0612)   telmisartan (MICARDIS) tablet 80 mg (has no administration in time range)   terazosin (HYTRIN) capsule 2 mg (has no administration in time range)   acetaminophen (Tylenol) tablet 650 mg (has no administration in time range)   LORazepam (ATIVAN) tablet 0.5 mg (0.5 mg Oral Given 2/3/22 0612)         COURSE & MEDICAL DECISION MAKING  Patient presents to the ER after a ground-level fall.  She did not have syncope.  She has no neurologic symptoms.  Complained of head pain, neck pain and entire back pain.  She was hypertensive on arrival and had not taken her morning blood pressure medications.  These were ordered.  Diagnostic imaging was ordered.    Imaging returned negative for acute trauma.  She has a thyroid nodule and was informed of this.  She is followed by endocrinology.  She has an appointment with her  primary provider in 2 weeks.  She was given Tylenol for pain.  She was quite anxious and given lorazepam.    At this point she is appropriate for discharge and outpatient management.  Advised Tylenol at home as needed.  Rest ice take her home medications.  She was precautioned to return to ER for uncontrolled symptoms, worsening, not improving or concern.    FINAL IMPRESSION  1.  Ground-level fall  2.  Minor head injury  3.  Cervical strain  4.  Thoracic strain  5.  Lumbar strain      This dictation was created using voice recognition software. The accuracy of the dictation is limited to the abilities of the software.  The nursing notes were reviewed and certain aspects of this information were incorporated into this note.      Electronically signed by: Alex Hughes M.D., 2/3/2022 6:21 AM

## 2022-02-11 ENCOUNTER — APPOINTMENT (OUTPATIENT)
Dept: ENDOCRINOLOGY | Facility: MEDICAL CENTER | Age: 87
End: 2022-02-11
Payer: MEDICARE

## 2022-02-20 ENCOUNTER — HOSPITAL ENCOUNTER (EMERGENCY)
Facility: MEDICAL CENTER | Age: 87
End: 2022-02-20
Attending: EMERGENCY MEDICINE
Payer: MEDICARE

## 2022-02-20 VITALS
SYSTOLIC BLOOD PRESSURE: 157 MMHG | DIASTOLIC BLOOD PRESSURE: 74 MMHG | WEIGHT: 143 LBS | TEMPERATURE: 98 F | RESPIRATION RATE: 16 BRPM | BODY MASS INDEX: 23.82 KG/M2 | HEART RATE: 73 BPM | OXYGEN SATURATION: 95 % | HEIGHT: 65 IN

## 2022-02-20 DIAGNOSIS — M54.9 PAIN, UPPER BACK: ICD-10-CM

## 2022-02-20 DIAGNOSIS — M54.50 LUMBAR BACK PAIN: ICD-10-CM

## 2022-02-20 PROCEDURE — 700101 HCHG RX REV CODE 250: Performed by: EMERGENCY MEDICINE

## 2022-02-20 PROCEDURE — 700102 HCHG RX REV CODE 250 W/ 637 OVERRIDE(OP): Performed by: EMERGENCY MEDICINE

## 2022-02-20 PROCEDURE — 96374 THER/PROPH/DIAG INJ IV PUSH: CPT

## 2022-02-20 PROCEDURE — 700111 HCHG RX REV CODE 636 W/ 250 OVERRIDE (IP): Performed by: EMERGENCY MEDICINE

## 2022-02-20 PROCEDURE — 96375 TX/PRO/DX INJ NEW DRUG ADDON: CPT

## 2022-02-20 PROCEDURE — 700105 HCHG RX REV CODE 258: Performed by: EMERGENCY MEDICINE

## 2022-02-20 PROCEDURE — A9270 NON-COVERED ITEM OR SERVICE: HCPCS | Performed by: EMERGENCY MEDICINE

## 2022-02-20 PROCEDURE — 99285 EMERGENCY DEPT VISIT HI MDM: CPT

## 2022-02-20 RX ORDER — KETOROLAC TROMETHAMINE 30 MG/ML
15 INJECTION, SOLUTION INTRAMUSCULAR; INTRAVENOUS ONCE
Status: COMPLETED | OUTPATIENT
Start: 2022-02-20 | End: 2022-02-20

## 2022-02-20 RX ORDER — MIDAZOLAM HYDROCHLORIDE 1 MG/ML
0.5 INJECTION INTRAMUSCULAR; INTRAVENOUS ONCE
Status: COMPLETED | OUTPATIENT
Start: 2022-02-20 | End: 2022-02-20

## 2022-02-20 RX ORDER — LIDOCAINE 50 MG/G
1 PATCH TOPICAL EVERY 24 HOURS
Status: DISCONTINUED | OUTPATIENT
Start: 2022-02-20 | End: 2022-02-20 | Stop reason: HOSPADM

## 2022-02-20 RX ORDER — LIDOCAINE 50 MG/G
1 PATCH TOPICAL EVERY 24 HOURS
Qty: 15 PATCH | Refills: 0 | Status: SHIPPED | OUTPATIENT
Start: 2022-02-20 | End: 2022-04-20

## 2022-02-20 RX ORDER — TRAMADOL HYDROCHLORIDE 50 MG/1
50 TABLET ORAL ONCE
Status: COMPLETED | OUTPATIENT
Start: 2022-02-20 | End: 2022-02-20

## 2022-02-20 RX ORDER — TRAMADOL HYDROCHLORIDE 50 MG/1
50 TABLET ORAL EVERY 4 HOURS PRN
Qty: 30 TABLET | Refills: 0 | Status: SHIPPED | OUTPATIENT
Start: 2022-02-20 | End: 2022-03-02

## 2022-02-20 RX ADMIN — MIDAZOLAM HYDROCHLORIDE 0.5 MG: 1 INJECTION, SOLUTION INTRAMUSCULAR; INTRAVENOUS at 07:31

## 2022-02-20 RX ADMIN — LIDOCAINE 1 PATCH: 50 PATCH CUTANEOUS at 07:10

## 2022-02-20 RX ADMIN — TRAMADOL HYDROCHLORIDE 50 MG: 50 TABLET, COATED ORAL at 09:06

## 2022-02-20 RX ADMIN — KETAMINE HYDROCHLORIDE 25 MG: 10 INJECTION, SOLUTION INTRAMUSCULAR; INTRAVENOUS at 07:32

## 2022-02-20 RX ADMIN — KETOROLAC TROMETHAMINE 15 MG: 30 INJECTION, SOLUTION INTRAMUSCULAR at 07:31

## 2022-02-20 ASSESSMENT — FIBROSIS 4 INDEX: FIB4 SCORE: 0.99

## 2022-02-20 NOTE — ED NOTES
IV removed, discharge instructions reviewed. Pt communicated understanding all questions answered at this time

## 2022-02-20 NOTE — ED NOTES
Updated patient and  on plan of care.  concerned about increasing pain and requesting to speak to ERP about potentially doing repeat imaging and the strength and mixing of the medications. ERP messaged. Patient agreeable to lidocaine patch.

## 2022-02-20 NOTE — ED TRIAGE NOTES
Chief Complaint   Patient presents with   • Back Pain     Pt BIB EMS for mid back pain. Pt reports back pain x 2 weeks following a GLF at home. Pt seen previously in ER for GLF with negative imaging. Hx back problems. Pt states today pain is worsening, report new numbness to bilateral hands, otherwise CMS intact. EMS gave 600mg ibuprofen

## 2022-02-20 NOTE — DISCHARGE INSTRUCTIONS
Please follow-up with a new primary care doctor in 1 to 2 weeks and take medications as directed for your chronic spine pain after your fall recently.    If you become constipated please use Dulcolax as a laxative and please drink plenty of fluids at home using medications as directed

## 2022-02-20 NOTE — ED PROVIDER NOTES
ED Provider Note    Scribed for Chinmay Vaughn M.D. by Eron Montelongo. 2/20/2022  6:32 AM    Primary care provider: No current primary care provider .  Means of arrival: EMS  History obtained from: Patient  History limited by: None    CHIEF COMPLAINT  Chief Complaint   Patient presents with   • Back Pain     Pt BIB EMS for mid back pain. Pt reports back pain x 2 weeks following a GLF at home. Pt seen previously in ER for GLF with negative imaging. Hx back problems. Pt states today pain is worsening, report new numbness to bilateral hands, otherwise CMS intact. EMS gave 600mg ibuprofen       HPI  Prabha Lizama is a 86 y.o. female who presents to the Emergency Department for lower back pain. Describes the pain as both dull as well as sharp and stabbing. Patient had a GLF 2 weeks ago and came into the [facility]. She received xrays which came back negative. She states the pain has worsened since her visit. Has associated neck pain and decreased appetite. Denies abnormal bowel movements. She currently uses a walker at home during the night only, not during the day. Patient has also been taking extra-strength Tylenol every 3 hours which have not alleviated symptoms.     REVIEW OF SYSTEMS  Pertinent negatives include no abnormal bowel movements. As above, all other systems reviewed and are negative.   See HPI for further details.     PAST MEDICAL HISTORY   has a past medical history of CHI (closed head injury), Elevated cortisol level, Hypertension, Insomnia, Multiple thyroid nodules (2008), Thyrotoxicosis, and Urinary tract infection.    SURGICAL HISTORY   has a past surgical history that includes tonsillectomy and appendectomy.    SOCIAL HISTORY  Social History     Tobacco Use   • Smoking status: Never Smoker   • Smokeless tobacco: Never Used   Vaping Use   • Vaping Use: Never used   Substance Use Topics   • Alcohol use: No   • Drug use: No      Social History     Substance and Sexual Activity   Drug Use No  "      FAMILY HISTORY  Family History   Problem Relation Age of Onset   • Heart Disease Mother    • Heart Disease Father    • Hypertension Sister    • Arthritis Sister    • Thyroid Sister        CURRENT MEDICATIONS  Home Medications     Reviewed by Nidia Ngo R.N. (Registered Nurse) on 02/20/22 at 0619  Med List Status: <None>   Medication Last Dose Status   amLODIPine (NORVASC) 2.5 MG Tab  Active   aspirin EC (ECOTRIN) 81 MG TBEC  Active   carvedilol (COREG) 25 MG Tab  Active   LORazepam (ATIVAN) 0.5 MG Tab  Active   LORazepam (ATIVAN) 0.5 MG Tab  Active   telmisartan (MICARDIS) 80 MG Tab  Active   terazosin (HYTRIN) 2 MG Cap  Active                ALLERGIES  Allergies   Allergen Reactions   • Amoxicillin      Unsure of reaction/or allergy   • Hydralazine Unspecified     Pt reported severe hypotensive reaction   • Morphine Vomiting and Nausea   • Penicillins      1970 reaction       PHYSICAL EXAM  VITAL SIGNS: BP (!) 162/77   Pulse 79   Temp 36.7 °C (98 °F) (Temporal)   Resp 15   Ht 1.651 m (5' 5\")   Wt 64.9 kg (143 lb)   SpO2 96%   BMI 23.80 kg/m²   Constitutional: Well developed, Well nourished, No acute distress, Non-toxic appearance.   HENT: Normocephalic, Atraumatic, Bilateral external ears normal, Oropharynx is clear mucous membranes are moist. No oral exudates or nasal discharge.   Eyes: Pupils are equal round and reactive, EOMI, Conjunctiva normal, No discharge.   Neck: Normal range of motion, Cervical spine tenderness, Kyphosis of cervical spine. Supple, No stridor. No meningismus.  Lymphatic: No lymphadenopathy noted.   Cardiovascular: Regular rate and rhythm without murmur rub or gallop.  Thorax & Lungs: Clear breath sounds bilaterally without wheezes, rhonchi or rales. There is no chest wall tenderness.   Abdomen: Soft non-tender non-distended. There is no rebound or guarding. No organomegaly is appreciated. Bowel sounds are normal.  Skin: Normal without rash.   Back: Lumbar spine " tenderness.  Extremities: Intact distal pulses, No edema, No tenderness, No cyanosis, No clubbing. Capillary refill is less than 2 seconds.  Musculoskeletal: Good range of motion in all major joints. No tenderness to palpation or major deformities noted.   Neurologic: Alert & oriented x 3, Normal motor function, Normal sensory function, No focal deficits noted. Reflexes are normal.  Psychiatric: Affect normal, Judgment normal, Mood normal. There is no suicidal ideation or patient reported hallucinations.       COURSE & MEDICAL DECISION MAKING  Nursing notes, VS, PMSFHx reviewed in chart.    Review of medical records show patient received CT cervical, thoracic, and lumbar which came back negative.     6:32 AM Patient seen and examined at bedside. Patient was treated with ketamine 25 mg in NS 50 mL, midazolam 0.5 mg, and Toradol 15 mg for her symptoms.      7:19 AM Patient was reevaluated at bedside. Her  was in the room and had questions about the patient's pain and plan of care to which I answered all their questions. He had no further concerns at this time.    8:58 AM Patient will be treated with tramadol 50 mg for her symptoms.  Spoke with the patient and her  about previous imaging that would not show benefit for repeating as this is the gold standard    9:20 AM Patient was reevaluated at bedside. Their pain is better after the medications. Will refer the patient to a new PCP. Return precautions and plan of care were discussed to which she understands and verbalizes agreement. She was given the opportunity to ask questions. Patient is ready for discharge at this time.    Patient has had high blood pressure while in the emergency department, felt likely secondary to medical condition. Counseled patient to monitor blood pressure at home and follow up with primary care physician.      The patient will return for new or worsening symptoms and is stable at the time of discharge.  She understands plan of  care and appreciates that she feels better upon discharge    DISPOSITION:  Patient will be discharged home in stable condition.    FINAL IMPRESSION  1. Pain, upper back    2. Lumbar back pain          IEron (Scribe), am scribing for, and in the presence of, Chinmay Vaughn M.D..    Electronically signed by: Eron Montelongo (Scribe), 2/20/2022    Chinmay MIKE M.D. personally performed the services described in this documentation, as scribed by Eron Montelongo in my presence, and it is both accurate and complete.    The note accurately reflects work and decisions made by me.  Chinmay Vaughn M.D.  2/20/2022  12:47 PM

## 2022-02-23 ENCOUNTER — TELEPHONE (OUTPATIENT)
Dept: SCHEDULING | Facility: IMAGING CENTER | Age: 87
End: 2022-02-23

## 2022-03-03 LAB
25(OH)D3+25(OH)D2 SERPL-MCNC: 50 NG/ML (ref 30–100)
ALBUMIN SERPL-MCNC: 3.8 G/DL (ref 3.6–4.6)
ALBUMIN/CREAT UR: <12 MG/G CREAT (ref 0–29)
ALBUMIN/GLOB SERPL: 1.4 {RATIO} (ref 1.2–2.2)
ALP SERPL-CCNC: 145 IU/L (ref 44–121)
ALT SERPL-CCNC: 22 IU/L (ref 0–32)
AST SERPL-CCNC: 16 IU/L (ref 0–40)
BILIRUB SERPL-MCNC: 0.4 MG/DL (ref 0–1.2)
BUN SERPL-MCNC: 11 MG/DL (ref 8–27)
BUN/CREAT SERPL: 14 (ref 12–28)
CALCIUM SERPL-MCNC: 9.4 MG/DL (ref 8.7–10.3)
CHLORIDE SERPL-SCNC: 104 MMOL/L (ref 96–106)
CO2 SERPL-SCNC: 23 MMOL/L (ref 20–29)
CREAT SERPL-MCNC: 0.79 MG/DL (ref 0.57–1)
CREAT UR-MCNC: 25.3 MG/DL
EGFRCR SERPLBLD CKD-EPI 2021: 74 ML/MIN/1.73
GLOBULIN SER CALC-MCNC: 2.7 G/DL (ref 1.5–4.5)
GLUCOSE SERPL-MCNC: 108 MG/DL (ref 65–99)
MICROALBUMIN UR-MCNC: <3 UG/ML
POTASSIUM SERPL-SCNC: 4.3 MMOL/L (ref 3.5–5.2)
PROT SERPL-MCNC: 6.5 G/DL (ref 6–8.5)
SODIUM SERPL-SCNC: 141 MMOL/L (ref 134–144)
T3FREE SERPL-MCNC: 3.6 PG/ML (ref 2–4.4)
T4 FREE SERPL-MCNC: 2.05 NG/DL (ref 0.82–1.77)
TSH SERPL DL<=0.005 MIU/L-ACNC: 0.93 UIU/ML (ref 0.45–4.5)

## 2022-03-08 ENCOUNTER — OFFICE VISIT (OUTPATIENT)
Dept: MEDICAL GROUP | Facility: LAB | Age: 87
End: 2022-03-08
Payer: MEDICARE

## 2022-03-08 ENCOUNTER — APPOINTMENT (OUTPATIENT)
Dept: MEDICAL GROUP | Facility: LAB | Age: 87
End: 2022-03-08
Payer: MEDICARE

## 2022-03-08 VITALS
OXYGEN SATURATION: 97 % | HEART RATE: 79 BPM | DIASTOLIC BLOOD PRESSURE: 92 MMHG | TEMPERATURE: 97.6 F | HEIGHT: 66 IN | WEIGHT: 143.96 LBS | SYSTOLIC BLOOD PRESSURE: 140 MMHG | BODY MASS INDEX: 23.14 KG/M2

## 2022-03-08 DIAGNOSIS — M54.6 ACUTE BILATERAL THORACIC BACK PAIN: ICD-10-CM

## 2022-03-08 PROBLEM — M48.50XA COMPRESSION FRACTURE OF VERTEBRA (HCC): Status: ACTIVE | Noted: 2018-07-10

## 2022-03-08 PROBLEM — E87.1 HYPONATREMIA: Status: RESOLVED | Noted: 2020-08-31 | Resolved: 2022-03-08

## 2022-03-08 PROBLEM — G47.00 INSOMNIA, UNSPECIFIED: Status: ACTIVE | Noted: 2019-11-05

## 2022-03-08 PROCEDURE — 99214 OFFICE O/P EST MOD 30 MIN: CPT | Performed by: PHYSICIAN ASSISTANT

## 2022-03-08 RX ORDER — TRAMADOL HYDROCHLORIDE 50 MG/1
50 TABLET ORAL EVERY 8 HOURS PRN
Qty: 21 TABLET | Refills: 0 | Status: SHIPPED | OUTPATIENT
Start: 2022-03-08 | End: 2022-03-15

## 2022-03-08 RX ORDER — BACLOFEN 10 MG/1
5 TABLET ORAL
Qty: 5 TABLET | Refills: 0 | Status: SHIPPED | OUTPATIENT
Start: 2022-03-08 | End: 2022-03-16

## 2022-03-08 ASSESSMENT — FIBROSIS 4 INDEX: FIB4 SCORE: 1.01

## 2022-03-08 ASSESSMENT — PATIENT HEALTH QUESTIONNAIRE - PHQ9: CLINICAL INTERPRETATION OF PHQ2 SCORE: 0

## 2022-03-08 NOTE — PROGRESS NOTES
Subjective:     CC:  The encounter diagnosis was Acute bilateral thoracic back pain.    HISTORY OF THE PRESENT ILLNESS: Patient is a 86 y.o. female. This pleasant patient is here today to establish care and discuss back pain.     Low back pain  Pt had a ground level fall 2/3/2022 and was evaluated at ER. Ground level fall getting up to go the bathroom at night.     CT-cervical, thoracic, lumbar spine were negative, thyroid calcification noted advise f/u CT in 12 months, patient has a history of multiple thyroid nodules and does follow closely with endocrinology for this. Pt was discharged without medication     02/20/2022 worsening low back pain. Reevaluated at ER  Was discharged with tramadol and lidocaine patches. Out of medication at this time and states that her pain feels poorly controlled.    Currently back pain located in the lower thoracic spine region radiating to the left thoracic paraspinal region.  It is worse with movement particularly moving from a standing to sitting position.  Denies any numbness or tingling.  Denies any subsequent falls or injuries.  Denies any episodes of incontinence. Denies saddle paresthesia.  She does note some pain improvement with lidocaine patches and tramadol that was prescribed in the ER.      Of note, patient does have a history of compression fracture of the L3 vertebrae in approximately 2018.  DEXA scan 2/14/2020 did show osteoporosis.      Patient follows with Dr Bloch for BP  Patient is already established with endocrinology      Current Outpatient Medications Ordered in Epic   Medication Sig Dispense Refill   • baclofen (LIORESAL) 10 MG Tab Take 0.5 Tablets by mouth at bedtime for 10 days. 5 Tablet 0   • traMADol (ULTRAM) 50 MG Tab Take 1 Tablet by mouth every 8 hours as needed for Moderate Pain for up to 7 days. 21 Tablet 0   • lidocaine (LIDODERM) 5 % Patch Place 1 Patch on the skin every 24 hours. 15 Patch 0   • LORazepam (ATIVAN) 0.5 MG Tab Take 1 Tablet by mouth  "every four hours as needed for Anxiety for up to 90 days. 30 Tablet 0   • amLODIPine (NORVASC) 2.5 MG Tab Take 1 Tablet by mouth every day. 90 Tablet 3   • telmisartan (MICARDIS) 80 MG Tab Take 1 Tablet by mouth every morning. 90 Tablet 1   • carvedilol (COREG) 25 MG Tab TAKE 1 TABLET BY MOUTH TWICE DAILY 180 Tablet 0   • terazosin (HYTRIN) 2 MG Cap TAKE 2 CAPSULE BY MOUTH TWICE DAILY, TOTAL OF 8MG(4 CAPSULES IN 24 HOURS) 360 Capsule 0     No current Epic-ordered facility-administered medications on file.       ROS:   Gen: no fevers/chills  Pulm: no sob, no cough  CV: no chest pain, no palpitations  GI: no nausea/vomiting, no diarrhea  MSk: + myalgias  Neuro: no headaches, no numbness/tingling  Heme/Lymph: no easy bruising      Objective:       Exam: /92 (BP Location: Left arm, Patient Position: Sitting, BP Cuff Size: Adult)   Pulse 79   Temp 36.4 °C (97.6 °F) (Temporal)   Ht 1.676 m (5' 6\")   Wt 65.3 kg (143 lb 15.4 oz)   SpO2 97%  Body mass index is 23.24 kg/m².    General: Normal appearing. No distress.  HEENT: Normocephalic. Eyes conjunctiva clear lids without ptosis, pupils equal and reactive to light accommodation, ears normal shape and contour, canals are clear bilaterally, tympanic membranes are benign, nasal mucosa benign, oropharynx is without erythema, edema or exudates.   Neck: Supple without JVD or bruit. Thyroid is not enlarged.  Pulmonary: Clear to ausculation.  Normal effort. No rales, ronchi, or wheezing.  Cardiovascular: Regular rate and rhythm without murmur. Carotid and radial pulses are intact and equal bilaterally.  Abdomen: Soft, nontender, nondistended. Normal bowel sounds. Liver and spleen are not palpable  Neurologic: Grossly nonfocal  Lymph: No cervical or supraclavicular lymph nodes are palpable  Skin: Warm and dry.  No obvious lesions.  Musculoskeletal: Normal gait. No extremity cyanosis, clubbing, or edema.  Psych: Normal mood and affect. Alert and oriented x3. Judgment and " insight is normal.  Back: Full ROM, 5/5 LE strength, sensation intact bilaterally in LE, tender to palpation over thoracic spinous processes, paraspinals left thoracic region tender to palpation, no crepitus.  No discoloration.  No step-offs.  No masses.  Dowagers hump noted      Assessment & Plan:   86 y.o. female with the following -    1. Acute bilateral thoracic back pain  Acute condition, stable  No fractures noted on previous imaging, poorly controlled pain status post ground-level fall  Continue lidocaine patches  Continue Tylenol 500 mg 3 times daily as needed  Short-term prescription for tramadol 50 mg 1 tablet every 8 hours as needed for 7 days.  Patient understands that short-term prescription and further refills will not be provided.  PDMP reviewed.  Controlled substances form signed  Baclofen 5 mg 1 tablet nightly as needed  Physical therapy referral ordered  Short interval follow-up if pain continues to be poorly controlled      I spent a total of 22 minutes with record review, exam, communication with the patient, communication with other providers, and documentation of this encounter.    Return in about 6 months (around 9/8/2022).    Please note that this dictation was created using voice recognition software. I have made every reasonable attempt to correct obvious errors, but I expect that there are errors of grammar and possibly content that I did not discover before finalizing the note.

## 2022-03-14 ENCOUNTER — OFFICE VISIT (OUTPATIENT)
Dept: ENDOCRINOLOGY | Facility: MEDICAL CENTER | Age: 87
End: 2022-03-14
Payer: MEDICARE

## 2022-03-14 VITALS
WEIGHT: 142.9 LBS | HEART RATE: 78 BPM | BODY MASS INDEX: 22.97 KG/M2 | OXYGEN SATURATION: 97 % | HEIGHT: 66 IN | DIASTOLIC BLOOD PRESSURE: 82 MMHG | SYSTOLIC BLOOD PRESSURE: 140 MMHG

## 2022-03-14 DIAGNOSIS — E04.2 MULTIPLE THYROID NODULES: ICD-10-CM

## 2022-03-14 DIAGNOSIS — M81.0 POSTMENOPAUSAL OSTEOPOROSIS: ICD-10-CM

## 2022-03-14 PROCEDURE — 99213 OFFICE O/P EST LOW 20 MIN: CPT

## 2022-03-14 PROCEDURE — 99211 OFF/OP EST MAY X REQ PHY/QHP: CPT

## 2022-03-14 ASSESSMENT — FIBROSIS 4 INDEX: FIB4 SCORE: 1.01

## 2022-03-14 NOTE — PATIENT INSTRUCTIONS
Alendronate tablets (fosamax)   What is this medicine?  ALENDRONATE (a GWEN droe mary) slows calcium loss from bones. It helps to make normal healthy bone and to slow bone loss in people with Paget's disease and osteoporosis. It may be used in others at risk for bone loss.  This medicine may be used for other purposes; ask your health care provider or pharmacist if you have questions.  COMMON BRAND NAME(S): Fosamax  What should I tell my health care provider before I take this medicine?  They need to know if you have any of these conditions:  · dental disease  · esophagus, stomach, or intestine problems, like acid reflux or GERD  · kidney disease  · low blood calcium  · low vitamin D  · problems sitting or standing 30 minutes  · trouble swallowing  · an unusual or allergic reaction to alendronate, other medicines, foods, dyes, or preservatives  · pregnant or trying to get pregnant  · breast-feeding  How should I use this medicine?  You must take this medicine exactly as directed or you will lower the amount of the medicine you absorb into your body or you may cause yourself harm. Take this medicine by mouth first thing in the morning, after you are up for the day. Do not eat or drink anything before you take your medicine. Swallow the tablet with a full glass (6 to 8 fluid ounces) of plain water. Do not take this medicine with any other drink. Do not chew or crush the tablet. After taking this medicine, do not eat breakfast, drink, or take any medicines or vitamins for at least 30 minutes. Sit or stand up for at least 30 minutes after you take this medicine; do not lie down. Do not take your medicine more often than directed.  Talk to your pediatrician regarding the use of this medicine in children. Special care may be needed.  Overdosage: If you think you have taken too much of this medicine contact a poison control center or emergency room at once.  NOTE: This medicine is only for you. Do not share this medicine  with others.  What if I miss a dose?  If you miss a dose, do not take it later in the day. Continue your normal schedule starting the next morning. Do not take double or extra doses.  What may interact with this medicine?  · aluminum hydroxide  · antacids  · aspirin  · calcium supplements  · drugs for inflammation like ibuprofen, naproxen, and others  · iron supplements  · magnesium supplements  · vitamins with minerals  This list may not describe all possible interactions. Give your health care provider a list of all the medicines, herbs, non-prescription drugs, or dietary supplements you use. Also tell them if you smoke, drink alcohol, or use illegal drugs. Some items may interact with your medicine.  What should I watch for while using this medicine?  Visit your doctor or health care professional for regular checks ups. It may be some time before you see benefit from this medicine. Do not stop taking your medicine except on your doctor's advice. Your doctor or health care professional may order blood tests and other tests to see how you are doing.  You should make sure you get enough calcium and vitamin D while you are taking this medicine, unless your doctor tells you not to. Discuss the foods you eat and the vitamins you take with your health care professional.  Some people who take this medicine have severe bone, joint, and/or muscle pain. This medicine may also increase your risk for a broken thigh bone. Tell your doctor right away if you have pain in your upper leg or groin. Tell your doctor if you have any pain that does not go away or that gets worse.  This medicine can make you more sensitive to the sun. If you get a rash while taking this medicine, sunlight may cause the rash to get worse. Keep out of the sun. If you cannot avoid being in the sun, wear protective clothing and use sunscreen. Do not use sun lamps or tanning beds/booths.  What side effects may I notice from receiving this medicine?  Side  effects that you should report to your doctor or health care professional as soon as possible:  · allergic reactions like skin rash, itching or hives, swelling of the face, lips, or tongue  · black or tarry stools  · bone, muscle or joint pain  · changes in vision  · chest pain  · heartburn or stomach pain  · jaw pain, especially after dental work  · pain or trouble when swallowing  · redness, blistering, peeling or loosening of the skin, including inside the mouth  Side effects that usually do not require medical attention (report to your doctor or health care professional if they continue or are bothersome):  · changes in taste  · diarrhea or constipation  · eye pain or itching  · headache  · nausea or vomiting  · stomach gas or fullness  This list may not describe all possible side effects. Call your doctor for medical advice about side effects. You may report side effects to FDA at 3-992-FDA-3111.  Where should I keep my medicine?  Keep out of the reach of children.  Store at room temperature of 15 and 30 degrees C (59 and 86 degrees F). Throw away any unused medicine after the expiration date.  NOTE: This sheet is a summary. It may not cover all possible information. If you have questions about this medicine, talk to your doctor, pharmacist, or health care provider.  © 2020 [employer] Standard (2012-06-15 08:56:09)      Abaloparatide injection (Tymlos)    What is this medicine?  ABALOPARATIDE (a ball oh PAR a tide) increases bone mass and strength. It helps make healthy bone and to slow bone loss. This medicine is used to prevent bone fracture.  This medicine may be used for other purposes; ask your health care provider or pharmacist if you have questions.  COMMON BRAND NAME(S): TYMLOS  What should I tell my health care provider before I take this medicine?  They need to know if you have any of these conditions:  · bone disease other than osteoporosis  · high levels of calcium in the blood  · high levels of  an enzyme called alkaline phosphatase in the blood  · history of cancer in the bone  · kidney stone  · Paget's disease  · parathyroid disease  · receiving radiation therapy  · an unusual or allergic reaction to abaloparatide, other medicines, foods, dyes, or preservatives  · pregnant or trying to get pregnant  · breast-feeding  How should I use this medicine?  This medicine is for injection under the skin. Use this medicine at the same time each day. You will be taught how to prepare and give this medicine. Use exactly as directed. Do not take your medicine more often than directed.  It is important that you put your used needles and pens in a special sharps container. Do not put them in a trash can. If you do not have a sharps container, call your pharmacist or health care provider to get one.  A special MedGuide will be given to you by the pharmacist with each prescription and refill. Be sure to read this information carefully each time.  Talk to your pediatrician regarding the use of this medicine in children. Special care may be needed.  Overdosage: If you think you have taken too much of this medicine contact a poison control center or emergency room at once.  NOTE: This medicine is only for you. Do not share this medicine with others.  What if I miss a dose?  If you miss a dose, take it as soon as you can. If it is almost time for your next dose, take only that dose. Do not take double or extra doses.  What may interact with this medicine?  Interactions have not been studied.  This list may not describe all possible interactions. Give your health care provider a list of all the medicines, herbs, non-prescription drugs, or dietary supplements you use. Also tell them if you smoke, drink alcohol, or use illegal drugs. Some items may interact with your medicine.  What should I watch for while using this medicine?  Visit your doctor or health care professional for regular checks on your progress. You may need blood  work done while you are taking this medicine.  You may get dizzy. Do not drive, use machinery, or do anything that needs mental alertness until you know how this medicine affects you. Do not stand or sit up quickly, especially if you are an older patient. This reduces the risk of dizzy or fainting spells. Avoid alcoholic drinks; they can make you more dizzy.  You should make sure that you get enough calcium and vitamin D while you are taking this medicine. Discuss the foods you eat and the vitamins you take with your health care professional.  Talk to your doctor about your risk of cancer. You may be more at risk for certain types of cancers if you take this medicine.  Pens should never be shared. Even if the needle is changed, sharing may result in passing of viruses like hepatitis or HIV.  What side effects may I notice from receiving this medicine?  Side effects that you should report to your doctor or health care professional as soon as possible:  · allergic reactions like skin rash, itching or hives, swelling of the face, lips, or tongue  · blood in the urine; pain in the lower back or side; pain when urinating  · signs and symptoms of low blood pressure like dizziness; feeling faint or lightheaded, falls; unusually weak or tired  · signs and symptoms of increased calcium like nausea; vomiting; constipation; low energy; or muscle weakness  Side effects that usually do not require medical attention (report these to your doctor or health care professional if they continue or are bothersome):  · headache  · fast heartbeat  · nausea  · pain, redness, irritation or swelling at the injection site  · stomach upset or pain  · tiredness  This list may not describe all possible side effects. Call your doctor for medical advice about side effects. You may report side effects to FDA at 6-413-FDA-5464.  Where should I keep my medicine?  Keep out of the reach of children.  Store unopened pens in the refrigerator between 2  and 8 degrees C (36 and 46 degrees F). Do not freeze. Avoid exposure to heat. Throw away any unopened medicine after the expiration date on the label.  After the first use, store your pen for up to 30 days at room temperature between 20 and 25 degrees C (68 and 77 degrees F). Do not store with a needle attached to the pen. Use the pen for only 30 days. Throw away your pen 30 days after first opening it even if the pen still contains medicine.  NOTE: This sheet is a summary. It may not cover all possible information. If you have questions about this medicine, talk to your doctor, pharmacist, or health care provider.  © 2020 [employer] Standard (2018-08-27 09:45:53)  Denosumab injection (Prolia)  Osteoporosis    Osteoporosis is thinning and loss of density in your bones. Osteoporosis makes bones more brittle and fragile and more likely to break (fracture). Over time, osteoporosis can cause your bones to become so weak that they fracture after a minor fall. Bones in the hip, wrist, and spine are most likely to fracture due to osteoporosis.  What are the causes?  The exact cause of this condition is not known.  What increases the risk?  You may be at greater risk for osteoporosis if you:  · Have a family history of the condition.  · Have poor nutrition.  · Use steroid medicines, such as prednisone.  · Are female.  · Are age 50 or older.  · Smoke or have a history of smoking.  · Are not physically active (are sedentary).  · Are white () or of  descent.  · Have a small body frame.  · Take certain medicines, such as antiseizure medicines.  What are the signs or symptoms?  A fracture might be the first sign of osteoporosis, especially if the fracture results from a fall or injury that usually would not cause a bone to break. Other signs and symptoms include:  · Pain in the neck or low back.  · Stooped posture.  · Loss of height.  How is this diagnosed?  This condition may be diagnosed based on:  · Your  medical history.  · A physical exam.  · A bone mineral density test, also called a DXA or DEXA test (dual-energy X-ray absorptiometry test). This test uses X-rays to measure the amount of minerals in your bones.  How is this treated?  The goal of treatment is to strengthen your bones and lower your risk for a fracture. Treatment may involve:  · Making lifestyle changes, such as:  ? Including foods with more calcium and vitamin D in your diet.  ? Doing weight-bearing and muscle-strengthening exercises.  ? Stopping tobacco use.  ? Limiting alcohol intake.  · Taking medicine to slow the process of bone loss or to increase bone density.  · Taking daily supplements of calcium and vitamin D.  · Taking hormone replacement medicines, such as estrogen for women and testosterone for men.  · Monitoring your levels of calcium and vitamin D.  Follow these instructions at home:    Activity  · Exercise as told by your health care provider. Ask your health care provider what exercises and activities are safe for you. You should do:  ? Exercises that make you work against gravity (weight-bearing exercises), such as autumn chi, yoga, or walking.  ? Exercises to strengthen muscles, such as lifting weights.  Lifestyle  · Limit alcohol intake to no more than 1 drink a day for nonpregnant women and 2 drinks a day for men. One drink equals 12 oz of beer, 5 oz of wine, or 1½ oz of hard liquor.  · Do not use any products that contain nicotine or tobacco, such as cigarettes and e-cigarettes. If you need help quitting, ask your health care provider.  Preventing falls  · Use devices to help you move around (mobility aids) as needed, such as canes, walkers, scooters, or crutches.  · Keep rooms well-lit and clutter-free.  · Remove tripping hazards from walkways, including cords and throw rugs.  · Install grab bars in bathrooms and safety rails on stairs.  · Use rubber mats in the bathroom and other areas that are often wet or slippery.  · Wear  closed-toe shoes that fit well and support your feet. Wear shoes that have rubber soles or low heels.  · Review your medicines with your health care provider. Some medicines can cause dizziness or changes in blood pressure, which can increase your risk of falling.  General instructions  · Include calcium and vitamin D in your diet. Calcium is important for bone health, and vitamin D helps your body to absorb calcium. Good sources of calcium and vitamin D include:  ? Certain fatty fish, such as salmon and tuna.  ? Products that have calcium and vitamin D added to them (fortified products), such as fortified cereals.  ? Egg yolks.  ? Cheese.  ? Liver.  · Take over-the-counter and prescription medicines only as told by your health care provider.  · Keep all follow-up visits as told by your health care provider. This is important.  Contact a health care provider if:  · You have never been screened for osteoporosis and you are:  ? A woman who is age 65 or older.  ? A man who is age 70 or older.  Get help right away if:  · You fall or injure yourself.  Summary  · Osteoporosis is thinning and loss of density in your bones. This makes bones more brittle and fragile and more likely to break (fracture),even with minor falls.  · The goal of treatment is to strengthen your bones and reduce your risk for a fracture.  · Include calcium and vitamin D in your diet. Calcium is important for bone health, and vitamin D helps your body to absorb calcium.  · Talk with your health care provider about screening for osteoporosis if you are a woman who is age 65 or older, or a man who is age 70 or older.  This information is not intended to replace advice given to you by your health care provider. Make sure you discuss any questions you have with your health care provider.  Document Released: 09/27/2006 Document Revised: 11/30/2018 Document Reviewed: 10/12/2018  Elsevier Patient Education © 2020 [employer]    What is this  medicine?  DENOSUMAB (den oh ren mab) slows bone breakdown. Prolia is used to treat osteoporosis in women after menopause and in men, and in people who are taking corticosteroids for 6 months or more. Xgeva is used to treat a high calcium level due to cancer and to prevent bone fractures and other bone problems caused by multiple myeloma or cancer bone metastases. Xgeva is also used to treat giant cell tumor of the bone.  This medicine may be used for other purposes; ask your health care provider or pharmacist if you have questions.  COMMON BRAND NAME(S): Prolia, XGEVA  What should I tell my health care provider before I take this medicine?  They need to know if you have any of these conditions:  · dental disease  · having surgery or tooth extraction  · infection  · kidney disease  · low levels of calcium or Vitamin D in the blood  · malnutrition  · on hemodialysis  · skin conditions or sensitivity  · thyroid or parathyroid disease  · an unusual reaction to denosumab, other medicines, foods, dyes, or preservatives  · pregnant or trying to get pregnant  · breast-feeding  How should I use this medicine?  This medicine is for injection under the skin. It is given by a health care professional in a hospital or clinic setting.  A special MedGuide will be given to you before each treatment. Be sure to read this information carefully each time.  For Prolia, talk to your pediatrician regarding the use of this medicine in children. Special care may be needed. For Xgeva, talk to your pediatrician regarding the use of this medicine in children. While this drug may be prescribed for children as young as 13 years for selected conditions, precautions do apply.  Overdosage: If you think you have taken too much of this medicine contact a poison control center or emergency room at once.  NOTE: This medicine is only for you. Do not share this medicine with others.  What if I miss a dose?  It is important not to miss your dose. Call  your doctor or health care professional if you are unable to keep an appointment.  What may interact with this medicine?  Do not take this medicine with any of the following medications:  · other medicines containing denosumab  This medicine may also interact with the following medications:  · medicines that lower your chance of fighting infection  · steroid medicines like prednisone or cortisone  This list may not describe all possible interactions. Give your health care provider a list of all the medicines, herbs, non-prescription drugs, or dietary supplements you use. Also tell them if you smoke, drink alcohol, or use illegal drugs. Some items may interact with your medicine.  What should I watch for while using this medicine?  Visit your doctor or health care professional for regular checks on your progress. Your doctor or health care professional may order blood tests and other tests to see how you are doing.  Call your doctor or health care professional for advice if you get a fever, chills or sore throat, or other symptoms of a cold or flu. Do not treat yourself. This drug may decrease your body's ability to fight infection. Try to avoid being around people who are sick.  You should make sure you get enough calcium and vitamin D while you are taking this medicine, unless your doctor tells you not to. Discuss the foods you eat and the vitamins you take with your health care professional.  See your dentist regularly. Brush and floss your teeth as directed. Before you have any dental work done, tell your dentist you are receiving this medicine.  Do not become pregnant while taking this medicine or for 5 months after stopping it. Talk with your doctor or health care professional about your birth control options while taking this medicine. Women should inform their doctor if they wish to become pregnant or think they might be pregnant. There is a potential for serious side effects to an unborn child. Talk to your  health care professional or pharmacist for more information.  What side effects may I notice from receiving this medicine?  Side effects that you should report to your doctor or health care professional as soon as possible:  · allergic reactions like skin rash, itching or hives, swelling of the face, lips, or tongue  · bone pain  · breathing problems  · dizziness  · jaw pain, especially after dental work  · redness, blistering, peeling of the skin  · signs and symptoms of infection like fever or chills; cough; sore throat; pain or trouble passing urine  · signs of low calcium like fast heartbeat, muscle cramps or muscle pain; pain, tingling, numbness in the hands or feet; seizures  · unusual bleeding or bruising  · unusually weak or tired  Side effects that usually do not require medical attention (report to your doctor or health care professional if they continue or are bothersome):  · constipation  · diarrhea  · headache  · joint pain  · loss of appetite  · muscle pain  · runny nose  · tiredness  · upset stomach  This list may not describe all possible side effects. Call your doctor for medical advice about side effects. You may report side effects to FDA at 5-262-FDA-7263.  Where should I keep my medicine?  This medicine is only given in a clinic, doctor's office, or other health care setting and will not be stored at home.  NOTE: This sheet is a summary. It may not cover all possible information. If you have questions about this medicine, talk to your doctor, pharmacist, or health care provider.  © 2020 [employer] Standard (2019-04-26 16:10:44)

## 2022-03-16 RX ORDER — BACLOFEN 10 MG/1
TABLET ORAL
Qty: 5 TABLET | Refills: 0 | Status: SHIPPED | OUTPATIENT
Start: 2022-03-16 | End: 2022-03-25

## 2022-03-16 NOTE — TELEPHONE ENCOUNTER
Received request via: Pharmacy    Was the patient seen in the last year in this department? Yes  3/8/2022  Does the patient have an active prescription (recently filled or refills available) for medication(s) requested? No

## 2022-03-25 RX ORDER — BACLOFEN 10 MG/1
TABLET ORAL
Qty: 5 TABLET | Refills: 0 | Status: SHIPPED | OUTPATIENT
Start: 2022-03-25 | End: 2022-04-04

## 2022-03-25 NOTE — TELEPHONE ENCOUNTER
Received request via: Pharmacy    Was the patient seen in the last year in this department? Yes  LOV 03/08/2022  Does the patient have an active prescription (recently filled or refills available) for medication(s) requested? No

## 2022-04-04 RX ORDER — BACLOFEN 10 MG/1
TABLET ORAL
Qty: 5 TABLET | Refills: 0 | Status: SHIPPED | OUTPATIENT
Start: 2022-04-04 | End: 2022-04-20

## 2022-04-11 DIAGNOSIS — I10 HYPERTENSION, UNSPECIFIED TYPE: ICD-10-CM

## 2022-04-11 RX ORDER — TELMISARTAN 80 MG/1
TABLET ORAL
Qty: 90 TABLET | Refills: 3 | Status: SHIPPED | OUTPATIENT
Start: 2022-04-11 | End: 2023-04-05

## 2022-04-20 ENCOUNTER — OFFICE VISIT (OUTPATIENT)
Dept: VASCULAR LAB | Facility: MEDICAL CENTER | Age: 87
End: 2022-04-20
Attending: INTERNAL MEDICINE
Payer: MEDICARE

## 2022-04-20 VITALS
BODY MASS INDEX: 22.82 KG/M2 | HEIGHT: 66 IN | HEART RATE: 81 BPM | WEIGHT: 142 LBS | SYSTOLIC BLOOD PRESSURE: 128 MMHG | DIASTOLIC BLOOD PRESSURE: 77 MMHG

## 2022-04-20 DIAGNOSIS — I10 HYPERTENSION, UNSPECIFIED TYPE: ICD-10-CM

## 2022-04-20 DIAGNOSIS — F41.9 ANXIETY: ICD-10-CM

## 2022-04-20 PROCEDURE — 99212 OFFICE O/P EST SF 10 MIN: CPT

## 2022-04-20 PROCEDURE — 99213 OFFICE O/P EST LOW 20 MIN: CPT | Performed by: INTERNAL MEDICINE

## 2022-04-20 RX ORDER — LORAZEPAM 0.5 MG/1
0.5 TABLET ORAL EVERY 4 HOURS PRN
Qty: 30 TABLET | Refills: 0 | Status: SHIPPED | OUTPATIENT
Start: 2022-04-20 | End: 2022-07-19

## 2022-04-20 ASSESSMENT — FIBROSIS 4 INDEX: FIB4 SCORE: 1.01

## 2022-04-20 NOTE — PROGRESS NOTES
"VASCULAR MEDICINE CLINIC - Follow up VISIT  4-20-22    Prabha Lizama is a 86 y.o.  male who presents today  for   Chief Complaint   Patient presents with   • Follow-Up        HPI:  Patient returns for evaluation and management of hypertensoin  Good adhernce with bp meds  Keeps excellent BP logs, daily  BPs at home usually 120s-130s/60s-70ss, HR 60-70s  No headaches  No lightheadedness   No interfering substances   Limited by back pain.        Social History     Tobacco Use   • Smoking status: Never Smoker   • Smokeless tobacco: Never Used   Vaping Use   • Vaping Use: Never used   Substance Use Topics   • Alcohol use: No   • Drug use: No      DIET AND EXERCISE:  Weight Change: stable  Diet: relatively low salt  Exercise: sporadic irregular exercise      Objective:     Vitals:    04/20/22 1135 04/20/22 1140   BP: 147/82 128/77   BP Location: Right arm Left arm   Patient Position: Sitting Sitting   BP Cuff Size: Small adult Small adult   Pulse: 80 81   Weight: 64.4 kg (142 lb)    Height: 1.676 m (5' 6\")       Physical Exam  Vitals reviewed.   Constitutional:       General: She is not in acute distress.     Appearance: She is not diaphoretic.   Cardiovascular:      Rate and Rhythm: Normal rate and regular rhythm.      Heart sounds: Normal heart sounds. No murmur heard.  Pulmonary:      Effort: Pulmonary effort is normal. No respiratory distress.      Breath sounds: Normal breath sounds. No wheezing.   Musculoskeletal:         General: No swelling. Normal range of motion.      Right lower leg: No edema.      Left lower leg: No edema.   Skin:     General: Skin is warm and dry.      Coloration: Skin is not pale.   Neurological:      Mental Status: She is alert and oriented to person, place, and time.      Motor: No weakness.      Coordination: Coordination normal.      Gait: Gait is intact. Gait normal.   Psychiatric:         Mood and Affect: Mood and affect normal.         Behavior: Behavior normal.         Thought " Content: Thought content normal.          DATA REVIEW    Lab Results   Component Value Date/Time    CHOLSTRLTOT 205 (H) 05/02/2019 06:56 AM    CHOLSTRLTOT 200 (H) 02/08/2010 07:10 AM     (H) 05/02/2019 06:56 AM     (H) 02/08/2010 07:10 AM    HDL 67 05/02/2019 06:56 AM    HDL 54 02/08/2010 07:10 AM    TRIGLYCERIDE 112 05/02/2019 06:56 AM    TRIGLYCERIDE 121 02/08/2010 07:10 AM       Lab Results   Component Value Date/Time    SODIUM 141 03/02/2022 04:15 AM    SODIUM 138 08/08/2021 06:37 PM    POTASSIUM 4.3 03/02/2022 04:15 AM    POTASSIUM 3.7 08/08/2021 06:37 PM    CHLORIDE 104 03/02/2022 04:15 AM    CHLORIDE 104 08/08/2021 06:37 PM    CO2 23 03/02/2022 04:15 AM    CO2 22 08/08/2021 06:37 PM    GLUCOSE 108 (H) 03/02/2022 04:15 AM    GLUCOSE 108 (H) 08/08/2021 06:37 PM    BUN 11 03/02/2022 04:15 AM    BUN 9 08/08/2021 06:37 PM    CREATININE 0.79 03/02/2022 04:15 AM    CREATININE 0.67 08/08/2021 06:37 PM    CREATININE 0.87 07/07/2011 03:30 PM    BUNCREATRAT 14 03/02/2022 04:15 AM    BUNCREATRAT 11 07/07/2011 03:30 PM    GLOMRATE >59 08/04/2010 08:10 AM     Lab Results   Component Value Date/Time    ALKPHOSPHAT 145 (H) 03/02/2022 04:15 AM    ALKPHOSPHAT 69 08/08/2021 06:37 PM    ASTSGOT 16 03/02/2022 04:15 AM    ASTSGOT 16 08/08/2021 06:37 PM    ALTSGPT 22 03/02/2022 04:15 AM    ALTSGPT 23 08/08/2021 06:37 PM    TBILIRUBIN 0.4 03/02/2022 04:15 AM    TBILIRUBIN 0.4 08/08/2021 06:37 PM       Lab Results   Component Value Date/Time    HBA1C 5.2 01/15/2018 02:11 PM       Lab Results   Component Value Date/Time    MICRALB <3.0 03/02/2022 04:15 AM       Medical Decision Making:  Today's Assessment / Status / Plan:     1. Hypertension, unspecified type     2. Anxiety        Patient Type: Primary Prevention    Etiology of Established CVD if Present: none    Lipid Management: Qualifies for Statin Therapy Based on 2018 ACC/AHA Guidelines: no  Calculated 10-Year Risk of ASCVD: N/A  Currently on Statin: No  Outside age  range for consideration of statin therapy in primary prevention per acc/aha guidelines   - continue lifestyle mod    Blood Pressure Management:  Acc/[facility] (2017) Blood Pressure Goal <130/80  Long h/o difficult to control bp with white coat htn and some degree of pseudopheo  Reasonable control both at home and in the office  Has had hyponatremia on diuretics in past - continue to avoid  Perhaps a bit of leg swelling on amlodipine  GFR and electrolytes well-maintained on most recent blood work  Plan:  - continue current meds  - conitnue excellent home bp monitoring    Glycemic Status: Normal  Continue lifestyle mod  -check fasting gluc    Anti-Platelet/Anti-Coagulant Tx: mo  Not necessarily indicated at present    Smoking: continue complete avoidance     Physical Activity: Encouraged more exercise.  If she is limited in her walking by back pain she should try some stretching.  She may even want to talk to her PCP about some physical therapy    Weight Management and Nutrition: Dietary plan was discussed with patient at this visit including overall heart healthy eating, low in sodium    Instructed to follow-up with PCP for remainder of adult medical needs: yes  We will partner with other providers in the management of established vascular disease and cardiometabolic risk factors.    Studies to Be Obtained: none  Labs to Be Obtained: none    Follow up in: 6 months    Michael Bloch, MD  [facility]    Cc: JEFF Simon

## 2022-10-20 ENCOUNTER — OFFICE VISIT (OUTPATIENT)
Dept: VASCULAR LAB | Facility: MEDICAL CENTER | Age: 87
End: 2022-10-20
Attending: INTERNAL MEDICINE
Payer: MEDICARE

## 2022-10-20 VITALS
DIASTOLIC BLOOD PRESSURE: 68 MMHG | HEART RATE: 69 BPM | SYSTOLIC BLOOD PRESSURE: 141 MMHG | WEIGHT: 136 LBS | HEIGHT: 66 IN | BODY MASS INDEX: 21.86 KG/M2

## 2022-10-20 DIAGNOSIS — F41.9 ANXIETY: ICD-10-CM

## 2022-10-20 DIAGNOSIS — I10 HYPERTENSION, UNSPECIFIED TYPE: ICD-10-CM

## 2022-10-20 PROCEDURE — 99213 OFFICE O/P EST LOW 20 MIN: CPT | Performed by: INTERNAL MEDICINE

## 2022-10-20 PROCEDURE — 99212 OFFICE O/P EST SF 10 MIN: CPT

## 2022-10-20 RX ORDER — LORAZEPAM 0.5 MG/1
0.5 TABLET ORAL EVERY 8 HOURS PRN
Qty: 30 TABLET | Refills: 5 | Status: SHIPPED | OUTPATIENT
Start: 2022-10-20 | End: 2023-04-18

## 2022-10-20 ASSESSMENT — FIBROSIS 4 INDEX: FIB4 SCORE: 1.02

## 2022-10-20 NOTE — PROGRESS NOTES
"VASCULAR MEDICINE CLINIC - Follow up VISIT  10/20/22      Prabha Lizama is a 87 y.o.  female who presents today  for   Chief Complaint   Patient presents with    Follow-Up        HPI:  Patient returns for evaluation and management of hypertensoin  Good adhernce with bp meds  Keeps excellent BP logs, daily  BPs at home usually 120s-130s/60s-70ss, HR 60-70s  No headaches  No lightheadedness   No interfering substances   Limited by back pain, but improved  Anxiety under reasonable control.  She does take Ativan intermittently  Still with a lot of trouble maintaining good sleep hygiene       Social History     Tobacco Use    Smoking status: Never    Smokeless tobacco: Never   Vaping Use    Vaping Use: Never used   Substance Use Topics    Alcohol use: No    Drug use: No      DIET AND EXERCISE:  Weight Change: stable  Diet: relatively low salt  Exercise: Using new exercise machine for about 10 minutes each day     Objective:     Vitals:    10/20/22 1146 10/20/22 1151   BP: (!) 157/66 (!) 141/68   BP Location: Right arm Right arm   Patient Position: Sitting Sitting   BP Cuff Size: Adult Adult   Pulse: 70 69   Weight: 61.7 kg (136 lb)    Height: 1.676 m (5' 6\")       Physical Exam  Vitals reviewed.   Constitutional:       General: She is not in acute distress.     Appearance: She is not diaphoretic.   Pulmonary:      Effort: Pulmonary effort is normal. No respiratory distress.      Breath sounds: Normal breath sounds.   Musculoskeletal:      Right lower leg: No edema.      Left lower leg: No edema.   Skin:     Coloration: Skin is not pale.   Neurological:      General: No focal deficit present.      Mental Status: She is alert and oriented to person, place, and time.      Coordination: Coordination normal.      Gait: Gait is intact. Gait normal.   Psychiatric:         Mood and Affect: Mood and affect normal.         Behavior: Behavior normal.        DATA REVIEW    Lab Results   Component Value Date/Time    " CHOLSTRLTOT 205 (H) 05/02/2019 06:56 AM    CHOLSTRLTOT 200 (H) 02/08/2010 07:10 AM     (H) 05/02/2019 06:56 AM     (H) 02/08/2010 07:10 AM    HDL 67 05/02/2019 06:56 AM    HDL 54 02/08/2010 07:10 AM    TRIGLYCERIDE 112 05/02/2019 06:56 AM    TRIGLYCERIDE 121 02/08/2010 07:10 AM       Lab Results   Component Value Date/Time    SODIUM 141 03/02/2022 04:15 AM    SODIUM 138 08/08/2021 06:37 PM    POTASSIUM 4.3 03/02/2022 04:15 AM    POTASSIUM 3.7 08/08/2021 06:37 PM    CHLORIDE 104 03/02/2022 04:15 AM    CHLORIDE 104 08/08/2021 06:37 PM    CO2 23 03/02/2022 04:15 AM    CO2 22 08/08/2021 06:37 PM    GLUCOSE 108 (H) 03/02/2022 04:15 AM    GLUCOSE 108 (H) 08/08/2021 06:37 PM    BUN 11 03/02/2022 04:15 AM    BUN 9 08/08/2021 06:37 PM    CREATININE 0.79 03/02/2022 04:15 AM    CREATININE 0.67 08/08/2021 06:37 PM    CREATININE 0.87 07/07/2011 03:30 PM    BUNCREATRAT 14 03/02/2022 04:15 AM    BUNCREATRAT 11 07/07/2011 03:30 PM    GLOMRATE >59 08/04/2010 08:10 AM     Lab Results   Component Value Date/Time    ALKPHOSPHAT 145 (H) 03/02/2022 04:15 AM    ALKPHOSPHAT 69 08/08/2021 06:37 PM    ASTSGOT 16 03/02/2022 04:15 AM    ASTSGOT 16 08/08/2021 06:37 PM    ALTSGPT 22 03/02/2022 04:15 AM    ALTSGPT 23 08/08/2021 06:37 PM    TBILIRUBIN 0.4 03/02/2022 04:15 AM    TBILIRUBIN 0.4 08/08/2021 06:37 PM       Lab Results   Component Value Date/Time    HBA1C 5.2 01/15/2018 02:11 PM       Lab Results   Component Value Date/Time    MICRALB <3.0 03/02/2022 04:15 AM       Medical Decision Making:  Today's Assessment / Status / Plan:     1. Hypertension, unspecified type           Patient Type: Primary Prevention    Etiology of Established CVD if Present: none    Lipid Management: Qualifies for Statin Therapy Based on 2018 ACC/AHA Guidelines: no  Calculated 10-Year Risk of ASCVD: N/A  Currently on Statin: No  Outside age range for consideration of statin therapy in primary prevention per acc/aha guidelines   - continue lifestyle  mod    Blood Pressure Management:  Acc/[facility] (2017) Blood Pressure Goal <130/80  Long h/o difficult to control bp with white coat htn and some degree of pseudopheo  Reasonable control both at home and in the office  Has had hyponatremia on diuretics in past - continue to avoid  Perhaps a bit of leg swelling on amlodipine, but reasonable on this dose  GFR and electrolytes well-maintained on most recent blood work  Plan:  - continue current meds  - conitnue excellent home bp monitoring  -Recheck GFR and electrolytes prior to next visit    Glycemic Status: Normal  Continue lifestyle mod  -check fasting gluc prior to next visit    Anti-Platelet/Anti-Coagulant Tx: mo  Not necessarily indicated at present    Smoking: continue complete avoidance     Physical Activity: Continue to use her new exercise machine at least 10 minutes a day    Weight Management and Nutrition: Focus on sodium restriction.  Avoid further weight loss    Other     -Anxiety and insomnia -courtesy refill of lorazepam that she can take as needed.  I encouraged her to discuss this further with her PCP    Instructed to follow-up with PCP for remainder of adult medical needs: yes  We will partner with other providers in the management of established vascular disease and cardiometabolic risk factors.    Studies to Be Obtained: none  Labs to Be Obtained: As above prior to next visit    Follow up in: 6 months    Michael Bloch, MD  [facility]

## 2022-12-13 ENCOUNTER — OFFICE VISIT (OUTPATIENT)
Dept: MEDICAL GROUP | Facility: MEDICAL CENTER | Age: 87
End: 2022-12-13
Attending: INTERNAL MEDICINE
Payer: MEDICARE

## 2022-12-13 VITALS
OXYGEN SATURATION: 98 % | SYSTOLIC BLOOD PRESSURE: 124 MMHG | BODY MASS INDEX: 22.02 KG/M2 | HEART RATE: 68 BPM | TEMPERATURE: 97.8 F | RESPIRATION RATE: 16 BRPM | DIASTOLIC BLOOD PRESSURE: 76 MMHG | HEIGHT: 66 IN | WEIGHT: 137 LBS

## 2022-12-13 DIAGNOSIS — I10 PRIMARY HYPERTENSION: ICD-10-CM

## 2022-12-13 DIAGNOSIS — E78.00 PURE HYPERCHOLESTEROLEMIA: ICD-10-CM

## 2022-12-13 DIAGNOSIS — G47.00 INSOMNIA, UNSPECIFIED TYPE: ICD-10-CM

## 2022-12-13 DIAGNOSIS — S32.030A COMPRESSION FRACTURE OF L3 VERTEBRA, INITIAL ENCOUNTER (HCC): ICD-10-CM

## 2022-12-13 DIAGNOSIS — R32 URINARY INCONTINENCE, UNSPECIFIED TYPE: ICD-10-CM

## 2022-12-13 DIAGNOSIS — E04.2 MULTIPLE THYROID NODULES: ICD-10-CM

## 2022-12-13 DIAGNOSIS — M81.0 AGE-RELATED OSTEOPOROSIS WITHOUT CURRENT PATHOLOGICAL FRACTURE: ICD-10-CM

## 2022-12-13 DIAGNOSIS — R73.01 IMPAIRED FASTING GLUCOSE: ICD-10-CM

## 2022-12-13 PROBLEM — F41.9 ANXIETY: Status: RESOLVED | Noted: 2020-08-30 | Resolved: 2022-12-13

## 2022-12-13 PROBLEM — M48.50XA COMPRESSION FRACTURE OF VERTEBRA (HCC): Status: RESOLVED | Noted: 2018-07-10 | Resolved: 2022-12-13

## 2022-12-13 PROBLEM — R10.13 DYSPEPSIA: Status: RESOLVED | Noted: 2020-08-29 | Resolved: 2022-12-13

## 2022-12-13 PROBLEM — E87.1 HYPONATREMIA: Status: RESOLVED | Noted: 2020-08-31 | Resolved: 2022-12-13

## 2022-12-13 PROBLEM — L65.9 HAIR LOSS: Status: RESOLVED | Noted: 2017-02-08 | Resolved: 2022-12-13

## 2022-12-13 PROBLEM — D64.9 ANEMIA: Status: RESOLVED | Noted: 2021-01-12 | Resolved: 2022-12-13

## 2022-12-13 PROBLEM — Z86.79 HISTORY OF HYPERTENSION: Status: RESOLVED | Noted: 2020-08-28 | Resolved: 2022-12-13

## 2022-12-13 PROCEDURE — 99214 OFFICE O/P EST MOD 30 MIN: CPT | Performed by: FAMILY MEDICINE

## 2022-12-13 ASSESSMENT — FIBROSIS 4 INDEX: FIB4 SCORE: 1.02

## 2022-12-13 NOTE — PROGRESS NOTES
"  Subjective:     Prabha Lizama is a 87 y.o. female presenting to establish care          Current Outpatient Medications:     VITAMIN D PO, Take  by mouth., Disp: , Rfl:     CALCIUM PO, Take  by mouth., Disp: , Rfl:     Acetaminophen (TYLENOL PO), Take  by mouth., Disp: , Rfl:     LORazepam (ATIVAN) 0.5 MG Tab, Take 1 Tablet by mouth every 8 hours as needed for Anxiety for up to 180 days., Disp: 30 Tablet, Rfl: 5    carvedilol (COREG) 25 MG Tab, TAKE 1 TABLET BY MOUTH TWICE DAILY, Disp: 180 Tablet, Rfl: 0    terazosin (HYTRIN) 2 MG Cap, TAKE 2 CAPSULE BY MOUTH TWICE DAILY, TOTAL OF 8MG(4 CAPSULES IN 24 HOURS), Disp: 360 Capsule, Rfl: 0    telmisartan (MICARDIS) 80 MG Tab, TAKE 1 TABLET EVERY MORNING, Disp: 90 Tablet, Rfl: 3    amLODIPine (NORVASC) 2.5 MG Tab, Take 1 Tablet by mouth every day., Disp: 90 Tablet, Rfl: 3    Objective:     Vitals: /76   Pulse 68   Temp 36.6 °C (97.8 °F)   Resp 16   Ht 1.676 m (5' 6\")   Wt 62.1 kg (137 lb)   SpO2 98%   BMI 22.11 kg/m²   General: Alert  HEENT: Normocephalic.  Heart: Regular rate and rhythm.  S1 and S2 normal.  No murmurs appreciated.  Respiratory: Normal respiratory effort.  Clear to auscultation bilaterally.  Abdomen: Non-distended, soft  Extremities: No leg edema.    Assessment/Plan:     Prabha was seen today for [facility].    Diagnoses and all orders for this visit:    Primary hypertension  Chronic, stable, continue amlodipine, carvedilol, losartan, terazosin.  She sees vascular medicine regularly.    Multiple thyroid nodules  Chronic, stable, will continue to monitor  -     US-THYROID; Future    Age-related osteoporosis without current pathological fracture  Chronic, possibly stable.  We discussed checking a bone density scan, she declined for now.  Continue with calcium and vitamin D supplements    Pure hypercholesterolemia  Chronic, stable, vascular medicine is monitoring.  She plans to do her labs soon    Impaired fasting blood " sugar  Chronic, stable, continue to monitor    Insomnia, unspecified type  Chronic, stable, she reports taking half tablet of Ativan as needed at night.  She has tried Tylenol PM, trazodone with minimal improvement.  She is wondering if there is anything over-the-counter that she can try.  We discussed trying valerian root or melatonin.  If those do not help, we discussed trying hydroxyzine or quetiapine next.  Would recommend trying to avoid Ativan as much as possible.    Urinary incontinence, unspecified type  Chronic, stable, is wearing a pad, which helps.  We discussed trying Kegel exercises.    Compression fracture of L3 vertebra, initial encounter ([facility])  Chronic, stable, she takes Tylenol as needed, which helps with her back pain.      Return in about 4 months (around 4/13/2023) for routine follow up.

## 2023-01-03 DIAGNOSIS — R03.0 WHITE COAT SYNDROME WITH HIGH BLOOD PRESSURE BUT WITHOUT HYPERTENSION: ICD-10-CM

## 2023-01-03 RX ORDER — CARVEDILOL 25 MG/1
25 TABLET ORAL 2 TIMES DAILY
Qty: 180 TABLET | Refills: 1 | Status: SHIPPED | OUTPATIENT
Start: 2023-01-03 | End: 2023-07-03 | Stop reason: SDUPTHER

## 2023-01-03 RX ORDER — TERAZOSIN 2 MG/1
CAPSULE ORAL
Qty: 360 CAPSULE | Refills: 1 | Status: SHIPPED | OUTPATIENT
Start: 2023-01-03 | End: 2023-07-03 | Stop reason: SDUPTHER

## 2023-01-10 DIAGNOSIS — R03.0 WHITE COAT SYNDROME WITH HIGH BLOOD PRESSURE BUT WITHOUT HYPERTENSION: ICD-10-CM

## 2023-01-10 RX ORDER — AMLODIPINE BESYLATE 2.5 MG/1
2.5 TABLET ORAL DAILY
Qty: 90 TABLET | Refills: 3 | Status: SHIPPED | OUTPATIENT
Start: 2023-01-10 | End: 2023-01-12 | Stop reason: SDUPTHER

## 2023-01-12 DIAGNOSIS — R03.0 WHITE COAT SYNDROME WITH HIGH BLOOD PRESSURE BUT WITHOUT HYPERTENSION: ICD-10-CM

## 2023-01-12 RX ORDER — AMLODIPINE BESYLATE 2.5 MG/1
2.5 TABLET ORAL DAILY
Qty: 90 TABLET | Refills: 3 | Status: SHIPPED | OUTPATIENT
Start: 2023-01-12

## 2023-03-31 ENCOUNTER — HOSPITAL ENCOUNTER (OUTPATIENT)
Dept: RADIOLOGY | Facility: MEDICAL CENTER | Age: 88
End: 2023-03-31
Attending: FAMILY MEDICINE
Payer: MEDICARE

## 2023-03-31 DIAGNOSIS — E04.2 MULTIPLE THYROID NODULES: ICD-10-CM

## 2023-03-31 PROCEDURE — 76536 US EXAM OF HEAD AND NECK: CPT

## 2023-04-03 DIAGNOSIS — E04.2 MULTIPLE THYROID NODULES: ICD-10-CM

## 2023-04-07 LAB
ALBUMIN SERPL-MCNC: 4 G/DL (ref 3.6–4.6)
ALBUMIN/GLOB SERPL: 1.7 {RATIO} (ref 1.2–2.2)
ALP SERPL-CCNC: 72 IU/L (ref 44–121)
ALT SERPL-CCNC: 19 IU/L (ref 0–32)
AST SERPL-CCNC: 15 IU/L (ref 0–40)
BILIRUB SERPL-MCNC: 0.4 MG/DL (ref 0–1.2)
BUN SERPL-MCNC: 12 MG/DL (ref 8–27)
BUN/CREAT SERPL: 15 (ref 12–28)
CALCIUM SERPL-MCNC: 8.9 MG/DL (ref 8.7–10.3)
CHLORIDE SERPL-SCNC: 107 MMOL/L (ref 96–106)
CHOLEST SERPL-MCNC: 220 MG/DL (ref 100–199)
CO2 SERPL-SCNC: 23 MMOL/L (ref 20–29)
CREAT SERPL-MCNC: 0.79 MG/DL (ref 0.57–1)
EGFRCR SERPLBLD CKD-EPI 2021: 73 ML/MIN/1.73
ERYTHROCYTE [DISTWIDTH] IN BLOOD BY AUTOMATED COUNT: 12.8 % (ref 11.7–15.4)
GLOBULIN SER CALC-MCNC: 2.4 G/DL (ref 1.5–4.5)
GLUCOSE SERPL-MCNC: 105 MG/DL (ref 70–99)
HCT VFR BLD AUTO: 43.8 % (ref 34–46.6)
HDLC SERPL-MCNC: 56 MG/DL
HGB BLD-MCNC: 14.9 G/DL (ref 11.1–15.9)
LABORATORY COMMENT REPORT: ABNORMAL
LDLC SERPL CALC-MCNC: 145 MG/DL (ref 0–99)
MCH RBC QN AUTO: 32.4 PG (ref 26.6–33)
MCHC RBC AUTO-ENTMCNC: 34 G/DL (ref 31.5–35.7)
MCV RBC AUTO: 95 FL (ref 79–97)
NRBC BLD AUTO-RTO: NORMAL %
PLATELET # BLD AUTO: 283 X10E3/UL (ref 150–450)
POTASSIUM SERPL-SCNC: 4.2 MMOL/L (ref 3.5–5.2)
PROT SERPL-MCNC: 6.4 G/DL (ref 6–8.5)
RBC # BLD AUTO: 4.6 X10E6/UL (ref 3.77–5.28)
SODIUM SERPL-SCNC: 142 MMOL/L (ref 134–144)
TRIGL SERPL-MCNC: 105 MG/DL (ref 0–149)
TSH SERPL DL<=0.005 MIU/L-ACNC: 1.05 UIU/ML (ref 0.45–4.5)
VLDLC SERPL CALC-MCNC: 19 MG/DL (ref 5–40)
WBC # BLD AUTO: 4.2 X10E3/UL (ref 3.4–10.8)

## 2023-04-19 ENCOUNTER — OFFICE VISIT (OUTPATIENT)
Dept: VASCULAR LAB | Facility: MEDICAL CENTER | Age: 88
End: 2023-04-19
Attending: INTERNAL MEDICINE
Payer: MEDICARE

## 2023-04-19 VITALS
DIASTOLIC BLOOD PRESSURE: 86 MMHG | WEIGHT: 136 LBS | HEART RATE: 71 BPM | BODY MASS INDEX: 22.66 KG/M2 | HEIGHT: 65 IN | SYSTOLIC BLOOD PRESSURE: 170 MMHG

## 2023-04-19 DIAGNOSIS — I10 HYPERTENSION, UNSPECIFIED TYPE: ICD-10-CM

## 2023-04-19 PROCEDURE — 99213 OFFICE O/P EST LOW 20 MIN: CPT | Performed by: INTERNAL MEDICINE

## 2023-04-19 PROCEDURE — 99212 OFFICE O/P EST SF 10 MIN: CPT

## 2023-04-19 ASSESSMENT — FIBROSIS 4 INDEX: FIB4 SCORE: 1.03

## 2023-04-19 NOTE — PROGRESS NOTES
"VASCULAR MEDICINE CLINIC - Follow up VISIT  04/19/23    Prabha Lizama is a 87 y.o.  female who presents today  for   Chief Complaint   Patient presents with    Follow-Up      Subjective    HPI:  Patient returns for evaluation and management of hypertensoin  Good adhernce with bp meds  Keeps excellent BP logs, daily  BPs at home usually 110s-120s/50s-60s mostly  No headaches  No lightheadedness   No interfering substances   Limited by back pain, but improved  Anxiety under reasonable control.    No tia or cva symptoms  No angina or palpitations       Social History     Tobacco Use    Smoking status: Never    Smokeless tobacco: Never   Vaping Use    Vaping Use: Never used   Substance Use Topics    Alcohol use: No    Drug use: No      DIET AND EXERCISE:  Weight Change: stable  Diet: relatively low salt  Exercise: Using new exercise machine for about 10 minutes each day        Objective       Objective:     Vitals:    04/19/23 1422 04/19/23 1425   BP: (!) 167/86 (!) 170/86   BP Location: Left arm Left arm   Patient Position: Sitting Sitting   BP Cuff Size: Adult Adult   Pulse: 71 71   Weight: 61.7 kg (136 lb)    Height: 1.651 m (5' 5\")       Physical Exam  Vitals reviewed.   Constitutional:       General: She is not in acute distress.     Appearance: She is not diaphoretic.   Pulmonary:      Effort: Pulmonary effort is normal. No respiratory distress.      Breath sounds: Normal breath sounds.   Musculoskeletal:      Right lower leg: No edema.      Left lower leg: No edema.   Skin:     Coloration: Skin is not pale.   Neurological:      General: No focal deficit present.      Mental Status: She is alert and oriented to person, place, and time.      Coordination: Coordination normal.      Gait: Gait is intact. Gait normal.   Psychiatric:         Mood and Affect: Mood and affect normal.         Behavior: Behavior normal.        DATA REVIEW    Lab Results   Component Value Date/Time    CHOLSTRLTOT 220 (H) 04/06/2023 " 04:51 AM    CHOLSTRLTOT 200 (H) 02/08/2010 07:10 AM     (H) 05/02/2019 06:56 AM     (H) 02/08/2010 07:10 AM    HDL 56 04/06/2023 04:51 AM    HDL 54 02/08/2010 07:10 AM    TRIGLYCERIDE 105 04/06/2023 04:51 AM    TRIGLYCERIDE 121 02/08/2010 07:10 AM       Lab Results   Component Value Date/Time    SODIUM 142 04/06/2023 04:51 AM    SODIUM 138 08/08/2021 06:37 PM    POTASSIUM 4.2 04/06/2023 04:51 AM    POTASSIUM 3.7 08/08/2021 06:37 PM    CHLORIDE 107 (H) 04/06/2023 04:51 AM    CHLORIDE 104 08/08/2021 06:37 PM    CO2 23 04/06/2023 04:51 AM    CO2 22 08/08/2021 06:37 PM    GLUCOSE 105 (H) 04/06/2023 04:51 AM    GLUCOSE 108 (H) 08/08/2021 06:37 PM    BUN 12 04/06/2023 04:51 AM    BUN 9 08/08/2021 06:37 PM    CREATININE 0.79 04/06/2023 04:51 AM    CREATININE 0.67 08/08/2021 06:37 PM    CREATININE 0.87 07/07/2011 03:30 PM    BUNCREATRAT 15 04/06/2023 04:51 AM    BUNCREATRAT 11 07/07/2011 03:30 PM    GLOMRATE >59 08/04/2010 08:10 AM     Lab Results   Component Value Date/Time    ALKPHOSPHAT 72 04/06/2023 04:51 AM    ALKPHOSPHAT 69 08/08/2021 06:37 PM    ASTSGOT 15 04/06/2023 04:51 AM    ASTSGOT 16 08/08/2021 06:37 PM    ALTSGPT 19 04/06/2023 04:51 AM    ALTSGPT 23 08/08/2021 06:37 PM    TBILIRUBIN 0.4 04/06/2023 04:51 AM    TBILIRUBIN 0.4 08/08/2021 06:37 PM       Lab Results   Component Value Date/Time    HBA1C 5.2 01/15/2018 02:11 PM       Lab Results   Component Value Date/Time    MICRALB <3.0 03/02/2022 04:15 AM             Medical Decision Making:  Today's Assessment / Status / Plan:     1. Hypertension, unspecified type           Patient Type: Primary Prevention    Etiology of Established CVD if Present: none    Lipid Management: Qualifies for Statin Therapy Based on 2018 ACC/AHA Guidelines: no  Calculated 10-Year Risk of ASCVD: N/A  Currently on Statin: No  Outside age range for consideration of statin therapy in primary prevention per acc/aha guidelines   - continue lifestyle mod    Blood Pressure  Management:  Acc/[facility] (2017) Blood Pressure Goal <130/80  Long h/o difficult to control bp with white coat htn and some degree of pseudopheo  Reasonable control both at home and in the office  Has had hyponatremia on diuretics in past - continue to avoid  Perhaps a bit of leg swelling on amlodipine, but reasonable on this dose  GFR and electrolytes well-maintained on most recent blood work  Plan:  - continue current meds  - conitnue excellent home bp monitoring    Glycemic Status: prediabetes  Has stable mild IFG  - Continue lifestyle mod    Anti-Platelet/Anti-Coagulant Tx: mo  Not necessarily indicated at present    Smoking: continue complete avoidance     Physical Activity: Continue to use her new exercise machine at least 10 minutes a day    Weight Management and Nutrition: Focus on sodium restriction.  Avoid further weight loss    Other     -Anxiety and insomnia - seems stable. Defer further management to pcp    -Thyroid nodules - smaller on f/u u/s. Defer any further indicated w/u to pcp    Instructed to follow-up with PCP for remainder of adult medical needs: yes  We will partner with other providers in the management of established vascular disease and cardiometabolic risk factors.    Studies to Be Obtained: none  Labs to Be Obtained: per pcp    Follow up in: 6 months    Michael Bloch, MD  [facility]

## 2023-05-12 ENCOUNTER — OFFICE VISIT (OUTPATIENT)
Dept: MEDICAL GROUP | Facility: MEDICAL CENTER | Age: 88
End: 2023-05-12
Payer: MEDICARE

## 2023-05-12 VITALS
BODY MASS INDEX: 23.07 KG/M2 | TEMPERATURE: 97.2 F | HEART RATE: 82 BPM | SYSTOLIC BLOOD PRESSURE: 124 MMHG | RESPIRATION RATE: 14 BRPM | DIASTOLIC BLOOD PRESSURE: 80 MMHG | HEIGHT: 66 IN | WEIGHT: 143.52 LBS | OXYGEN SATURATION: 95 %

## 2023-05-12 DIAGNOSIS — M81.0 AGE-RELATED OSTEOPOROSIS WITHOUT CURRENT PATHOLOGICAL FRACTURE: ICD-10-CM

## 2023-05-12 DIAGNOSIS — G47.00 INSOMNIA, UNSPECIFIED TYPE: ICD-10-CM

## 2023-05-12 DIAGNOSIS — R73.01 IMPAIRED FASTING GLUCOSE: ICD-10-CM

## 2023-05-12 DIAGNOSIS — E04.2 MULTIPLE THYROID NODULES: ICD-10-CM

## 2023-05-12 DIAGNOSIS — I10 PRIMARY HYPERTENSION: ICD-10-CM

## 2023-05-12 DIAGNOSIS — E78.00 PURE HYPERCHOLESTEROLEMIA: ICD-10-CM

## 2023-05-12 PROCEDURE — 3074F SYST BP LT 130 MM HG: CPT | Performed by: FAMILY MEDICINE

## 2023-05-12 PROCEDURE — 1126F AMNT PAIN NOTED NONE PRSNT: CPT | Performed by: FAMILY MEDICINE

## 2023-05-12 PROCEDURE — 3079F DIAST BP 80-89 MM HG: CPT | Performed by: FAMILY MEDICINE

## 2023-05-12 PROCEDURE — 99214 OFFICE O/P EST MOD 30 MIN: CPT | Performed by: FAMILY MEDICINE

## 2023-05-12 ASSESSMENT — PATIENT HEALTH QUESTIONNAIRE - PHQ9: CLINICAL INTERPRETATION OF PHQ2 SCORE: 0

## 2023-05-12 ASSESSMENT — FIBROSIS 4 INDEX: FIB4 SCORE: 1.03

## 2023-05-12 NOTE — PROGRESS NOTES
"  Subjective:     Prabha Lizama is a 87 y.o. female presenting for a follow up          Current Outpatient Medications:     telmisartan (MICARDIS) 80 MG Tab, TAKE 1 TABLET EVERY MORNING, Disp: 90 Tablet, Rfl: 3    amLODIPine (NORVASC) 2.5 MG Tab, Take 1 Tablet by mouth every day., Disp: 90 Tablet, Rfl: 3    carvedilol (COREG) 25 MG Tab, Take 1 Tablet by mouth 2 times a day., Disp: 180 Tablet, Rfl: 1    terazosin (HYTRIN) 2 MG Cap, TAKE 2 CAPSULE BY MOUTH TWICE DAILY, Disp: 360 Capsule, Rfl: 1    VITAMIN D PO, Take  by mouth., Disp: , Rfl:     CALCIUM PO, Take  by mouth., Disp: , Rfl:     Acetaminophen (TYLENOL PO), Take  by mouth., Disp: , Rfl:     Objective:     Vitals: /80 (BP Location: Right arm, Patient Position: Sitting, BP Cuff Size: Adult)   Pulse 82   Temp 36.2 °C (97.2 °F) (Temporal)   Resp 14   Ht 1.676 m (5' 6\")   Wt 65.1 kg (143 lb 8.3 oz)   SpO2 95%   BMI 23.16 kg/m²   General: Alert  HEENT: Normocephalic.   Heart: Regular rate and rhythm.  S1 and S2 normal.  No murmurs appreciated.  Respiratory: Normal respiratory effort.  Clear to auscultation bilaterally.  Abdomen: Non-distended, soft  Extremities: No leg edema.    Assessment/Plan:     Prabha was seen today for follow-up and lab results.    Diagnoses and all orders for this visit:    Multiple thyroid nodules  Chronic, stable, we discussed her last ultrasound.  We will continue to monitor we discussed repeating in about 2 years.   -     Basic Metabolic Panel; Future    Primary hypertension  Chronic, stable.  Remains well controlled.  Denies any side effects to medications.  She continues on amlodipine, carvedilol, telmisartan, terazosin  -     Basic Metabolic Panel; Future    Pure hypercholesterolemia  Chronic, stable, continue to monitor  -     Basic Metabolic Panel; Future    Impaired fasting blood sugar  Chronic, stable, continue to monitor  -     Basic Metabolic Panel; Future  -     HEMOGLOBIN A1C; Future    Age-related " osteoporosis without current pathological fracture  Chronic, stable, continue supplements  -     VITAMIN D,25 HYDROXY (DEFICIENCY); Future  -     Basic Metabolic Panel; Future    Insomnia, unspecified type  Chronic, stable, she reports that she rarely uses Ativan.  She has tried Tylenol PM, trazodone in the past.  Could consider trying hydroxyzine or quetiapine if needed.        Return in about 6 months (around 11/12/2023) for routine follow up.

## 2023-07-03 DIAGNOSIS — R03.0 WHITE COAT SYNDROME WITH HIGH BLOOD PRESSURE BUT WITHOUT HYPERTENSION: ICD-10-CM

## 2023-07-03 RX ORDER — CARVEDILOL 25 MG/1
25 TABLET ORAL 2 TIMES DAILY
Qty: 180 TABLET | Refills: 1 | Status: SHIPPED | OUTPATIENT
Start: 2023-07-03 | End: 2023-12-28

## 2023-07-03 RX ORDER — TERAZOSIN 2 MG/1
CAPSULE ORAL
Qty: 360 CAPSULE | Refills: 1 | Status: SHIPPED | OUTPATIENT
Start: 2023-07-03 | End: 2023-12-28

## 2023-07-05 DIAGNOSIS — R03.0 WHITE COAT SYNDROME WITH HIGH BLOOD PRESSURE BUT WITHOUT HYPERTENSION: ICD-10-CM

## 2023-07-05 RX ORDER — CARVEDILOL 25 MG/1
25 TABLET ORAL 2 TIMES DAILY
Qty: 180 TABLET | Refills: 1 | OUTPATIENT
Start: 2023-07-05

## 2023-07-05 RX ORDER — TERAZOSIN 2 MG/1
CAPSULE ORAL
Qty: 360 CAPSULE | Refills: 1 | OUTPATIENT
Start: 2023-07-05

## 2023-09-18 ENCOUNTER — DOCUMENTATION (OUTPATIENT)
Dept: HEALTH INFORMATION MANAGEMENT | Facility: OTHER | Age: 88
End: 2023-09-18
Payer: MEDICARE

## 2023-10-23 ENCOUNTER — OFFICE VISIT (OUTPATIENT)
Dept: VASCULAR LAB | Facility: MEDICAL CENTER | Age: 88
End: 2023-10-23
Attending: INTERNAL MEDICINE
Payer: MEDICARE

## 2023-10-23 VITALS
WEIGHT: 146 LBS | HEART RATE: 73 BPM | SYSTOLIC BLOOD PRESSURE: 171 MMHG | HEIGHT: 66 IN | DIASTOLIC BLOOD PRESSURE: 80 MMHG | BODY MASS INDEX: 23.46 KG/M2

## 2023-10-23 DIAGNOSIS — R03.0 WHITE COAT SYNDROME WITH HIGH BLOOD PRESSURE BUT WITHOUT HYPERTENSION: ICD-10-CM

## 2023-10-23 DIAGNOSIS — F41.9 ANXIETY: ICD-10-CM

## 2023-10-23 DIAGNOSIS — I10 HYPERTENSION, UNSPECIFIED TYPE: ICD-10-CM

## 2023-10-23 PROCEDURE — 3079F DIAST BP 80-89 MM HG: CPT | Performed by: INTERNAL MEDICINE

## 2023-10-23 PROCEDURE — 99213 OFFICE O/P EST LOW 20 MIN: CPT | Performed by: INTERNAL MEDICINE

## 2023-10-23 PROCEDURE — 3077F SYST BP >= 140 MM HG: CPT | Performed by: INTERNAL MEDICINE

## 2023-10-23 PROCEDURE — 99212 OFFICE O/P EST SF 10 MIN: CPT

## 2023-10-23 RX ORDER — LORAZEPAM 0.5 MG/1
0.5 TABLET ORAL EVERY 8 HOURS PRN
Qty: 20 TABLET | Refills: 1 | Status: SHIPPED | OUTPATIENT
Start: 2023-10-23 | End: 2024-01-17

## 2023-10-23 ASSESSMENT — FIBROSIS 4 INDEX: FIB4 SCORE: 1.05

## 2023-10-23 NOTE — PROGRESS NOTES
"VASCULAR MEDICINE CLINIC - Follow up VISIT  10/23/23    Prabha Lizmaa is a 88 y.o.  female who presents today  for   Chief Complaint   Patient presents with    Follow-Up      Subjective    HPI:  Patient returns for evaluation and management of hypertensoin  Good adhernce with bp meds  Keeps excellent BP logs, daily  BPs at home usually 110s-120s/50s-60s mostly  No headaches  No lightheadedness   No interfering substances   Limited by back pain, but improved  Anxiety under reasonable control.    No tia or cva symptoms  No angina or palpitations       Social History     Tobacco Use    Smoking status: Never    Smokeless tobacco: Never   Vaping Use    Vaping Use: Never used   Substance Use Topics    Alcohol use: No    Drug use: No      DIET AND EXERCISE:  Weight Change: stable  Diet: relatively low salt  Exercise: Using new exercise machine for about 10 minutes each day        Objective       Objective:     Vitals:    10/23/23 1402 10/23/23 1406   BP: (!) 177/79 (!) 171/80   BP Location: Left arm Left arm   Patient Position: Sitting Sitting   BP Cuff Size: Adult Adult   Pulse: 73 73   Weight: 66.2 kg (146 lb)    Height: 1.676 m (5' 6\")       Physical Exam  Vitals reviewed.   Constitutional:       General: She is not in acute distress.     Appearance: She is not diaphoretic.   HENT:      Head: Normocephalic and atraumatic.   Eyes:      General: No scleral icterus.     Conjunctiva/sclera: Conjunctivae normal.   Neck:      Vascular: No carotid bruit.   Cardiovascular:      Rate and Rhythm: Normal rate and regular rhythm.      Heart sounds: Normal heart sounds. No murmur heard.  Pulmonary:      Effort: Pulmonary effort is normal. No respiratory distress.      Breath sounds: Normal breath sounds. No wheezing or rales.   Musculoskeletal:      Right lower leg: No edema.      Left lower leg: No edema.   Skin:     Coloration: Skin is not pale.   Neurological:      General: No focal deficit present.      Mental Status: She " is alert and oriented to person, place, and time.      Cranial Nerves: No cranial nerve deficit.      Coordination: Coordination normal.      Gait: Gait is intact. Gait normal.   Psychiatric:         Mood and Affect: Mood and affect normal.         Behavior: Behavior normal.          DATA REVIEW    Lab Results   Component Value Date/Time    CHOLSTRLTOT 220 (H) 04/06/2023 04:51 AM    CHOLSTRLTOT 200 (H) 02/08/2010 07:10 AM     (H) 05/02/2019 06:56 AM     (H) 02/08/2010 07:10 AM    HDL 56 04/06/2023 04:51 AM    HDL 54 02/08/2010 07:10 AM    TRIGLYCERIDE 105 04/06/2023 04:51 AM    TRIGLYCERIDE 121 02/08/2010 07:10 AM       Lab Results   Component Value Date/Time    SODIUM 142 04/06/2023 04:51 AM    SODIUM 138 08/08/2021 06:37 PM    POTASSIUM 4.2 04/06/2023 04:51 AM    POTASSIUM 3.7 08/08/2021 06:37 PM    CHLORIDE 107 (H) 04/06/2023 04:51 AM    CHLORIDE 104 08/08/2021 06:37 PM    CO2 23 04/06/2023 04:51 AM    CO2 22 08/08/2021 06:37 PM    GLUCOSE 105 (H) 04/06/2023 04:51 AM    GLUCOSE 108 (H) 08/08/2021 06:37 PM    BUN 12 04/06/2023 04:51 AM    BUN 9 08/08/2021 06:37 PM    CREATININE 0.79 04/06/2023 04:51 AM    CREATININE 0.67 08/08/2021 06:37 PM    CREATININE 0.87 07/07/2011 03:30 PM    BUNCREATRAT 15 04/06/2023 04:51 AM    BUNCREATRAT 11 07/07/2011 03:30 PM    GLOMRATE >59 08/04/2010 08:10 AM     Lab Results   Component Value Date/Time    ALKPHOSPHAT 72 04/06/2023 04:51 AM    ALKPHOSPHAT 69 08/08/2021 06:37 PM    ASTSGOT 15 04/06/2023 04:51 AM    ASTSGOT 16 08/08/2021 06:37 PM    ALTSGPT 19 04/06/2023 04:51 AM    ALTSGPT 23 08/08/2021 06:37 PM    TBILIRUBIN 0.4 04/06/2023 04:51 AM    TBILIRUBIN 0.4 08/08/2021 06:37 PM       Lab Results   Component Value Date/Time    HBA1C 5.2 01/15/2018 02:11 PM       Lab Results   Component Value Date/Time    MICRALB <3.0 03/02/2022 04:15 AM             Medical Decision Making:  Today's Assessment / Status / Plan:     1. Hypertension, unspecified type        2. White  coat syndrome with high blood pressure but without hypertension        3. Anxiety           Patient Type: Primary Prevention    Etiology of Established CVD if Present: none    Lipid Management: Qualifies for Statin Therapy Based on 2018 ACC/AHA Guidelines: no  Calculated 10-Year Risk of ASCVD: N/A  Currently on Statin: No  Outside age range for consideration of statin therapy in primary prevention per acc/aha guidelines   - continue lifestyle mod    Blood Pressure Management:  Acc/aha (2017) Blood Pressure Goal <130/80  Long h/o difficult to control bp with white coat htn and some degree of pseudopheo  Reasonable control both at home and in the office  Has had hyponatremia on diuretics in past - continue to avoid  Perhaps a bit of leg swelling on amlodipine, but reasonable on this dose  GFR and electrolytes well-maintained on most recent blood work  Plan:  - continue current meds  - conitnue excellent home bp monitoring    Glycemic Status: prediabetes  Has stable mild IFG  - Continue lifestyle mod    Anti-Platelet/Anti-Coagulant Tx: mo  Not necessarily indicated at present    Smoking: continue complete avoidance     Physical Activity: Continue to use her new exercise machine at least 10 minutes a day    Weight Management and Nutrition: Focus on sodium restriction.  Avoid further weight loss    Other     -Anxiety and insomnia - seems stable. Defer further management to pcp    -Thyroid nodules - smaller on f/u u/s. Defer any further indicated w/u to pcp    Instructed to follow-up with PCP for remainder of adult medical needs: yes  We will partner with other providers in the management of established vascular disease and cardiometabolic risk factors.    Studies to Be Obtained: none  Labs to Be Obtained: per pcp    Follow up in: 6 months    Michael Bloch, MD  [facility]

## 2023-10-26 LAB
25(OH)D3+25(OH)D2 SERPL-MCNC: 44.1 NG/ML (ref 30–100)
BUN SERPL-MCNC: 13 MG/DL (ref 8–27)
BUN/CREAT SERPL: 18 (ref 12–28)
CALCIUM SERPL-MCNC: 9.1 MG/DL (ref 8.7–10.3)
CHLORIDE SERPL-SCNC: 102 MMOL/L (ref 96–106)
CO2 SERPL-SCNC: 24 MMOL/L (ref 20–29)
CREAT SERPL-MCNC: 0.73 MG/DL (ref 0.57–1)
EGFRCR SERPLBLD CKD-EPI 2021: 80 ML/MIN/1.73
GLUCOSE SERPL-MCNC: 98 MG/DL (ref 70–99)
HBA1C MFR BLD: 5.6 % (ref 4.8–5.6)
POTASSIUM SERPL-SCNC: 4.5 MMOL/L (ref 3.5–5.2)
SODIUM SERPL-SCNC: 140 MMOL/L (ref 134–144)

## 2023-11-03 ENCOUNTER — OFFICE VISIT (OUTPATIENT)
Dept: MEDICAL GROUP | Facility: MEDICAL CENTER | Age: 88
End: 2023-11-03
Payer: MEDICARE

## 2023-11-03 VITALS
HEIGHT: 66 IN | TEMPERATURE: 98.2 F | DIASTOLIC BLOOD PRESSURE: 84 MMHG | BODY MASS INDEX: 23.63 KG/M2 | HEART RATE: 78 BPM | OXYGEN SATURATION: 98 % | WEIGHT: 147 LBS | SYSTOLIC BLOOD PRESSURE: 130 MMHG

## 2023-11-03 DIAGNOSIS — R68.2 DRY MOUTH: ICD-10-CM

## 2023-11-03 DIAGNOSIS — F41.9 ANXIETY: ICD-10-CM

## 2023-11-03 DIAGNOSIS — R73.01 IMPAIRED FASTING GLUCOSE: ICD-10-CM

## 2023-11-03 DIAGNOSIS — G47.00 INSOMNIA, UNSPECIFIED TYPE: ICD-10-CM

## 2023-11-03 DIAGNOSIS — K59.04 CHRONIC IDIOPATHIC CONSTIPATION: ICD-10-CM

## 2023-11-03 DIAGNOSIS — E04.2 MULTIPLE THYROID NODULES: ICD-10-CM

## 2023-11-03 DIAGNOSIS — I10 PRIMARY HYPERTENSION: ICD-10-CM

## 2023-11-03 DIAGNOSIS — R32 URINARY INCONTINENCE, UNSPECIFIED TYPE: ICD-10-CM

## 2023-11-03 DIAGNOSIS — M81.0 AGE-RELATED OSTEOPOROSIS WITHOUT CURRENT PATHOLOGICAL FRACTURE: ICD-10-CM

## 2023-11-03 PROBLEM — K59.00 CONSTIPATION: Status: ACTIVE | Noted: 2023-11-03

## 2023-11-03 PROBLEM — M35.00 SICCA (HCC): Status: ACTIVE | Noted: 2023-11-03

## 2023-11-03 PROCEDURE — 99214 OFFICE O/P EST MOD 30 MIN: CPT | Performed by: BEHAVIOR ANALYST

## 2023-11-03 PROCEDURE — 3075F SYST BP GE 130 - 139MM HG: CPT | Performed by: BEHAVIOR ANALYST

## 2023-11-03 PROCEDURE — 3079F DIAST BP 80-89 MM HG: CPT | Performed by: BEHAVIOR ANALYST

## 2023-11-03 ASSESSMENT — ENCOUNTER SYMPTOMS: SHORTNESS OF BREATH: 0

## 2023-11-03 ASSESSMENT — FIBROSIS 4 INDEX: FIB4 SCORE: 1.05

## 2023-11-03 NOTE — PATIENT INSTRUCTIONS
Constipation is a fairly common problem, and fortunately there are many good treatments available.  Listed below are things you can do to help with this issue.  I recommend you start with the first suggestion listed, and if that is not enough to help then keep working your way down the list until you get to the point where you are having normal bowel movements, then try backing off down the list to see if you can maintain normal bowel movements with something less aggressive.    1)  Increasing water intake to about 80 ounces a day (a bit more than a half gallon of water) can help.  In addition, regular exercise can make an enormous difference besides being generally good for you anyway.  Making sure your diet includes plenty of fruits and vegetables is also very helpful.    2)  Adding additional fiber to your diet with products like metamucil, benefiber, citrucel, or psyllium can help by adding bulk to your stool, keeping it from getting quite so packed down.    3) Magnesium citrate capsule (250mg) relaxes smooth muscles and softens stool. Take 1-2 capsules once every evening.     4) Polyethylene glycol (Miralax or its generic equivalent) is an osmotic laxative, meaning it brings extra water into your colon, helping keep your stool from getting hard and packed down.  One capful a day should give normal soft stool within about 2-3 days.  If not, consider trying 1 capful twice a day.    5) Milk of magnesia (30 ml daily) or magnesium citrate (1/2 to 1 bottle daily), or a bisacodyl (Dulcolax) suppository can be used next to get things moving.    6) If this has been ineffective, using Senna-S, 1-2 tablets one to two times daily can be helpful.    7) If all these measures have been ineffective, you can try a mineral oil enema or a warm tap water enema to see if this helps things.

## 2023-11-03 NOTE — PROGRESS NOTES
Subjective:     CC:    Chief Complaint   Patient presents with    Establish Care     What to do to get energy up          HISTORY OF THE PRESENT ILLNESS: Patient is a 88 y.o. female. This pleasant patient is here today to establish care and discuss chronic conditions.  Prior PCP was Dr. Gonzalez.    Problem   Constipation    She reports recent constipation. She mentioned this to Dr. Bloch and he recommended prunes which has been helpful.      Sicca (Hcc)    Reporting dry mouth. Just started biotin which has been helpful.      Age-Related Osteoporosis Without Current Pathological Fracture    Dexa results: 2020: T score: femur: -3.5   Spine:  -3.5  10-year Probability of Fracture:  Major Osteoporotic     36.4%  Hip     16.7%    Calcium and Vit D supplements:  Falls or fractures: hx of compression fracture  Weight bearing exercise:    She has declined starting medication in the past.      Urinary Incontinence    She reports leakage of urine and wears a pad.      Anxiety    She reports chronic anxiety and has been taking intermittent alprazolam for years. She admits she was very nervous about seeing me today.      Insomnia, Unspecified    Ongoing for many years. She states she is not taking any medication for this.   However, she then admits that if she wakes up and feels very anxious she takes a lorazepam to help her go back to sleep. She states at most she takes 0.5 tab lorazepam about once a week.   Last refill of lorazepam was from Dr. Bloch 10/23 and she says she hasn't picked up this refill yet.   She has been meaning to try melatonin but hasn't yet.      Multiple Thyroid Nodules    US completed 5/2015, 6/2017, 2/2020, 7/2021, 3/2023. Most recent showed smaller size of nodules. Repeat 3/2025     Impaired fasting blood sugar    Lab Results   Component Value Date/Time    HBA1C 5.6 10/25/2023 0739          Primary Hypertension    Has been difficult to treat in the past.  Is been seen by vascular medicine for many  "years for resistant hypertension.  Current Meds: Amlodipine 2.5 mg daily, carvedilol 25 mg twice daily, terazosin 2 mg twice daily , and telmisartan 80 mg  reports Good adhernce with bp meds. She gets nervous at appt and BP can be higher.   Keeps excellent BP logs, daily  BPs at home usually 110s-120s/50s-60s mostly  Denies chest pain, sob, headaches, dizziness/lightheadedness  She notes an occasional tingling in her head when moving from sitting to standing.          Current Outpatient Medications   Medication Sig    LORazepam (ATIVAN) 0.5 MG Tab Take 1 Tablet by mouth every 8 hours as needed for Anxiety for up to 40 doses.    terazosin (HYTRIN) 2 MG Cap TAKE 2 CAPSULE BY MOUTH TWICE DAILY    carvedilol (COREG) 25 MG Tab Take 1 Tablet by mouth 2 times a day.    telmisartan (MICARDIS) 80 MG Tab TAKE 1 TABLET EVERY MORNING    amLODIPine (NORVASC) 2.5 MG Tab Take 1 Tablet by mouth every day.    VITAMIN D PO Take  by mouth.    CALCIUM PO Take  by mouth.    Acetaminophen (TYLENOL PO) Take  by mouth.        Social History     Socioeconomic History    Marital status:    Tobacco Use    Smoking status: Never    Smokeless tobacco: Never   Vaping Use    Vaping Use: Never used   Substance and Sexual Activity    Alcohol use: No    Drug use: No    Sexual activity: Not Currently     Partners: Male       Family History   Problem Relation Age of Onset    Heart Disease Mother     Heart Disease Father     Hypertension Sister     Arthritis Sister     Thyroid Sister        ROS: See HPI  Review of Systems   Constitutional:  Negative for malaise/fatigue.   Respiratory:  Negative for shortness of breath.    Cardiovascular:  Negative for chest pain.         Objective:     Exam: /84 (BP Location: Left arm, Patient Position: Sitting, BP Cuff Size: Adult)   Pulse 78   Temp 36.8 °C (98.2 °F) (Temporal)   Ht 1.676 m (5' 6\")   Wt 66.7 kg (147 lb)   SpO2 98%   BMI 23.73 kg/m²   Body mass index is 23.73 kg/m².    Physical " Exam  Constitutional:       Appearance: Normal appearance.   Cardiovascular:      Rate and Rhythm: Normal rate and regular rhythm.      Pulses: Normal pulses.      Heart sounds: Normal heart sounds.   Pulmonary:      Effort: Pulmonary effort is normal.      Breath sounds: Normal breath sounds.   Musculoskeletal:      Cervical back: Normal range of motion and neck supple.   Neurological:      Mental Status: She is alert.                Assessment & Plan:     88 y.o. female with the following -     1. Primary hypertension  - Chronic, controlled.  Continue follow-up vascular medicine.  Continue  Amlodipine 2.5 mg daily, carvedilol 25 mg twice daily, terazosin 2 mg twice daily , and telmisartan 80 mg  -Recommended adequate hydration and nutrition, change positions slowly, and exercise,  to avoid symptoms of orthostatic dizziness.     2. Multiple thyroid nodules  -Chronic, stable with recent imaging showing shrinking of the nodules.  -Plan to repeat thyroid ultrasound 3/2025    3. Age-related osteoporosis without current pathological fracture  -Chronic, untreated.  Quite severe osteoporosis without medical management.  Chart review shows that her prior endocrinologist, Dr. Crowe, recommended treating her osteoporosis but patient had declined.  Patient has no recollection of ever discussing treatment for osteoporosis and is very surprised regarding our discussion today.  -Counseled patient regarding the guidelines on treating osteoporosis.  She is undecided on whether she wants to be in treatment or not.  We will complete a bone density scan in 3 discussed at next visit.  - DS-BONE DENSITY STUDY (DEXA); Future    4. Insomnia, unspecified type  -Chronic problem, stable.  We reviewed the potential risk of dependence and withdrawal with lorazepam.  She is not requesting a refill today.  Recommended sleep hygiene techniques and melatonin    5. Anxiety  -Chronic, persistent.  Has only been treated with as needed  benzodiazepines in the past.  Continue to monitor and discuss alternatives to benzos in the future.    6. Chronic idiopathic constipation  - -Spent significant time reviewing treatment for constipation.  Provided patient with written education in patient education section.  -In summary recommendations include: Increase water intake and exercise, add magnesium citrate, add fiber such as Metamucil and how to appropriate titrate MiraLAX.     7. Impaired fasting blood sugar  -Chronic, stable.    8. Dry mouth  -Recommended Biotene mouthwash    9. Urinary incontinence, unspecified type  -Recommend pelvic floor exercises.  Offered referral to pelvic floor physical therapy but patient declines for now.        Return in about 2 months (around 1/3/2024) for Medicare AWV.    Please note that this dictation was created using voice recognition software. I have made every reasonable attempt to correct obvious errors, but I expect that there are errors of grammar and possibly content that I did not discover before finalizing the note.

## 2023-11-03 NOTE — LETTER
November 3, 2023      Prabha Lizama      Constipation is a fairly common problem, and fortunately there are many good treatments available.  Listed below are things you can do to help with this issue.  I recommend you start with the first suggestion listed, and if that is not enough to help then keep working your way down the list until you get to the point where you are having normal bowel movements, then try backing off down the list to see if you can maintain normal bowel movements with something less aggressive.    1)  Increasing water intake to about 80 ounces a day (a bit more than a half gallon of water) can help.  In addition, regular exercise can make an enormous difference besides being generally good for you anyway.  Making sure your diet includes plenty of fruits and vegetables is also very helpful.    2)  Adding additional fiber to your diet with products like metamucil, benefiber, citrucel, or psyllium can help by adding bulk to your stool, keeping it from getting quite so packed down.    3) Magnesium citrate capsule (250mg) relaxes smooth muscles and softens stool. Take 1-2 capsules once every evening.     4) Polyethylene glycol (Miralax or its generic equivalent) is an osmotic laxative, meaning it brings extra water into your colon, helping keep your stool from getting hard and packed down.  One capful a day should give normal soft stool within about 2-3 days.  If not, consider trying 1 capful twice a day.    5) Milk of magnesia (30 ml daily) or magnesium citrate (1/2 to 1 bottle daily), or a bisacodyl (Dulcolax) suppository can be used next to get things moving.    6) If this has been ineffective, using Senna-S, 1-2 tablets one to two times daily can be helpful.    7) If all these measures have been ineffective, you can try a mineral oil enema or a warm tap water enema to see if this helps things.       JOVANY Pascal.

## 2023-12-28 DIAGNOSIS — R03.0 WHITE COAT SYNDROME WITH HIGH BLOOD PRESSURE BUT WITHOUT HYPERTENSION: ICD-10-CM

## 2023-12-28 RX ORDER — CARVEDILOL 25 MG/1
25 TABLET ORAL 2 TIMES DAILY
Qty: 180 TABLET | Refills: 1 | Status: SHIPPED | OUTPATIENT
Start: 2023-12-28

## 2023-12-28 RX ORDER — TERAZOSIN 2 MG/1
CAPSULE ORAL
Qty: 360 CAPSULE | Refills: 1 | Status: SHIPPED | OUTPATIENT
Start: 2023-12-28

## 2024-03-28 DIAGNOSIS — R03.0 WHITE COAT SYNDROME WITH HIGH BLOOD PRESSURE BUT WITHOUT HYPERTENSION: ICD-10-CM

## 2024-03-28 RX ORDER — AMLODIPINE BESYLATE 2.5 MG/1
2.5 TABLET ORAL DAILY
Qty: 90 TABLET | Refills: 3 | Status: SHIPPED | OUTPATIENT
Start: 2024-03-28

## 2024-03-28 NOTE — TELEPHONE ENCOUNTER
Received request via: Pharmacy    Was the patient seen in the last year in this department? Yes    Does the patient have an active prescription (recently filled or refills available) for medication(s) requested? No    [email or web address]    Does the patient have alf Plus and need 100 day supply (blood pressure, diabetes and cholesterol meds only)? Patient does not have SCP

## 2024-04-01 DIAGNOSIS — I10 HYPERTENSION, UNSPECIFIED TYPE: ICD-10-CM

## 2024-04-01 RX ORDER — TELMISARTAN 80 MG/1
TABLET ORAL
Qty: 90 TABLET | Refills: 3 | Status: SHIPPED | OUTPATIENT
Start: 2024-04-01

## 2024-04-03 ENCOUNTER — TELEPHONE (OUTPATIENT)
Dept: VASCULAR LAB | Facility: MEDICAL CENTER | Age: 89
End: 2024-04-03
Payer: MEDICARE

## 2024-04-03 DIAGNOSIS — I10 HYPERTENSION, UNSPECIFIED TYPE: ICD-10-CM

## 2024-04-03 DIAGNOSIS — R03.0 WHITE COAT SYNDROME WITH HIGH BLOOD PRESSURE BUT WITHOUT HYPERTENSION: ICD-10-CM

## 2024-04-03 NOTE — TELEPHONE ENCOUNTER
Established patient  Chart prep for upcoming appointment with Dr. Bloch    Any pending/incomplete orders from last visit? No, all orders completed.  Was patient called and reminded to complete pending orders? N/A orders complete  Were any records requested?  No  Correct appointment type (New/Established/virtual)? Yes  Referral up to date? Yes renewal ordered, sent to provider to co-sign, AND new referral attached to upcoming appointment.    Referral attached to appointment (renewals and New patients only)? Yes renewal ordered and sent to provider to co-sign     Nori Cantu, Med Ass't  [facility]  Ph. [phone]  Fx. [phone]

## 2024-04-12 ENCOUNTER — OFFICE VISIT (OUTPATIENT)
Dept: VASCULAR LAB | Facility: MEDICAL CENTER | Age: 89
End: 2024-04-12
Attending: INTERNAL MEDICINE
Payer: MEDICARE

## 2024-04-12 VITALS
DIASTOLIC BLOOD PRESSURE: 77 MMHG | SYSTOLIC BLOOD PRESSURE: 146 MMHG | HEIGHT: 64 IN | BODY MASS INDEX: 25.61 KG/M2 | HEART RATE: 76 BPM | WEIGHT: 150 LBS

## 2024-04-12 DIAGNOSIS — I10 HYPERTENSION, UNSPECIFIED TYPE: ICD-10-CM

## 2024-04-12 PROCEDURE — 99213 OFFICE O/P EST LOW 20 MIN: CPT | Performed by: INTERNAL MEDICINE

## 2024-04-12 PROCEDURE — G2211 COMPLEX E/M VISIT ADD ON: HCPCS | Performed by: INTERNAL MEDICINE

## 2024-04-12 PROCEDURE — 3078F DIAST BP <80 MM HG: CPT | Performed by: INTERNAL MEDICINE

## 2024-04-12 PROCEDURE — 99212 OFFICE O/P EST SF 10 MIN: CPT

## 2024-04-12 PROCEDURE — 3077F SYST BP >= 140 MM HG: CPT | Performed by: INTERNAL MEDICINE

## 2024-04-12 ASSESSMENT — FIBROSIS 4 INDEX: FIB4 SCORE: 1.05

## 2024-04-12 NOTE — PROGRESS NOTES
"VASCULAR MEDICINE CLINIC - Follow up VISIT  04/12/24    Prabha Lizama is a 88 y.o. female who presents today  for   Chief Complaint   Patient presents with    Follow-Up      Subjective    HPI:  Patient returns for evaluation and management of hypertensoin  Good adhernce with bp meds  Keeps excellent BP logs, daily  BPs at home usually 110s-120s/50s-60s mostly  No headaches  No lightheadedness   No interfering substances   Limited by back pain, but improved  Anxiety under reasonable control.    No tia or cva symptoms  No angina or palpitations       Social History     Tobacco Use    Smoking status: Never    Smokeless tobacco: Never   Vaping Use    Vaping Use: Never used   Substance Use Topics    Alcohol use: No    Drug use: No      DIET AND EXERCISE:  Weight Change: up a few pounds  Diet: relatively low salt  Exercise: Using new exercise machine for at least 10 minutes each day. Still very active in ADLs        Objective       Objective:     Vitals:    04/12/24 1327 04/12/24 1330   BP: (!) 159/78 (!) 146/77   BP Location: Left arm Left arm   Patient Position: Sitting Sitting   BP Cuff Size: Adult Adult   Pulse: 77 76   Weight: 68 kg (150 lb)    Height: 1.626 m (5' 4\")       BMI Readings from Last 4 Encounters:   04/12/24 25.75 kg/m²   11/03/23 23.73 kg/m²   10/23/23 23.57 kg/m²   05/12/23 23.16 kg/m²     Wt Readings from Last 4 Encounters:   04/12/24 68 kg (150 lb)   11/03/23 66.7 kg (147 lb)   10/23/23 66.2 kg (146 lb)   05/12/23 65.1 kg (143 lb 8.3 oz)      Physical Exam  Vitals reviewed.   Constitutional:       General: She is not in acute distress.     Appearance: She is not diaphoretic.   HENT:      Head: Normocephalic and atraumatic.   Eyes:      General: No scleral icterus.     Conjunctiva/sclera: Conjunctivae normal.   Neck:      Vascular: No carotid bruit.   Cardiovascular:      Rate and Rhythm: Normal rate and regular rhythm.      Heart sounds: Normal heart sounds. No murmur heard.  Pulmonary:      " Effort: Pulmonary effort is normal. No respiratory distress.      Breath sounds: Normal breath sounds. No wheezing or rales.   Musculoskeletal:      Right lower leg: No edema.      Left lower leg: No edema.   Skin:     Coloration: Skin is not pale.   Neurological:      General: No focal deficit present.      Mental Status: She is alert and oriented to person, place, and time.      Cranial Nerves: No cranial nerve deficit.      Coordination: Coordination normal.      Gait: Gait is intact. Gait normal.   Psychiatric:         Mood and Affect: Mood and affect normal.         Behavior: Behavior normal.          DATA REVIEW    Lab Results   Component Value Date/Time    CHOLSTRLTOT 220 (H) 04/06/2023 04:51 AM    CHOLSTRLTOT 200 (H) 02/08/2010 07:10 AM     (H) 05/02/2019 06:56 AM     (H) 02/08/2010 07:10 AM    HDL 56 04/06/2023 04:51 AM    HDL 54 02/08/2010 07:10 AM    TRIGLYCERIDE 105 04/06/2023 04:51 AM    TRIGLYCERIDE 121 02/08/2010 07:10 AM       Lab Results   Component Value Date/Time    SODIUM 140 10/25/2023 07:39 AM    SODIUM 138 08/08/2021 06:37 PM    POTASSIUM 4.5 10/25/2023 07:39 AM    POTASSIUM 3.7 08/08/2021 06:37 PM    CHLORIDE 102 10/25/2023 07:39 AM    CHLORIDE 104 08/08/2021 06:37 PM    CO2 24 10/25/2023 07:39 AM    CO2 22 08/08/2021 06:37 PM    GLUCOSE 98 10/25/2023 07:39 AM    GLUCOSE 108 (H) 08/08/2021 06:37 PM    BUN 13 10/25/2023 07:39 AM    BUN 9 08/08/2021 06:37 PM    CREATININE 0.73 10/25/2023 07:39 AM    CREATININE 0.67 08/08/2021 06:37 PM    CREATININE 0.87 07/07/2011 03:30 PM    BUNCREATRAT 18 10/25/2023 07:39 AM    BUNCREATRAT 11 07/07/2011 03:30 PM    GLOMRATE >59 08/04/2010 08:10 AM     Lab Results   Component Value Date/Time    ALKPHOSPHAT 72 04/06/2023 04:51 AM    ALKPHOSPHAT 69 08/08/2021 06:37 PM    ASTSGOT 15 04/06/2023 04:51 AM    ASTSGOT 16 08/08/2021 06:37 PM    ALTSGPT 19 04/06/2023 04:51 AM    ALTSGPT 23 08/08/2021 06:37 PM    TBILIRUBIN 0.4 04/06/2023 04:51 AM     TBILIRUBIN 0.4 08/08/2021 06:37 PM       Lab Results   Component Value Date/Time    HBA1C 5.6 10/25/2023 07:39 AM    HBA1C 5.2 01/15/2018 02:11 PM       Lab Results   Component Value Date/Time    MICRALB <3.0 03/02/2022 04:15 AM             Medical Decision Making:  Today's Assessment / Status / Plan:     1. Hypertension, unspecified type           Patient Type: Primary Prevention    Etiology of Established CVD if Present: none    Lipid Management: Qualifies for Statin Therapy Based on 2018 ACC/AHA Guidelines: no  Calculated 10-Year Risk of ASCVD: N/A  Currently on Statin: No  Outside age range for consideration of statin therapy in primary prevention per acc/aha guidelines   - continue lifestyle mod    Blood Pressure Management:  Acc/aha (2017) Blood Pressure Goal <130/80  Long h/o difficult to control bp with white coat htn and some degree of pseudopheo  Reasonable control both at home and in the office  Has had hyponatremia on diuretics in past - continue to avoid  Perhaps a bit of leg swelling on amlodipine, but reasonable on this dose  GFR and electrolytes well-maintained on most recent blood work  Plan:  - continue current meds  - conitnue excellent home bp monitoring    Glycemic Status: prediabetes  Has stable mild IFG  - Continue lifestyle mod    Anti-Platelet/Anti-Coagulant Tx: mo  Not necessarily indicated at present    Smoking: continue complete avoidance     Physical Activity: Continue to use her new exercise machine at least 10 minutes a day    Weight Management and Nutrition: Focus on sodium restriction.  Avoid further weight loss    Other     -Anxiety and insomnia - seems stable. Defer further management to pcp    -Thyroid nodules - smaller on f/u u/s. Defer any further indicated w/u to pcp    Instructed to follow-up with PCP for remainder of adult medical needs: yes  We will partner with other providers in the management of established vascular disease and cardiometabolic risk factors.    Studies to  Be Obtained: none  Labs to Be Obtained: per pcp    Follow up in: 6 months - needs appt.    Michael Bloch, MD  [facility]

## 2024-04-30 ENCOUNTER — HOSPITAL ENCOUNTER (EMERGENCY)
Facility: MEDICAL CENTER | Age: 89
End: 2024-04-30
Attending: STUDENT IN AN ORGANIZED HEALTH CARE EDUCATION/TRAINING PROGRAM
Payer: MEDICARE

## 2024-04-30 ENCOUNTER — APPOINTMENT (OUTPATIENT)
Dept: RADIOLOGY | Facility: MEDICAL CENTER | Age: 89
End: 2024-04-30
Attending: STUDENT IN AN ORGANIZED HEALTH CARE EDUCATION/TRAINING PROGRAM
Payer: MEDICARE

## 2024-04-30 VITALS
HEART RATE: 63 BPM | DIASTOLIC BLOOD PRESSURE: 78 MMHG | SYSTOLIC BLOOD PRESSURE: 160 MMHG | TEMPERATURE: 98 F | OXYGEN SATURATION: 97 % | RESPIRATION RATE: 18 BRPM
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
## 2024-04-30 DIAGNOSIS — M54.9 PAIN, UPPER BACK: ICD-10-CM

## 2024-04-30 LAB
ALBUMIN SERPL BCP-MCNC: 3.8 G/DL (ref 3.2–4.9)
ALBUMIN/GLOB SERPL: 1.2 G/DL
ALP SERPL-CCNC: 78 U/L (ref 30–99)
ALT SERPL-CCNC: 21 U/L (ref 2–50)
ANION GAP SERPL CALC-SCNC: 11 MMOL/L (ref 7–16)
AST SERPL-CCNC: 15 U/L (ref 12–45)
BASOPHILS # BLD AUTO: 0.7 % (ref 0–1.8)
BASOPHILS # BLD: 0.05 K/UL (ref 0–0.12)
BILIRUB SERPL-MCNC: 0.5 MG/DL (ref 0.1–1.5)
BUN SERPL-MCNC: 11 MG/DL (ref 8–22)
CALCIUM ALBUM COR SERPL-MCNC: 9 MG/DL (ref 8.5–10.5)
CALCIUM SERPL-MCNC: 8.8 MG/DL (ref 8.4–10.2)
CHLORIDE SERPL-SCNC: 98 MMOL/L (ref 96–112)
CO2 SERPL-SCNC: 23 MMOL/L (ref 20–33)
CREAT SERPL-MCNC: 0.59 MG/DL (ref 0.5–1.4)
EKG IMPRESSION: NORMAL
EOSINOPHIL # BLD AUTO: 0.17 K/UL (ref 0–0.51)
EOSINOPHIL NFR BLD: 2.2 % (ref 0–6.9)
ERYTHROCYTE [DISTWIDTH] IN BLOOD BY AUTOMATED COUNT: 44.1 FL (ref 35.9–50)
GFR SERPLBLD CREATININE-BSD FMLA CKD-EPI: 87 ML/MIN/1.73 M 2
GLOBULIN SER CALC-MCNC: 3.1 G/DL (ref 1.9–3.5)
GLUCOSE SERPL-MCNC: 105 MG/DL (ref 65–99)
HCT VFR BLD AUTO: 44.2 % (ref 37–47)
HGB BLD-MCNC: 15.1 G/DL (ref 12–16)
IMM GRANULOCYTES # BLD AUTO: 0.03 K/UL (ref 0–0.11)
IMM GRANULOCYTES NFR BLD AUTO: 0.4 % (ref 0–0.9)
LYMPHOCYTES # BLD AUTO: 1.2 K/UL (ref 1–4.8)
LYMPHOCYTES NFR BLD: 15.7 % (ref 22–41)
MCH RBC QN AUTO: 32.2 PG (ref 27–33)
MCHC RBC AUTO-ENTMCNC: 34.2 G/DL (ref 32.2–35.5)
MCV RBC AUTO: 94.2 FL (ref 81.4–97.8)
MONOCYTES # BLD AUTO: 0.9 K/UL (ref 0–0.85)
MONOCYTES NFR BLD AUTO: 11.8 % (ref 0–13.4)
NEUTROPHILS # BLD AUTO: 5.27 K/UL (ref 1.82–7.42)
NEUTROPHILS NFR BLD: 69.2 % (ref 44–72)
NRBC # BLD AUTO: 0 K/UL
NRBC BLD-RTO: 0 /100 WBC (ref 0–0.2)
PLATELET # BLD AUTO: 271 K/UL (ref 164–446)
PMV BLD AUTO: 9.9 FL (ref 9–12.9)
POTASSIUM SERPL-SCNC: 4 MMOL/L (ref 3.6–5.5)
PROT SERPL-MCNC: 6.9 G/DL (ref 6–8.2)
RBC # BLD AUTO: 4.69 M/UL (ref 4.2–5.4)
SODIUM SERPL-SCNC: 132 MMOL/L (ref 135–145)
TROPONIN T SERPL-MCNC: 7 NG/L (ref 6–19)
WBC # BLD AUTO: 7.6 K/UL (ref 4.8–10.8)

## 2024-04-30 RX ORDER — LIDOCAINE 50 MG/G
1 PATCH TOPICAL EVERY 24 HOURS
Qty: 10 PATCH | Refills: 0 | Status: ON HOLD | OUTPATIENT
Start: 2024-04-30 | End: 2024-05-03

## 2024-04-30 RX ORDER — IBUPROFEN 600 MG/1
600 TABLET ORAL ONCE
Status: COMPLETED | OUTPATIENT
Start: 2024-04-30 | End: 2024-04-30

## 2024-04-30 RX ORDER — LIDOCAINE 50 MG/G
1 PATCH TOPICAL EVERY 24 HOURS
Status: DISCONTINUED | OUTPATIENT
Start: 2024-04-30 | End: 2024-04-30 | Stop reason: HOSPADM

## 2024-04-30 RX ADMIN — LIDOCAINE 1 PATCH: 50 PATCH TOPICAL at 04:30

## 2024-04-30 RX ADMIN — IBUPROFEN 600 MG: 600 TABLET, FILM COATED ORAL at 04:30

## 2024-04-30 ASSESSMENT — PAIN DESCRIPTION - DESCRIPTORS: DESCRIPTORS: SHARP

## 2024-04-30 NOTE — ED PROVIDER NOTES
ED Provider Note    CHIEF COMPLAINT  Chief Complaint   Patient presents with    Rib Pain    Back Pain     Pt slipped and fell into wall on Thursday hitting her right rib cage and right upper back. Reports sharp pain tonight & pain with movement. Denies hitting head       EXTERNAL RECORDS REVIEWED  Reviewed outpatient PCP and cardiology note for medical history.  Patient has a history of anxiety insomnia hypertension osteoporosis, urinary incontinence    HPI/ROS  LIMITATION TO HISTORY   Select: : None  OUTSIDE HISTORIAN(S):   providing clinically relevant collateral history    Prabha Lizama is a 88 y.o. female who presents to the emergency department for evaluation of back pain ongoing for the last several days.  Patient says that she slipped while walking to the bathroom and fell hitting her back against the wall in her bathroom approximately 3 days ago.  Has had persistent pain in the posterior thoracic back on the right for the last 3 days.  This evening she felt like someone was stabbing her in the back, noticed worsening in her pain so she decided to come to the emergency department for evaluation.  No chest pain.  No shortness of breath.  During the fall she denies head strike, loss of consciousness, neck pain.  She was able to ambulate and has been ambulatory without hip pain over the last several days.      PAST MEDICAL HISTORY   has a past medical history of CHI (closed head injury), Elevated cortisol level, Hypertension, Insomnia, Multiple thyroid nodules (2008), and Thyrotoxicosis.    SURGICAL HISTORY   has a past surgical history that includes tonsillectomy and appendectomy.    FAMILY HISTORY  Family History   Problem Relation Age of Onset    Heart Disease Mother     Heart Disease Father     Hypertension Sister     Arthritis Sister     Thyroid Sister        SOCIAL HISTORY  Social History     Tobacco Use    Smoking status: Never    Smokeless tobacco: Never   Vaping Use    Vaping Use: Never  used   Substance and Sexual Activity    Alcohol use: No    Drug use: No    Sexual activity: Not Currently     Partners: Male       CURRENT MEDICATIONS  Home Medications       Reviewed by Madina Mendoza R.N. (Registered Nurse) on 04/30/24 at 0403  Med List Status: Partial     Medication Last Dose Status   Acetaminophen (TYLENOL PO)  Active   amLODIPine (NORVASC) 2.5 MG Tab  Active   CALCIUM PO  Active   carvedilol (COREG) 25 MG Tab  Active   telmisartan (MICARDIS) 80 MG Tab  Active   terazosin (HYTRIN) 2 MG Cap  Active   VITAMIN D PO  Active                    ALLERGIES  Allergies   Allergen Reactions    Amoxicillin      Unsure of reaction/or allergy    Hydralazine Unspecified     Pt reported severe hypotensive reaction    Morphine Vomiting and Nausea    Penicillins      1970 reaction       PHYSICAL EXAM  VITAL SIGNS: BP (!) 160/78   Pulse 63   Temp 36.7 °C (98 °F) (Temporal)   Resp 18   SpO2 97%    General: Well- appearing , non-toxic, no acute distress  Neuro: GCS 15, moving all extremities  HEENT:   - Head: Normocephalic, atraumatic  - Eyes: PERRL, no periorbital ecchymosis  - Midface: Atraumatic and stable  - Ears/Nose: normal external nose and ears, no retroauricular ecchymosis  - Mouth: moist mucosal membranes, atraumatic  Neck: No midline tenderness palpation, full range of motion in lateral rotation, flexion, extension  Chest: Atraumatic, no crepitus or tenderness palpation  Resp: clear to auscultation, no increased work of breathing  CV: Regular rate and rhythm, perfusing well  Abd: Soft, non-tender, non-distended  Pelvis: Stable on anterior posterior compression  Extremities:   RUE: Atraumatic, nontender to palpation, 2+ radial pulse, SILT, strength intact  LUE: Atraumatic, nontender to palpation, 2+ radial pulse, SILT, strength intact  RLE: Atraumatic, nontender to palpation, 2+ DP pulse, SILT, strength intact  LLE: Atraumatic, nontender to palpation, 2+ DP pulse, SILT, strength intact  Back:  Atraumatic, mild tenderness of the erector spinae muscles of the thoracic spine at approximately T7.  No midline tenderness.  No lumbar tenderness.    DIAGNOSTIC STUDIES / PROCEDURES    EKG  My independent EKG interpretation:  Results for orders placed or performed during the hospital encounter of 24   EKG (NOW)   Result Value Ref Range    Report       [facility] Emergency Dept.    Test Date:  2024  Pt Name:    QUINTIN JOHNSTON               Department: [facility]  MRN:        [id number]                      [facility]  Gender:     Female                       Technician: [id number]  :        1935                   Requested By:KRISTIE REYNOSO  Order #:    [id number]                    Reading MD:    Measurements  Intervals                                Axis  Rate:       67                           P:          57  NM:         180                          QRS:        6  QRSD:       114                          T:          32  QT:         433  QTc:        457    Interpretive Statements  Sinus rhythm  Probable left ventricular hypertrophy  Compared to ECG 2021 20:48:51  ST (T wave) deviation no longer present         LABS  Results for orders placed or performed during the hospital encounter of 24   CBC WITH DIFFERENTIAL   Result Value Ref Range    WBC 7.6 4.8 - 10.8 K/uL    RBC 4.69 4.20 - 5.40 M/uL    Hemoglobin 15.1 12.0 - 16.0 g/dL    Hematocrit 44.2 37.0 - 47.0 %    MCV 94.2 81.4 - 97.8 fL    MCH 32.2 27.0 - 33.0 pg    MCHC 34.2 32.2 - 35.5 g/dL    RDW 44.1 35.9 - 50.0 fL    Platelet Count 271 164 - 446 K/uL    MPV 9.9 9.0 - 12.9 fL    Neutrophils-Polys 69.20 44.00 - 72.00 %    Lymphocytes 15.70 (L) 22.00 - 41.00 %    Monocytes 11.80 0.00 - 13.40 %    Eosinophils 2.20 0.00 - 6.90 %    Basophils 0.70 0.00 - 1.80 %    Immature Granulocytes 0.40 0.00 - 0.90 %    Nucleated RBC 0.00 0.00 - 0.20 /100 WBC    Neutrophils (Absolute) 5.27 1.82 - 7.42 K/uL    Lymphs  (Absolute) 1.20 1.00 - 4.80 K/uL    Monos (Absolute) 0.90 (H) 0.00 - 0.85 K/uL    Eos (Absolute) 0.17 0.00 - 0.51 K/uL    Baso (Absolute) 0.05 0.00 - 0.12 K/uL    Immature Granulocytes (abs) 0.03 0.00 - 0.11 K/uL    NRBC (Absolute) 0.00 K/uL   COMP METABOLIC PANEL   Result Value Ref Range    Sodium 132 (L) 135 - 145 mmol/L    Potassium 4.0 3.6 - 5.5 mmol/L    Chloride 98 96 - 112 mmol/L    Co2 23 20 - 33 mmol/L    Anion Gap 11.0 7.0 - 16.0    Glucose 105 (H) 65 - 99 mg/dL    Bun 11 8 - 22 mg/dL    Creatinine 0.59 0.50 - 1.40 mg/dL    Calcium 8.8 8.4 - 10.2 mg/dL    Correct Calcium 9.0 8.5 - 10.5 mg/dL    AST(SGOT) 15 12 - 45 U/L    ALT(SGPT) 21 2 - 50 U/L    Alkaline Phosphatase 78 30 - 99 U/L    Total Bilirubin 0.5 0.1 - 1.5 mg/dL    Albumin 3.8 3.2 - 4.9 g/dL    Total Protein 6.9 6.0 - 8.2 g/dL    Globulin 3.1 1.9 - 3.5 g/dL    A-G Ratio 1.2 g/dL   TROPONIN   Result Value Ref Range    Troponin T 7 6 - 19 ng/L   ESTIMATED GFR   Result Value Ref Range    GFR (CKD-EPI) 87 >60 mL/min/1.73 m 2   EKG (NOW)   Result Value Ref Range    Report       [facility] Emergency Dept.    Test Date:  2024  Pt Name:    QUINTIN JOHNSTON               Department: [facility]  MRN:        [id number]                      Room:       -ROOM 7  Gender:     Female                       Technician: [id number]  :        1935                   Requested By:KRISTIE REYNOSO  Order #:    [id number]                    Reading MD:    Measurements  Intervals                                Axis  Rate:       67                           P:          57  AZ:         180                          QRS:        6  QRSD:       114                          T:          32  QT:         433  QTc:        457    Interpretive Statements  Sinus rhythm  Probable left ventricular hypertrophy  Compared to ECG 2021 20:48:51  ST (T wave) deviation no longer present         RADIOLOGY  I have independently interpreted the diagnostic imaging  associated with this visit and am waiting the final reading from the radiologist.   My preliminary interpretation is as follows:   -   Radiologist interpretation:   CT-TSPINE W/O PLUS RECONS   Final Result         1.  No acute traumatic bony injury of the thoracic spine.   2.  Anterior wedge deformity of T11 with vertebral plana.   3.  Atherosclerosis      CT-CHEST (THORAX) W/O   Final Result         1.  Negative CT scan of the chest without contrast.   2.  Right posterior thyroid nodule, recommend follow-up thyroid sonography is seen in due to nodule size   3.  Atherosclerosis              MEDICAL DECISION MAKING      ED COURSE AND PLAN    Prabha Lizama is a 88 y.o. female presenting to the emergency department for evaluation of back pain after a fall 3 days ago. Accompanied by her .  Patient says that pain got moderately worse today and she felt a stabbing sensation in her back so she decided to come to the emergency department.  Her  says that he was concerned about her heart so they called EMS and brought her in.  On exam she has tenderness palpation in hypotonicity of the erector spinae muscles of the thoracic back she has no step-offs.  She does not have any midline tenderness.  Will plan to obtain baseline labs, EKG.     Will obtain a CT scan of her thoracic spine as well as her chest to assess for any fracture or alternative traumatic pathology.     ---Pertinent ED Course---:    4:21 AM I reviewed the patient's old records in Epic, medication list, allergies, past medical history and performed a physical examination.     5:30 AM CT scan of the thoracic spine shows an anterior wedge deformity of T11.  I discussed case with Dr. Fernandez, reading radiologist who says that he sees no evidence of an acute fracture at T11.  I reexamined patient, she does not have any point tenderness to palpation over T11.  CT scan of the chest shows no evidence of acute rib fracture.  She does have a small thyroid  nodule, discussed with the patient, she has had this previously biopsied, was benign.    At this time patient is appropriate for discharge home.  She was prescribed lidocaine patches.  Sheis appropriate for discharge home, strict return precautions discussed.      Procedures:      ----------------------------------------------------------------------------------  DISCUSSIONS    I have discussed management of the patient with the following physicians and ELIJAH's:      Discussion of management with other [facility] or appropriate source(s):     Escalation of care considered, and ultimately not performed: Considered but no indication for discussion with spinal surgery    Barriers to care at this time, including but not limited to:     Decision tools and prescription drugs considered including, but not limited to: Prescribed lidocaine patches    FINAL IMPRESSION    1. Fall, initial encounter    2. Pain, upper back        New Prescriptions    LIDOCAINE (LIDODERM) 5 % PATCH    Place 1 Patch on the skin every 24 hours.         DISPOSITION    Discharge home, Stable          This chart was dictated using an electronic voice recognition software. The chart has been reviewed and edited but there is still possibility for dictation errors due to limitation of software.    Atilio Singh,  4/30/2024

## 2024-04-30 NOTE — DISCHARGE INSTRUCTIONS
You were seen in the emergency department after a fall.  CT scans showed no evidence of an acute fracture.  You do have a prior compression fracture of your T11 vertebra but this does not seem to be an acute fracture from this most recent fall.    Please follow-up with your primary care physician.  Apply the prescribed lidocaine patch to the involved area on your back.  Take Tylenol 500 mg and ibuprofen 200 mg every 6 hours to help with your symptoms over the next 2 days.    CT of your chest showed a 2 x 2 cm thyroid nodule, please follow-up with your primary care physician for an ultrasound of your thyroid gland.

## 2024-04-30 NOTE — ED TRIAGE NOTES
Chief Complaint   Patient presents with    Rib Pain    Back Pain     Pt slipped and fell into wall on Thursday hitting her right rib cage and right upper back. Reports sharp pain tonight & pain with movement. Denies hitting head     BP (!) 202/87   Pulse 69   Temp 36.7 °C (98 °F) (Temporal)   Resp 18   SpO2 96%

## 2024-05-01 ENCOUNTER — APPOINTMENT (OUTPATIENT)
Dept: RADIOLOGY | Facility: MEDICAL CENTER | Age: 89
DRG: 183 | End: 2024-05-01
Payer: MEDICARE

## 2024-05-01 ENCOUNTER — HOSPITAL ENCOUNTER (INPATIENT)
Facility: MEDICAL CENTER | Age: 89
LOS: 1 days | DRG: 183 | End: 2024-05-03
Attending: EMERGENCY MEDICINE | Admitting: STUDENT IN AN ORGANIZED HEALTH CARE EDUCATION/TRAINING PROGRAM
Payer: MEDICARE

## 2024-05-01 DIAGNOSIS — S22.41XA CLOSED FRACTURE OF MULTIPLE RIBS OF RIGHT SIDE, INITIAL ENCOUNTER: ICD-10-CM

## 2024-05-01 DIAGNOSIS — R07.81 RIB PAIN: ICD-10-CM

## 2024-05-01 DIAGNOSIS — M54.9 PAIN, UPPER BACK: ICD-10-CM

## 2024-05-01 LAB
ALBUMIN SERPL BCP-MCNC: 4 G/DL (ref 3.2–4.9)
ALBUMIN/GLOB SERPL: 1.5 G/DL
ALP SERPL-CCNC: 75 U/L (ref 30–99)
ALT SERPL-CCNC: 19 U/L (ref 2–50)
ANION GAP SERPL CALC-SCNC: 13 MMOL/L (ref 7–16)
APTT PPP: 24.3 SEC (ref 24.7–36)
AST SERPL-CCNC: 17 U/L (ref 12–45)
BASOPHILS # BLD AUTO: 0.8 % (ref 0–1.8)
BASOPHILS # BLD: 0.06 K/UL (ref 0–0.12)
BILIRUB SERPL-MCNC: 0.4 MG/DL (ref 0.1–1.5)
BUN SERPL-MCNC: 10 MG/DL (ref 8–22)
CALCIUM ALBUM COR SERPL-MCNC: 9.1 MG/DL (ref 8.5–10.5)
CALCIUM SERPL-MCNC: 9.1 MG/DL (ref 8.5–10.5)
CHLORIDE SERPL-SCNC: 98 MMOL/L (ref 96–112)
CO2 SERPL-SCNC: 21 MMOL/L (ref 20–33)
CREAT SERPL-MCNC: 0.75 MG/DL (ref 0.5–1.4)
EOSINOPHIL # BLD AUTO: 0.22 K/UL (ref 0–0.51)
EOSINOPHIL NFR BLD: 3 % (ref 0–6.9)
ERYTHROCYTE [DISTWIDTH] IN BLOOD BY AUTOMATED COUNT: 42.1 FL (ref 35.9–50)
GFR SERPLBLD CREATININE-BSD FMLA CKD-EPI: 77 ML/MIN/1.73 M 2
GLOBULIN SER CALC-MCNC: 2.6 G/DL (ref 1.9–3.5)
GLUCOSE SERPL-MCNC: 107 MG/DL (ref 65–99)
HCT VFR BLD AUTO: 42 % (ref 37–47)
HGB BLD-MCNC: 15.2 G/DL (ref 12–16)
IMM GRANULOCYTES # BLD AUTO: 0.02 K/UL (ref 0–0.11)
IMM GRANULOCYTES NFR BLD AUTO: 0.3 % (ref 0–0.9)
INR PPP: 0.99 (ref 0.87–1.13)
LYMPHOCYTES # BLD AUTO: 1.29 K/UL (ref 1–4.8)
LYMPHOCYTES NFR BLD: 17.8 % (ref 22–41)
MCH RBC QN AUTO: 33.1 PG (ref 27–33)
MCHC RBC AUTO-ENTMCNC: 36.2 G/DL (ref 32.2–35.5)
MCV RBC AUTO: 91.5 FL (ref 81.4–97.8)
MONOCYTES # BLD AUTO: 0.87 K/UL (ref 0–0.85)
MONOCYTES NFR BLD AUTO: 12 % (ref 0–13.4)
NEUTROPHILS # BLD AUTO: 4.77 K/UL (ref 1.82–7.42)
NEUTROPHILS NFR BLD: 66.1 % (ref 44–72)
NRBC # BLD AUTO: 0 K/UL
NRBC BLD-RTO: 0 /100 WBC (ref 0–0.2)
PLATELET # BLD AUTO: 286 K/UL (ref 164–446)
PMV BLD AUTO: 9.7 FL (ref 9–12.9)
POTASSIUM SERPL-SCNC: 3.6 MMOL/L (ref 3.6–5.5)
PROT SERPL-MCNC: 6.6 G/DL (ref 6–8.2)
PROTHROMBIN TIME: 13.2 SEC (ref 12–14.6)
RBC # BLD AUTO: 4.59 M/UL (ref 4.2–5.4)
SODIUM SERPL-SCNC: 132 MMOL/L (ref 135–145)
WBC # BLD AUTO: 7.2 K/UL (ref 4.8–10.8)

## 2024-05-01 PROCEDURE — 99222 1ST HOSP IP/OBS MODERATE 55: CPT | Mod: AI | Performed by: STUDENT IN AN ORGANIZED HEALTH CARE EDUCATION/TRAINING PROGRAM

## 2024-05-01 RX ORDER — OXYCODONE HYDROCHLORIDE 5 MG/1
5 TABLET ORAL
Status: DISCONTINUED | OUTPATIENT
Start: 2024-05-01 | End: 2024-05-02

## 2024-05-01 RX ORDER — ACETAMINOPHEN 325 MG/1
650 TABLET ORAL EVERY 6 HOURS PRN
Status: DISCONTINUED | OUTPATIENT
Start: 2024-05-06 | End: 2024-05-03 | Stop reason: HOSPADM

## 2024-05-01 RX ORDER — CARVEDILOL 25 MG/1
25 TABLET ORAL 2 TIMES DAILY
Status: DISCONTINUED | OUTPATIENT
Start: 2024-05-01 | End: 2024-05-03 | Stop reason: HOSPADM

## 2024-05-01 RX ORDER — OXYCODONE HYDROCHLORIDE 10 MG/1
10 TABLET ORAL
Status: DISCONTINUED | OUTPATIENT
Start: 2024-05-01 | End: 2024-05-02

## 2024-05-01 RX ORDER — TERAZOSIN 1 MG/1
4 CAPSULE ORAL TWICE DAILY
Status: DISCONTINUED | OUTPATIENT
Start: 2024-05-01 | End: 2024-05-03 | Stop reason: HOSPADM

## 2024-05-01 RX ORDER — AMLODIPINE BESYLATE 5 MG/1
2.5 TABLET ORAL DAILY
Status: DISCONTINUED | OUTPATIENT
Start: 2024-05-01 | End: 2024-05-03 | Stop reason: HOSPADM

## 2024-05-01 RX ORDER — ACETAMINOPHEN 500 MG
500 TABLET ORAL EVERY 6 HOURS PRN
COMMUNITY

## 2024-05-01 RX ORDER — CYCLOBENZAPRINE HCL 10 MG
10 TABLET ORAL 3 TIMES DAILY PRN
Status: DISCONTINUED | OUTPATIENT
Start: 2024-05-01 | End: 2024-05-03 | Stop reason: HOSPADM

## 2024-05-01 RX ORDER — ENOXAPARIN SODIUM 100 MG/ML
40 INJECTION SUBCUTANEOUS DAILY
Status: DISCONTINUED | OUTPATIENT
Start: 2024-05-01 | End: 2024-05-03 | Stop reason: HOSPADM

## 2024-05-01 RX ORDER — IBUPROFEN 400 MG/1
400 TABLET ORAL 3 TIMES DAILY
Status: DISCONTINUED | OUTPATIENT
Start: 2024-05-01 | End: 2024-05-03 | Stop reason: HOSPADM

## 2024-05-01 RX ORDER — IBUPROFEN 400 MG/1
400 TABLET ORAL 3 TIMES DAILY PRN
Status: DISCONTINUED | OUTPATIENT
Start: 2024-05-06 | End: 2024-05-03 | Stop reason: HOSPADM

## 2024-05-01 RX ORDER — HYDROMORPHONE HYDROCHLORIDE 1 MG/ML
0.5 INJECTION, SOLUTION INTRAMUSCULAR; INTRAVENOUS; SUBCUTANEOUS
Status: DISCONTINUED | OUTPATIENT
Start: 2024-05-01 | End: 2024-05-02

## 2024-05-01 RX ORDER — LIDOCAINE 4 G/G
1 PATCH TOPICAL EVERY 24 HOURS
Status: DISCONTINUED | OUTPATIENT
Start: 2024-05-01 | End: 2024-05-03 | Stop reason: HOSPADM

## 2024-05-01 RX ORDER — TELMISARTAN 40 MG/1
80 TABLET ORAL EVERY MORNING
Status: DISCONTINUED | OUTPATIENT
Start: 2024-05-01 | End: 2024-05-03 | Stop reason: HOSPADM

## 2024-05-01 RX ORDER — ACETAMINOPHEN 325 MG/1
650 TABLET ORAL EVERY 6 HOURS
Status: DISCONTINUED | OUTPATIENT
Start: 2024-05-01 | End: 2024-05-03 | Stop reason: HOSPADM

## 2024-05-01 RX ORDER — ONDANSETRON 2 MG/ML
4 INJECTION INTRAMUSCULAR; INTRAVENOUS ONCE
Status: COMPLETED | OUTPATIENT
Start: 2024-05-01 | End: 2024-05-01

## 2024-05-01 RX ORDER — ONDANSETRON 2 MG/ML
4 INJECTION INTRAMUSCULAR; INTRAVENOUS EVERY 6 HOURS PRN
Status: DISCONTINUED | OUTPATIENT
Start: 2024-05-01 | End: 2024-05-03 | Stop reason: HOSPADM

## 2024-05-01 RX ORDER — LIDOCAINE 50 MG/G
1 PATCH TOPICAL EVERY 24 HOURS
Status: DISCONTINUED | OUTPATIENT
Start: 2024-05-01 | End: 2024-05-01

## 2024-05-01 RX ADMIN — IBUPROFEN 400 MG: 400 TABLET, FILM COATED ORAL at 18:24

## 2024-05-01 RX ADMIN — TELMISARTAN 80 MG: 80 TABLET ORAL at 08:34

## 2024-05-01 RX ADMIN — IBUPROFEN 400 MG: 400 TABLET, FILM COATED ORAL at 08:35

## 2024-05-01 RX ADMIN — IBUPROFEN 400 MG: 400 TABLET, FILM COATED ORAL at 12:00

## 2024-05-01 RX ADMIN — ACETAMINOPHEN 650 MG: 325 TABLET, FILM COATED ORAL at 18:24

## 2024-05-01 RX ADMIN — ONDANSETRON 4 MG: 2 INJECTION INTRAMUSCULAR; INTRAVENOUS at 16:11

## 2024-05-01 RX ADMIN — ONDANSETRON 4 MG: 2 INJECTION INTRAMUSCULAR; INTRAVENOUS at 04:56

## 2024-05-01 RX ADMIN — CYCLOBENZAPRINE HYDROCHLORIDE 10 MG: 5 TABLET, FILM COATED ORAL at 14:53

## 2024-05-01 RX ADMIN — ONDANSETRON 4 MG: 2 INJECTION INTRAMUSCULAR; INTRAVENOUS at 08:31

## 2024-05-01 RX ADMIN — OXYCODONE HYDROCHLORIDE 5 MG: 5 TABLET ORAL at 16:15

## 2024-05-01 RX ADMIN — TERAZOSIN HYDROCHLORIDE 4 MG: 1 CAPSULE ORAL at 08:33

## 2024-05-01 RX ADMIN — AMLODIPINE BESYLATE 2.5 MG: 5 TABLET ORAL at 08:33

## 2024-05-01 RX ADMIN — ACETAMINOPHEN 650 MG: 325 TABLET, FILM COATED ORAL at 12:00

## 2024-05-01 RX ADMIN — OXYCODONE HYDROCHLORIDE 10 MG: 10 TABLET ORAL at 07:52

## 2024-05-01 RX ADMIN — CARVEDILOL 25 MG: 25 TABLET, FILM COATED ORAL at 08:34

## 2024-05-01 RX ADMIN — FENTANYL CITRATE 50 MCG: 50 INJECTION, SOLUTION INTRAMUSCULAR; INTRAVENOUS at 05:54

## 2024-05-01 RX ADMIN — CARVEDILOL 25 MG: 25 TABLET, FILM COATED ORAL at 18:25

## 2024-05-01 RX ADMIN — LIDOCAINE 1 PATCH: 4 PATCH TOPICAL at 08:36

## 2024-05-01 RX ADMIN — FENTANYL CITRATE 25 MCG: 50 INJECTION, SOLUTION INTRAMUSCULAR; INTRAVENOUS at 04:56

## 2024-05-01 RX ADMIN — ENOXAPARIN SODIUM 40 MG: 100 INJECTION SUBCUTANEOUS at 18:25

## 2024-05-01 RX ADMIN — TERAZOSIN HYDROCHLORIDE 4 MG: 1 CAPSULE ORAL at 18:25

## 2024-05-01 ASSESSMENT — LIFESTYLE VARIABLES
TOTAL SCORE: 0
AVERAGE NUMBER OF DAYS PER WEEK YOU HAVE A DRINK CONTAINING ALCOHOL: 0
ON A TYPICAL DAY WHEN YOU DRINK ALCOHOL HOW MANY DRINKS DO YOU HAVE: 0
TOTAL SCORE: 0
EVER FELT BAD OR GUILTY ABOUT YOUR DRINKING: NO
CONSUMPTION TOTAL: NEGATIVE
EVER HAD A DRINK FIRST THING IN THE MORNING TO STEADY YOUR NERVES TO GET RID OF A HANGOVER: NO
HAVE YOU EVER FELT YOU SHOULD CUT DOWN ON YOUR DRINKING: NO
HOW MANY TIMES IN THE PAST YEAR HAVE YOU HAD 5 OR MORE DRINKS IN A DAY: 0
DOES PATIENT WANT TO STOP DRINKING: NO
ALCOHOL_USE: NO
HAVE PEOPLE ANNOYED YOU BY CRITICIZING YOUR DRINKING: NO
TOTAL SCORE: 0

## 2024-05-01 ASSESSMENT — PAIN DESCRIPTION - PAIN TYPE
TYPE: ACUTE PAIN

## 2024-05-01 ASSESSMENT — ENCOUNTER SYMPTOMS
ORTHOPNEA: 0
MYALGIAS: 0
SORE THROAT: 0
DIZZINESS: 0
COUGH: 0
CHILLS: 0
VOMITING: 0
SHORTNESS OF BREATH: 0
DOUBLE VISION: 0
NECK PAIN: 0
HEARTBURN: 0
BLURRED VISION: 0
HEADACHES: 0
FEVER: 0
NAUSEA: 0
PALPITATIONS: 0

## 2024-05-01 ASSESSMENT — COGNITIVE AND FUNCTIONAL STATUS - GENERAL
TURNING FROM BACK TO SIDE WHILE IN FLAT BAD: A LITTLE
STANDING UP FROM CHAIR USING ARMS: A LITTLE
MOVING TO AND FROM BED TO CHAIR: A LITTLE
WALKING IN HOSPITAL ROOM: A LITTLE
SUGGESTED CMS G CODE MODIFIER MOBILITY: CK
MOBILITY SCORE: 18
MOVING FROM LYING ON BACK TO SITTING ON SIDE OF FLAT BED: A LITTLE
CLIMB 3 TO 5 STEPS WITH RAILING: A LITTLE

## 2024-05-01 ASSESSMENT — FIBROSIS 4 INDEX
FIB4 SCORE: 1.17
FIB4 SCORE: 1.17
FIB4 SCORE: 1.04

## 2024-05-01 ASSESSMENT — GAIT ASSESSMENTS
DISTANCE (FEET): 100
GAIT LEVEL OF ASSIST: CONTACT GUARD ASSIST
DEVIATION: BRADYKINETIC;ANTALGIC
ASSISTIVE DEVICE: FRONT WHEEL WALKER

## 2024-05-01 NOTE — PROGRESS NOTES
Arrived on the floor by eduardo, with  next to her. Pt is alert and oriented,. Walked to her room slow to move related to pain on her right rib cage. FWW given. Pt have purewick but wants to use the BR. POC updated. Call light within reach.

## 2024-05-01 NOTE — CARE PLAN
The patient is Watcher - Medium risk of patient condition declining or worsening    Shift Goals  Clinical Goals: Obs pain management  Patient Goals: comfort  Family Goals: comfort,    Progress made toward(s) clinical / shift goals:    Problem: Knowledge Deficit - Standard  Goal: Patient and family/care givers will demonstrate understanding of plan of care, disease process/condition, diagnostic tests and medications  Description: Target End Date:  1-3 days or as soon as patient condition allows    Document in Patient Education    1.  Patient and family/caregiver oriented to unit, equipment, visitation policy and means for communicating concern  2.  Complete/review Learning Assessment  3.  Assess knowledge level of disease process/condition, treatment plan, diagnostic tests and medications  4.  Explain disease process/condition, treatment plan, diagnostic tests and medications  Outcome: Progressing       Patient is not progressing towards the following goals:

## 2024-05-01 NOTE — THERAPY
05/01/24 1013   Initial Contact Note    Initial Contact Note Order Received and Verified, Physical Therapy Evaluation in Progress with Full Report to Follow.   Interdisciplinary Plan of Care Collaboration   IDT Collaboration with  Nursing;Physician   Patient Position at End of Therapy In Bed   Collaboration Comments will hold eval today due to pain control. Will eval when pt able to tolerate mobility.   Session Information   Date / Session Number  5/1/24-( H) EVAL

## 2024-05-01 NOTE — ED PROVIDER NOTES
ED Provider Note    CHIEF COMPLAINT  Chief Complaint   Patient presents with    Rib Pain     BIB [facility] from home due to Right rib pain. Slipped and fell to the wall on Thursday. Seen yesterday at [facility]. Scans negative for fracture. Comes to ER today for increasing pain. Denies SOB. Refused stronger analgesic from EMS stating can only take tylenol and was last taken at 0230.       EXTERNAL RECORDS REVIEWED  Other reviewed a CT scan from yesterday there is performed at [facility] emergency department showed no obvious fracture    HPI/ROS    Prabha Lizama is a 88 y.o. female who presents with right-sided chest pain.  The patient states 4 days ago she was dozing off when she spilled her tea in her lap.  She got up to change and while changing fell down into the wall striking the right side of her chest.  She is evaluated at [facility] emergency department and had a CT scan that did show a suspected chronic T11 wedge deformity and otherwise no obvious right-sided rib fractures.  The patient states she cannot sleep due to the pain.  She contacted EMS and she presents with severe pain to the right side of the chest that does have some pleuritic component.  She does not have any abdominal pain.  She denies midline back pain.  She does not have any paresthesias nor functional loss of her extremities.    PAST MEDICAL HISTORY   has a past medical history of CHI (closed head injury), Elevated cortisol level, Hypertension, Insomnia, Multiple thyroid nodules (2008), and Thyrotoxicosis.    SURGICAL HISTORY   has a past surgical history that includes tonsillectomy and appendectomy.    FAMILY HISTORY  Family History   Problem Relation Age of Onset    Heart Disease Mother     Heart Disease Father     Hypertension Sister     Arthritis Sister     Thyroid Sister        SOCIAL HISTORY  Social History     Tobacco Use    Smoking status: Never    Smokeless tobacco: Never   Vaping Use    Vaping Use: Never used  "  Substance and Sexual Activity    Alcohol use: No    Drug use: No    Sexual activity: Not Currently     Partners: Male       CURRENT MEDICATIONS  Home Medications       Reviewed by Debbie Gandara R.N. (Registered Nurse) on 05/01/24 at 0419  Med List Status: Partial     Medication Last Dose Status   Acetaminophen (TYLENOL PO)  Active   amLODIPine (NORVASC) 2.5 MG Tab  Active   CALCIUM PO  Active   carvedilol (COREG) 25 MG Tab  Active   lidocaine (LIDODERM) 5 % Patch  Active   telmisartan (MICARDIS) 80 MG Tab  Active   terazosin (HYTRIN) 2 MG Cap  Active   VITAMIN D PO  Active                    ALLERGIES  Allergies   Allergen Reactions    Amoxicillin      Unsure of reaction/or allergy    Hydralazine Unspecified     Pt reported severe hypotensive reaction    Morphine Vomiting and Nausea    Penicillins      1970 reaction       PHYSICAL EXAM  VITAL SIGNS: BP (!) 181/85   Pulse 68   Temp 35.9 °C (96.7 °F) (Temporal)   Resp 18   Ht 1.626 m (5' 4\")   Wt 68 kg (150 lb)   SpO2 98%   BMI 25.75 kg/m²    General the patient appears uncomfortable    HEENT atraumatic    Cervical, thoracic, lumbar spine has no midline tenderness or step-offs    Pulmonary the patient's lungs are slightly diminished throughout.  She does have reproducible pain throughout the right side of her chest laterally and posteriorly without crepitus and obvious deformities    Cardiovascular S1-S2 with a regular rate and rhythm    GI abdomen soft with no distention    Skin no pallor nor signs of trauma    Extremities atraumatic    Neurologic examination GCS of 15    EKG/LABS  Results for orders placed or performed during the hospital encounter of 05/01/24   CBC WITH DIFFERENTIAL   Result Value Ref Range    WBC 7.2 4.8 - 10.8 K/uL    RBC 4.59 4.20 - 5.40 M/uL    Hemoglobin 15.2 12.0 - 16.0 g/dL    Hematocrit 42.0 37.0 - 47.0 %    MCV 91.5 81.4 - 97.8 fL    MCH 33.1 (H) 27.0 - 33.0 pg    MCHC 36.2 (H) 32.2 - 35.5 g/dL    RDW 42.1 35.9 - 50.0 fL    " Platelet Count 286 164 - 446 K/uL    MPV 9.7 9.0 - 12.9 fL    Neutrophils-Polys 66.10 44.00 - 72.00 %    Lymphocytes 17.80 (L) 22.00 - 41.00 %    Monocytes 12.00 0.00 - 13.40 %    Eosinophils 3.00 0.00 - 6.90 %    Basophils 0.80 0.00 - 1.80 %    Immature Granulocytes 0.30 0.00 - 0.90 %    Nucleated RBC 0.00 0.00 - 0.20 /100 WBC    Neutrophils (Absolute) 4.77 1.82 - 7.42 K/uL    Lymphs (Absolute) 1.29 1.00 - 4.80 K/uL    Monos (Absolute) 0.87 (H) 0.00 - 0.85 K/uL    Eos (Absolute) 0.22 0.00 - 0.51 K/uL    Baso (Absolute) 0.06 0.00 - 0.12 K/uL    Immature Granulocytes (abs) 0.02 0.00 - 0.11 K/uL    NRBC (Absolute) 0.00 K/uL   PROTHROMBIN TIME   Result Value Ref Range    PT 13.2 12.0 - 14.6 sec    INR 0.99 0.87 - 1.13   APTT   Result Value Ref Range    APTT 24.3 (L) 24.7 - 36.0 sec   COMP METABOLIC PANEL   Result Value Ref Range    Sodium 132 (L) 135 - 145 mmol/L    Potassium 3.6 3.6 - 5.5 mmol/L    Chloride 98 96 - 112 mmol/L    Co2 21 20 - 33 mmol/L    Anion Gap 13.0 7.0 - 16.0    Glucose 107 (H) 65 - 99 mg/dL    Bun 10 8 - 22 mg/dL    Creatinine 0.75 0.50 - 1.40 mg/dL    Calcium 9.1 8.5 - 10.5 mg/dL    Correct Calcium 9.1 8.5 - 10.5 mg/dL    AST(SGOT) 17 12 - 45 U/L    ALT(SGPT) 19 2 - 50 U/L    Alkaline Phosphatase 75 30 - 99 U/L    Total Bilirubin 0.4 0.1 - 1.5 mg/dL    Albumin 4.0 3.2 - 4.9 g/dL    Total Protein 6.6 6.0 - 8.2 g/dL    Globulin 2.6 1.9 - 3.5 g/dL    A-G Ratio 1.5 g/dL   ESTIMATED GFR   Result Value Ref Range    GFR (CKD-EPI) 77 >60 mL/min/1.73 m 2         COURSE & MEDICAL DECISION MAKING    This an 88-year-old female who presents the emerged part with right-sided chest pain.  I did review her CT scan from yesterday not see any evidence of a rib fracture or pneumothorax.  My suspicion is she does have an occult rib fracture on the right side causing her discomfort.  She has failed outpatient management with Tylenol and Motrin.  She did receive some fentanyl and has had minimal improvement.  Will  give her second dose of fentanyl and will admit the patient to the hospital for pain control.  Laboratory analysis was obtained from a baseline standpoint and there is no concerning abnormalities.  The patient's abdomen is benign.  I agree the compression fracture is most likely chronic as she does not have any midline discomfort to support an acute fracture.    FINAL DIAGNOSIS  Occult right-sided rib fractures    Disposition  Patient will be admitted in stable condition       Electronically signed by: Ernesto Rivera M.D., 5/1/2024 4:44 AM

## 2024-05-01 NOTE — THERAPY
Occupational Therapy Contact Note    Patient Name: Prabha Lizama  Age:  88 y.o., Sex:  female  Medical Record #: [id number]  Today's Date: 5/1/2024    Discussed missed therapy with Fabian & MD       05/01/24 1002   Interdisciplinary Plan of Care Collaboration   Collaboration Comments OT shawn barrios MD advised to hold as pt's pain is not well controlled.

## 2024-05-01 NOTE — THERAPY
Physical Therapy   Initial Evaluation     Patient Name: Prabha Lizama  Age:  88 y.o., Sex:  female  Medical Record #: [id number]  Today's Date: 5/1/2024     Precautions  Precautions: (P) Fall Risk    Assessment    Patient is  88 y.o. female who presented 5/1/2024 with rib pain.  Very pleasant woman with history of hypertension, osteoporosis.  x-ray of her ribs which showed at least 2 minimally displaced right-sided rib fractures.  No evidence of large pleural effusion or pneumothorax. Pt c/o of nausea, pain and generalized pain. Willing to participate, spouse very supportive. Anticipate pt will do well once pain is managed. Recommend home with home health to assist with equipment and home safety needs. See below flow sheet for eval details. LK     Plan    Physical Therapy Initial Treatment Plan   Treatment Plan : (P) Bed Mobility, Gait Training, Neuro Re-Education / Balance, Self Care / Home Evaluation, Stair Training, Therapeutic Activities, Therapeutic Exercise  Treatment Frequency: (P) 3 Times per Week  Duration: (P) Until Therapy Goals Met    DC Equipment Recommendations: (P) None  Discharge Recommendations: (P) Recommend home health for continued physical therapy services              05/01/24 1526   Charge Group   PT Evaluation PT Evaluation High   PT Self Care / Home Evaluation (Units) 1   Total Time Spent   PT Evaluation Time Spent (Mins) 30   PT Self Care/Home Evaluation Time Spent (Mins) 10    Services   Is patient using  services for this encounter? No   Initial Contact Note    Initial Contact Note Order Received and Verified, Physical Therapy Evaluation in Progress with Full Report to Follow.   Precautions   Precautions Fall Risk   Vitals   O2 Delivery Device None - Room Air   Pain 0 - 10 Group   Location Generalized;Back;Rib Cage;Neck;Leg   Location Orientation Right  (B LE thigh cramps)   Therapist Pain Assessment 7;Nurse Notified;Post Activity Pain Same as Prior to Activity    Prior Living Situation   Prior Services None;Home-Independent   Housing / Facility 1 Story House   Steps Into Home 1   Steps In Home 0   Equipment Owned Front-Wheel Walker   Lives with - Patient's Self Care Capacity Spouse   Prior Level of Functional Mobility   Bed Mobility Independent   Transfer Status Independent   Ambulation Independent   Ambulation Distance household   Assistive Devices Used Front-Wheel Walker   Comments only uses FWW at night to use bathroom   History of Falls   History of Falls Yes   Date of Last Fall 04/29/24   Cognition    Cognition / Consciousness X   Ability To Follow Commands 1 Step   New Learning Impaired   Attention Impaired   Comments affected by pain   Passive ROM Lower Body   Passive ROM Lower Body WDL   Active ROM Lower Body    Active ROM Lower Body  WDL   Strength Lower Body   Lower Body Strength  WDL   Comments functional   Sensation Lower Body   Lower Extremity Sensation   X   Comments aching B thighs, no burning or numbness   Lower Body Muscle Tone   Lower Body Muscle Tone  WDL   Neurological Concerns   Neurological Concerns No   Coordination Lower Body    Coordination Lower Body  WDL   Other Treatments   Other Treatments Provided forward head, T/S kyhopsis   Balance Assessment   Sitting Balance (Static) Fair   Sitting Balance (Dynamic) Fair -   Standing Balance (Static) Fair   Standing Balance (Dynamic) Fair -   Weight Shift Sitting Fair   Weight Shift Standing Fair   Comments no LOB with FWW   Bed Mobility    Supine to Sit Minimal Assist   Sit to Supine Minimal Assist   Scooting Contact Guard Assist   Rolling Contact Guard Assist   Gait Analysis   Gait Level Of Assist Contact Guard Assist   Assistive Device Front Wheel Walker   Distance (Feet) 100   # of Times Distance was Traveled 1   Deviation Bradykinetic;Antalgic   # of Stairs Climbed 0   Comments moving slow, then fast when returning BTB   Functional Mobility   Sit to Stand Contact Guard Assist   Bed, Chair, Wheelchair  Transfer Contact Guard Assist   6 Clicks Assessment - How much HELP from from another person do you currently need... (If the patient hasn't done an activity recently, how much help from another person do you think he/she would need if he/she tried?)   Turning from your back to your side while in a flat bed without using bedrails? 3   Moving from lying on your back to sitting on the side of a flat bed without using bedrails? 3   Moving to and from a bed to a chair (including a wheelchair)? 3   Standing up from a chair using your arms (e.g., wheelchair, or bedside chair)? 3   Walking in hospital room? 3   Climbing 3-5 steps with a railing? 3   6 clicks Mobility Score 18   Activity Tolerance   Sitting Edge of Bed 7 mins   Standing 10 mins   Edema / Skin Assessment   Edema / Skin  Not Assessed   Short Term Goals    Short Term Goal # 1 in 6 V pt will perform bed mob with supervison flat be and no rail   Short Term Goal # 2 in 6 V pt will transfer to various surfaces with Supervision using FWW   Short Term Goal # 3 in 6 V pt will amb using FWW x 150 feet with supervision using FWW   Short Term Goal # 4 in 6 V pt will climb up and down 1 step with FWW and SBA   Education Group   Education Provided Role of Physical Therapist   Role of Physical Therapist Patient Response Patient;Family;Acceptance;Explanation;Verbal Demonstration   Additional Comments spouse very engaged and ready to assist pt. Pt instructed in deep breathing as tolerated, log roll, postural yumiko. balance rest with activity. ankle heelcord stretching   Physical Therapy Initial Treatment Plan    Treatment Plan  Bed Mobility;Gait Training;Neuro Re-Education / Balance;Self Care / Home Evaluation;Stair Training;Therapeutic Activities;Therapeutic Exercise   Treatment Frequency 3 Times per Week   Duration Until Therapy Goals Met   Problem List    Problems Pain;Impaired Bed Mobility;Impaired Transfers;Impaired Ambulation;Impaired Balance;Decreased Activity  Tolerance   Anticipated Discharge Equipment and Recommendations   DC Equipment Recommendations None   Discharge Recommendations Recommend home health for continued physical therapy services   Interdisciplinary Plan of Care Collaboration   IDT Collaboration with  Nursing;Physician;Family / Caregiver   Patient Position at End of Therapy In Bed   Collaboration Comments re: PT eval   Session Information   Date / Session Number  5/1/24-1 ( 1/3, 5/7)

## 2024-05-01 NOTE — ED NOTES
PIV started and labs sent. Patient medicated as reflected in MAR. Patient verbalized understanding of all education provided. Patient tolerated medication at this time.

## 2024-05-01 NOTE — ASSESSMENT & PLAN NOTE
CT showing anterior wedge at T11.  No acute fractures noted.  Right-sided rib series showing 2 minimally displaced rib fractures  Multimodal pain control, scheduled Tylenol and ibuprofen  Patient experiencing some delirium, discontinued Oxycodone as she is getting significantly lethargic with it  Tramadol added for PRN pain management as she has taken it in the past without adverse effects  PT/OT

## 2024-05-01 NOTE — PROGRESS NOTES
Progress Note Hospitalist Services After Midnight    Date: 05/01/24    HPI:   Prabha Lizama is a 88 y.o. female with a past medical history of hypertension and osteoporosis who presented 5/1/2024 with rib pain.     She presents due to progressive pain of her right chest wall.  Approximately 4 days ago she had a fall where she tripped on her nightgown and fell into the wall very hard with impact on her right side.  Since then she has been having progressively worsening right-sided rib pain.  Pain is worsened by movement, often it subsides while she is at rest.  Sometimes it feels spasmodic in nature.  Is not having any shortness of breath, no chest pressure, no nausea/vomiting, no diaphoresis.  She was seen at [facility] yesterday and had a CT of the chest done which showed no acute fractures, likely chronic vertebral compression fracture.  She returns this morning due to continued worsening of the pain.      In the ED resident significant for blood pressure 181/85. Labs significant for sodium 132. She was requiring multiple doses of IV pain medications so admission requested for further pain management and observation.     While on the floor the patient received x-ray of her ribs which showed at least 2 minimally displaced right-sided rib fractures.  No evidence of large pleural effusion or pneumothorax.  Patient is still having significant pain.    Plan of Care:   -PT/OT  -Multimodal pain management: Lidocaine patch, Tylenol, Oxycodone, Flexeril  -Check BMP in the a.m.  -Continue home medication for hypertension  -IS    Disposition: Patient to remain overnight as observation for further pain management and awaiting for therapies.    Silvia Barnes  Hospitalist Services  [phone]  [email or web address]

## 2024-05-01 NOTE — ED TRIAGE NOTES
Prabhangozi Diazs Lizama  88 y.o. female  Chief Complaint   Patient presents with    Rib Pain     BIB [facility] from home due to Right rib pain. Slipped and fell to the wall on Thursday. Seen yesterday at [facility]. Scans negative for fracture. Comes to ER today for increasing pain. Denies SOB. Refused stronger analgesic from EMS stating can only take tylenol and was last taken at 0230.     Pt on wheelchair to [facility] for above complaint.     Pt is GCS 15, speaking in full sentences, follows commands and responds appropriately to questions. Resp are even and unlabored.    Pt placed in ED lobby. Pt educated on triage process. Pt encouraged to alert staff for any changes.       Vitals:    05/01/24 0411   BP: (!) 181/85   Pulse: 68   Resp: 18   Temp: 35.9 °C (96.7 °F)   SpO2: 98%

## 2024-05-01 NOTE — H&P
Hospital Medicine History & Physical Note    Date of Service  5/1/2024    Primary Care Physician  [facility]    Code Status  Full Code    Chief Complaint  Chief Complaint   Patient presents with    Rib Pain     BIB [facility] from home due to Right rib pain. Slipped and fell to the wall on Thursday. Seen yesterday at [facility]. Scans negative for fracture. Comes to ER today for increasing pain. Denies SOB. Refused stronger analgesic from EMS stating can only take tylenol and was last taken at 0230.       History of Presenting Illness  Prabha Lizama is a 88 y.o. female who presented 5/1/2024 with rib pain.  Very pleasant woman with history of hypertension, osteoporosis.  She presents due to progressive pain of her right chest wall.  Approximately 4 days ago she had a fall where she tripped on her nightgown and fell into the wall very hard with impact on her right side.  Since then she has been having progressively worsening right-sided rib pain.  Pain is worsened by movement, often it subsides while she is at rest.  Sometimes it feels spasmodic in nature.  Is not having any shortness of breath, no chest pressure, no nausea/vomiting, no diaphoresis.  She was seen at [facility] yesterday and had a CT of the chest done which showed no acute fractures, likely chronic vertebral compression fracture.  She returns this morning due to continued worsening of the pain.  In the ED she was requiring multiple doses of IV pain medications so admission requested for further pain management and observation.    I discussed the plan of care with patient and family.    Review of Systems  Review of Systems   Constitutional:  Negative for chills and fever.   HENT:  Negative for congestion and sore throat.    Eyes:  Negative for blurred vision and double vision.   Respiratory:  Negative for cough and shortness of breath.    Cardiovascular:  Negative for palpitations and orthopnea.   Gastrointestinal:  Negative  for heartburn, nausea and vomiting.   Genitourinary:  Negative for dysuria and urgency.   Musculoskeletal:  Negative for myalgias and neck pain.   Neurological:  Negative for dizziness and headaches.       Past Medical History   has a past medical history of CHI (closed head injury), Elevated cortisol level, Hypertension, Insomnia, Multiple thyroid nodules (2008), and Thyrotoxicosis.    Surgical History   has a past surgical history that includes tonsillectomy and appendectomy.     Family History  family history includes Arthritis in her sister; Heart Disease in her father and mother; Hypertension in her sister; Thyroid in her sister.   Family history reviewed with patient. There is family history that is pertinent to the chief complaint.     Social History   reports that she has never smoked. She has never used smokeless tobacco. She reports that she does not drink alcohol and does not use drugs.    Allergies  Allergies   Allergen Reactions    Amoxicillin      Unsure of reaction/or allergy    Hydralazine Unspecified     Pt reported severe hypotensive reaction    Morphine Vomiting and Nausea    Penicillins      1970 reaction       Medications  Prior to Admission Medications   Prescriptions Last Dose Informant Patient Reported? Taking?   Acetaminophen (TYLENOL PO)   Yes No   Sig: Take  by mouth.   CALCIUM PO   Yes No   Sig: Take  by mouth.   VITAMIN D PO   Yes No   Sig: Take  by mouth.   amLODIPine (NORVASC) 2.5 MG Tab   No No   Sig: Take 1 Tablet by mouth every day.   carvedilol (COREG) 25 MG Tab   No No   Sig: TAKE 1 TABLET BY MOUTH TWICE DAILY   lidocaine (LIDODERM) 5 % Patch   No No   Sig: Place 1 Patch on the skin every 24 hours.   telmisartan (MICARDIS) 80 MG Tab   No No   Sig: TAKE 1 TABLET EVERY MORNING   terazosin (HYTRIN) 2 MG Cap   No No   Sig: TAKE 2 CAPSULES BY MOUTH TWICE DAILY      Facility-Administered Medications: None       Physical Exam  Temp:  [35.9 °C (96.7 °F)] 35.9 °C (96.7 °F)  Pulse:  [63-71]  "63  Resp:  [18] 18  BP: (152-190)/(74-85) 173/74  SpO2:  [93 %-98 %] 93 %  Blood Pressure : (!) 190/84   Temperature: 35.9 °C (96.7 °F)   Pulse: 66   Respiration: 18   Pulse Oximetry: 95 %       Physical Exam  Constitutional:       General: She is not in acute distress.     Appearance: She is not toxic-appearing.   HENT:      Head: Normocephalic and atraumatic.      Nose: Nose normal.      Mouth/Throat:      Mouth: Mucous membranes are moist.      Pharynx: Oropharynx is clear.   Eyes:      Extraocular Movements: Extraocular movements intact.      Conjunctiva/sclera: Conjunctivae normal.   Cardiovascular:      Rate and Rhythm: Normal rate and regular rhythm.      Pulses: Normal pulses.      Heart sounds: Normal heart sounds.   Pulmonary:      Effort: Pulmonary effort is normal.      Breath sounds: Normal breath sounds.   Abdominal:      General: There is no distension.      Palpations: Abdomen is soft.   Musculoskeletal:         General: No deformity.      Cervical back: Neck supple. No rigidity.      Comments: Pain reproducible on right chest wall near axilla   Skin:     General: Skin is warm and dry.   Neurological:      General: No focal deficit present.      Mental Status: She is oriented to person, place, and time.         Laboratory:  Recent Labs     04/30/24 0432 05/01/24 0453   WBC 7.6 7.2   RBC 4.69 4.59   HEMOGLOBIN 15.1 15.2   HEMATOCRIT 44.2 42.0   MCV 94.2 91.5   MCH 32.2 33.1*   MCHC 34.2 36.2*   RDW 44.1 42.1   PLATELETCT 271 286   MPV 9.9 9.7     Recent Labs     04/30/24 0432 05/01/24 0453   SODIUM 132* 132*   POTASSIUM 4.0 3.6   CHLORIDE 98 98   CO2 23 21   GLUCOSE 105* 107*   BUN 11 10   CREATININE 0.59 0.75   CALCIUM 8.8 9.1     Recent Labs     04/30/24 0432 05/01/24 0453   ALTSGPT 21 19   ASTSGOT 15 17   ALKPHOSPHAT 78 75   TBILIRUBIN 0.5 0.4   GLUCOSE 105* 107*     Recent Labs     05/01/24 0453   APTT 24.3*   INR 0.99     No results for input(s): \"NTPROBNP\" in the last 72 hours.    "   Recent Labs     04/30/24  0432   TROPONINT 7       Imaging:  [id number] (WITH 1-VIEW CXR) RIGHT    (Results Pending)       Assessment/Plan:  Justification for Admission Status  I anticipate this patient is appropriate for observation status at this time because intractable pain requiring multiple doses of IV pain medications with monitoring for toxicity    Patient will need a Med/Surg bed on EMERGENCY service .  The need is secondary to above.    * Rib pain- (present on admission)  Assessment & Plan  Following mechanical ground-level fall into a wall with impact onto her right side.  Progressive pain since then.  No rib fractures seen on CT yesterday.  Will obtain right-sided rib series to see if any fractures now.  Required multiple doses of IV pain medications in ED, will admit for further pain control  Multimodal pain control, scheduled Tylenol and ibuprofen, opiates as needed  PT/OT    Hyponatremia- (present on admission)  Assessment & Plan  Mild, 132 on presentation  Asymptomatic  Likely secondary to pain  Monitor BMP    Primary hypertension- (present on admission)  Assessment & Plan  Patient is on amlodipine 2.5 mg, carvedilol 25 mg and Hernandez and 4 mg twice daily  Patient wanted noted that she would not like her antihypertensive regimen changed  Continue home meds        VTE prophylaxis: SCDs/TEDs and enoxaparin ppx

## 2024-05-01 NOTE — ASSESSMENT & PLAN NOTE
Patient is on amlodipine 2.5 mg, carvedilol 25 mg and Hernandez and 4 mg twice daily  Patient wanted noted that she would not like her antihypertensive regimen changed  Continue home meds

## 2024-05-01 NOTE — PROGRESS NOTES
Med rec is complete per Patient and family at bedside.  Family/friend/caregiver present at time of interview with permission from Patient.    Allergies reviewed.    Has patient had any outside antibiotics in the last 30 days? N    Any Anticoagulants (rivaroxaban, apixaban, edoxaban, dabigatran, warfarin, enoxaparin) taken in the last 14 days? N           Pharmacy/Pharmacies Pt utilizes : [facility] [phone]          same

## 2024-05-01 NOTE — ED NOTES
Pt escorted from lobby to room 3. Pt placed in hospital gown and is now sitting up in bed on monitor with even and unlabored breaths, call light within reach.

## 2024-05-02 ENCOUNTER — HOME HEALTH ADMISSION (OUTPATIENT)
Dept: HOME HEALTH SERVICES | Facility: HOME HEALTHCARE | Age: 89
End: 2024-05-02
Payer: MEDICARE

## 2024-05-02 PROBLEM — G93.40 ACUTE ENCEPHALOPATHY: Status: ACTIVE | Noted: 2024-05-02

## 2024-05-02 LAB
ANION GAP SERPL CALC-SCNC: 12 MMOL/L (ref 7–16)
BUN SERPL-MCNC: 14 MG/DL (ref 8–22)
CALCIUM SERPL-MCNC: 8.9 MG/DL (ref 8.5–10.5)
CHLORIDE SERPL-SCNC: 100 MMOL/L (ref 96–112)
CO2 SERPL-SCNC: 21 MMOL/L (ref 20–33)
CREAT SERPL-MCNC: 0.72 MG/DL (ref 0.5–1.4)
GFR SERPLBLD CREATININE-BSD FMLA CKD-EPI: 81 ML/MIN/1.73 M 2
GLUCOSE SERPL-MCNC: 90 MG/DL (ref 65–99)
POTASSIUM SERPL-SCNC: 3.6 MMOL/L (ref 3.6–5.5)
SODIUM SERPL-SCNC: 133 MMOL/L (ref 135–145)

## 2024-05-02 PROCEDURE — 99232 SBSQ HOSP IP/OBS MODERATE 35: CPT | Mod: FS | Performed by: STUDENT IN AN ORGANIZED HEALTH CARE EDUCATION/TRAINING PROGRAM

## 2024-05-02 RX ORDER — HALOPERIDOL 5 MG/ML
5 INJECTION INTRAMUSCULAR EVERY 6 HOURS PRN
Status: DISCONTINUED | OUTPATIENT
Start: 2024-05-02 | End: 2024-05-03 | Stop reason: HOSPADM

## 2024-05-02 RX ORDER — TRAMADOL HYDROCHLORIDE 50 MG/1
50 TABLET ORAL EVERY 6 HOURS PRN
Status: DISCONTINUED | OUTPATIENT
Start: 2024-05-02 | End: 2024-05-03 | Stop reason: HOSPADM

## 2024-05-02 RX ORDER — UREA 10 %
5 LOTION (ML) TOPICAL NIGHTLY
Status: DISCONTINUED | OUTPATIENT
Start: 2024-05-02 | End: 2024-05-03 | Stop reason: HOSPADM

## 2024-05-02 RX ADMIN — OXYCODONE HYDROCHLORIDE 5 MG: 5 TABLET ORAL at 05:17

## 2024-05-02 RX ADMIN — IBUPROFEN 400 MG: 400 TABLET, FILM COATED ORAL at 04:21

## 2024-05-02 RX ADMIN — LIDOCAINE 1 PATCH: 4 PATCH TOPICAL at 09:38

## 2024-05-02 RX ADMIN — CARVEDILOL 25 MG: 25 TABLET, FILM COATED ORAL at 17:06

## 2024-05-02 RX ADMIN — IBUPROFEN 400 MG: 400 TABLET, FILM COATED ORAL at 12:20

## 2024-05-02 RX ADMIN — IBUPROFEN 400 MG: 400 TABLET, FILM COATED ORAL at 17:05

## 2024-05-02 RX ADMIN — ACETAMINOPHEN 650 MG: 325 TABLET, FILM COATED ORAL at 12:18

## 2024-05-02 RX ADMIN — CARVEDILOL 25 MG: 25 TABLET, FILM COATED ORAL at 09:39

## 2024-05-02 RX ADMIN — CYCLOBENZAPRINE HYDROCHLORIDE 10 MG: 5 TABLET, FILM COATED ORAL at 10:17

## 2024-05-02 RX ADMIN — TERAZOSIN HYDROCHLORIDE 4 MG: 1 CAPSULE ORAL at 09:41

## 2024-05-02 RX ADMIN — CYCLOBENZAPRINE HYDROCHLORIDE 10 MG: 5 TABLET, FILM COATED ORAL at 04:20

## 2024-05-02 RX ADMIN — Medication 5 MG: at 21:43

## 2024-05-02 RX ADMIN — ACETAMINOPHEN 650 MG: 325 TABLET, FILM COATED ORAL at 04:20

## 2024-05-02 RX ADMIN — ACETAMINOPHEN 650 MG: 325 TABLET, FILM COATED ORAL at 17:05

## 2024-05-02 RX ADMIN — HALOPERIDOL LACTATE 5 MG: 5 INJECTION, SOLUTION INTRAMUSCULAR at 03:35

## 2024-05-02 RX ADMIN — ENOXAPARIN SODIUM 40 MG: 100 INJECTION SUBCUTANEOUS at 17:06

## 2024-05-02 RX ADMIN — TERAZOSIN HYDROCHLORIDE 4 MG: 1 CAPSULE ORAL at 17:06

## 2024-05-02 RX ADMIN — AMLODIPINE BESYLATE 2.5 MG: 5 TABLET ORAL at 09:40

## 2024-05-02 RX ADMIN — TELMISARTAN 80 MG: 80 TABLET ORAL at 09:40

## 2024-05-02 RX ADMIN — ACETAMINOPHEN 650 MG: 325 TABLET, FILM COATED ORAL at 23:50

## 2024-05-02 ASSESSMENT — ENCOUNTER SYMPTOMS
NAUSEA: 0
FEVER: 0
ABDOMINAL PAIN: 0
PALPITATIONS: 0
SORE THROAT: 0
VOMITING: 0
FALLS: 1
NERVOUS/ANXIOUS: 0
HEADACHES: 0
CHILLS: 0
BACK PAIN: 1
COUGH: 0
DOUBLE VISION: 0
DIZZINESS: 0
WEAKNESS: 1
BLURRED VISION: 0
SHORTNESS OF BREATH: 0

## 2024-05-02 ASSESSMENT — PAIN DESCRIPTION - PAIN TYPE
TYPE: ACUTE PAIN
TYPE: ACUTE PAIN

## 2024-05-02 ASSESSMENT — COGNITIVE AND FUNCTIONAL STATUS - GENERAL
DRESSING REGULAR UPPER BODY CLOTHING: A LOT
TOILETING: A LOT
DRESSING REGULAR LOWER BODY CLOTHING: A LOT
HELP NEEDED FOR BATHING: A LOT
SUGGESTED CMS G CODE MODIFIER DAILY ACTIVITY: CK
PERSONAL GROOMING: A LOT
DAILY ACTIVITIY SCORE: 14

## 2024-05-02 ASSESSMENT — ACTIVITIES OF DAILY LIVING (ADL): TOILETING: INDEPENDENT

## 2024-05-02 NOTE — CARE PLAN
The patient is Stable - Low risk of patient condition declining or worsening    Shift Goals  Clinical Goals: Obs pain management  Patient Goals: comfort  Family Goals: comfort,      Problem: Knowledge Deficit - Standard  Goal: Patient and family/care givers will demonstrate understanding of plan of care, disease process/condition, diagnostic tests and medications  Description: Target End Date:  1-3 days or as soon as patient condition allows    Document in Patient Education    1.  Patient and family/caregiver oriented to unit, equipment, visitation policy and means for communicating concern  2.  Complete/review Learning Assessment  3.  Assess knowledge level of disease process/condition, treatment plan, diagnostic tests and medications  4.  Explain disease process/condition, treatment plan, diagnostic tests and medications  Outcome: Progressing     Problem: Pain - Standard  Goal: Alleviation of pain or a reduction in pain to the patient’s comfort goal  Description: Target End Date:  Prior to discharge or change in level of care    Document on Vitals flowsheet    1.  Document pain using the appropriate pain scale per order or unit policy  2.  Educate and implement non-pharmacologic comfort measures (i.e. relaxation, distraction, massage, cold/heat therapy, etc.)  3.  Pain management medications as ordered  4.  Reassess pain after pain med administration per policy  5.  If opiods administered assess patient's response to pain medication is appropriate per POSS sedation scale  6.  Follow pain management plan developed in collaboration with patient and interdisciplinary team (including palliative care or pain specialists if applicable)  Outcome: Progressing     Problem: Skin Integrity  Goal: Skin integrity is maintained or improved  Description: Target End Date:  Prior to discharge or change in level of care    Document interventions on Skin Risk/Justus flowsheet groups and corresponding LDA    1.  Assess and monitor  skin integrity, appearance and/or temperature  2.  Assess risk factors for impaired skin integrity and/or pressures ulcers  3.  Implement precautions to protect skin integrity in collaboration with interdisciplinary team  4.  Implement pressure ulcer prevention protocol if at risk for skin breakdown  5.  Confirm wound care consult if at risk for skin breakdown  6.  Ensure patient use of pressure relieving devices  (Low air loss bed, waffle overlay, heel protectors, ROHO cushion, etc)  Outcome: Progressing     Problem: Fall Risk  Goal: Patient will remain free from falls  Description: Target End Date:  Prior to discharge or change in level of care    Document interventions on the Gargshilpa Cagle Fall Risk Assessment    1.  Assess for fall risk factors  2.  Implement fall precautions  Outcome: Progressing

## 2024-05-02 NOTE — HOSPITAL COURSE
Prabha Lizama is a 88 y.o. female with a past medical history of hypertension and osteoporosis who presented 5/1/2024 with rib pain. Progressively worsening after mechanical fall. She was seen at [facility] 04/30 and had a CT of the chest which showed no acute fractures, likely chronic vertebral compression fracture.  She returned to ED with worsening pain; requiring multiple doses of IV pain medications so admission requested for further pain management and observation. Patient received x-ray of her ribs which showed at least 2 minimally displaced right-sided rib fractures.  No evidence of large pleural effusion or pneumothorax.  PT saw the patient yesterday and cleared her for home health. Patient was on room air and pain appropriately controlled. Patient and her  requested discharge today. Patient was determined satisfactory for discharge with appropriate follow up. Case management assisted in organizing home health.     Of note, thryroid nodule was identified on imaging and communicated to patient. Recommend coordination of thyroid ultrasound by primary care provider.

## 2024-05-02 NOTE — DISCHARGE PLANNING
Received Choice Form @: 2444  Agency/Facility Name: [facility]  Referral Sent Per Choice Form @: 0298

## 2024-05-02 NOTE — DISCHARGE PLANNING
Per message from Axel GARY and note from Mir with [facility] patient accepted to [facility].

## 2024-05-02 NOTE — DISCHARGE PLANNING
Care Transition Team Assessment    Spoke with spouse at bedside and in bray. Lives with spouse in single story house. Has Walker at home. PCP Priscilla Vicente. Uses [facility] on [address]. Spouse will be ride @ D/C. Anticipate HHC vs SNF.    Information Source  Information Given By: Spouse  Informant's Name: Diony    Readmission Evaluation  Is this a readmission?: No  Ambulatory or Self Mobile in Wheelchair: No-Not an Elopement Risk  Disoriented: No  Psychiatric Symptoms: None  History of Wandering: No  Elopement this Admit: No  Vocalizing Wanting to Leave: No  Displays Behaviors, Body Language Wanting to Leave: No-Not at Risk for Elopement  Elopement Risk: Not at Risk for Elopement    Interdisciplinary Discharge Planning  Primary Care Physician: Priscilla Vicente  Lives with - Patient's Self Care Capacity: Spouse  Patient or legal guardian wants to designate a caregiver: No  Support Systems: Spouse / Significant Other  Housing / Facility: [address]  Do You Take your Prescribed Medications Regularly: Yes  Able to Return to Previous ADL's: Future Time w/Therapy  Mobility Issues: Yes  Prior Services: Home-Independent  Patient Prefers to be Discharged to:: Home  Assistance Needed: Yes  Durable Medical Equipment: Walker    Discharge Preparedness  What are your discharge supports?: Spouse  Prior Functional Level: Ambulatory    Functional Assesment  Prior Functional Level: Ambulatory    Finances  Prescription Coverage: Yes    Anticipated Discharge Information  Discharge Disposition: D/T to home under HHA care in anticipation of covered skilled care (06)  Discharge Address: [address] [place] [address]  Discharge Contact Phone Number: [phone]

## 2024-05-02 NOTE — THERAPY
Occupational Therapy   Initial Evaluation     Patient Name: Prabha Lizama  Age:  88 y.o., Sex:  female  Medical Record #: [id number]  Today's Date: 5/2/2024     Precautions  Precautions: Fall Risk  Comments: rib and hip pain    Assessment  Patient is 88 y.o. female admitted with R chest pain after tripping on nightgown and falling into wall found to have R fib fx with hx of HTN, and osteoporosis. Pt seen for OT evaluation and presented with significantly decreased cognition, likely medication related, poor pain control, impaired balance, impaired insight/safety awareness, poor activity tolerance. Spouse concerned about ability to care for pt upon DC, with her pain and cognition ebbing/flowing. Pt required max A for mobility and self cares. Anticipate improved function with improved pain control and cleared cognition, however at this time recommend post acute placement as spouse is unable to assist at her current functional level. Will follow for skilled OT.    Plan    Occupational Therapy Initial Treatment Plan   Treatment Interventions: (P) Self Care / Activities of Daily Living, Adaptive Equipment, Cognitive Skill Development, Neuro Re-Education / Balance, Therapeutic Exercises, Therapeutic Activity  Treatment Frequency: (P) 3 Times per Week  Duration: (P) Until Therapy Goals Met    DC Equipment Recommendations: (P) Unable to determine at this time  Discharge Recommendations: (P) Recommend post-acute placement for additional occupational therapy services prior to discharge home      05/02/24 1036      Initial Contact Note    Initial Contact Note Order Received and Verified, Occupational Therapy Evaluation in Progress with Full Report to Follow.   Prior Living Situation   Prior Services Home-Independent   Housing / Facility 1 Story House   Bathroom Set up Walk In Shower;Shower Chair   Equipment Owned Front-Wheel Walker;Tub / Shower Seat  (high toilet)   Lives with - Patient's Self Care Capacity Spouse  "  Comments Pt baseline independent, no longer drives, spouse supportive, also elderly and only able to provide limited assist   Prior Level of ADL Function   Self Feeding Independent   Grooming / Hygiene Independent   Bathing Independent   Dressing Independent   Toileting Independent   Prior Level of IADL Function   Medication Management Independent   Laundry Independent   Kitchen Mobility Independent   Finances Independent   Home Management Independent   Shopping Requires Assist   Driving / Transportation Relatives / Others Provide Transportation   History of Falls   History of Falls Yes   Date of Last Fall   (reason for admit)   Precautions   Precautions Fall Risk   Comments rib and hip pain   Pain 0 - 10 Group   Therapist Pain Assessment   (reporting rib and bilateral hip pain)   Cognition    Cognition / Consciousness X   Ability To Follow Commands 1 Step   Safety Awareness Impaired   New Learning Impaired   Attention Impaired   Comments Pt oriented, but confused with impaired insight/awareness, spouse reporting significantly decreased cog compared to baseline. Perseverative on pain \"everywhere\". Poor command following   Active ROM Upper Body   Active ROM Upper Body  WDL   Strength Upper Body   Upper Body Strength  X   Gross Strength Generalized Weakness, Equal Bilaterally.    Balance Assessment   Sitting Balance (Static) Fair -   Sitting Balance (Dynamic) Poor +   Standing Balance (Static) Trace +   Standing Balance (Dynamic) Trace   Weight Shift Sitting Fair   Weight Shift Standing Poor   Comments with 2pa   Bed Mobility    Supine to Sit Maximal Assist   Sit to Supine Maximal Assist   Scooting Maximal Assist   Comments pain in bearing weight through BLE, difficulty assessing where 2/2 impaired cognition, pt rubbing hips   ADL Assessment   Grooming Moderate Assist;Seated   Lower Body Dressing Total Assist   Toileting Total Assist   How much help from another person does the patient currently need...   6 Clicks " Daily Activity Score 14   Functional Mobility   Sit to Stand Maximal Assist   Mobility STS 2x with 2pa   Patient / Family Goals   Patient / Family Goal #1 to be in less pain   Short Term Goals   Short Term Goal # 1 Pt will demo toilet txf SPV   Short Term Goal # 2 Pt demo LB dressing SPV   Short Term Goal # 3 Pt will tolerate standing G/h SPV   Short Term Goal # 4 Pt will consistently follow 1 step commands   Education Group   Role of Occupational Therapist Patient Response Patient;Family;Acceptance;Explanation   Occupational Therapy Initial Treatment Plan    Treatment Interventions Self Care / Activities of Daily Living;Adaptive Equipment;Cognitive Skill Development;Neuro Re-Education / Balance;Therapeutic Exercises;Therapeutic Activity   Treatment Frequency 3 Times per Week   Duration Until Therapy Goals Met   Problem List   Problem List Decreased Active Daily Living Skills;Decreased Upper Extremity Strength Right;Decreased Homemaking Skills;Decreased Upper Extremity Strength Left;Decreased Activity Tolerance;Decreased Functional Mobility;Safety Awareness Deficits / Cognition;Impaired Cognitive Function;Impaired Postural Control / Balance   Anticipated Discharge Equipment and Recommendations   DC Equipment Recommendations Unable to determine at this time   Discharge Recommendations Recommend post-acute placement for additional occupational therapy services prior to discharge home   Interdisciplinary Plan of Care Collaboration   IDT Collaboration with  Nursing;Physician;;Family / Caregiver   Patient Position at End of Therapy In Bed;Bed Alarm On;Call Light within Reach;Tray Table within Reach;Phone within Reach;Family / Friend in Room   Collaboration Comments RN aware

## 2024-05-02 NOTE — PROGRESS NOTES
Telephone report given to SUSHANT Wakefield for the transfer of the patient to [facility]. No questions at this time from the receiving RN.

## 2024-05-02 NOTE — PROGRESS NOTES
2 RN Skin Assessment Completed by Nerissa RN and Merari RN.   Skin lesions to back, chronic closed   Head: WDL  Ears: WDL  Nose: WDL  Mouth: WDL  Neck: WDL  Breasts/Chest: WDL  Shoulder Blades: WDL  Spine: WDL  (R) Arm/Elbow/hand: WDL  (L) Arm/Elbow/hand: WDL  Abdomen:WDL  Groin: WDL  Sacrum/Coccyx/Buttocks: blanching  (R) Leg: WDL  (L) Leg: WDL  (R) Heel/Foot/Toe: blanching  (L) Heel/Foot/Toe: blanching              Devices in place: BP Cuff and Pulse Ox     Interventions in place: Gray ear foams and Pillows     Possible skin injury found: No     Pictures uploaded into Epic: N/A  Wound Consult Placed: N/A

## 2024-05-02 NOTE — DISCHARGE PLANNING
ATTN: Case Management  RE: Referral for Home Health    As of 5/2/24, we have accepted the Home Health referral for the patient listed above.    A [facility] clinician will be out to see the patient within 48 hours. If you have any questions or concerns regarding the patient’s transition to Home Health, please do not hesitate to contact us at [phone].      We look forward to collaborating with you,  [facility] Team

## 2024-05-02 NOTE — PROGRESS NOTES
[facility] Medicine Daily Progress Note    Date of Service  5/2/2024    Chief Complaint  Prabha Lizama is a 88 y.o. female admitted 5/1/2024 with rib pain.    Hospital Course  Prabha Lizama is a 88 y.o. female with a past medical history of hypertension and osteoporosis who presented 5/1/2024 with rib pain.      She presents due to progressive pain of her right chest wall.  Approximately 4 days ago she had a fall where she tripped on her nightgown and fell into the wall very hard with impact on her right side.  Since then she has been having progressively worsening right-sided rib pain.  Pain is worsened by movement, often it subsides while she is at rest.  Sometimes it feels spasmodic in nature.  Is not having any shortness of breath, no chest pressure, no nausea/vomiting, no diaphoresis.  She was seen at [facility] yesterday and had a CT of the chest done which showed no acute fractures, likely chronic vertebral compression fracture.  She returns this morning due to continued worsening of the pain.       In the ED resident significant for blood pressure 181/85. Labs significant for sodium 132. She was requiring multiple doses of IV pain medications so admission requested for further pain management and observation.      While on the floor the patient received x-ray of her ribs which showed at least 2 minimally displaced right-sided rib fractures.  No evidence of large pleural effusion or pneumothorax.  PT saw the patient yesterday and cleared her for home health, but today when OT went to see the patient she was requiring much more assistance than the previous day and stated she was in significant pain.  OT is recommending placement as unsafe to go home.  Additionally overnight patient was having some delirium requiring IV Haldol and is still slightly altered this a.m.    Interval Problem Update  -Experienced delirium overnight.  Still having significant pain and decreased ability to ambulate.  Increased assistance required during ambulation compared to yesterday.  -Plan of care: Oxycodone discontinued and tramadol ordered.  Patient seems to be very effective with oxycodone.  Awake during daytime hours to assist with better sleep at night.  Up to chair 3 times a day.  Continue working with PT/OT.  -Disposition: Transfer patient as observation status.    I have discussed this patient's plan of care and discharge plan at IDT rounds today with Case Management, Nursing, Nursing leadership, and other members of the IDT team.    Consultants/Specialty  None    Code Status  Full Code    Disposition  The patient is not medically cleared for discharge to home or a post-acute facility.  Anticipate discharge to: skilled nursing facility    I have placed the appropriate orders for post-discharge needs.    Review of Systems  Review of Systems   Constitutional:  Negative for chills, fever and malaise/fatigue.   HENT:  Negative for congestion and sore throat.    Eyes:  Negative for blurred vision and double vision.   Respiratory:  Negative for cough and shortness of breath.    Cardiovascular:  Negative for chest pain, palpitations and leg swelling.   Gastrointestinal:  Negative for abdominal pain, nausea and vomiting.   Genitourinary:  Negative for dysuria and frequency.   Musculoskeletal:  Positive for back pain and falls.   Skin:  Negative for itching and rash.   Neurological:  Positive for weakness. Negative for dizziness and headaches.   Psychiatric/Behavioral:  The patient is not nervous/anxious.         Physical Exam  Temp:  [35.9 °C (96.7 °F)-36.8 °C (98.3 °F)] 36 °C (96.8 °F)  Pulse:  [59-76] 64  Resp:  [14-20] 14  BP: (120-195)/(56-90) 148/66  SpO2:  [93 %-98 %] 95 %    Physical Exam  Constitutional:       Appearance: Normal appearance.   HENT:      Head: Normocephalic and atraumatic.      Nose: Nose normal.      Mouth/Throat:      Mouth: Mucous membranes are moist.   Eyes:      Pupils: Pupils are equal, round,  and reactive to light.   Cardiovascular:      Rate and Rhythm: Normal rate and regular rhythm.      Pulses: Normal pulses.      Heart sounds: Normal heart sounds.   Pulmonary:      Effort: Pulmonary effort is normal.      Breath sounds: Normal breath sounds.   Abdominal:      General: Bowel sounds are normal.      Palpations: Abdomen is soft.   Musculoskeletal:         General: Normal range of motion.      Cervical back: Normal range of motion.   Skin:     General: Skin is warm and dry.      Capillary Refill: Capillary refill takes less than 2 seconds.   Neurological:      Mental Status: She is alert. She is disoriented.   Psychiatric:         Mood and Affect: Mood normal.         Behavior: Behavior normal.         Fluids  No intake or output data in the 24 hours ending 05/02/24 1210    Laboratory  Recent Labs     04/30/24  0432 05/01/24  0453   WBC 7.6 7.2   RBC 4.69 4.59   HEMOGLOBIN 15.1 15.2   HEMATOCRIT 44.2 42.0   MCV 94.2 91.5   MCH 32.2 33.1*   MCHC 34.2 36.2*   RDW 44.1 42.1   PLATELETCT 271 286   MPV 9.9 9.7     Recent Labs     04/30/24  0432 05/01/24  0453 05/02/24  0505   SODIUM 132* 132* 133*   POTASSIUM 4.0 3.6 3.6   CHLORIDE 98 98 100   CO2 23 21 21   GLUCOSE 105* 107* 90   BUN 11 10 14   CREATININE 0.59 0.75 0.72   CALCIUM 8.8 9.1 8.9     Recent Labs     05/01/24  0453   APTT 24.3*   INR 0.99               Imaging  DX-SHOULDER 2+ RIGHT   Final Result      No radiographic evidence of acute traumatic injury.      [id number] (WITH 1-VIEW CXR) RIGHT   Final Result      At least 2 minimally displaced right-sided rib fractures. No evidence of large pleural effusion or pneumothorax.           Assessment/Plan  * Rib pain- (present on admission)  Assessment & Plan  CT showing anterior wedge at T11.  No acute fractures noted.  Right-sided rib series showing 2 minimally displaced rib fractures  Multimodal pain control, scheduled Tylenol and ibuprofen  Patient experiencing some delirium, discontinued  Oxycodone as she is getting significantly lethargic with it  Tramadol added for PRN pain management as she has taken it in the past without adverse effects  PT/OT    Acute encephalopathy- (present on admission)  Assessment & Plan  Patient experiencing some delirium during the night  IV Haldol provided  Please keep lights on and curtains open in the day with minimal interruptions of sleep at night  Up to chair and ambulate TID     Hyponatremia- (present on admission)  Assessment & Plan  Mild, 133 on presentation  Asymptomatic  Monitor BMP    Primary hypertension- (present on admission)  Assessment & Plan  Patient is on amlodipine 2.5 mg, carvedilol 25 mg and Hernandez and 4 mg twice daily  Patient wanted noted that she would not like her antihypertensive regimen changed  Continue home meds      VTE prophylaxis:    enoxaparin ppx      I have performed a physical exam and reviewed and updated ROS and Plan today (5/2/2024). In review of yesterday's note (5/1/2024), there are no changes except as documented above.      ISilvia A.P.R.N. performed a substantiated portion of the service face-to-face with same patient on the same date of service INDEPENDENTLY FROM THE MD ON ASSESSMENT, EXAMINATION, AND DISCUSSION IN PLAN OF CARE FOR 17 MINUTES.  I was personally involved in reviewing and conducting the medical decision making, including the information as described below:

## 2024-05-02 NOTE — PROGRESS NOTES
Patient transferred to new unit via bed by transport. All belongings with the patient upon transfer.

## 2024-05-02 NOTE — ASSESSMENT & PLAN NOTE
Patient experiencing some delirium during the night  IV Haldol provided  Please keep lights on and curtains open in the day with minimal interruptions of sleep at night  Up to chair and ambulate TID

## 2024-05-02 NOTE — PROGRESS NOTES
4 Eyes Skin Assessment Completed by SUSHANT Wakefield and SUSHANT March.    Head WDL  Ears WDL  Nose WDL  Mouth WDL  Neck WDL  Breast/Chest WDL  Shoulder Blades WDL  Spine WDL  (R) Arm/Elbow/Hand WDL  (L) Arm/Elbow/Hand Bruising        Abdomen WDL  Groin WDL  Scrotum/Coccyx/Buttocks WDL  (R) Leg WDL  (L) Leg Edema  (R) Heel/Foot/Toe WDL  (L) Heel/Foot/Toe WDL          Devices In Places: purewick in place       Interventions In Place Pillows    Possible Skin Injury No    Pictures Uploaded Into Epic Yes  Wound Consult Placed N/A  RN Wound Prevention Protocol Ordered No

## 2024-05-02 NOTE — PROGRESS NOTES
Patient unfortunately woke up overnight very confused, not redirectable, crying and stating she will call the police. Reassured patient she is safe, called to family, which did not help either. Patient became combative with attempts at therapeutic redirection. Reported to provider LUZ COBB who ordered IV Haldol.     Administered IV haldol per order. Patient calm 15 mins after administration and more agreeable.

## 2024-05-02 NOTE — DISCHARGE PLANNING
Received request for HHC. Spoke with spouse at bedside and in bray. Discussed HHC. Choice form filled out and signed by spouse. All information verified. Choice form faxed to Axel GARY. Message sent to Axel ABDIRAHMAN to update.

## 2024-05-03 ENCOUNTER — PHARMACY VISIT (OUTPATIENT)
Dept: PHARMACY | Facility: MEDICAL CENTER | Age: 89
End: 2024-05-03
Payer: COMMERCIAL

## 2024-05-03 VITALS
SYSTOLIC BLOOD PRESSURE: 140 MMHG | HEART RATE: 63 BPM | BODY MASS INDEX: 25.76 KG/M2 | TEMPERATURE: 97.2 F | DIASTOLIC BLOOD PRESSURE: 59 MMHG | RESPIRATION RATE: 17 BRPM | HEIGHT: 64 IN | WEIGHT: 150.91 LBS | OXYGEN SATURATION: 95 %

## 2024-05-03 PROBLEM — E04.1 THYROID NODULE: Status: ACTIVE | Noted: 2024-05-03

## 2024-05-03 PROBLEM — S22.39XA RIB FRACTURE: Status: ACTIVE | Noted: 2024-05-01

## 2024-05-03 PROBLEM — I70.90 ATHEROSCLEROSIS: Status: ACTIVE | Noted: 2024-05-03

## 2024-05-03 PROBLEM — M43.9 WEDGE DEFORMITY ON X-RAY OF SPINE: Status: ACTIVE | Noted: 2024-05-03

## 2024-05-03 LAB
ANION GAP SERPL CALC-SCNC: 11 MMOL/L (ref 7–16)
BUN SERPL-MCNC: 20 MG/DL (ref 8–22)
CALCIUM SERPL-MCNC: 9.2 MG/DL (ref 8.5–10.5)
CHLORIDE SERPL-SCNC: 96 MMOL/L (ref 96–112)
CO2 SERPL-SCNC: 24 MMOL/L (ref 20–33)
CREAT SERPL-MCNC: 0.83 MG/DL (ref 0.5–1.4)
ERYTHROCYTE [DISTWIDTH] IN BLOOD BY AUTOMATED COUNT: 42.3 FL (ref 35.9–50)
GFR SERPLBLD CREATININE-BSD FMLA CKD-EPI: 68 ML/MIN/1.73 M 2
GLUCOSE SERPL-MCNC: 99 MG/DL (ref 65–99)
HCT VFR BLD AUTO: 40 % (ref 37–47)
HGB BLD-MCNC: 13.8 G/DL (ref 12–16)
MCH RBC QN AUTO: 32.1 PG (ref 27–33)
MCHC RBC AUTO-ENTMCNC: 34.5 G/DL (ref 32.2–35.5)
MCV RBC AUTO: 93 FL (ref 81.4–97.8)
PLATELET # BLD AUTO: 280 K/UL (ref 164–446)
PMV BLD AUTO: 9.9 FL (ref 9–12.9)
POTASSIUM SERPL-SCNC: 4.3 MMOL/L (ref 3.6–5.5)
RBC # BLD AUTO: 4.3 M/UL (ref 4.2–5.4)
SODIUM SERPL-SCNC: 131 MMOL/L (ref 135–145)
WBC # BLD AUTO: 5.9 K/UL (ref 4.8–10.8)

## 2024-05-03 PROCEDURE — 99239 HOSP IP/OBS DSCHRG MGMT >30: CPT | Performed by: INTERNAL MEDICINE

## 2024-05-03 PROCEDURE — RXMED WILLOW AMBULATORY MEDICATION CHARGE: Performed by: INTERNAL MEDICINE

## 2024-05-03 RX ORDER — NAPROXEN 500 MG/1
500 TABLET ORAL 2 TIMES DAILY WITH MEALS
Qty: 14 TABLET | Refills: 0 | Status: SHIPPED | OUTPATIENT
Start: 2024-05-03 | End: 2024-05-03

## 2024-05-03 RX ORDER — LIDOCAINE 50 MG/G
1 PATCH TOPICAL EVERY 24 HOURS
Qty: 5 PATCH | Refills: 0 | Status: SHIPPED | OUTPATIENT
Start: 2024-05-03 | End: 2024-05-08

## 2024-05-03 RX ORDER — LIDOCAINE 50 MG/G
1 PATCH TOPICAL EVERY 24 HOURS
Qty: 5 PATCH | Refills: 0 | Status: SHIPPED | OUTPATIENT
Start: 2024-05-03 | End: 2024-05-03

## 2024-05-03 RX ORDER — NAPROXEN 500 MG/1
500 TABLET ORAL 2 TIMES DAILY WITH MEALS
Qty: 14 TABLET | Refills: 0 | Status: SHIPPED | OUTPATIENT
Start: 2024-05-03 | End: 2024-05-10

## 2024-05-03 RX ORDER — TRAMADOL HYDROCHLORIDE 50 MG/1
50 TABLET ORAL EVERY 6 HOURS PRN
Qty: 12 TABLET | Refills: 0 | Status: SHIPPED | OUTPATIENT
Start: 2024-05-03 | End: 2024-05-06

## 2024-05-03 RX ADMIN — TELMISARTAN 80 MG: 80 TABLET ORAL at 06:05

## 2024-05-03 RX ADMIN — AMLODIPINE BESYLATE 2.5 MG: 5 TABLET ORAL at 06:05

## 2024-05-03 RX ADMIN — CARVEDILOL 25 MG: 25 TABLET, FILM COATED ORAL at 06:05

## 2024-05-03 RX ADMIN — TERAZOSIN HYDROCHLORIDE 4 MG: 1 CAPSULE ORAL at 06:04

## 2024-05-03 RX ADMIN — LIDOCAINE 1 PATCH: 4 PATCH TOPICAL at 08:36

## 2024-05-03 RX ADMIN — IBUPROFEN 400 MG: 400 TABLET, FILM COATED ORAL at 06:04

## 2024-05-03 RX ADMIN — ACETAMINOPHEN 650 MG: 325 TABLET, FILM COATED ORAL at 06:03

## 2024-05-03 NOTE — DISCHARGE SUMMARY
Discharge Summary    CHIEF COMPLAINT ON ADMISSION  Chief Complaint   Patient presents with    Rib Pain     BIB [facility] from home due to Right rib pain. Slipped and fell to the wall on Thursday. Seen yesterday at [facility]. Scans negative for fracture. Comes to ER today for increasing pain. Denies SOB. Refused stronger analgesic from EMS stating can only take tylenol and was last taken at 0230.       Reason for Admission  RUQ Pain     Admission Date  5/1/2024    CODE STATUS  Full Code    HPI & HOSPITAL COURSE  Prabha Lizama is a 88 y.o. female with a past medical history of hypertension and osteoporosis who presented 5/1/2024 with rib pain. Progressively worsening after mechanical fall. She was seen at [facility] 04/30 and had a CT of the chest which showed no acute fractures, likely chronic vertebral compression fracture.  She returned to ED with worsening pain; requiring multiple doses of IV pain medications so admission requested for further pain management and observation. Patient received x-ray of her ribs which showed at least 2 minimally displaced right-sided rib fractures.  No evidence of large pleural effusion or pneumothorax.  PT saw the patient yesterday and cleared her for home health. Patient was on room air and pain appropriately controlled. Patient and her  requested discharge today. Patient was determined satisfactory for discharge with appropriate follow up. Case management assisted in organizing home health.     Of note, thryroid nodule was identified on imaging and communicated to patient. Recommend coordination of thyroid ultrasound by primary care provider.     Therefore, she is discharged in fair and stable condition to home with close outpatient follow-up.    The patient met 2-midnight criteria for an inpatient stay at the time of discharge.    Discharge Date  5/3/2024    FOLLOW UP ITEMS POST DISCHARGE  Please follow up with PCP in 3-5 days for post hospitalization follow up  and medication reconciliation.       DISCHARGE DIAGNOSES  Principal Problem:    Rib fracture (POA: Yes)  Active Problems:    Acute encephalopathy (POA: Yes)    Wedge deformity on x-ray of spine (POA: Unknown)    Thyroid nodule (POA: Unknown)    Atherosclerosis (POA: Unknown)    Primary hypertension (POA: Yes)    Hyponatremia (POA: Yes)  Resolved Problems:    * No resolved hospital problems. *      FOLLOW UP  Future Appointments   Date Time Provider Department Center   5/24/2024  1:00 PM [facility]     No follow-up provider specified.    MEDICATIONS ON DISCHARGE     Medication List        START taking these medications        Instructions   naproxen 500 MG Tabs  Commonly known as: Naprosyn   Take 1 Tablet by mouth 2 times a day with meals for 7 days.  Dose: 500 mg     traMADol 50 MG Tabs  Commonly known as: Ultram   Take 1 Tablet by mouth every 6 hours as needed for Moderate Pain or Severe Pain for up to 3 days.  Dose: 50 mg            CHANGE how you take these medications        Instructions   telmisartan 80 MG Tabs  What changed: when to take this  Commonly known as: Micardis   TAKE 1 TABLET EVERY MORNING     terazosin 2 MG Caps  What changed:   how much to take  how to take this  when to take this  Commonly known as: Hytrin   TAKE 2 CAPSULES BY MOUTH TWICE DAILY            CONTINUE taking these medications        Instructions   acetaminophen 500 MG Tabs  Commonly known as: Tylenol   Take 500 mg by mouth every 6 hours as needed for Mild Pain.  Dose: 500 mg     amLODIPine 2.5 MG Tabs  Commonly known as: Norvasc   Take 1 Tablet by mouth every day.  Dose: 2.5 mg     CALCIUM PO   Take 1 Tablet by mouth every day.  Dose: 1 Tablet     carvedilol 25 MG Tabs  Commonly known as: Coreg   TAKE 1 TABLET BY MOUTH TWICE DAILY  Dose: 25 mg     lidocaine 5 % Ptch  Commonly known as: Lidoderm   Place 1 Patch on the skin every 24 hours for 5 days.  Dose: 1 Patch     VITAMIN D PO   Take 1 Tablet by  mouth every day.  Dose: 1 Tablet              Allergies  Allergies   Allergen Reactions    Amoxicillin      Unsure of reaction/or allergy    Hydralazine Unspecified     Pt reported severe hypotensive reaction    Morphine Vomiting and Nausea    Penicillins      1970 reaction       DIET  Orders Placed This Encounter   Procedures    Diet Order Diet: Regular     Standing Status:   Standing     Number of Occurrences:   1     Order Specific Question:   Diet:     Answer:   Regular [1]       LABORATORY  Lab Results   Component Value Date    SODIUM 131 (L) 05/03/2024    POTASSIUM 4.3 05/03/2024    CHLORIDE 96 05/03/2024    CO2 24 05/03/2024    GLUCOSE 99 05/03/2024    BUN 20 05/03/2024    CREATININE 0.83 05/03/2024    CREATININE 0.87 07/07/2011    GLOMRATE >59 08/04/2010        Lab Results   Component Value Date    WBC 5.9 05/03/2024    WBC 4.0 08/04/2010    HEMOGLOBIN 13.8 05/03/2024    HEMATOCRIT 40.0 05/03/2024    PLATELETCT 280 05/03/2024        Total time of the discharge process exceeds 37 minutes.

## 2024-05-03 NOTE — DOCUMENTATION QUERY
[facility]                                                                       Query Response Note      PATIENT:               QUINTIN JOHNSTON  ACCT #:                  [id number]  MRN:                     [id number]  :                      1935  ADMIT DATE:       2024 4:23 AM  DISCH DATE:          RESPONDING  PROVIDER #:        [id number]           QUERY TEXT:    Based on the clinical indicators outlined above, please clarify if the following has been treated/evaluated during this hospitalization:    Thank you.      The patient's Clinical Indicators include:   Hospitalist note: Acute encephalopathy, hyponatremia. Acute delirium overnight requiring haldol.   H&P: Progressive rib pain. Required multiple doses of IV pain medication.    Risk factors: Medications, hyponatremia    Treatment: Neuro checks, re-orient patient, monitor labs    Thank you,  Maria E Santos NP, CCDS   Clinical   Connect via [employer] Governance  Options provided:   -- Toxic-metabolic encephalopathy   -- Toxic encephalopathy   -- Metabolic encephalopathy   -- Other explanation, Please specify      Query created by: Maria E Santos on 5/3/2024 6:53 AM    RESPONSE TEXT:    Metabolic encephalopathy          Electronically signed by:  LIDIA RUIZ MD 5/3/2024 6:58 AM

## 2024-05-03 NOTE — CARE PLAN
The patient is Stable - Low risk of patient condition declining or worsening    Shift Goals  Clinical Goals: Patient's pain will be <5 throughout shift  Patient Goals: Sleep and rest  Family Goals: Pain control    Progress made toward(s) clinical / shift goals:    Patient is alert and oriented x2, disoriented to time and situation. Patient needs redirection and re-orientation. Patient's pain is controlled with medication, rest and heat packs. Patient's bed alarm is on, bed in low position, call light within reach.     Patient is not progressing towards the following goals:      Problem: Knowledge Deficit - Standard  Goal: Patient and family/care givers will demonstrate understanding of plan of care, disease process/condition, diagnostic tests and medications  Description: Target End Date:  1-3 days or as soon as patient condition allows    Document in Patient Education    1.  Patient and family/caregiver oriented to unit, equipment, visitation policy and means for communicating concern  2.  Complete/review Learning Assessment  3.  Assess knowledge level of disease process/condition, treatment plan, diagnostic tests and medications  4.  Explain disease process/condition, treatment plan, diagnostic tests and medications  Outcome: Not Progressing

## 2024-05-03 NOTE — PROGRESS NOTES
Reviewed discharge instructions with patient and . Medications sent to [facility] pharmacy per patient choice- communicated with in house pharmacist.  to trasport patient home .PIV removed.

## 2024-06-21 DIAGNOSIS — R03.0 WHITE COAT SYNDROME WITH HIGH BLOOD PRESSURE BUT WITHOUT HYPERTENSION: ICD-10-CM

## 2024-06-21 RX ORDER — CARVEDILOL 25 MG/1
25 TABLET ORAL 2 TIMES DAILY
Qty: 180 TABLET | Refills: 1 | Status: CANCELLED | OUTPATIENT
Start: 2024-06-21

## 2024-06-21 RX ORDER — TERAZOSIN 2 MG/1
CAPSULE ORAL
Qty: 360 CAPSULE | Refills: 1 | Status: SHIPPED | OUTPATIENT
Start: 2024-06-21 | End: 2024-06-24

## 2024-06-21 RX ORDER — TERAZOSIN 2 MG/1
CAPSULE ORAL
Qty: 360 CAPSULE | Refills: 1 | OUTPATIENT
Start: 2024-06-21

## 2024-06-21 RX ORDER — CARVEDILOL 25 MG/1
25 TABLET ORAL 2 TIMES DAILY
Qty: 180 TABLET | Refills: 1 | Status: SHIPPED | OUTPATIENT
Start: 2024-06-21 | End: 2024-06-24

## 2024-06-21 RX ORDER — TERAZOSIN 2 MG/1
CAPSULE ORAL
Qty: 360 CAPSULE | Refills: 1 | Status: CANCELLED | OUTPATIENT
Start: 2024-06-21

## 2024-06-21 RX ORDER — TERAZOSIN 2 MG/1
CAPSULE ORAL
Qty: 360 CAPSULE | Refills: 1 | Status: SHIPPED | OUTPATIENT
Start: 2024-06-21 | End: 2024-06-21 | Stop reason: SDUPTHER

## 2024-06-21 NOTE — TELEPHONE ENCOUNTER
Coumadin/Pharmacotherapy/Vascular RX Refill Request    PT Name: Prabha Mathews Dl    Medication: carvedilol (COREG) 25 MG Tab    terazosin (HYTRIN) 2 MG Cap    Best Call Back Number: [phone]    Any special instructions: PT would like a 3 month supply    Thank you,   Lesley TAVARES

## 2024-06-24 DIAGNOSIS — R03.0 WHITE COAT SYNDROME WITH HIGH BLOOD PRESSURE BUT WITHOUT HYPERTENSION: ICD-10-CM

## 2024-06-24 RX ORDER — TERAZOSIN 2 MG/1
CAPSULE ORAL
Qty: 360 CAPSULE | Refills: 1 | Status: SHIPPED | OUTPATIENT
Start: 2024-06-24

## 2024-06-24 RX ORDER — CARVEDILOL 25 MG/1
25 TABLET ORAL 2 TIMES DAILY
Qty: 180 TABLET | Refills: 1 | Status: SHIPPED | OUTPATIENT
Start: 2024-06-24

## 2024-07-26 ENCOUNTER — TELEPHONE (OUTPATIENT)
Dept: MEDICAL GROUP | Facility: MEDICAL CENTER | Age: 89
End: 2024-07-26

## 2024-07-26 ENCOUNTER — APPOINTMENT (OUTPATIENT)
Dept: MEDICAL GROUP | Facility: MEDICAL CENTER | Age: 89
End: 2024-07-26
Payer: MEDICARE

## 2024-07-26 VITALS
TEMPERATURE: 97 F | BODY MASS INDEX: 26.16 KG/M2 | SYSTOLIC BLOOD PRESSURE: 150 MMHG | DIASTOLIC BLOOD PRESSURE: 70 MMHG | HEART RATE: 95 BPM | WEIGHT: 153.22 LBS | HEIGHT: 64 IN | OXYGEN SATURATION: 93 %

## 2024-07-26 DIAGNOSIS — E78.00 PURE HYPERCHOLESTEROLEMIA: ICD-10-CM

## 2024-07-26 DIAGNOSIS — G47.00 INSOMNIA, UNSPECIFIED TYPE: ICD-10-CM

## 2024-07-26 DIAGNOSIS — E04.1 THYROID NODULE: ICD-10-CM

## 2024-07-26 DIAGNOSIS — M81.0 AGE-RELATED OSTEOPOROSIS WITHOUT CURRENT PATHOLOGICAL FRACTURE: ICD-10-CM

## 2024-07-26 DIAGNOSIS — Z00.00 ENCOUNTER FOR MEDICARE ANNUAL WELLNESS EXAM: Primary | ICD-10-CM

## 2024-07-26 DIAGNOSIS — I10 PRIMARY HYPERTENSION: ICD-10-CM

## 2024-07-26 PROCEDURE — G0439 PPPS, SUBSEQ VISIT: HCPCS | Performed by: BEHAVIOR ANALYST

## 2024-07-26 PROCEDURE — 3078F DIAST BP <80 MM HG: CPT | Performed by: BEHAVIOR ANALYST

## 2024-07-26 PROCEDURE — 3077F SYST BP >= 140 MM HG: CPT | Performed by: BEHAVIOR ANALYST

## 2024-07-26 ASSESSMENT — ACTIVITIES OF DAILY LIVING (ADL): BATHING_REQUIRES_ASSISTANCE: 0

## 2024-07-26 ASSESSMENT — FIBROSIS 4 INDEX: FIB4 SCORE: 1.21

## 2024-07-26 ASSESSMENT — ENCOUNTER SYMPTOMS: GENERAL WELL-BEING: GOOD

## 2024-07-26 ASSESSMENT — PATIENT HEALTH QUESTIONNAIRE - PHQ9: CLINICAL INTERPRETATION OF PHQ2 SCORE: 0

## 2024-07-26 NOTE — TELEPHONE ENCOUNTER
Caller Name: Prabha Lizama   Call Back Number: [phone] (home)      How would the patient prefer to be contacted with a response: Phone call OK to leave a detailed message    Reached out to patient via phone call to get her scheduled for her 6 mo fv patient did not answer voicemail was left for patient to give are office a call back to get that appt scheduled

## 2024-11-04 ENCOUNTER — OFFICE VISIT (OUTPATIENT)
Dept: VASCULAR LAB | Facility: MEDICAL CENTER | Age: 89
End: 2024-11-04
Attending: INTERNAL MEDICINE
Payer: MEDICARE

## 2024-11-04 VITALS
BODY MASS INDEX: 24.59 KG/M2 | WEIGHT: 153 LBS | SYSTOLIC BLOOD PRESSURE: 165 MMHG | HEIGHT: 66 IN | HEART RATE: 70 BPM | DIASTOLIC BLOOD PRESSURE: 78 MMHG

## 2024-11-04 DIAGNOSIS — I10 HYPERTENSION, UNSPECIFIED TYPE: ICD-10-CM

## 2024-11-04 PROCEDURE — G2211 COMPLEX E/M VISIT ADD ON: HCPCS | Performed by: INTERNAL MEDICINE

## 2024-11-04 PROCEDURE — 99212 OFFICE O/P EST SF 10 MIN: CPT

## 2024-11-04 PROCEDURE — 3077F SYST BP >= 140 MM HG: CPT | Performed by: INTERNAL MEDICINE

## 2024-11-04 PROCEDURE — 99214 OFFICE O/P EST MOD 30 MIN: CPT | Performed by: INTERNAL MEDICINE

## 2024-11-04 PROCEDURE — 3078F DIAST BP <80 MM HG: CPT | Performed by: INTERNAL MEDICINE

## 2024-11-04 ASSESSMENT — FIBROSIS 4 INDEX: FIB4 SCORE: 1.21

## 2024-11-04 NOTE — PROGRESS NOTES
"VASCULAR MEDICINE CLINIC - Follow up VISIT  11/04/24    Prabha Lizama is a 89 y.o. female who presents today  for   Chief Complaint   Patient presents with    Follow-Up      Subjective    HPI:  Patient returns for evaluation and management of hypertensoin  Good adhernce with bp meds  Had a fall in may - tripped. None since  Keeps excellent BP logs, daily  BPs at home usually 110s-120s/50s-60s mostly - but more 130s and higher than we have seen in past - usually a bit higher at night  Perhaps a bit more swelling than in past  No headaches  No lightheadedness   No interfering substances   Limited by back pain, but improved  Anxiety under reasonable control.    No tia or cva symptoms  No angina or palpitations       Social History     Tobacco Use    Smoking status: Never    Smokeless tobacco: Never   Vaping Use    Vaping status: Never Used   Substance Use Topics    Alcohol use: No    Drug use: No      DIET AND EXERCISE:  Weight Change: up a few pounds and then stable  Diet: relatively low salt  Exercise: Using new exercise machine for at least 10 minutes each day. Still very active in ADLs        Objective       Objective:     Vitals:    11/04/24 1338 11/04/24 1342   BP: (!) 154/78 (!) 165/78   BP Location: Left arm Left arm   Patient Position: Sitting Sitting   BP Cuff Size: Adult Adult   Pulse: 72 70   Weight: 69.4 kg (153 lb)    Height: 1.676 m (5' 6\")       BMI Readings from Last 4 Encounters:   11/04/24 24.69 kg/m²   07/26/24 26.30 kg/m²   05/01/24 25.90 kg/m²   04/12/24 25.75 kg/m²     Wt Readings from Last 4 Encounters:   11/04/24 69.4 kg (153 lb)   07/26/24 69.5 kg (153 lb 3.5 oz)   05/01/24 68.5 kg (150 lb 14.5 oz)   04/12/24 68 kg (150 lb)      Physical Exam  Vitals reviewed.   Constitutional:       General: She is not in acute distress.     Appearance: She is not diaphoretic.   HENT:      Head: Normocephalic and atraumatic.   Eyes:      General: No scleral icterus.     Conjunctiva/sclera: Conjunctivae " normal.   Neck:      Vascular: No carotid bruit.   Cardiovascular:      Rate and Rhythm: Normal rate and regular rhythm.      Heart sounds: Normal heart sounds. No murmur heard.  Pulmonary:      Effort: Pulmonary effort is normal. No respiratory distress.      Breath sounds: Normal breath sounds. No wheezing or rales.   Musculoskeletal:      Right lower leg: No edema.      Left lower leg: No edema.   Skin:     Coloration: Skin is not pale.   Neurological:      General: No focal deficit present.      Mental Status: She is alert and oriented to person, place, and time.      Cranial Nerves: No cranial nerve deficit.      Coordination: Coordination normal.      Gait: Gait is intact. Gait normal.   Psychiatric:         Mood and Affect: Mood and affect normal.         Behavior: Behavior normal.          DATA REVIEW    Lab Results   Component Value Date/Time    CHOLSTRLTOT 220 (H) 04/06/2023 04:51 AM    CHOLSTRLTOT 200 (H) 02/08/2010 07:10 AM     (H) 05/02/2019 06:56 AM     (H) 02/08/2010 07:10 AM    HDL 56 04/06/2023 04:51 AM    HDL 54 02/08/2010 07:10 AM    TRIGLYCERIDE 105 04/06/2023 04:51 AM    TRIGLYCERIDE 121 02/08/2010 07:10 AM       Lab Results   Component Value Date/Time    SODIUM 131 (L) 05/03/2024 06:17 AM    POTASSIUM 4.3 05/03/2024 06:17 AM    CHLORIDE 96 05/03/2024 06:17 AM    CO2 24 05/03/2024 06:17 AM    GLUCOSE 99 05/03/2024 06:17 AM    BUN 20 05/03/2024 06:17 AM    CREATININE 0.83 05/03/2024 06:17 AM    CREATININE 0.87 07/07/2011 03:30 PM    BUNCREATRAT 18 10/25/2023 07:39 AM    BUNCREATRAT 11 07/07/2011 03:30 PM    GLOMRATE >59 08/04/2010 08:10 AM     Lab Results   Component Value Date/Time    ALKPHOSPHAT 75 05/01/2024 04:53 AM    ASTSGOT 17 05/01/2024 04:53 AM    ALTSGPT 19 05/01/2024 04:53 AM    TBILIRUBIN 0.4 05/01/2024 04:53 AM       Lab Results   Component Value Date/Time    HBA1C 5.6 10/25/2023 07:39 AM    HBA1C 5.2 01/15/2018 02:11 PM       Lab Results   Component Value Date/Time     MICRALB <3.0 03/02/2022 04:15 AM             Medical Decision Making:  Today's Assessment / Status / Plan:     1. Hypertension, unspecified type           Patient Type: Primary Prevention    Etiology of Established CVD if Present: none    Lipid Management: Qualifies for Statin Therapy Based on 2018 ACC/AHA Guidelines: no  Calculated 10-Year Risk of ASCVD: N/A  Currently on Statin: No  Outside age range for consideration of statin therapy in primary prevention per acc/aha guidelines   - continue lifestyle mod    Blood Pressure Management:  Acc/aha (2017) Blood Pressure Goal <130/80  Long h/o difficult to control bp with white coat htn and some degree of pseudopheo  Reasonable control both at home and in the office although not quite as good control as past - evening bps are sneaking up  Has had hyponatremia on diuretics in past - continue to avoid  Perhaps a bit of leg swelling on amlodipine, but reasonable on this dose and as we discussed we do not have a lot of options  GFR and electrolytes well-maintained on most recent blood work  Plan:  - continue current meds  - conitnue excellent home bp monitoring - continue every other day    Glycemic Status: prediabetes  Has stable mild IFG  - Continue lifestyle mod    Anti-Platelet/Anti-Coagulant Tx: no  Not necessarily indicated at present    Smoking: continue complete avoidance     Physical Activity: Continue to use her new exercise machine at least 10 minutes a day. More walking and stretching    Weight Management and Nutrition: Focus on sodium restriction.  Avoid further weight loss    Other     -Anxiety and insomnia - seems stable. Defer further management to pcp    -Thyroid nodules - smaller on f/u u/s. Defer any further indicated w/u to pcp    Instructed to follow-up with PCP for remainder of adult medical needs: yes  We will partner with other providers in the management of established vascular disease and cardiometabolic risk factors.    Studies to Be Obtained:  none  Labs to Be Obtained: per pcp    Follow up in: 6 months     Michael Bloch, MD  [facility]ascular [facility]

## 2024-12-20 DIAGNOSIS — R03.0 WHITE COAT SYNDROME WITH HIGH BLOOD PRESSURE BUT WITHOUT HYPERTENSION: ICD-10-CM

## 2024-12-20 DIAGNOSIS — I10 HYPERTENSION, UNSPECIFIED TYPE: ICD-10-CM

## 2024-12-20 NOTE — TELEPHONE ENCOUNTER
[facility] MED    Received request via: Patient    Was the patient seen in the last year in this department? Yes    Does the patient have an active prescription (recently filled or refills available) for medication(s) requested? No    Pharmacy Name:     [id number] - [place], [place] - [address]  [facility] [[id number]]     Does the patient have group home Plus and need 100-day supply? (This applies to ALL medications) Patient does not have SCP

## 2024-12-21 DIAGNOSIS — R03.0 WHITE COAT SYNDROME WITH HIGH BLOOD PRESSURE BUT WITHOUT HYPERTENSION: ICD-10-CM

## 2024-12-22 DIAGNOSIS — R03.0 WHITE COAT SYNDROME WITH HIGH BLOOD PRESSURE BUT WITHOUT HYPERTENSION: ICD-10-CM

## 2024-12-23 RX ORDER — CARVEDILOL 25 MG/1
25 TABLET ORAL 2 TIMES DAILY
Qty: 180 TABLET | Refills: 1 | OUTPATIENT
Start: 2024-12-23

## 2024-12-23 RX ORDER — AMLODIPINE BESYLATE 2.5 MG/1
2.5 TABLET ORAL DAILY
Qty: 90 TABLET | Refills: 3 | OUTPATIENT
Start: 2024-12-23

## 2024-12-23 RX ORDER — CARVEDILOL 25 MG/1
25 TABLET ORAL 2 TIMES DAILY
Qty: 200 TABLET | Refills: 3 | Status: SHIPPED | OUTPATIENT
Start: 2024-12-23

## 2024-12-23 RX ORDER — TERAZOSIN 2 MG/1
CAPSULE ORAL
Qty: 360 CAPSULE | Refills: 1 | OUTPATIENT
Start: 2024-12-23

## 2024-12-23 RX ORDER — AMLODIPINE BESYLATE 2.5 MG/1
2.5 TABLET ORAL DAILY
Qty: 100 TABLET | Refills: 3 | Status: SHIPPED | OUTPATIENT
Start: 2024-12-23

## 2024-12-23 RX ORDER — TELMISARTAN 80 MG/1
80 TABLET ORAL EVERY MORNING
Qty: 90 TABLET | Refills: 3 | OUTPATIENT
Start: 2024-12-23

## 2025-01-24 ENCOUNTER — ANESTHESIA EVENT (OUTPATIENT)
Dept: SURGERY | Facility: MEDICAL CENTER | Age: 89
End: 2025-01-24
Payer: MEDICARE

## 2025-01-24 ENCOUNTER — HOSPITAL ENCOUNTER (INPATIENT)
Facility: MEDICAL CENTER | Age: 89
LOS: 6 days | End: 2025-01-30
Attending: EMERGENCY MEDICINE | Admitting: HOSPITALIST
Payer: MEDICARE

## 2025-01-24 ENCOUNTER — APPOINTMENT (OUTPATIENT)
Dept: RADIOLOGY | Facility: MEDICAL CENTER | Age: 89
End: 2025-01-24
Attending: EMERGENCY MEDICINE
Payer: MEDICARE

## 2025-01-24 DIAGNOSIS — E04.2 MULTIPLE THYROID NODULES: ICD-10-CM

## 2025-01-24 DIAGNOSIS — S22.49XA CLOSED FRACTURE OF MULTIPLE RIBS, UNSPECIFIED LATERALITY, INITIAL ENCOUNTER: ICD-10-CM

## 2025-01-24 DIAGNOSIS — G93.40 ACUTE ENCEPHALOPATHY: ICD-10-CM

## 2025-01-24 DIAGNOSIS — F41.9 ANXIETY: ICD-10-CM

## 2025-01-24 DIAGNOSIS — R73.01 IMPAIRED FASTING GLUCOSE: ICD-10-CM

## 2025-01-24 DIAGNOSIS — S42.291A OTHER CLOSED DISPLACED FRACTURE OF PROXIMAL END OF RIGHT HUMERUS, INITIAL ENCOUNTER: ICD-10-CM

## 2025-01-24 DIAGNOSIS — E87.1 HYPONATREMIA: ICD-10-CM

## 2025-01-24 DIAGNOSIS — R32 URINARY INCONTINENCE, UNSPECIFIED TYPE: ICD-10-CM

## 2025-01-24 DIAGNOSIS — M43.9 WEDGE DEFORMITY ON X-RAY OF SPINE: ICD-10-CM

## 2025-01-24 DIAGNOSIS — I10 PRIMARY HYPERTENSION: ICD-10-CM

## 2025-01-24 DIAGNOSIS — E55.9 VITAMIN D DEFICIENCY: ICD-10-CM

## 2025-01-24 DIAGNOSIS — S72.001A CLOSED FRACTURE OF RIGHT HIP, INITIAL ENCOUNTER (HCC): ICD-10-CM

## 2025-01-24 DIAGNOSIS — S32.030A COMPRESSION FRACTURE OF L3 VERTEBRA, INITIAL ENCOUNTER (HCC): ICD-10-CM

## 2025-01-24 DIAGNOSIS — G47.00 INSOMNIA, UNSPECIFIED TYPE: ICD-10-CM

## 2025-01-24 DIAGNOSIS — K59.04 CHRONIC IDIOPATHIC CONSTIPATION: ICD-10-CM

## 2025-01-24 DIAGNOSIS — S72.001A CLOSED RIGHT HIP FRACTURE, INITIAL ENCOUNTER (HCC): ICD-10-CM

## 2025-01-24 DIAGNOSIS — M81.0 AGE-RELATED OSTEOPOROSIS WITHOUT CURRENT PATHOLOGICAL FRACTURE: ICD-10-CM

## 2025-01-24 DIAGNOSIS — I70.90 ATHEROSCLEROSIS: ICD-10-CM

## 2025-01-24 DIAGNOSIS — M79.10 MYALGIA: ICD-10-CM

## 2025-01-24 DIAGNOSIS — E78.00 PURE HYPERCHOLESTEROLEMIA: ICD-10-CM

## 2025-01-24 DIAGNOSIS — R68.2 DRY MOUTH: ICD-10-CM

## 2025-01-24 PROBLEM — S42.201A CLOSED FRACTURE OF PROXIMAL END OF RIGHT HUMERUS: Status: ACTIVE | Noted: 2025-01-24

## 2025-01-24 LAB
ALBUMIN SERPL BCP-MCNC: 3.4 G/DL (ref 3.2–4.9)
ALBUMIN/GLOB SERPL: 1.5 G/DL
ALP SERPL-CCNC: 67 U/L (ref 30–99)
ALT SERPL-CCNC: 19 U/L (ref 2–50)
ANION GAP SERPL CALC-SCNC: 10 MMOL/L (ref 7–16)
APTT PPP: 24 SEC (ref 24.7–36)
AST SERPL-CCNC: 16 U/L (ref 12–45)
BASOPHILS # BLD AUTO: 0.3 % (ref 0–1.8)
BASOPHILS # BLD: 0.04 K/UL (ref 0–0.12)
BILIRUB SERPL-MCNC: 0.5 MG/DL (ref 0.1–1.5)
BUN SERPL-MCNC: 12 MG/DL (ref 8–22)
CALCIUM ALBUM COR SERPL-MCNC: 8.8 MG/DL (ref 8.5–10.5)
CALCIUM SERPL-MCNC: 8.3 MG/DL (ref 8.4–10.2)
CHLORIDE SERPL-SCNC: 104 MMOL/L (ref 96–112)
CO2 SERPL-SCNC: 21 MMOL/L (ref 20–33)
CREAT SERPL-MCNC: 0.63 MG/DL (ref 0.5–1.4)
EKG IMPRESSION: NORMAL
EOSINOPHIL # BLD AUTO: 0.28 K/UL (ref 0–0.51)
EOSINOPHIL NFR BLD: 1.9 % (ref 0–6.9)
ERYTHROCYTE [DISTWIDTH] IN BLOOD BY AUTOMATED COUNT: 42.6 FL (ref 35.9–50)
GFR SERPLBLD CREATININE-BSD FMLA CKD-EPI: 85 ML/MIN/1.73 M 2
GLOBULIN SER CALC-MCNC: 2.3 G/DL (ref 1.9–3.5)
GLUCOSE SERPL-MCNC: 117 MG/DL (ref 65–99)
HCT VFR BLD AUTO: 37.7 % (ref 37–47)
HGB BLD-MCNC: 13 G/DL (ref 12–16)
IMM GRANULOCYTES # BLD AUTO: 0.06 K/UL (ref 0–0.11)
IMM GRANULOCYTES NFR BLD AUTO: 0.4 % (ref 0–0.9)
INR PPP: 1.11 (ref 0.87–1.13)
LYMPHOCYTES # BLD AUTO: 0.71 K/UL (ref 1–4.8)
LYMPHOCYTES NFR BLD: 4.8 % (ref 22–41)
MAGNESIUM SERPL-MCNC: 2.1 MG/DL (ref 1.5–2.5)
MCH RBC QN AUTO: 32.9 PG (ref 27–33)
MCHC RBC AUTO-ENTMCNC: 34.5 G/DL (ref 32.2–35.5)
MCV RBC AUTO: 95.4 FL (ref 81.4–97.8)
MONOCYTES # BLD AUTO: 0.91 K/UL (ref 0–0.85)
MONOCYTES NFR BLD AUTO: 6.2 % (ref 0–13.4)
NEUTROPHILS # BLD AUTO: 12.67 K/UL (ref 1.82–7.42)
NEUTROPHILS NFR BLD: 86.4 % (ref 44–72)
NRBC # BLD AUTO: 0 K/UL
NRBC BLD-RTO: 0 /100 WBC (ref 0–0.2)
PLATELET # BLD AUTO: 238 K/UL (ref 164–446)
PMV BLD AUTO: 10.3 FL (ref 9–12.9)
POTASSIUM SERPL-SCNC: 4.2 MMOL/L (ref 3.6–5.5)
PROT SERPL-MCNC: 5.7 G/DL (ref 6–8.2)
PROTHROMBIN TIME: 14.7 SEC (ref 12–14.6)
RBC # BLD AUTO: 3.95 M/UL (ref 4.2–5.4)
SODIUM SERPL-SCNC: 135 MMOL/L (ref 135–145)
WBC # BLD AUTO: 14.7 K/UL (ref 4.8–10.8)

## 2025-01-24 PROCEDURE — 99223 1ST HOSP IP/OBS HIGH 75: CPT | Performed by: HOSPITALIST

## 2025-01-24 PROCEDURE — 700111 HCHG RX REV CODE 636 W/ 250 OVERRIDE (IP): Mod: JZ | Performed by: HOSPITALIST

## 2025-01-24 PROCEDURE — 85730 THROMBOPLASTIN TIME PARTIAL: CPT

## 2025-01-24 PROCEDURE — 83735 ASSAY OF MAGNESIUM: CPT

## 2025-01-24 PROCEDURE — 93005 ELECTROCARDIOGRAM TRACING: CPT | Mod: TC | Performed by: EMERGENCY MEDICINE

## 2025-01-24 PROCEDURE — 700102 HCHG RX REV CODE 250 W/ 637 OVERRIDE(OP): Performed by: HOSPITALIST

## 2025-01-24 PROCEDURE — A9270 NON-COVERED ITEM OR SERVICE: HCPCS | Performed by: HOSPITALIST

## 2025-01-24 PROCEDURE — 85610 PROTHROMBIN TIME: CPT

## 2025-01-24 PROCEDURE — 73502 X-RAY EXAM HIP UNI 2-3 VIEWS: CPT | Mod: LT

## 2025-01-24 PROCEDURE — 71045 X-RAY EXAM CHEST 1 VIEW: CPT

## 2025-01-24 PROCEDURE — 700111 HCHG RX REV CODE 636 W/ 250 OVERRIDE (IP): Mod: JZ | Performed by: EMERGENCY MEDICINE

## 2025-01-24 PROCEDURE — 700105 HCHG RX REV CODE 258: Performed by: HOSPITALIST

## 2025-01-24 PROCEDURE — 85025 COMPLETE CBC W/AUTO DIFF WBC: CPT

## 2025-01-24 PROCEDURE — 96374 THER/PROPH/DIAG INJ IV PUSH: CPT

## 2025-01-24 PROCEDURE — 770001 HCHG ROOM/CARE - MED/SURG/GYN PRIV*

## 2025-01-24 PROCEDURE — 99285 EMERGENCY DEPT VISIT HI MDM: CPT

## 2025-01-24 PROCEDURE — 36415 COLL VENOUS BLD VENIPUNCTURE: CPT

## 2025-01-24 PROCEDURE — 73060 X-RAY EXAM OF HUMERUS: CPT | Mod: RT

## 2025-01-24 PROCEDURE — 96376 TX/PRO/DX INJ SAME DRUG ADON: CPT

## 2025-01-24 PROCEDURE — 80053 COMPREHEN METABOLIC PANEL: CPT

## 2025-01-24 PROCEDURE — 96375 TX/PRO/DX INJ NEW DRUG ADDON: CPT

## 2025-01-24 PROCEDURE — 94760 N-INVAS EAR/PLS OXIMETRY 1: CPT

## 2025-01-24 RX ORDER — ACETAMINOPHEN 325 MG/1
650 TABLET ORAL EVERY 6 HOURS PRN
Status: DISCONTINUED | OUTPATIENT
Start: 2025-01-24 | End: 2025-01-28

## 2025-01-24 RX ORDER — CARVEDILOL 25 MG/1
25 TABLET ORAL 2 TIMES DAILY
Status: DISCONTINUED | OUTPATIENT
Start: 2025-01-24 | End: 2025-01-30 | Stop reason: HOSPADM

## 2025-01-24 RX ORDER — VITAMIN B COMPLEX
1000 TABLET ORAL DAILY
Status: DISCONTINUED | OUTPATIENT
Start: 2025-01-25 | End: 2025-01-30 | Stop reason: HOSPADM

## 2025-01-24 RX ORDER — HYDROMORPHONE HYDROCHLORIDE 1 MG/ML
1 INJECTION, SOLUTION INTRAMUSCULAR; INTRAVENOUS; SUBCUTANEOUS ONCE
Status: COMPLETED | OUTPATIENT
Start: 2025-01-25 | End: 2025-01-24

## 2025-01-24 RX ORDER — SODIUM CHLORIDE 9 MG/ML
INJECTION, SOLUTION INTRAVENOUS CONTINUOUS
Status: DISCONTINUED | OUTPATIENT
Start: 2025-01-24 | End: 2025-01-26

## 2025-01-24 RX ORDER — POLYETHYLENE GLYCOL 3350 17 G/17G
1 POWDER, FOR SOLUTION ORAL
Status: DISCONTINUED | OUTPATIENT
Start: 2025-01-24 | End: 2025-01-30 | Stop reason: HOSPADM

## 2025-01-24 RX ORDER — ONDANSETRON 2 MG/ML
4 INJECTION INTRAMUSCULAR; INTRAVENOUS EVERY 4 HOURS PRN
Status: DISCONTINUED | OUTPATIENT
Start: 2025-01-24 | End: 2025-01-30 | Stop reason: HOSPADM

## 2025-01-24 RX ORDER — ONDANSETRON 4 MG/1
4 TABLET, ORALLY DISINTEGRATING ORAL EVERY 4 HOURS PRN
Status: DISCONTINUED | OUTPATIENT
Start: 2025-01-24 | End: 2025-01-30 | Stop reason: HOSPADM

## 2025-01-24 RX ORDER — TELMISARTAN 40 MG/1
80 TABLET ORAL DAILY
Status: DISCONTINUED | OUTPATIENT
Start: 2025-01-25 | End: 2025-01-30 | Stop reason: HOSPADM

## 2025-01-24 RX ORDER — LABETALOL HYDROCHLORIDE 5 MG/ML
10 INJECTION, SOLUTION INTRAVENOUS EVERY 4 HOURS PRN
Status: DISCONTINUED | OUTPATIENT
Start: 2025-01-24 | End: 2025-01-30 | Stop reason: HOSPADM

## 2025-01-24 RX ORDER — DIPHENHYDRAMINE HYDROCHLORIDE 50 MG/ML
12.5 INJECTION INTRAMUSCULAR; INTRAVENOUS EVERY 6 HOURS PRN
Status: DISCONTINUED | OUTPATIENT
Start: 2025-01-24 | End: 2025-01-25

## 2025-01-24 RX ORDER — AMOXICILLIN 250 MG
2 CAPSULE ORAL EVERY EVENING
Status: DISCONTINUED | OUTPATIENT
Start: 2025-01-24 | End: 2025-01-30 | Stop reason: HOSPADM

## 2025-01-24 RX ORDER — AMLODIPINE BESYLATE 5 MG/1
2.5 TABLET ORAL DAILY
Status: DISCONTINUED | OUTPATIENT
Start: 2025-01-24 | End: 2025-01-30 | Stop reason: HOSPADM

## 2025-01-24 RX ORDER — TERAZOSIN 1 MG/1
4 CAPSULE ORAL 2 TIMES DAILY
Status: DISCONTINUED | OUTPATIENT
Start: 2025-01-24 | End: 2025-01-30 | Stop reason: HOSPADM

## 2025-01-24 RX ADMIN — FENTANYL CITRATE 50 MCG: 50 INJECTION, SOLUTION INTRAMUSCULAR; INTRAVENOUS at 16:30

## 2025-01-24 RX ADMIN — SODIUM CHLORIDE: 9 INJECTION, SOLUTION INTRAVENOUS at 12:52

## 2025-01-24 RX ADMIN — HYDROMORPHONE HYDROCHLORIDE 1 MG: 1 INJECTION, SOLUTION INTRAMUSCULAR; INTRAVENOUS; SUBCUTANEOUS at 23:36

## 2025-01-24 RX ADMIN — FENTANYL CITRATE 50 MCG: 50 INJECTION, SOLUTION INTRAMUSCULAR; INTRAVENOUS at 18:49

## 2025-01-24 RX ADMIN — CARVEDILOL 25 MG: 25 TABLET, FILM COATED ORAL at 17:29

## 2025-01-24 RX ADMIN — FENTANYL CITRATE 50 MCG: 50 INJECTION, SOLUTION INTRAMUSCULAR; INTRAVENOUS at 17:44

## 2025-01-24 RX ADMIN — FENTANYL CITRATE 100 MCG: 50 INJECTION, SOLUTION INTRAMUSCULAR; INTRAVENOUS at 20:24

## 2025-01-24 RX ADMIN — TERAZOSIN HYDROCHLORIDE 4 MG: 1 CAPSULE ORAL at 17:28

## 2025-01-24 RX ADMIN — FENTANYL CITRATE 100 MCG: 50 INJECTION, SOLUTION INTRAMUSCULAR; INTRAVENOUS at 21:54

## 2025-01-24 RX ADMIN — FENTANYL CITRATE 50 MCG: 50 INJECTION, SOLUTION INTRAMUSCULAR; INTRAVENOUS at 14:36

## 2025-01-24 RX ADMIN — ONDANSETRON 4 MG: 2 INJECTION INTRAMUSCULAR; INTRAVENOUS at 14:34

## 2025-01-24 RX ADMIN — FENTANYL CITRATE 50 MCG: 50 INJECTION, SOLUTION INTRAMUSCULAR; INTRAVENOUS at 12:48

## 2025-01-24 RX ADMIN — FENTANYL CITRATE 50 MCG: 50 INJECTION, SOLUTION INTRAMUSCULAR; INTRAVENOUS at 11:14

## 2025-01-24 SDOH — ECONOMIC STABILITY: TRANSPORTATION INSECURITY
IN THE PAST 12 MONTHS, HAS THE LACK OF TRANSPORTATION KEPT YOU FROM MEDICAL APPOINTMENTS OR FROM GETTING MEDICATIONS?: NO

## 2025-01-24 SDOH — ECONOMIC STABILITY: TRANSPORTATION INSECURITY
IN THE PAST 12 MONTHS, HAS LACK OF RELIABLE TRANSPORTATION KEPT YOU FROM MEDICAL APPOINTMENTS, MEETINGS, WORK OR FROM GETTING THINGS NEEDED FOR DAILY LIVING?: NO

## 2025-01-24 ASSESSMENT — ENCOUNTER SYMPTOMS
FLANK PAIN: 0
SENSORY CHANGE: 0
DOUBLE VISION: 0
SEIZURES: 0
SPUTUM PRODUCTION: 0
SINUS PAIN: 0
GASTROINTESTINAL NEGATIVE: 1
PHOTOPHOBIA: 0
FEVER: 0
CARDIOVASCULAR NEGATIVE: 1
BACK PAIN: 0
CHILLS: 0
DIZZINESS: 0
POLYDIPSIA: 0
EYE REDNESS: 0
DEPRESSION: 0
COUGH: 0
SHORTNESS OF BREATH: 0
EYE DISCHARGE: 0
STRIDOR: 0
ORTHOPNEA: 0
DIAPHORESIS: 0
MYALGIAS: 0
PND: 0
BRUISES/BLEEDS EASILY: 0
TINGLING: 0
INSOMNIA: 0
FALLS: 0
WHEEZING: 0
EYES NEGATIVE: 1
PSYCHIATRIC NEGATIVE: 1
NEUROLOGICAL NEGATIVE: 1
HEARTBURN: 0
RESPIRATORY NEGATIVE: 1
FOCAL WEAKNESS: 0
CONSTITUTIONAL NEGATIVE: 1
ABDOMINAL PAIN: 0
BLOOD IN STOOL: 0

## 2025-01-24 ASSESSMENT — PAIN DESCRIPTION - PAIN TYPE
TYPE: ACUTE PAIN

## 2025-01-24 ASSESSMENT — VISUAL ACUITY: OU: 1

## 2025-01-24 ASSESSMENT — COGNITIVE AND FUNCTIONAL STATUS - GENERAL
DAILY ACTIVITIY SCORE: 12
CLIMB 3 TO 5 STEPS WITH RAILING: TOTAL
MOVING TO AND FROM BED TO CHAIR: TOTAL
MOVING FROM LYING ON BACK TO SITTING ON SIDE OF FLAT BED: TOTAL
PERSONAL GROOMING: A LOT
DRESSING REGULAR UPPER BODY CLOTHING: TOTAL
SUGGESTED CMS G CODE MODIFIER MOBILITY: CM
SUGGESTED CMS G CODE MODIFIER DAILY ACTIVITY: CL
DRESSING REGULAR LOWER BODY CLOTHING: A LOT
STANDING UP FROM CHAIR USING ARMS: A LOT
WALKING IN HOSPITAL ROOM: A LOT
MOBILITY SCORE: 9
HELP NEEDED FOR BATHING: A LOT
EATING MEALS: A LITTLE
TURNING FROM BACK TO SIDE WHILE IN FLAT BAD: A LOT
TOILETING: A LOT

## 2025-01-24 ASSESSMENT — SOCIAL DETERMINANTS OF HEALTH (SDOH)
IN THE PAST 12 MONTHS, HAS THE ELECTRIC, GAS, OIL, OR WATER COMPANY THREATENED TO SHUT OFF SERVICE IN YOUR HOME?: NO
WITHIN THE LAST YEAR, HAVE YOU BEEN KICKED, HIT, SLAPPED, OR OTHERWISE PHYSICALLY HURT BY YOUR PARTNER OR EX-PARTNER?: NO
WITHIN THE PAST 12 MONTHS, THE FOOD YOU BOUGHT JUST DIDN'T LAST AND YOU DIDN'T HAVE MONEY TO GET MORE: NEVER TRUE
WITHIN THE LAST YEAR, HAVE TO BEEN RAPED OR FORCED TO HAVE ANY KIND OF SEXUAL ACTIVITY BY YOUR PARTNER OR EX-PARTNER?: NO
WITHIN THE PAST 12 MONTHS, YOU WORRIED THAT YOUR FOOD WOULD RUN OUT BEFORE YOU GOT THE MONEY TO BUY MORE: NEVER TRUE
WITHIN THE LAST YEAR, HAVE YOU BEEN AFRAID OF YOUR PARTNER OR EX-PARTNER?: NO
WITHIN THE LAST YEAR, HAVE YOU BEEN HUMILIATED OR EMOTIONALLY ABUSED IN OTHER WAYS BY YOUR PARTNER OR EX-PARTNER?: NO

## 2025-01-24 ASSESSMENT — LIFESTYLE VARIABLES
TOTAL SCORE: 0
CONSUMPTION TOTAL: NEGATIVE
ON A TYPICAL DAY WHEN YOU DRINK ALCOHOL HOW MANY DRINKS DO YOU HAVE: 0
TOTAL SCORE: 0
SUBSTANCE_ABUSE: 0
HAVE YOU EVER FELT YOU SHOULD CUT DOWN ON YOUR DRINKING: NO
TOTAL SCORE: 0
HAVE PEOPLE ANNOYED YOU BY CRITICIZING YOUR DRINKING: NO
EVER HAD A DRINK FIRST THING IN THE MORNING TO STEADY YOUR NERVES TO GET RID OF A HANGOVER: NO
EVER FELT BAD OR GUILTY ABOUT YOUR DRINKING: NO
HOW MANY TIMES IN THE PAST YEAR HAVE YOU HAD 5 OR MORE DRINKS IN A DAY: 0
ALCOHOL_USE: NO
AVERAGE NUMBER OF DAYS PER WEEK YOU HAVE A DRINK CONTAINING ALCOHOL: 0

## 2025-01-24 ASSESSMENT — FIBROSIS 4 INDEX
FIB4 SCORE: 1.34
FIB4 SCORE: 1.21

## 2025-01-24 NOTE — ASSESSMENT & PLAN NOTE
1/29:  Continue Norvasc 2.5 mg daily  Coreg 25 mg twice daily  Telmisartan 80 mg daily  Terazosin 4 mg twice daily  As needed labetalol

## 2025-01-24 NOTE — ED PROVIDER NOTES
ED Provider Note    CHIEF COMPLAINT  Chief Complaint   Patient presents with    GLF     Pt had MGLF this morning pt denies loc blood thinners or head strike. Pt has right shoulder pain, right hip pain  and is nonambulatory.     Shoulder Pain    Hip Pain       HPI/ROS    Prabha Lizama is a 89 y.o. female who presents with right shoulder pain and right hip pain.  The patient had a mechanical fall today in the kitchen.  She did not strike her head nor did she have any neck or back pain.  She has severe pain to the right proximal humerus region as well as in the right hip and pelvis region and she cannot ambulate.  She does not take any anticoagulants.  She does not have any paresthesias to her extremities.   states she is at her baseline from a mental standpoint.    PAST MEDICAL HISTORY   has a past medical history of CHI (closed head injury), Elevated cortisol level, Hypertension, Insomnia, Multiple thyroid nodules (2008), and Thyrotoxicosis.    SURGICAL HISTORY   has a past surgical history that includes tonsillectomy and appendectomy.    FAMILY HISTORY  Family History   Problem Relation Age of Onset    Heart Disease Mother     Heart Disease Father     Hypertension Sister     Arthritis Sister     Thyroid Sister        SOCIAL HISTORY  Social History     Tobacco Use    Smoking status: Never    Smokeless tobacco: Never   Vaping Use    Vaping status: Never Used   Substance and Sexual Activity    Alcohol use: No    Drug use: No    Sexual activity: Not Currently     Partners: Male       CURRENT MEDICATIONS  Home Medications       Reviewed by Celestine Kemp R.N. (Registered Nurse) on 01/24/25 at 1054  Med List Status: Not Addressed     Medication Last Dose Status   acetaminophen (TYLENOL) 500 MG Tab  Active   amLODIPine (NORVASC) 2.5 MG Tab  Active   Ascorbic Acid (BL VITAMIN C PO)  Active   CALCIUM PO  Active   carvedilol (COREG) 25 MG Tab  Active   telmisartan (MICARDIS) 80 MG Tab  Active   terazosin  (HYTRIN) 2 MG Cap  Active   VITAMIN D PO  Active                    ALLERGIES  Allergies   Allergen Reactions    Amoxicillin      Unsure of reaction/or allergy    Hydralazine Unspecified     Pt reported severe hypotensive reaction    Morphine Vomiting and Nausea    Penicillins      1970 reaction       PHYSICAL EXAM  VITAL SIGNS: BP (!) 200/92   Pulse 70   Temp 36.9 °C (98.5 °F) (Temporal)   Resp 18   Wt 70.3 kg (155 lb)   SpO2 96%   Breastfeeding No   BMI 25.02 kg/m²    In general the patient appears uncomfortable    HEENT atraumatic    Cervical, thoracic, lumbar spine has no midline tenderness or step-offs    Pulmonary the patient's lungs are clear to auscultation bilaterally with no pain with AP or lateral compression    Cardiovascular S1-S2 with a regular rate and rhythm    GI abdomen soft    Pelvis is stable    Extremities patient does have ecchymosis and tenderness throughout the right proximal humerus region.  The patient also has severe pain in the right hip region with internal/external rotation but her leg is not shortened.  Extremities otherwise atraumatic    Skin ecchymosis throughout the right proximal shoulder    Neurologic examination GCS of 15    EKG/LABS  Results for orders placed or performed during the hospital encounter of 01/24/25   COMP METABOLIC PANEL    Collection Time: 01/24/25 11:07 AM   Result Value Ref Range    Sodium 135 135 - 145 mmol/L    Potassium 4.2 3.6 - 5.5 mmol/L    Chloride 104 96 - 112 mmol/L    Co2 21 20 - 33 mmol/L    Anion Gap 10.0 7.0 - 16.0    Glucose 117 (H) 65 - 99 mg/dL    Bun 12 8 - 22 mg/dL    Creatinine 0.63 0.50 - 1.40 mg/dL    Calcium 8.3 (L) 8.4 - 10.2 mg/dL    Correct Calcium 8.8 8.5 - 10.5 mg/dL    AST(SGOT) 16 12 - 45 U/L    ALT(SGPT) 19 2 - 50 U/L    Alkaline Phosphatase 67 30 - 99 U/L    Total Bilirubin 0.5 0.1 - 1.5 mg/dL    Albumin 3.4 3.2 - 4.9 g/dL    Total Protein 5.7 (L) 6.0 - 8.2 g/dL    Globulin 2.3 1.9 - 3.5 g/dL    A-G Ratio 1.5 g/dL    ESTIMATED GFR    Collection Time: 25 11:07 AM   Result Value Ref Range    GFR (CKD-EPI) 85 >60 mL/min/1.73 m 2   EKG (NOW)    Collection Time: 25 11:21 AM   Result Value Ref Range    Report       [facility] Emergency Dept.    Test Date:  2025  Pt Name:    QUINTIN JOHNSTON               Department: [facility]  MRN:        [id number]                      [facility]  Gender:     Female                       Technician: [id number]  :        [id number]                   Requested By:IVY PATEL  Order #:    [id number]                    Reading MD: IVY PATEL MD    Measurements  Intervals                                Axis  Rate:       60                           P:          71  NV:         170                          QRS:        45  QRSD:       98                           T:          57  QT:         464  QTc:        464    Interpretive Statements  Twelve-lead EKG shows a normal sinus rhythm with a ventricular to 60, normal  QRS, normal intervals, no ST segment elevation or depression, normal T waves.  Electronically Signed On 2025 11:21:58 PST by IVY PATEL MD         I have independently interpreted this EKG    RADIOLOGY/PROCEDURES   I have independently interpreted the diagnostic imaging associated with this visit and am waiting the final reading from the radiologist.   My preliminary interpretation is as follows: X-ray of the right humerus was reviewed and the patient does have a complete fracture of the proximal humerus with significant shortening.  The patient also has an intertrochanteric fracture of the right hip    Radiologist interpretation:  DX-HUMERUS 2+ RIGHT   Final Result      Displaced fracture of the proximal right humerus.      DX-HIP-COMPLETE - UNILATERAL 2+ LEFT   Final Result      Displaced intertrochanteric right femoral fracture.      DX-CHEST-PORTABLE (1 VIEW)   Final Result         1. No acute cardiac or pulmonary  abnormalities are identified.   2. Displaced right humeral fracture.          COURSE & MEDICAL DECISION MAKING    This an 89-year-old female who presents for evaluation after a fall.  Clinically she did have significant evidence of trauma to the right proximal humerus and right hip.  X-rays do confirm fractures.  This will require surgical intervention.  I did order preoperative workup including laboratory analysis, chest x-ray, and EKG.  I notified the hospitalist will get the patient with orthopedics in consultation.  The patient has received a couple boluses of fentanyl for pain control.    FINAL DIAGNOSIS  1.  Mechanical fall  2.  Displaced right proximal humerus fracture  3.  Right intertrochanteric femur fracture    Disposition  Patient will be admitted to the hospital she is currently resting in stable condition     Electronically signed by: Ernesto Rivera M.D., 1/24/2025 11:00 AM

## 2025-01-24 NOTE — ASSESSMENT & PLAN NOTE
Patient had a ground-level fall 1/25 and suffered a right femoral neck fracture and right humerus fracture  1/25 Underwent:  RIGHT INTRAMEDULLARY SAM, FEMUR, PROXIMAL  RIGHT TOTAL SHOULDER ARTHROPLASTY    Scheduled Tylenol, prn oxycodone  PT OT recommending SNF    1/28: Hemoglobin stable, we have resumed Lovenox.    1/29: Patient today complaining of significant pain and it seems the patient getting confused with oxycodone so we have added gabapentin 100 mg twice daily, will continue with Tylenol 1 g 3 times daily scheduled and we have transition oxycodone to tramadol 25 mg every 8 hours as needed.

## 2025-01-24 NOTE — ASSESSMENT & PLAN NOTE
GLF - R hip and R proximal humeral fracture  Orthopedic repair done on 1/25  PRN oral oxycodone  Scheduled Tylenol minimize narcotics as able  Bowel regimen  Pending SNF when she is medically clear    1/28: Hemoglobin stable, we have resumed Lovenox.    1/29: Patient today complaining of significant pain and it seems the patient getting confused with oxycodone so we have added gabapentin 100 mg twice daily, will continue with Tylenol 1 g 3 times daily scheduled and we have transition oxycodone to tramadol 25 mg every 8 hours as needed.

## 2025-01-24 NOTE — DISCHARGE PLANNING
Met with pt (  at bedside). Pt lives with  in a one level home. Pt said she does not use any DMEs pta. She is independent with ADLs, does not drive,  helps with IADLs.     PCP is Priscilla COBB.   asked CM to cancel appt with PCP so CM called Aura in scheduling and she will cancel appt.    Goes to [facility] on [address]    We discussed discharge planning.   Pt and  are agreeable for pt to go to SNF or acute rehab once clear for transfer.   said pt has never been in any type of rehab facility.  Referrals sent to SNF and [facility]. Sx today per  and will need recommendations from PT/OT.    Care Transition Team Assessment    Information Source  Orientation Level: Oriented X4  Information Given By: Patient  Who is responsible for making decisions for patient? : Patient    Readmission Evaluation  Is this a readmission?: No    Elopement Risk  Legal Hold: No  Ambulatory or Self Mobile in Wheelchair: No-Not an Elopement Risk    Interdisciplinary Discharge Planning  Does Admitting Nurse Feel This Could be a Complex Discharge?: No  Primary Care Physician: JORDYN Vicente  Lives with - Patient's Self Care Capacity: Spouse  Patient or legal guardian wants to designate a caregiver: No  Support Systems: Spouse / Significant Other  Housing / Facility: 1 Story House  Do You Take your Prescribed Medications Regularly: Yes  Able to Return to Previous ADL's: No  Mobility Issues: No  Prior Services: None  Patient Prefers to be Discharged to:: SNF vs Rehab  Assistance Needed: Yes    Discharge Preparedness  What is your plan after discharge?: Skilled nursing facility (vs acute rehab)  What are your discharge supports?: Spouse  Prior Functional Level: Ambulatory  Difficulity with ADLs: Walking, Toileting, Dressing, Bathing  Difficulity with IADLs: Cooking, Driving, Laundry, Shopping    Functional Assesment  Prior Functional Level: Ambulatory    Finances  Financial  Barriers to Discharge: No  Prescription Coverage: Yes    Vision / Hearing Impairment  Vision Impairment : Yes  Hearing Impairment : No    Values / Beliefs / Concerns  Values / Beliefs Concerns : No    Advance Directive  Advance Directive?: None    Domestic Abuse  Have you ever been the victim of abuse or violence?: No         Discharge Risks or Barriers  Discharge risks or barriers?: Post-acute placement / services    Anticipated Discharge Information  Discharge Disposition: D/T to SNF with Medicare cert in anticipation of skilled care (03)

## 2025-01-24 NOTE — H&P
Hospital Medicine History & Physical Note    Date of Service  1/24/2025    Primary Care Physician  [facility]    Consultants  orthopedics    Specialist Names: Dr. Cifuentes    Code Status  Full Code    Chief Complaint  Chief Complaint   Patient presents with    GLF     Pt had MGLF this morning pt denies loc blood thinners or head strike. Pt has right shoulder pain, right hip pain  and is nonambulatory.     Shoulder Pain    Hip Pain       History of Presenting Illness  Prabha Lizama is a 89 y.o. female who presented 1/24/2025 with a ground-level fall that happened in her kitchen this morning.  The patient says she went to try to get herself some cereal and water but in the process lost her balance reached for the counter but in the process of reaching for the counter she missed the counter somehow twisted and fell against the floor but ended up against the wall more on her right side.  With the impact the patient has suffered a shoulder fracture that is quite extensive as well as a femoral fracture that is quite extensive and she will need at this point surgical repair most likely of both joints.  For now patient will be given IV fentanyl 50 mcg once now and then 50 mcg every 1 hour as needed for severe pain.  I will give her 25 mcg every 1 hour for moderate pain.  The patient for now will be bed rest and will most likely either place a pure wick or a Keenan catheter.  She will be given fluid resuscitation with normal saline.  She will be held off of blood thinners for now until orthopedics clears her for blood thinners.  We anticipate that she will be going to surgery either today or tomorrow..    I discussed the plan of care with patient, family, bedside RN, , pharmacy, and emergency room physician Dr. Ernesto Rivera .    Review of Systems  Review of Systems   Constitutional: Negative.  Negative for chills, diaphoresis and fever.   HENT: Negative.  Negative for nosebleeds and  sinus pain.    Eyes: Negative.  Negative for double vision, photophobia, discharge and redness.   Respiratory: Negative.  Negative for cough, sputum production, shortness of breath, wheezing and stridor.    Cardiovascular: Negative.  Negative for chest pain, orthopnea, leg swelling and PND.   Gastrointestinal: Negative.  Negative for abdominal pain, blood in stool and heartburn.   Genitourinary: Negative.  Negative for dysuria, flank pain and frequency.   Musculoskeletal:  Positive for joint pain (Right shoulder and right hip). Negative for back pain, falls and myalgias.   Skin: Negative.  Negative for itching.   Neurological: Negative.  Negative for dizziness, tingling, sensory change, focal weakness and seizures.   Endo/Heme/Allergies: Negative.  Negative for polydipsia. Does not bruise/bleed easily.   Psychiatric/Behavioral: Negative.  Negative for depression, substance abuse and suicidal ideas. The patient does not have insomnia.    All other systems reviewed and are negative.      Past Medical History   has a past medical history of CHI (closed head injury), Elevated cortisol level, Hypertension, Insomnia, Multiple thyroid nodules (2008), and Thyrotoxicosis.    Surgical History   has a past surgical history that includes tonsillectomy and appendectomy.     Family History  family history includes Arthritis in her sister; Heart Disease in her father and mother; Hypertension in her sister; Thyroid in her sister.   Family history reviewed with patient. There is no family history that is pertinent to the chief complaint.     Social History   reports that she has never smoked. She has never used smokeless tobacco. She reports that she does not drink alcohol and does not use drugs.    Allergies  Allergies   Allergen Reactions    Amoxicillin      Unsure of reaction/or allergy    Hydralazine Unspecified     Pt reported severe hypotensive reaction    Morphine Vomiting and Nausea    Penicillins      1970 reaction        Medications  Prior to Admission Medications   Prescriptions Last Dose Informant Patient Reported? Taking?   Ascorbic Acid (VITAMIN C PO) 1/23/2025 Noon Patient, Significant Other Yes Yes   Sig: Take 1 Tablet by mouth every day.   CALCIUM PO 1/23/2025 Noon Patient, Significant Other Yes Yes   Sig: Take 1 Tablet by mouth every day.   VITAMIN D PO 1/23/2025 Noon Patient, Significant Other Yes Yes   Sig: Take 1 Tablet by mouth every day.   acetaminophen (TYLENOL) 325 MG Tab 1/23/2025 Patient, Significant Other Yes Yes   Sig: Take 325 mg by mouth every 6 hours as needed for Mild Pain.   amLODIPine (NORVASC) 2.5 MG Tab 1/23/2025 Noon Patient, Significant Other No Yes   Sig: TAKE 1 TABLET BY MOUTH EVERY DAY   carvedilol (COREG) 25 MG Tab 1/24/2025 Morning Patient, Significant Other No Yes   Sig: TAKE 1 TABLET BY MOUTH TWICE DAILY   telmisartan (MICARDIS) 80 MG Tab 1/24/2025 Morning Patient, Significant Other No Yes   Sig: TAKE 1 TABLET EVERY MORNING   Patient taking differently: Take 80 mg by mouth every day.   terazosin (HYTRIN) 2 MG Cap 1/24/2025 Morning Patient, Significant Other No Yes   Sig: TAKE 2 CAPSULES BY MOUTH TWICE DAILY   Patient taking differently: Take 4 mg by mouth 2 times a day. TAKE 2 CAPSULES BY MOUTH TWICE DAILY      Facility-Administered Medications: None       Physical Exam  Temp:  [36.9 °C (98.5 °F)] 36.9 °C (98.5 °F)  Pulse:  [60-70] 61  Resp:  [18] 18  BP: (164-200)/(70-92) 164/70  SpO2:  [94 %-96 %] 95 %  Blood Pressure : (!) 164/70   Temperature: 36.9 °C (98.5 °F)   Pulse: 61   Respiration: 18   Pulse Oximetry: 95 %       Physical Exam  Vitals and nursing note reviewed. Exam conducted with a chaperone present.   Constitutional:       General: She is awake.      Appearance: Normal appearance. She is well-developed, well-groomed and normal weight. She is ill-appearing and diaphoretic.   HENT:      Head: Normocephalic and atraumatic.      Jaw: There is normal jaw occlusion. No trismus.       Salivary Glands: Right salivary gland is not tender. Left salivary gland is not tender.      Right Ear: External ear normal.      Left Ear: External ear normal.      Nose: Nose normal.      Mouth/Throat:      Mouth: Mucous membranes are dry.      Pharynx: Oropharynx is clear. No oropharyngeal exudate or posterior oropharyngeal erythema.   Eyes:      General: Lids are normal. Vision grossly intact.         Right eye: No discharge.         Left eye: No discharge.      Extraocular Movements: Extraocular movements intact.      Conjunctiva/sclera: Conjunctivae normal.      Right eye: Right conjunctiva is not injected. No exudate.     Left eye: Left conjunctiva is not injected. No exudate.     Pupils: Pupils are equal, round, and reactive to light.   Neck:      Thyroid: No thyroid mass.      Vascular: No carotid bruit, hepatojugular reflux or JVD.      Trachea: No abnormal tracheal secretions or tracheal deviation.   Cardiovascular:      Rate and Rhythm: Regular rhythm. Tachycardia present. Occasional Extrasystoles are present.     Pulses: Normal pulses.      Heart sounds: Normal heart sounds. No murmur heard.     No friction rub.   Pulmonary:      Effort: Pulmonary effort is normal.      Breath sounds: Normal breath sounds. No wheezing or rhonchi.   Abdominal:      General: Abdomen is flat. Bowel sounds are normal.      Palpations: Abdomen is soft.      Tenderness: There is no abdominal tenderness. There is no right CVA tenderness or left CVA tenderness.      Hernia: No hernia is present.   Musculoskeletal:      Right shoulder: Swelling, deformity and tenderness present. Decreased range of motion. Decreased strength.      Cervical back: Full passive range of motion without pain, normal range of motion and neck supple. No rigidity. No muscular tenderness.      Right hip: Deformity and tenderness present. Decreased range of motion. Decreased strength.      Right lower leg: Swelling present. 1+ Edema present.      Left  lower leg: Swelling present. 1+ Edema present.   Lymphadenopathy:      Head:      Right side of head: No submental adenopathy.      Left side of head: No submental adenopathy.      Cervical:      Right cervical: No superficial cervical adenopathy.     Left cervical: No superficial cervical adenopathy.      Upper Body:      Right upper body: No supraclavicular adenopathy.      Left upper body: No supraclavicular adenopathy.   Skin:     General: Skin is warm.      Capillary Refill: Capillary refill takes less than 2 seconds.      Coloration: Skin is not cyanotic or pale.      Findings: No abrasion or bruising.   Neurological:      General: No focal deficit present.      Mental Status: She is oriented to person, place, and time. Mental status is at baseline. She is lethargic.      GCS: GCS eye subscore is 4. GCS verbal subscore is 4. GCS motor subscore is 6.      Cranial Nerves: No cranial nerve deficit.      Sensory: No sensory deficit.      Motor: Weakness present.      Coordination: Coordination abnormal.      Gait: Gait abnormal.      Deep Tendon Reflexes:      Reflex Scores:       Tricep reflexes are 2+ on the right side and 2+ on the left side.       Bicep reflexes are 2+ on the right side and 2+ on the left side.       Brachioradialis reflexes are 2+ on the right side and 2+ on the left side.       Patellar reflexes are 2+ on the right side and 2+ on the left side.       Achilles reflexes are 2+ on the right side and 2+ on the left side.  Psychiatric:         Attention and Perception: She is inattentive.         Mood and Affect: Mood is anxious. Affect is flat.         Speech: Speech is delayed.         Behavior: Behavior is slowed. Behavior is cooperative.         Cognition and Memory: Cognition and memory normal.         Judgment: Judgment normal.         Laboratory:      Recent Labs     01/24/25  1107   SODIUM 135   POTASSIUM 4.2   CHLORIDE 104   CO2 21   GLUCOSE 117*   BUN 12   CREATININE 0.63   CALCIUM 8.3*  "    Recent Labs     01/24/25  1107   ALTSGPT 19   ASTSGOT 16   ALKPHOSPHAT 67   TBILIRUBIN 0.5   GLUCOSE 117*         No results for input(s): \"NTPROBNP\" in the last 72 hours.      No results for input(s): \"TROPONINT\" in the last 72 hours.    Imaging:  DX-HUMERUS 2+ RIGHT   Final Result      Displaced fracture of the proximal right humerus.      DX-HIP-COMPLETE - UNILATERAL 2+ LEFT   Final Result      Displaced intertrochanteric right femoral fracture.      DX-CHEST-PORTABLE (1 VIEW)   Final Result         1. No acute cardiac or pulmonary abnormalities are identified.   2. Displaced right humeral fracture.          X-Ray:  I have personally reviewed the images and compared with prior images.  EKG:  I have personally reviewed the images and compared with prior images.    Assessment/Plan:  Justification for Admission Status  I anticipate this patient will require at least two midnights for appropriate medical management, necessitating inpatient admission because patient a ground-level fall and has a right femoral fracture and right humeral fracture will require at least 48 hours of inpatient management for evaluation of her fractures, surgical intervention, pain management and then a disposition plan.    Patient will need a Med/Surg bed on MEDICAL service .  The need is secondary to right hip fracture right shoulder fracture.    * Closed right hip fracture, initial encounter (HCC)- (present on admission)  Assessment & Plan  Ground-level fall today second to that she suffered a proximal humeral fracture on the right side.  Orthopedic consultation  N.p.o. status  Pain management IV fentanyl 50 mcg now and then 25 mcg for moderate pain and 250 mcg for severe pain every 1 hour as needed  Continue with fluid resuscitation using normal saline at 83 mL/h  Hold anticoagulation until surgery clears her.    Closed fracture of proximal end of right humerus- (present on admission)  Assessment & Plan  Patient had a ground-level " fall today and suffered a right soreness in the femoral neck fracture  Orthopedic consultation  N.p.o.  Pain management IV fentanyl 50 mcg now and then 25 mcg for moderate pain and 50 mcg for severe pain every 1 hour as needed  Fluid resuscitation  Hold anticoagulation until surgery clears her for anticoagulation    Pure hypercholesterolemia- (present on admission)  Assessment & Plan  Check a fasting lipid panel  Currently not on a statin    Impaired fasting blood sugar- (present on admission)  Assessment & Plan  Most recent hemoglobin A1c is 5.6 but this was from 2023  Repeat hemoglobin A1c  Most recent blood sugars 117    Primary hypertension- (present on admission)  Assessment & Plan  Optimize blood pressure management keep systolic blood pressure less than 140 diastolic under 90  Continue Norvasc 2.5 mg daily  Coreg 25 mg twice daily  Telmisartan 80 mg daily  Terazosin 4 mg twice daily  As needed labetalol    Vitamin D deficiency- (present on admission)  Assessment & Plan  Vitamin D supplementation 1000 units p.o. daily    Constipation- (present on admission)  Assessment & Plan  Bowel protocol    Anxiety- (present on admission)  Assessment & Plan  Chronic anxiety continue with Benadryl for anxiety management    Insomnia, unspecified- (present on admission)  Assessment & Plan  Melatonin        VTE prophylaxis: SCDs/TEDs

## 2025-01-24 NOTE — ED TRIAGE NOTES
BIB ems for following complaints.   Chief Complaint   Patient presents with    GLF     Pt had MGLF this morning pt denies loc blood thinners or head strike. Pt has right shoulder pain, right hip pain  and is nonambulatory.     Shoulder Pain    Hip Pain     BP (!) 200/92   Pulse 70   Temp 36.9 °C (98.5 °F) (Temporal)   Resp 18   Wt 70.3 kg (155 lb)   SpO2 96%   Breastfeeding No   BMI 25.02 kg/m²

## 2025-01-25 ENCOUNTER — ANESTHESIA (OUTPATIENT)
Dept: SURGERY | Facility: MEDICAL CENTER | Age: 89
End: 2025-01-25
Payer: MEDICARE

## 2025-01-25 ENCOUNTER — APPOINTMENT (OUTPATIENT)
Dept: RADIOLOGY | Facility: MEDICAL CENTER | Age: 89
End: 2025-01-25
Attending: STUDENT IN AN ORGANIZED HEALTH CARE EDUCATION/TRAINING PROGRAM
Payer: MEDICARE

## 2025-01-25 ENCOUNTER — APPOINTMENT (OUTPATIENT)
Dept: RADIOLOGY | Facility: MEDICAL CENTER | Age: 89
End: 2025-01-25
Attending: ORTHOPAEDIC SURGERY
Payer: MEDICARE

## 2025-01-25 LAB
ANION GAP SERPL CALC-SCNC: 9 MMOL/L (ref 7–16)
BUN SERPL-MCNC: 16 MG/DL (ref 8–22)
CALCIUM SERPL-MCNC: 8.1 MG/DL (ref 8.4–10.2)
CHLORIDE SERPL-SCNC: 105 MMOL/L (ref 96–112)
CO2 SERPL-SCNC: 19 MMOL/L (ref 20–33)
CREAT SERPL-MCNC: 0.61 MG/DL (ref 0.5–1.4)
ERYTHROCYTE [DISTWIDTH] IN BLOOD BY AUTOMATED COUNT: 45.1 FL (ref 35.9–50)
EST. AVERAGE GLUCOSE BLD GHB EST-MCNC: 108 MG/DL
GFR SERPLBLD CREATININE-BSD FMLA CKD-EPI: 86 ML/MIN/1.73 M 2
GLUCOSE SERPL-MCNC: 125 MG/DL (ref 65–99)
HBA1C MFR BLD: 5.4 % (ref 4–5.6)
HCT VFR BLD AUTO: 35.7 % (ref 37–47)
HGB BLD-MCNC: 12.1 G/DL (ref 12–16)
MCH RBC QN AUTO: 33.6 PG (ref 27–33)
MCHC RBC AUTO-ENTMCNC: 33.9 G/DL (ref 32.2–35.5)
MCV RBC AUTO: 99.2 FL (ref 81.4–97.8)
PLATELET # BLD AUTO: 199 K/UL (ref 164–446)
PMV BLD AUTO: 10.7 FL (ref 9–12.9)
POTASSIUM SERPL-SCNC: 4 MMOL/L (ref 3.6–5.5)
RBC # BLD AUTO: 3.6 M/UL (ref 4.2–5.4)
SODIUM SERPL-SCNC: 133 MMOL/L (ref 135–145)
WBC # BLD AUTO: 10.5 K/UL (ref 4.8–10.8)

## 2025-01-25 PROCEDURE — 700102 HCHG RX REV CODE 250 W/ 637 OVERRIDE(OP): Performed by: HOSPITALIST

## 2025-01-25 PROCEDURE — 99233 SBSQ HOSP IP/OBS HIGH 50: CPT | Performed by: INTERNAL MEDICINE

## 2025-01-25 PROCEDURE — C1713 ANCHOR/SCREW BN/BN,TIS/BN: HCPCS | Performed by: ORTHOPAEDIC SURGERY

## 2025-01-25 PROCEDURE — 700111 HCHG RX REV CODE 636 W/ 250 OVERRIDE (IP): Mod: JZ | Performed by: HOSPITALIST

## 2025-01-25 PROCEDURE — 160048 HCHG OR STATISTICAL LEVEL 1-5: Performed by: ORTHOPAEDIC SURGERY

## 2025-01-25 PROCEDURE — 0QS606Z REPOSITION RIGHT UPPER FEMUR WITH INTRAMEDULLARY INTERNAL FIXATION DEVICE, OPEN APPROACH: ICD-10-PCS | Performed by: ORTHOPAEDIC SURGERY

## 2025-01-25 PROCEDURE — 700111 HCHG RX REV CODE 636 W/ 250 OVERRIDE (IP): Mod: JZ | Performed by: STUDENT IN AN ORGANIZED HEALTH CARE EDUCATION/TRAINING PROGRAM

## 2025-01-25 PROCEDURE — 64415 NJX AA&/STRD BRCH PLXS IMG: CPT | Performed by: ORTHOPAEDIC SURGERY

## 2025-01-25 PROCEDURE — 160029 HCHG SURGERY MINUTES - 1ST 30 MINS LEVEL 4: Performed by: ORTHOPAEDIC SURGERY

## 2025-01-25 PROCEDURE — 700101 HCHG RX REV CODE 250: Performed by: ANESTHESIOLOGY

## 2025-01-25 PROCEDURE — 160041 HCHG SURGERY MINUTES - EA ADDL 1 MIN LEVEL 4: Performed by: ORTHOPAEDIC SURGERY

## 2025-01-25 PROCEDURE — 502000 HCHG MISC OR IMPLANTS RC 0278: Performed by: ORTHOPAEDIC SURGERY

## 2025-01-25 PROCEDURE — 0LQ10ZZ REPAIR RIGHT SHOULDER TENDON, OPEN APPROACH: ICD-10-PCS | Performed by: STUDENT IN AN ORGANIZED HEALTH CARE EDUCATION/TRAINING PROGRAM

## 2025-01-25 PROCEDURE — A9270 NON-COVERED ITEM OR SERVICE: HCPCS | Performed by: INTERNAL MEDICINE

## 2025-01-25 PROCEDURE — A9270 NON-COVERED ITEM OR SERVICE: HCPCS | Performed by: HOSPITALIST

## 2025-01-25 PROCEDURE — 36415 COLL VENOUS BLD VENIPUNCTURE: CPT

## 2025-01-25 PROCEDURE — C1776 JOINT DEVICE (IMPLANTABLE): HCPCS | Performed by: ORTHOPAEDIC SURGERY

## 2025-01-25 PROCEDURE — 502240 HCHG MISC OR SUPPLY RC 0272: Performed by: ORTHOPAEDIC SURGERY

## 2025-01-25 PROCEDURE — 700111 HCHG RX REV CODE 636 W/ 250 OVERRIDE (IP): Mod: JZ | Performed by: ANESTHESIOLOGY

## 2025-01-25 PROCEDURE — 160036 HCHG PACU - EA ADDL 30 MINS PHASE I: Performed by: ORTHOPAEDIC SURGERY

## 2025-01-25 PROCEDURE — 0RRJ00Z REPLACEMENT OF RIGHT SHOULDER JOINT WITH REVERSE BALL AND SOCKET SYNTHETIC SUBSTITUTE, OPEN APPROACH: ICD-10-PCS | Performed by: STUDENT IN AN ORGANIZED HEALTH CARE EDUCATION/TRAINING PROGRAM

## 2025-01-25 PROCEDURE — 160035 HCHG PACU - 1ST 60 MINS PHASE I: Performed by: ORTHOPAEDIC SURGERY

## 2025-01-25 PROCEDURE — 700102 HCHG RX REV CODE 250 W/ 637 OVERRIDE(OP): Performed by: ANESTHESIOLOGY

## 2025-01-25 PROCEDURE — 73502 X-RAY EXAM HIP UNI 2-3 VIEWS: CPT | Mod: RT

## 2025-01-25 PROCEDURE — 770001 HCHG ROOM/CARE - MED/SURG/GYN PRIV*

## 2025-01-25 PROCEDURE — 700105 HCHG RX REV CODE 258: Performed by: STUDENT IN AN ORGANIZED HEALTH CARE EDUCATION/TRAINING PROGRAM

## 2025-01-25 PROCEDURE — A9270 NON-COVERED ITEM OR SERVICE: HCPCS | Performed by: ANESTHESIOLOGY

## 2025-01-25 PROCEDURE — 80048 BASIC METABOLIC PNL TOTAL CA: CPT

## 2025-01-25 PROCEDURE — 72170 X-RAY EXAM OF PELVIS: CPT

## 2025-01-25 PROCEDURE — 83036 HEMOGLOBIN GLYCOSYLATED A1C: CPT

## 2025-01-25 PROCEDURE — 700105 HCHG RX REV CODE 258: Performed by: ANESTHESIOLOGY

## 2025-01-25 PROCEDURE — 160002 HCHG RECOVERY MINUTES (STAT): Performed by: ORTHOPAEDIC SURGERY

## 2025-01-25 PROCEDURE — 700102 HCHG RX REV CODE 250 W/ 637 OVERRIDE(OP): Performed by: STUDENT IN AN ORGANIZED HEALTH CARE EDUCATION/TRAINING PROGRAM

## 2025-01-25 PROCEDURE — 3E0T3BZ INTRODUCTION OF ANESTHETIC AGENT INTO PERIPHERAL NERVES AND PLEXI, PERCUTANEOUS APPROACH: ICD-10-PCS | Performed by: ANESTHESIOLOGY

## 2025-01-25 PROCEDURE — 700105 HCHG RX REV CODE 258: Performed by: ORTHOPAEDIC SURGERY

## 2025-01-25 PROCEDURE — 110371 HCHG SHELL REV 272: Performed by: ORTHOPAEDIC SURGERY

## 2025-01-25 PROCEDURE — 700102 HCHG RX REV CODE 250 W/ 637 OVERRIDE(OP): Performed by: INTERNAL MEDICINE

## 2025-01-25 PROCEDURE — 73030 X-RAY EXAM OF SHOULDER: CPT | Mod: RT

## 2025-01-25 PROCEDURE — 700111 HCHG RX REV CODE 636 W/ 250 OVERRIDE (IP): Mod: JZ | Performed by: INTERNAL MEDICINE

## 2025-01-25 PROCEDURE — 160009 HCHG ANES TIME/MIN: Performed by: ORTHOPAEDIC SURGERY

## 2025-01-25 PROCEDURE — 700105 HCHG RX REV CODE 258: Performed by: HOSPITALIST

## 2025-01-25 PROCEDURE — A9270 NON-COVERED ITEM OR SERVICE: HCPCS | Performed by: STUDENT IN AN ORGANIZED HEALTH CARE EDUCATION/TRAINING PROGRAM

## 2025-01-25 PROCEDURE — 85027 COMPLETE CBC AUTOMATED: CPT

## 2025-01-25 PROCEDURE — 700111 HCHG RX REV CODE 636 W/ 250 OVERRIDE (IP): Performed by: ANESTHESIOLOGY

## 2025-01-25 DEVICE — PIN PRECISION TM TAPER D3.2/3.9 X 450MM (1EA): Type: IMPLANTABLE DEVICE | Site: HIP | Status: FUNCTIONAL

## 2025-01-25 DEVICE — LOCKING SCREW DIA 5X42MM: Type: IMPLANTABLE DEVICE | Site: HIP | Status: FUNCTIONAL

## 2025-01-25 DEVICE — IMPLANTABLE DEVICE: Type: IMPLANTABLE DEVICE | Site: HIP | Status: FUNCTIONAL

## 2025-01-25 DEVICE — K-WIRE 3MM X 285MM (1EA): Type: IMPLANTABLE DEVICE | Site: HIP | Status: FUNCTIONAL

## 2025-01-25 RX ORDER — ROCURONIUM BROMIDE 10 MG/ML
INJECTION, SOLUTION INTRAVENOUS PRN
Status: DISCONTINUED | OUTPATIENT
Start: 2025-01-25 | End: 2025-01-25 | Stop reason: SURG

## 2025-01-25 RX ORDER — BUPIVACAINE HYDROCHLORIDE 2.5 MG/ML
INJECTION, SOLUTION EPIDURAL; INFILTRATION; INTRACAUDAL PRN
Status: DISCONTINUED | OUTPATIENT
Start: 2025-01-25 | End: 2025-01-25 | Stop reason: SURG

## 2025-01-25 RX ORDER — DIPHENHYDRAMINE HYDROCHLORIDE 50 MG/ML
25 INJECTION INTRAMUSCULAR; INTRAVENOUS EVERY 6 HOURS PRN
Status: DISCONTINUED | OUTPATIENT
Start: 2025-01-25 | End: 2025-01-26

## 2025-01-25 RX ORDER — HALOPERIDOL 5 MG/ML
1 INJECTION INTRAMUSCULAR
Status: DISCONTINUED | OUTPATIENT
Start: 2025-01-25 | End: 2025-01-25 | Stop reason: HOSPADM

## 2025-01-25 RX ORDER — HYDROMORPHONE HYDROCHLORIDE 1 MG/ML
1 INJECTION, SOLUTION INTRAMUSCULAR; INTRAVENOUS; SUBCUTANEOUS
Status: DISCONTINUED | OUTPATIENT
Start: 2025-01-25 | End: 2025-01-28

## 2025-01-25 RX ORDER — POLYETHYLENE GLYCOL 3350 17 G/17G
1 POWDER, FOR SOLUTION ORAL 2 TIMES DAILY PRN
Status: DISCONTINUED | OUTPATIENT
Start: 2025-01-25 | End: 2025-01-30 | Stop reason: HOSPADM

## 2025-01-25 RX ORDER — SODIUM CHLORIDE, SODIUM LACTATE, POTASSIUM CHLORIDE, CALCIUM CHLORIDE 600; 310; 30; 20 MG/100ML; MG/100ML; MG/100ML; MG/100ML
INJECTION, SOLUTION INTRAVENOUS CONTINUOUS
Status: ACTIVE | OUTPATIENT
Start: 2025-01-25 | End: 2025-01-25

## 2025-01-25 RX ORDER — SODIUM CHLORIDE, SODIUM LACTATE, POTASSIUM CHLORIDE, CALCIUM CHLORIDE 600; 310; 30; 20 MG/100ML; MG/100ML; MG/100ML; MG/100ML
INJECTION, SOLUTION INTRAVENOUS CONTINUOUS
Status: DISCONTINUED | OUTPATIENT
Start: 2025-01-25 | End: 2025-01-25 | Stop reason: HOSPADM

## 2025-01-25 RX ORDER — DEXAMETHASONE SODIUM PHOSPHATE 4 MG/ML
INJECTION, SOLUTION INTRA-ARTICULAR; INTRALESIONAL; INTRAMUSCULAR; INTRAVENOUS; SOFT TISSUE PRN
Status: DISCONTINUED | OUTPATIENT
Start: 2025-01-25 | End: 2025-01-25 | Stop reason: SURG

## 2025-01-25 RX ORDER — HYDROMORPHONE HYDROCHLORIDE 1 MG/ML
0.5 INJECTION, SOLUTION INTRAMUSCULAR; INTRAVENOUS; SUBCUTANEOUS
Status: DISCONTINUED | OUTPATIENT
Start: 2025-01-25 | End: 2025-01-28

## 2025-01-25 RX ORDER — MEPERIDINE HYDROCHLORIDE 25 MG/ML
12.5 INJECTION INTRAMUSCULAR; INTRAVENOUS; SUBCUTANEOUS
Status: DISCONTINUED | OUTPATIENT
Start: 2025-01-25 | End: 2025-01-25 | Stop reason: HOSPADM

## 2025-01-25 RX ORDER — OXYCODONE HYDROCHLORIDE 5 MG/1
5 TABLET ORAL EVERY 4 HOURS PRN
Status: DISCONTINUED | OUTPATIENT
Start: 2025-01-25 | End: 2025-01-27

## 2025-01-25 RX ORDER — OXYCODONE HCL 5 MG/5 ML
10 SOLUTION, ORAL ORAL
Status: COMPLETED | OUTPATIENT
Start: 2025-01-25 | End: 2025-01-25

## 2025-01-25 RX ORDER — ENOXAPARIN SODIUM 100 MG/ML
40 INJECTION SUBCUTANEOUS DAILY
Status: DISCONTINUED | OUTPATIENT
Start: 2025-01-25 | End: 2025-01-30 | Stop reason: HOSPADM

## 2025-01-25 RX ORDER — DEXAMETHASONE SODIUM PHOSPHATE 4 MG/ML
4 INJECTION, SOLUTION INTRA-ARTICULAR; INTRALESIONAL; INTRAMUSCULAR; INTRAVENOUS; SOFT TISSUE
Status: DISCONTINUED | OUTPATIENT
Start: 2025-01-25 | End: 2025-01-26

## 2025-01-25 RX ORDER — CEFAZOLIN SODIUM 1 G/3ML
INJECTION, POWDER, FOR SOLUTION INTRAMUSCULAR; INTRAVENOUS PRN
Status: DISCONTINUED | OUTPATIENT
Start: 2025-01-25 | End: 2025-01-25 | Stop reason: SURG

## 2025-01-25 RX ORDER — HYDROMORPHONE HYDROCHLORIDE 1 MG/ML
0.2 INJECTION, SOLUTION INTRAMUSCULAR; INTRAVENOUS; SUBCUTANEOUS
Status: DISCONTINUED | OUTPATIENT
Start: 2025-01-25 | End: 2025-01-25 | Stop reason: HOSPADM

## 2025-01-25 RX ORDER — OXYCODONE HCL 5 MG/5 ML
5 SOLUTION, ORAL ORAL
Status: COMPLETED | OUTPATIENT
Start: 2025-01-25 | End: 2025-01-25

## 2025-01-25 RX ORDER — SCOPOLAMINE 1 MG/3D
1 PATCH, EXTENDED RELEASE TRANSDERMAL
Status: DISCONTINUED | OUTPATIENT
Start: 2025-01-25 | End: 2025-01-30 | Stop reason: HOSPADM

## 2025-01-25 RX ORDER — VANCOMYCIN HYDROCHLORIDE 1 G/20ML
INJECTION, POWDER, LYOPHILIZED, FOR SOLUTION INTRAVENOUS
Status: COMPLETED | OUTPATIENT
Start: 2025-01-25 | End: 2025-01-25

## 2025-01-25 RX ORDER — SODIUM CHLORIDE, SODIUM LACTATE, POTASSIUM CHLORIDE, CALCIUM CHLORIDE 600; 310; 30; 20 MG/100ML; MG/100ML; MG/100ML; MG/100ML
INJECTION, SOLUTION INTRAVENOUS
Status: DISCONTINUED | OUTPATIENT
Start: 2025-01-25 | End: 2025-01-25 | Stop reason: SURG

## 2025-01-25 RX ORDER — HYDROMORPHONE HYDROCHLORIDE 1 MG/ML
0.4 INJECTION, SOLUTION INTRAMUSCULAR; INTRAVENOUS; SUBCUTANEOUS
Status: DISCONTINUED | OUTPATIENT
Start: 2025-01-25 | End: 2025-01-25 | Stop reason: HOSPADM

## 2025-01-25 RX ORDER — DOCUSATE SODIUM 100 MG/1
100 CAPSULE, LIQUID FILLED ORAL 2 TIMES DAILY
Status: DISCONTINUED | OUTPATIENT
Start: 2025-01-25 | End: 2025-01-28

## 2025-01-25 RX ORDER — HYDROMORPHONE HYDROCHLORIDE 1 MG/ML
0.1 INJECTION, SOLUTION INTRAMUSCULAR; INTRAVENOUS; SUBCUTANEOUS
Status: DISCONTINUED | OUTPATIENT
Start: 2025-01-25 | End: 2025-01-25 | Stop reason: HOSPADM

## 2025-01-25 RX ORDER — AMOXICILLIN 250 MG
1 CAPSULE ORAL NIGHTLY
Status: DISCONTINUED | OUTPATIENT
Start: 2025-01-25 | End: 2025-01-28

## 2025-01-25 RX ORDER — TETRACAINE HYDROCHLORIDE 5 MG/ML
2 SOLUTION OPHTHALMIC ONCE
Status: COMPLETED | OUTPATIENT
Start: 2025-01-25 | End: 2025-01-25

## 2025-01-25 RX ORDER — AMOXICILLIN 250 MG
1 CAPSULE ORAL
Status: DISCONTINUED | OUTPATIENT
Start: 2025-01-25 | End: 2025-01-30 | Stop reason: HOSPADM

## 2025-01-25 RX ORDER — BISACODYL 10 MG
10 SUPPOSITORY, RECTAL RECTAL
Status: DISCONTINUED | OUTPATIENT
Start: 2025-01-25 | End: 2025-01-28

## 2025-01-25 RX ORDER — ONDANSETRON 2 MG/ML
4 INJECTION INTRAMUSCULAR; INTRAVENOUS
Status: DISCONTINUED | OUTPATIENT
Start: 2025-01-25 | End: 2025-01-25 | Stop reason: HOSPADM

## 2025-01-25 RX ORDER — HALOPERIDOL 5 MG/ML
1 INJECTION INTRAMUSCULAR EVERY 6 HOURS PRN
Status: DISCONTINUED | OUTPATIENT
Start: 2025-01-25 | End: 2025-01-26

## 2025-01-25 RX ORDER — EPHEDRINE SULFATE 50 MG/ML
INJECTION, SOLUTION INTRAVENOUS PRN
Status: DISCONTINUED | OUTPATIENT
Start: 2025-01-25 | End: 2025-01-25 | Stop reason: SURG

## 2025-01-25 RX ORDER — TRANEXAMIC ACID 100 MG/ML
INJECTION, SOLUTION INTRAVENOUS PRN
Status: DISCONTINUED | OUTPATIENT
Start: 2025-01-25 | End: 2025-01-25 | Stop reason: SURG

## 2025-01-25 RX ORDER — ONDANSETRON 2 MG/ML
4 INJECTION INTRAMUSCULAR; INTRAVENOUS EVERY 4 HOURS PRN
Status: DISCONTINUED | OUTPATIENT
Start: 2025-01-25 | End: 2025-01-30 | Stop reason: HOSPADM

## 2025-01-25 RX ADMIN — HYDROMORPHONE HYDROCHLORIDE 1 MG: 1 INJECTION, SOLUTION INTRAMUSCULAR; INTRAVENOUS; SUBCUTANEOUS at 04:31

## 2025-01-25 RX ADMIN — SODIUM CHLORIDE: 9 INJECTION, SOLUTION INTRAVENOUS at 01:41

## 2025-01-25 RX ADMIN — ENOXAPARIN SODIUM 40 MG: 100 INJECTION SUBCUTANEOUS at 17:46

## 2025-01-25 RX ADMIN — SODIUM CHLORIDE, POTASSIUM CHLORIDE, SODIUM LACTATE AND CALCIUM CHLORIDE: 600; 310; 30; 20 INJECTION, SOLUTION INTRAVENOUS at 07:57

## 2025-01-25 RX ADMIN — TRANEXAMIC ACID 1000 MG: 100 INJECTION, SOLUTION INTRAVENOUS at 08:19

## 2025-01-25 RX ADMIN — BUPIVACAINE HYDROCHLORIDE 20 ML: 2.5 INJECTION, SOLUTION EPIDURAL; INFILTRATION; INTRACAUDAL at 09:51

## 2025-01-25 RX ADMIN — PROPOFOL 150 MG: 10 INJECTION, EMULSION INTRAVENOUS at 08:19

## 2025-01-25 RX ADMIN — SODIUM CHLORIDE, POTASSIUM CHLORIDE, SODIUM LACTATE AND CALCIUM CHLORIDE: 600; 310; 30; 20 INJECTION, SOLUTION INTRAVENOUS at 08:14

## 2025-01-25 RX ADMIN — DEXAMETHASONE SODIUM PHOSPHATE 8 MG: 4 INJECTION INTRA-ARTICULAR; INTRALESIONAL; INTRAMUSCULAR; INTRAVENOUS; SOFT TISSUE at 08:19

## 2025-01-25 RX ADMIN — CEFAZOLIN 2 G: 1 INJECTION, POWDER, FOR SOLUTION INTRAMUSCULAR; INTRAVENOUS at 08:19

## 2025-01-25 RX ADMIN — OXYCODONE 5 MG: 5 TABLET ORAL at 17:44

## 2025-01-25 RX ADMIN — CARVEDILOL 25 MG: 25 TABLET, FILM COATED ORAL at 17:44

## 2025-01-25 RX ADMIN — SUGAMMADEX 200 MG: 100 INJECTION, SOLUTION INTRAVENOUS at 11:22

## 2025-01-25 RX ADMIN — SENNOSIDES AND DOCUSATE SODIUM 2 TABLET: 50; 8.6 TABLET ORAL at 17:44

## 2025-01-25 RX ADMIN — TERAZOSIN HYDROCHLORIDE 4 MG: 1 CAPSULE ORAL at 17:44

## 2025-01-25 RX ADMIN — DOCUSATE SODIUM 100 MG: 100 CAPSULE, LIQUID FILLED ORAL at 17:44

## 2025-01-25 RX ADMIN — ROCURONIUM BROMIDE 50 MG: 50 INJECTION, SOLUTION INTRAVENOUS at 08:19

## 2025-01-25 RX ADMIN — ROCURONIUM BROMIDE 50 MG: 50 INJECTION, SOLUTION INTRAVENOUS at 10:05

## 2025-01-25 RX ADMIN — TETRACAINE HYDROCHLORIDE 2 DROP: 5 SOLUTION OPHTHALMIC at 13:18

## 2025-01-25 RX ADMIN — EPHEDRINE SULFATE 10 MG: 50 INJECTION, SOLUTION INTRAVENOUS at 08:29

## 2025-01-25 RX ADMIN — OXYCODONE HYDROCHLORIDE 5 MG: 5 SOLUTION ORAL at 12:52

## 2025-01-25 RX ADMIN — EPHEDRINE SULFATE 10 MG: 50 INJECTION, SOLUTION INTRAVENOUS at 08:36

## 2025-01-25 RX ADMIN — CEFAZOLIN 2 G: 2 INJECTION, POWDER, FOR SOLUTION INTRAMUSCULAR; INTRAVENOUS at 14:34

## 2025-01-25 RX ADMIN — SODIUM CHLORIDE: 9 INJECTION, SOLUTION INTRAVENOUS at 21:46

## 2025-01-25 RX ADMIN — FENTANYL CITRATE 25 MCG: 50 INJECTION, SOLUTION INTRAMUSCULAR; INTRAVENOUS at 12:52

## 2025-01-25 ASSESSMENT — ENCOUNTER SYMPTOMS
ABDOMINAL PAIN: 0
DEPRESSION: 0
MYALGIAS: 0
HEADACHES: 0
DIARRHEA: 0
HALLUCINATIONS: 0
NAUSEA: 0
SHORTNESS OF BREATH: 0
BLOOD IN STOOL: 0
PALPITATIONS: 0
FALLS: 1
SORE THROAT: 0
FOCAL WEAKNESS: 0
VOMITING: 0
WEAKNESS: 1
DIZZINESS: 0
MEMORY LOSS: 1
FEVER: 0
BACK PAIN: 0
NERVOUS/ANXIOUS: 1
CHILLS: 0
COUGH: 0
HEARTBURN: 0

## 2025-01-25 ASSESSMENT — PAIN DESCRIPTION - PAIN TYPE
TYPE: SURGICAL PAIN
TYPE: ACUTE PAIN
TYPE: SURGICAL PAIN
TYPE: ACUTE PAIN
TYPE: SURGICAL PAIN
TYPE: ACUTE PAIN

## 2025-01-25 NOTE — PROGRESS NOTES
4 Eyes Skin Assessment Completed by SUSHANT Quinonez and SUSHANT Tadeo.    Head WDL  Ears WDL  Nose WDL  Mouth WDL  Neck WDL  Breast/Chest WDL  Shoulder Blades WDL  Spine WDL  (R) Arm/Elbow/Hand Bruising and Swelling  (L) Arm/Elbow/Hand WDL  Abdomen WDL  Groin WDL  Scrotum/Coccyx/Buttocks Redness and Blanching  (R) Leg Swelling and Incision,bruising  (L) Leg WDL  (R) Heel/Foot/Toe Swelling  (L) Heel/Foot/Toe Swelling          Devices In Places Blood Pressure Cuff, Pulse Ox, Stern, and SCD's,stern cath,shoulder immobilizer,oxymask      Interventions In Place Heel Mepilex, TAP System, Pillows, Q2 Turns, Low Air Loss Mattress, Barrier Cream, and Heels Loaded W/Pillows    Possible Skin Injury No    Pictures Uploaded Into Epic N/A  Wound Consult Placed N/A  RN Wound Prevention Protocol Ordered No

## 2025-01-25 NOTE — PROGRESS NOTES
[facility] Medicine Daily Progress Note    Date of Service  1/25/2025    Chief Complaint  Prabha Lizama is a 89 y.o. female admitted 1/24/2025 with right hip and shoulder pain after a fall    Hospital Course  History of hypertension, diet controlled hyperlipidemia and anxiety admitted after ground-level fall with right shoulder and right hip pain found to have a right humerus and right femoral neck fracture.  She was admitted for operative repair, perioperative management and rehabilitation.    Interval Problem Update  1/25: To the OR for IM nail right femur, total shoulder arthroplasty on the right.  Postop PT OT.  Work on postop pain control    I have discussed this patient's plan of care and discharge plan at IDT rounds today with Case Management, Nursing, Nursing leadership, and other members of the IDT team.    Consultants/Specialty  orthopedics    Code Status  Full Code    Disposition  The patient is not medically cleared for discharge to home or a post-acute facility.  Anticipate discharge to: skilled nursing facility    I have placed the appropriate orders for post-discharge needs.    Review of Systems  Review of Systems   Constitutional:  Negative for chills, fever and malaise/fatigue.   HENT:  Negative for sore throat.    Respiratory:  Negative for cough and shortness of breath.    Cardiovascular:  Negative for chest pain and palpitations.   Gastrointestinal:  Negative for abdominal pain, blood in stool, diarrhea, heartburn, nausea and vomiting.   Genitourinary:  Negative for dysuria and frequency.   Musculoskeletal:  Positive for falls and joint pain. Negative for back pain and myalgias.   Neurological:  Positive for weakness. Negative for dizziness, focal weakness and headaches.   Psychiatric/Behavioral:  Positive for memory loss. Negative for depression and hallucinations. The patient is nervous/anxious.    All other systems reviewed and are negative.       Physical Exam  Temp:  [36.2 °C (97.2  °F)-37.1 °C (98.8 °F)] 37.1 °C (98.8 °F)  Pulse:  [54-81] 80  Resp:  [12-18] 14  BP: (115-181)/(59-88) 115/59  SpO2:  [91 %-99 %] 91 %    Physical Exam  Constitutional:       General: She is not in acute distress.  HENT:      Nose: Nose normal.      Mouth/Throat:      Mouth: Mucous membranes are dry.   Cardiovascular:      Rate and Rhythm: Normal rate and regular rhythm.      Pulses: Normal pulses.   Pulmonary:      Effort: Pulmonary effort is normal. No respiratory distress.      Breath sounds: Normal breath sounds. No wheezing.   Abdominal:      General: There is no distension.      Palpations: Abdomen is soft.   Musculoskeletal:         General: Tenderness and signs of injury present. No swelling.      Cervical back: No muscular tenderness.   Lymphadenopathy:      Cervical: No cervical adenopathy.   Skin:     General: Skin is warm and dry.      Findings: No rash.   Neurological:      General: No focal deficit present.      Mental Status: She is alert and oriented to person, place, and time.      Motor: Weakness present.   Psychiatric:         Mood and Affect: Mood normal.         Thought Content: Thought content normal.         Fluids  No intake or output data in the 24 hours ending 01/25/25 1117     Laboratory  Recent Labs     01/24/25  1242 01/25/25  0249   WBC 14.7* 10.5   RBC 3.95* 3.60*   HEMOGLOBIN 13.0 12.1   HEMATOCRIT 37.7 35.7*   MCV 95.4 99.2*   MCH 32.9 33.6*   MCHC 34.5 33.9   RDW 42.6 45.1   PLATELETCT 238 199   MPV 10.3 10.7     Recent Labs     01/24/25  1107 01/25/25  0249   SODIUM 135 133*   POTASSIUM 4.2 4.0   CHLORIDE 104 105   CO2 21 19*   GLUCOSE 117* 125*   BUN 12 16   CREATININE 0.63 0.61   CALCIUM 8.3* 8.1*     Recent Labs     01/24/25  1242   APTT 24.0*   INR 1.11               Imaging  DX-HUMERUS 2+ RIGHT   Final Result      Displaced fracture of the proximal right humerus.      DX-HIP-COMPLETE - UNILATERAL 2+ LEFT   Final Result      Displaced intertrochanteric right femoral fracture.       DX-CHEST-PORTABLE (1 VIEW)   Final Result         1. No acute cardiac or pulmonary abnormalities are identified.   2. Displaced right humeral fracture.      DX-HIP-COMPLETE - UNILATERAL 2+ RIGHT    (Results Pending)   DX-PORTABLE FLUORO > 1 HOUR    (Results Pending)        Assessment/Plan  * Closed right hip fracture, initial encounter (HCC)- (present on admission)  Assessment & Plan  Ground-level fall today second to that she suffered a proximal humeral fracture on the right side.  Orthopedic repair today  Advance diet after surgery  Add oral oxycodone, minimize narcotics as able  Continue with fluids  Bowel regimen  Lovenox postop  Postop PT OT    Vitamin D deficiency- (present on admission)  Assessment & Plan  Vitamin D supplementation 1000 units p.o. daily    Closed fracture of proximal end of right humerus- (present on admission)  Assessment & Plan  Patient had a ground-level fall today and suffered a right femoral neck fracture and right humerus fracture  Orthopedic consultation  Today, she is going for repair of both:  RIGHT INTRAMEDULLARY SAM, FEMUR, PROXIMAL  RIGHT TOTAL SHOULDER ARTHROPLASTY  She had confused after getting fentanyl  Scheduled Tylenol, add oxycodone  Lovenox postop for DVT prophylaxis  PT OT    Constipation- (present on admission)  Assessment & Plan  Bowel protocol    Anxiety- (present on admission)  Assessment & Plan  Chronic anxiety  Avoid Benadryl  Manage symptoms as they arise    Pure hypercholesterolemia- (present on admission)  Assessment & Plan  Diet controlled    Impaired fasting blood sugar- (present on admission)  Assessment & Plan  Most recent hemoglobin A1c is 5.6 but this was from 2023  Repeat hemoglobin A1c  Most recent blood sugars 117    Insomnia, unspecified- (present on admission)  Assessment & Plan  Melatonin    Primary hypertension- (present on admission)  Assessment & Plan  Optimize blood pressure management keep systolic blood pressure less than 140 diastolic  under 90  Continue Norvasc 2.5 mg daily  Coreg 25 mg twice daily  Telmisartan 80 mg daily  Terazosin 4 mg twice daily  As needed labetalol         VTE prophylaxis: Lovenox    I have performed a physical exam and reviewed and updated ROS and Plan today (1/25/2025). In review of yesterday's note (1/24/2025), there are no changes except as documented above.    Total time:  51 minutes.  I spent greater than 50% of the time for patient care, counseling, and coordination on this date, including unit/floor time, and face-to-face time with the patient as per interval events and assessment and plan above

## 2025-01-25 NOTE — PROGRESS NOTES
4 Eyes Skin Assessment Completed by SUSHANT Hsieh and SUSHANT Gar.    Head WDL  Ears WDL  Nose WDL  Mouth WDL  Neck WDL  Breast/Chest WDL  Shoulder Blades ANNELISE, patient declines to turn d/t pain  Spine ANNELISE  (R) Arm/Elbow/Hand ANNELISE, patient declines to move arm d/t pain  (L) Arm/Elbow/Hand WDL  Abdomen WDL  Groin WDL  Scrotum/Coccyx/Buttocks ANNELISE, patient declines to turn d/t pain  (R) Leg Swelling  (L) Leg Swelling  (R) Heel/Foot/Toe Swelling,   (L) Heel/Foot/Toe Swelling          Devices In Places Pulse Ox, SCD's, and Nasal Cannula      Interventions In Place NC W/Ear Foams, Heel Mepilex, and Pillows    Possible Skin Injury No    Pictures Uploaded Into Epic N/A  Wound Consult Placed N/A  RN Wound Prevention Protocol Ordered No

## 2025-01-25 NOTE — OR NURSING
0751 Patient allergies and NPO status verified. Patient verbalizes understanding of pain scale, expected course of stay and plan of care. Surgical site verified with patient. IV assessed for patency, sequentials placed on BLE.

## 2025-01-25 NOTE — CONSULTS
CC: right hip fracture; right shoulder fracture    HPI:   89-year-old female who suffered a ground-level fall while cooking breakfast yesterday morning resulting in complex proximal humerus fracture and proximal femur fracture.  She walks with a walker at night, but otherwise does not use any assistive device.  Lives independently with her .  In significant pain this morning.  No major medical problems aside from listed below.    ROS: Positive for musculoskeletal pain and what is stated in the HPI and PMH, otherwise negative review of systems    PMH:   Past Medical History:   Diagnosis Date    CHI (closed head injury)     Elevated cortisol level     unknown etiology    Hypertension     medicated    Insomnia     Multiple thyroid nodules 2008    Thyrotoxicosis     history in 2013 / euthyroid 2015       PSH:   Past Surgical History:   Procedure Laterality Date    APPENDECTOMY      TONSILLECTOMY         Meds:   Medications Prior to Admission   Medication Sig Dispense Refill Last Dose/Taking    Ascorbic Acid (VITAMIN C PO) Take 1 Tablet by mouth every day.   1/23/2025 Noon    carvedilol (COREG) 25 MG Tab TAKE 1 TABLET BY MOUTH TWICE DAILY 200 Tablet 3 1/24/2025 Morning    amLODIPine (NORVASC) 2.5 MG Tab TAKE 1 TABLET BY MOUTH EVERY  Tablet 3 1/23/2025 Noon    terazosin (HYTRIN) 2 MG Cap TAKE 2 CAPSULES BY MOUTH TWICE DAILY (Patient taking differently: Take 4 mg by mouth 2 times a day. TAKE 2 CAPSULES BY MOUTH TWICE DAILY) 360 Capsule 1 1/24/2025 Morning    acetaminophen (TYLENOL) 325 MG Tab Take 325 mg by mouth every 6 hours as needed for Mild Pain.   1/23/2025    telmisartan (MICARDIS) 80 MG Tab TAKE 1 TABLET EVERY MORNING (Patient taking differently: Take 80 mg by mouth every day.) 90 Tablet 3 1/24/2025 Morning    VITAMIN D PO Take 1 Tablet by mouth every day.   1/23/2025 Noon    CALCIUM PO Take 1 Tablet by mouth every day.   1/23/2025 Noon       Allergies:   Allergies   Allergen Reactions     Amoxicillin      Unsure of reaction/or allergy    Hydralazine Unspecified     Pt reported severe hypotensive reaction    Morphine Vomiting and Nausea    Penicillins      1970 reaction       SH:   Social History     Socioeconomic History    Marital status:      Spouse name: Not on file    Number of children: Not on file    Years of education: Not on file    Highest education level: Not on file   Occupational History    Not on file   Tobacco Use    Smoking status: Never    Smokeless tobacco: Never   Vaping Use    Vaping status: Never Used   Substance and Sexual Activity    Alcohol use: No    Drug use: No    Sexual activity: Not Currently     Partners: Male   Other Topics Concern    Not on file   Social History Narrative    Not on file     Social Drivers of Health     Financial Resource Strain: Not on file   Food Insecurity: No Food Insecurity (1/24/2025)    Hunger Vital Sign     Worried About Running Out of Food in the Last Year: Never true     Ran Out of Food in the Last Year: Never true   Transportation Needs: No Transportation Needs (1/24/2025)    PRAPARE - Transportation     Lack of Transportation (Medical): No     Lack of Transportation (Non-Medical): No   Physical Activity: Not on file   Stress: Not on file   Social Connections: Not on file   Intimate Partner Violence: Not At Risk (1/24/2025)    Humiliation, Afraid, Rape, and Kick questionnaire     Fear of Current or Ex-Partner: No     Emotionally Abused: No     Physically Abused: No     Sexually Abused: No   Housing Stability: Unknown (1/24/2025)    Housing Stability Vital Sign     Unable to Pay for Housing in the Last Year: No     Number of Times Moved in the Last Year: Not on file     Homeless in the Last Year: No       FH:   Family History   Problem Relation Age of Onset    Heart Disease Mother     Heart Disease Father     Hypertension Sister     Arthritis Sister     Thyroid Sister        Physical Exam:   General: AO x4  Eyes: non-icteric  Breathing:  nonlabored  MSK:   Significant pain over her shoulder and hip.  She is able to fire thumbs up, A-OK, cross fingers, spread fingers and make a tight fist.  Intact sensation in hand, grossly.  Fires EHL, FHL, tib ant gastrocsoleus.  Intact sensation grossly in foot.    Imaging:   X-rays of the hip demonstrate complex proximal femur fracture; x-rays of the shoulder demonstrate complex proximal humerus fracture    Assessment/Plan:   89-year-old female who suffered a ground-level fall resulting in complex right proximal humerus fracture and right proximal femur fracture.  Discussed treatment options including operative versus nonoperative management.  We discussed treatment options of her hip which include intramedullary nail.  Risks of surgery were discussed at length which include but are not limited to nonunion, malunion, leg shortening, pain, bleeding, nerve damage, risks of anesthesia including but not limited to stroke heart attack and death.  Patient was to proceed with surgery and consent was signed.      Leila Cifuentes M.D.

## 2025-01-25 NOTE — CARE PLAN
The patient is Stable - Low risk of patient condition declining or worsening    Shift Goals  Clinical Goals: Patient will manage pain to tolerable level, tolerate NPO diet, and monitor respirations during shift  Patient Goals: Pain control  Family Goals: ANNELISE    Progress made toward(s) clinical / shift goals: Patient reported 10/10 pain around one hour after PRN IV fentanyl given. NOC hospitalist notified of situation. New orders received for PRN IV dilaudid. Patient tolerated medication and managed pain throughout shift. Patient tolerated NPO diet. Respirations monitored/documented.     Patient is not progressing towards the following goals: N/A

## 2025-01-25 NOTE — ANESTHESIA PROCEDURE NOTES
Airway    Date/Time: 1/25/2025 8:19 AM    Performed by: Tobey Gansert, M.D.  Authorized by: Tobey Gansert, M.D.    Location:  OR  Urgency:  Elective  Indications for Airway Management:  Anesthesia      Spontaneous Ventilation: absent    Sedation Level:  Deep  Preoxygenated: Yes    Patient Position:  Sniffing  Final Airway Type:  Endotracheal airway  Final Endotracheal Airway:  ETT  Cuffed: Yes    Technique Used for Successful ETT Placement:  Direct laryngoscopy    Insertion Site:  Oral  Blade Type:  Glide  Laryngoscope Blade/Videolaryngoscope Blade Size:  3  ETT Size (mm):  7.0  Measured from:  Teeth  ETT to Teeth (cm):  21  Placement Verified by: auscultation and capnometry    Cormack-Lehane Classification:  Grade I - full view of glottis  Number of Attempts at Approach:  1

## 2025-01-25 NOTE — ANESTHESIA TIME REPORT
Anesthesia Start and Stop Event Times       Date Time Event    1/25/2025 0740 Ready for Procedure     0814 Anesthesia Start     1138 Anesthesia Stop          Responsible Staff  01/25/25      Name Role Begin End    Tobey Gansert, M.D. Anesth 0814 1138          Overtime Reason:  no overtime (within assigned shift)    Comments:

## 2025-01-25 NOTE — PROGRESS NOTES
Bedside report received from SUSHANT Parker. Patient resting in bed. Call bell within reach, bed alarm on and in place. All belongings within reach for patient. No further needs at this time.

## 2025-01-25 NOTE — CARE PLAN
The patient is Watcher - Medium risk of patient condition declining or worsening    Shift Goals  Clinical Goals: Patient will remain free from fall, pain level 3/10  Patient Goals: pain control  Family Goals: ANNELISE    Progress made toward(s) clinical / shift goals:  Patient has pain more on to the right eye, patient had Right shoulder arthroplasty and right hip jose miguel insertion.Pending PT/OT evaluation    Patient is not progressing towards the following goals:    Problem: Pain - Standard  Goal: Alleviation of pain or a reduction in pain to the patient’s comfort goal  Outcome: Progressing     Problem: Knowledge Deficit - Standard  Goal: Patient and family/care givers will demonstrate understanding of plan of care, disease process/condition, diagnostic tests and medications  Outcome: Progressing     Problem: Skin Integrity  Goal: Skin integrity is maintained or improved  Outcome: Progressing     Problem: Fall Risk  Goal: Patient will remain free from falls  Outcome: Progressing     Problem: Psychosocial  Goal: Patient's level of anxiety will decrease  Outcome: Progressing  Goal: Patient's ability to verbalize feelings about condition will improve  Outcome: Progressing  Goal: Patient's ability to re-evaluate and adapt role responsibilities will improve  Outcome: Progressing  Goal: Patient and family will demonstrate ability to cope with life altering diagnosis and/or procedure  Outcome: Progressing     Problem: Communication  Goal: The ability to communicate needs accurately and effectively will improve  Outcome: Progressing     Problem: Discharge Barriers/Planning  Goal: Patient's continuum of care needs are met  Outcome: Progressing     Problem: Hemodynamics  Goal: Patient's hemodynamics, fluid balance and neurologic status will be stable or improve  Outcome: Progressing     Problem: Respiratory  Goal: Patient will achieve/maintain optimum respiratory ventilation and gas exchange  Outcome: Progressing     Problem:  Dysphagia  Goal: Dysphagia will improve  Outcome: Progressing     Problem: Risk for Aspiration  Goal: Patient's risk for aspiration will be absent or decrease  Outcome: Progressing     Problem: Nutrition  Goal: Patient's nutritional and fluid intake will be adequate or improve  Outcome: Progressing     Problem: Urinary Elimination  Goal: Establish and maintain regular urinary output  Outcome: Progressing     Problem: Bowel Elimination  Goal: Establish and maintain regular bowel function  Outcome: Progressing     Problem: Mobility  Goal: Patient's capacity to carry out activities will improve  Outcome: Progressing     Problem: Self Care  Goal: Patient will have the ability to perform ADLs independently or with assistance (bathe, groom, dress, toilet and feed)  Outcome: Progressing     Problem: Infection - Standard  Goal: Patient will remain free from infection  Outcome: Progressing     Problem: Wound/ / Incision Healing  Goal: Patient's wound/surgical incision will decrease in size and heals properly  Outcome: Progressing     Problem: Post-Operative Shoulder Replacement  Goal: Patient will achieve optimal shoulder replacement post-surgical outcomes  Outcome: Progressing  Goal: Patient's neurovascular status will be maintained or improve  Outcome: Progressing  Goal: Early mobilization post surgery  Outcome: Progressing  Goal: Proper fit of orthotic device will be assessed and maintained  Outcome: Progressing     Problem: Pain - Post Surgery  Goal: Alleviation or reduction of pain post surgery  Outcome: Progressing     Problem: Incision Care  Goal: Optimal post surgical incision care  Outcome: Progressing     Problem: Respiratory/Oxygenation Function Post-Surgical  Goal: Patient will achieve/maintain normal respiratory rate/effort  Outcome: Progressing     Problem: Risk for Post Op Fluid Imbalance  Goal: Promotion of fluid balance  Outcome: Progressing     Problem: Venous Thromboembolism (VTE) Prevention  Goal: The  patient will remain free from venous thromboembolism (VTE)  Outcome: Progressing

## 2025-01-25 NOTE — OR NURSING
"1137: Patient arrived from OR via gurney.  Right hip dressing x2 and right shoulder dressing x1 CDI; pedal pulse +2; cap refill to right fingers less than 3. Cold pack x2 placed. +RUE block with immobilizer. Keenan patent to gravity draining clear yellow urine, stat lock secured to left leg.      Sedation/Resp Status: Non responsive to verbal with OPA in place. Respirations spontaneous and non-labored.    HR 50s SB; VSS on 6L 02 via mask.     1150: Cont stable with OPA in place. Bear Paws applied for rewarming.     1155: OPA out for return of spontaneous eye opening/gag reflex - respirations continue spontaneous and non-labored. 02 to 10L via mask for ERAS protocol.     1210: Cont stable, no changes to dressing/surgical sites x3. Keenan patent to gravity.     1220: Warm blanket placed on patient upper body/around head to help with warming. No changes to surgical site. Cont drowsy, responds to voice, but does not report pain. Mostly mumbles \"I don't know.\"    1230: Imaging at bedside. All surgical dressings cont CDI, no changes. Patient cont drowsy, moaning but unable to open her eyes completely.    1245: Imaging complete. Patient able to swallow sips of water, PO and IV pain meds per mar for consistent grimacing/moaning. Patient also mentioning her right eye painful. Distal right hip dressing has small shadow drainage, size of a quarter, proximal dressing cont CDI. Right shoulder dressing cont CDI. Keenan patent to gravity after imaging repositioning. Cont Bear Paws for warming, temp trending upwards.     1300: Update Dr Gansert re: right eye pain, no abrasions, irritation, or redness to skin around eye or eyelid noted. No changes to surgical site x3.     1315: Eye drops per mar per Dr Gansert for right eye pain.     1322: Med hold complete. Patient now resting with out grimace/moaning, calm. Report called to Devi CANCHOLA - cont Bear Paw for warming. VSS on 10L 02 via mask. Cont to tolerate sips of clears in very small " amounts. No changes to dressing/surgical site x3. Meets criteria to transfer to floor room for cont care.

## 2025-01-25 NOTE — PROGRESS NOTES
Patient off to floor for surgery.  at bedside    1410- patient back to floor,on 10 Liters via oxy mask. Patient is alert x 3,disoriented to situation. Patient has dressing to right shoulder with immobilizer on and dressing to right hip,dry and intact with old scant drainage. Patient has stern catheter in place,draining clear delaney urine.Reports some burning pain to right eye,received eyedrops in recovery. Right ye cleaned with Saline, per patient,it helps.  Fall precautions observe.

## 2025-01-25 NOTE — ANESTHESIA POSTPROCEDURE EVALUATION
Patient: Prabha Lizama    Procedure Summary       Date: 01/25/25 Room / Location:  OR  / SURGERY [facility]    Anesthesia Start: 0814 Anesthesia Stop: 1138    Procedures:       INSERTION, INTRAMEDULLARY SAM, FEMUR, PROXIMAL (Right: Hip)      ARTHROPLASTY, SHOULDER - REVERSE, TOTAL; ROTATOR CUFF REPAIR (Right: Shoulder) Diagnosis: (complex proximal humerus fracture; proximal femur fracture)    Surgeons: Leila Cifuentes M.D. Responsible Provider: Tobey Gansert, M.D.    Anesthesia Type: general, peripheral nerve block ASA Status: 2            Final Anesthesia Type: general, peripheral nerve block  Last vitals  BP   Blood Pressure : 115/60    Temp   (!) 35.6 °C (96.1 °F)    Pulse   75   Resp   14    SpO2   99 %      Anesthesia Post Evaluation    Patient location during evaluation: PACU  Patient participation: complete - patient participated  Level of consciousness: awake and alert    Airway patency: patent  Anesthetic complications: no  Cardiovascular status: hemodynamically stable  Respiratory status: acceptable  Hydration status: euvolemic    PONV: none          There were no known notable events for this encounter.     5

## 2025-01-25 NOTE — ANESTHESIA PROCEDURE NOTES
Peripheral Block    Date/Time: 1/25/2025 9:48 AM    Performed by: Tobey Gansert, M.D.  Authorized by: Tobey Gansert, M.D.    Patient Location:  OR  Start Time:  1/25/2025 9:48 AM  End Time:  1/25/2025 9:54 AM  Reason for Block: at surgeon's request and post-op pain management ONLY    patient identified, IV checked, site marked, risks and benefits discussed, surgical consent, monitors and equipment checked, pre-op evaluation and timeout performed    Patient Position:  Supine  Prep: ChloraPrep    Monitoring:  Heart rate, continuous pulse ox and cardiac monitor  Block Region:  Upper Extremity  Upper Extremity - Block Type:  BRACHIAL PLEXUS block, Interscalene approach    Laterality:  Right  Procedures: ultrasound guided  Image captured, interpreted and electronically stored.  Local Infiltration:  Lidocaine  Strength:  1 %  Dose:  3 ml  Block Type:  Single-shot  Needle Length:  50mm  Needle Gauge:  22 G  Needle Localization:  Ultrasound guidance  Ultrasound picture in chart  Injection Assessment:  Negative aspiration for heme, no paresthesia on injection, incremental injection and local visualized surrounding nerve on ultrasound  Evidence of intravascular injection: No     US Guided Interscalene Brachial Plexus Block   US transducer placed on the neck in oblique plane approximately at the level of C6.  Anterior and Middle Scalene (MSM) muscles identified with nerve trunks identified between the muscles.  Needle inserted lateral to probe and advanced under direct visualization through the MSM into a perineural position.  After negative aspiration, LA injected with ease and visualized surrounding the nerve trunks.

## 2025-01-25 NOTE — OP REPORT
Referring Provider: No ref. provider found    PCP:  LIT Pascal    Name:  Prabha Lizama    MRN: [id number]    DATE OF SURGERY: 01/25/25    SURGEON: Leila Cifuentes MD    PREOPERATIVE DIAGNOSIS:  complex proximal humerus fracture; proximal femur fracture     POSTOPERATIVE DIAGNOSIS:  complex proximal humerus fracture; proximal femur fracture    PROCEDURE(s) PERFORMED:  Procedure(s):  Right - INSERTION, INTRAMEDULLARY SAM, FEMUR, PROXIMAL - Wound Class: Clean - Incision Closure: Deep and Superficial Layers  Right - ARTHROPLASTY, SHOULDER, TOTAL - Wound Class: Clean     Assistance:  Circulator: Brandie López  Scrub Person: Nathanael Wayne  1st assist: Harinder Syed    Assistants were mandatory to provide adequate exposure for accurate implantation of prosthetic components, while protecting vital neurovascular structures    ANESTHESIOLOGIST:  Anesthesiologist: Tobey Gansert, M.D.    ANESTHESIA: General    ESTIMATED BLOOD LOSS: 200 cc    ANTIBIOTICS: 2 gm of Ancef before incision    COMPLICATIONS: * No complications entered in OR log *    IMPLANTS:   Paula  Size 11x400 mm Gamma nail  95 mm lag screw  42 mm distal locking screw    INDICATION FOR PROCEDURE:  Prabha Lizama is a 89 y.o.  female  who presents with the above hip fracture. After risks and benefits of surgery were reviewed with the patient and their family, they are in agreement to proceed with the above procedure.  Medical optimization was performed by our general medicine team prior to surgery.       Procedure in Detail:   After reviewing informed consent, the patient was transported to the operating room. Anesthesia was performed without difficulty by the anesthesiology team.  The patient was then transferred to the fracture table, the patients feet were wrapped with Coban over the socks and placed in the fracture table boots which were securely affixed to the traction apparatus.  A well padded peroneal post was  utilized.  The fluoroscopic C-arm was positioned.  The operative lower extremity was then manipulated until the fracture was grossly reduced on fluoroscopic images. Unfortunately, we were not able to accomplish closed reduction.  The operative lower extremity was then surgically prepared and draped in the usual fashion. Perioperative antibiotics were administered.  A formal time-out was performed identifying the correct patient, the correct site and the correct procedure.      Using a guide wire and the fluoroscopic C-arm, an incision for introducing the guidewire was planned.  This incision, 2-3 finger breadths immediately superior to the greater trochanter was then made and dissection was carried sharply through the fascia bowen.  The interval between gluteus medius was developed digitally.   The guidewire was placed against the tip of the greater trochanter.  Fluoroscopy was used to determine the ideal starting point in orthogonal views.  The guide wire was then advanced using a drill into the proximal femur to the level of the lesser trochanter.  Once the guidewire was determined to be well positioned fluoroscopically, the greater trochanter was opened using the entry reamer.  Open reduction was required and thus a lateral incision was made and reduction clamp was used to reduce the fracture prior to completing the nail.    A ball-tipped guide jose miguel was introduced into the proximal femur, advanced across the fracture site, and advanced through the distal segment to the distal femoral physeal scar. This was confirmed with orthogonal imaging.  The distance was measured using the measuring device and the appropriate size length was selected. Sequential reaming by 0.5 mm was then carried up to 1 mm beyond our desired diameter.  The nail was advanced over the guidewire.  The guidewire was then removed after nail placement was confirmed with fluoroscopic imaging.      Once the nail was advanced to the desired depth, the  guide for the lag screw was assembled and placed in an appropriate location based on fluoroscopic images.  A guidewire was advanced into the femoral head under fluoroscopic guidance in orthogonal views.  Once the tip of the guidewire was well centered and deeply advanced, advancement of the guidewire was stopped in the desired depth.  The measuring device was used to determine the lag screw length.  A cannulated drill bit was then used to drill over the guidewire.  Fluoroscopic imaging was utilized to assure the guide wire did not penetrate the hip joint.  The lag screw was then advanced in the usual fashion.  The nail was then secured, locked as a fixed angle construct and the nail placement and head screw apparatuses were removed.     Using the perfect circles technique, an incision was made in the lateral distal thigh and a hole was drilled in the bone corresponding to the distal transverse interlocking hole.  After measuring with a depth gauge, an interlocking screw was selected and placed.  Final fluoroscopic images were obtained.  All wounds were then copiously irrigated with sterile saline.     The subcutaneous layer was closed with 2-0 monocryl.  Skin was closed with dermabond.  Sterile dressings were applied.  All counts were correct at the end of the case and I was present for all critical portions of the case.  Patient was not awoken as she was transported to the operative table for shoulder procedure with Dr. Syed.    Disposition  The patient may WBAT.  No restrictions with regards to range of motion are needed.  PT/OT may start immediately.  The patient may shower and let water run over the incision immediately.  Do not scrub incision.  No lotions or ointments on incision.  Do not submerge incision underwater.  DVT prophylaxis may begin on POD #1, I will discuss this with our internal medicine team.  I will set up an appointment in my clinic, please refer to the follow-up section in the discharge  summary for this information

## 2025-01-25 NOTE — ANESTHESIA PREPROCEDURE EVALUATION
Case: [id number] Date/Time: 01/25/25 0745    Procedures:       INSERTION, INTRAMEDULLARY SAM, FEMUR, PROXIMAL (Right)      ARTHROPLASTY, SHOULDER, TOTAL (Right)    Location:  OR 02 / SURGERY [facility]    Surgeons: Leila Cifuentes M.D.            Relevant Problems   CARDIAC   (positive) Atherosclerosis   (positive) Primary hypertension       Physical Exam    Airway   Mallampati: II  TM distance: >3 FB  Neck ROM: full       Cardiovascular - normal exam  Rhythm: regular  Rate: normal  (-) murmur     Dental - normal exam           Pulmonary - normal exam  Breath sounds clear to auscultation     Abdominal    Neurological - normal exam                   Anesthesia Plan    ASA 2       Plan - general and peripheral nerve block     Peripheral nerve block will be post-op pain control  Airway plan will be ETT          Induction: intravenous    Postoperative Plan: Postoperative administration of opioids is intended.    Pertinent diagnostic labs and testing reviewed    Informed Consent:    Anesthetic plan and risks discussed with patient.    Use of blood products discussed with: patient whom consented to blood products.

## 2025-01-25 NOTE — OP REPORT
OPERATIVE NOTE     DATE OF PROCEDURE: 1/25/2025           PRE-OP DIAGNOSIS:  1.  Right shoulder proximal humerus fracture dislocation           POST-OP DIAGNOSIS:   1.  Right shoulder proximal humerus fracture dislocation  2.  Right shoulder rotator cuff tear           PROCEDURE:   Right reverse shoulder arthroplasty for fracture  Right shoulder open biceps tenodesis  Right shoulder open rotator cuff repair           SURGEON: Harinder Syed M.D. - Primary           ASSISTANT: Leila Cifuentes MD    Physician assist was required for critical portions of the case including patient positioning manipulation possibly graft preparation retraction suture management wound closure as well as other critical portions of the case.  I be unable to perform these portion of the case by myself there were no other certified first assist available    ANESTHESIA: General Peripheral nerve block           ESTIMATED BLOOD LOSS: 100 cc                  SPECIMENS: None           COMPLICATIONS: None           CONDITION: Stable           OPERATIVE INDICATIONS AND DESCRIPTION OF PROCEDURE:     Implants:  25 mm +3 lateralized baseplate  6.5 x 30 mm screw  36 glenosphere  13 mm proximal body  13 x 90 partially coated stem  0 low offset tray  36+9 poly    I met the patient in the preoperative holding area.  I had a full discussion with them regarding multiple options for the patient's condition including nonoperative management.  I did discuss sedated proximal humerus fracture dislocation patient is 89 years old.  Again today I offered them nonoperative treatment modalities.  Regarding operative options I specifically I outlined right reverse total shoulder arthroplasty for fracture other repairs as indicated. I discussed some possible complications including bleeding possibly requiring transfusion, infection, neurovascular damage, malunion, nonunion, failure of implants, failure of surgery, chronic pain, need for revision surgery, instability,  limb length discrepancy, prolonged rehab, weight bearing restrictions, DVT, PE, MI, stroke and death. After going over risks and benefits making no promises either guaranteed or implied patient elected to proceed.  At this point informed consent was signed.  A surgical marking pen was used to place my initials on the patient's right shoulder.    Patient was brought back to the operating room.  They were placed semiupright on a beachchair table all bony prominences padded very well to prevent neuropraxia's.  They were secured to the table with a strap.  General anesthesia was introduced.  A Trimano arm positioner was used for for positioning.  Right upper extremity was prepped in sterile standard fashion.  At this point a timeout was performed with all parties in the room ceasing activity. Informed consent sheet was visible confirming the patient's name date of birth MRN and matched their wristband. Antibiotics and TXA were confirmed. The right shoulder was confirmed as the correct surgical site.  My surgical initials were visible on this extremity.  At this point we were cleared to proceed with the procedure.    Began by utilizing deltopectoral incision approximately 7 cm nature.  Bovie cautery was maintain hemostasis.  Immediately encountered fracture hematoma.  I was able to expose the head which was posteriorly subluxed with significant translation of the humeral shaft.  Once I exposed the head I did an osteotomy of the lesser tuberosity.  High-strength nonoperable was warble sutures were placed in the lesser.  I then tagged our supraspinatus tendon and there is a fracture of the greater tuberosity.  I completed this with an osteotome.  I now had control of both the lesser greater tuberosities.  The head was removed.  At this point identified a biceps tendon tenodesis into the pec tendon.    I turned my attention to the glenoid.  Following the biceps I did a 360 degree release of the labrum.  Using Peralta to remove  the chondral surface placed a 25 mm baseplate guide pinned low along the glenoid reamed drilled tapped and secured our baseplate.  Peripheral screws to anterior and posterior cortical screws and then superior-inferior locking screws were placed this had good fixation in the bone.  36 glenosphere was secured.  Turned my attention back to the humerus    With humerus exposed we broached up to a size 13 stem which had good length and alignment with 20 degrees of retroversion.  Placed our final stem trialed with our above sized tray and poly.  This had good tension on the strap tendons stable construct.  Removed our trial tray and poly.  At this point placed our final implants secured it to the stem.  I then passed through the stem our high-strength sutures of the lesser and greater tuberosities.  I used some bone from the humeral head to bone graft around the stem.  In a cerclage fashion these were tied.  It demonstrated complete coverage with anatomic reduction of the tuberosities.  Other high-strength sutures were used in the interval between the supra and Scap subscapularis.  Levi of the sutures were cut    Turned my attention rotator cuff repair.  There was a rotator cuff repair linear split between the infra and supraspinatus tendons.  I this for fully visualized.  Using multiple high-strength nonabsorbable sutures figure-of-eight sutures were passed in independent fashion.  These were all tied with sliding locking knots.  The rotator cuff tear was reduced in anatomic fashion.  Free ends of the sutures were cut    This point we did a Betadine soak for 5 minutes.  Copious irrigation was used.  A gram of Vanco was placed.  Wound was closed interrupted layered fashion sterile bandage were placed patient was placed in a shoulder abduction orthosis woken from general anesthesia taken PACU in stable condition    POSTOPERATIVE PLAN:  Weight-bearing: Nonweightbearing, sling at all times unless showering  Antibiotics:  Ancef 24 hours postop  Showering: Okay to shower postop day 7, no lotions no scrubbing's, place new dressing.  No tub soaks baths or swimming  Prescriptions: have been E prescribed  Follow-up:  in 2 weeks.  Call with any questions or concerns patient as well as his significant other have been counseled on signs of infection and to call immediately if they develop any of these  Physical Therapy: Begin in 2 weeks    Did call the patient's  Phil and left a message to discuss the surgery with them.      This is dictated with voice recognition software please excuse any errors

## 2025-01-25 NOTE — CARE PLAN
The patient is Stable - Low risk of patient condition declining or worsening    Shift Goals  Clinical Goals: manage pain  Patient Goals: Help my painm    Progress made toward(s) clinical / shift goals:    none    Patient is not progressing towards the following goals:      Problem: Pain - Standard  Goal: Alleviation of pain or a reduction in pain to the patient’s comfort goal  Outcome: Not Progressing

## 2025-01-25 NOTE — HOSPITAL COURSE
History of hypertension, diet controlled hyperlipidemia and anxiety admitted after ground-level fall with right shoulder and right hip pain found to have a right humerus and right femoral neck fracture.  She was admitted for operative repair, perioperative management and rehabilitation.

## 2025-01-26 LAB
ALBUMIN SERPL BCP-MCNC: 2.6 G/DL (ref 3.2–4.9)
BUN SERPL-MCNC: 26 MG/DL (ref 8–22)
CALCIUM ALBUM COR SERPL-MCNC: 8.7 MG/DL (ref 8.5–10.5)
CALCIUM SERPL-MCNC: 7.6 MG/DL (ref 8.4–10.2)
CHLORIDE SERPL-SCNC: 108 MMOL/L (ref 96–112)
CO2 SERPL-SCNC: 20 MMOL/L (ref 20–33)
CREAT SERPL-MCNC: 1.08 MG/DL (ref 0.5–1.4)
ERYTHROCYTE [DISTWIDTH] IN BLOOD BY AUTOMATED COUNT: 47.8 FL (ref 35.9–50)
GFR SERPLBLD CREATININE-BSD FMLA CKD-EPI: 50 ML/MIN/1.73 M 2
GLUCOSE SERPL-MCNC: 112 MG/DL (ref 65–99)
HCT VFR BLD AUTO: 28.1 % (ref 37–47)
HGB BLD-MCNC: 9.1 G/DL (ref 12–16)
HGB BLD-MCNC: 9.2 G/DL (ref 12–16)
MAGNESIUM SERPL-MCNC: 2 MG/DL (ref 1.5–2.5)
MCH RBC QN AUTO: 32.9 PG (ref 27–33)
MCHC RBC AUTO-ENTMCNC: 32.7 G/DL (ref 32.2–35.5)
MCV RBC AUTO: 100.4 FL (ref 81.4–97.8)
PHOSPHATE SERPL-MCNC: 4.8 MG/DL (ref 2.5–4.5)
PLATELET # BLD AUTO: 173 K/UL (ref 164–446)
PMV BLD AUTO: 11.3 FL (ref 9–12.9)
POTASSIUM SERPL-SCNC: 4.4 MMOL/L (ref 3.6–5.5)
RBC # BLD AUTO: 2.8 M/UL (ref 4.2–5.4)
SODIUM SERPL-SCNC: 137 MMOL/L (ref 135–145)
WBC # BLD AUTO: 8.8 K/UL (ref 4.8–10.8)

## 2025-01-26 PROCEDURE — 80069 RENAL FUNCTION PANEL: CPT

## 2025-01-26 PROCEDURE — A9270 NON-COVERED ITEM OR SERVICE: HCPCS | Performed by: HOSPITALIST

## 2025-01-26 PROCEDURE — 700105 HCHG RX REV CODE 258: Performed by: HOSPITALIST

## 2025-01-26 PROCEDURE — A9270 NON-COVERED ITEM OR SERVICE: HCPCS | Performed by: INTERNAL MEDICINE

## 2025-01-26 PROCEDURE — 94760 N-INVAS EAR/PLS OXIMETRY 1: CPT

## 2025-01-26 PROCEDURE — 97163 PT EVAL HIGH COMPLEX 45 MIN: CPT

## 2025-01-26 PROCEDURE — A9270 NON-COVERED ITEM OR SERVICE: HCPCS | Performed by: STUDENT IN AN ORGANIZED HEALTH CARE EDUCATION/TRAINING PROGRAM

## 2025-01-26 PROCEDURE — 97167 OT EVAL HIGH COMPLEX 60 MIN: CPT

## 2025-01-26 PROCEDURE — 700102 HCHG RX REV CODE 250 W/ 637 OVERRIDE(OP): Performed by: STUDENT IN AN ORGANIZED HEALTH CARE EDUCATION/TRAINING PROGRAM

## 2025-01-26 PROCEDURE — 85027 COMPLETE CBC AUTOMATED: CPT

## 2025-01-26 PROCEDURE — 36415 COLL VENOUS BLD VENIPUNCTURE: CPT

## 2025-01-26 PROCEDURE — 700111 HCHG RX REV CODE 636 W/ 250 OVERRIDE (IP): Mod: JZ | Performed by: HOSPITALIST

## 2025-01-26 PROCEDURE — 700102 HCHG RX REV CODE 250 W/ 637 OVERRIDE(OP): Performed by: INTERNAL MEDICINE

## 2025-01-26 PROCEDURE — 770001 HCHG ROOM/CARE - MED/SURG/GYN PRIV*

## 2025-01-26 PROCEDURE — 83735 ASSAY OF MAGNESIUM: CPT

## 2025-01-26 PROCEDURE — 99233 SBSQ HOSP IP/OBS HIGH 50: CPT | Performed by: INTERNAL MEDICINE

## 2025-01-26 PROCEDURE — 97535 SELF CARE MNGMENT TRAINING: CPT

## 2025-01-26 PROCEDURE — 85018 HEMOGLOBIN: CPT | Mod: 91

## 2025-01-26 PROCEDURE — 700102 HCHG RX REV CODE 250 W/ 637 OVERRIDE(OP): Performed by: HOSPITALIST

## 2025-01-26 PROCEDURE — 51798 US URINE CAPACITY MEASURE: CPT

## 2025-01-26 RX ORDER — SODIUM CHLORIDE 9 MG/ML
1000 INJECTION, SOLUTION INTRAVENOUS ONCE
Status: COMPLETED | OUTPATIENT
Start: 2025-01-26 | End: 2025-01-26

## 2025-01-26 RX ADMIN — CARVEDILOL 25 MG: 25 TABLET, FILM COATED ORAL at 17:31

## 2025-01-26 RX ADMIN — HYDROMORPHONE HYDROCHLORIDE 1 MG: 1 INJECTION, SOLUTION INTRAMUSCULAR; INTRAVENOUS; SUBCUTANEOUS at 21:06

## 2025-01-26 RX ADMIN — HYDROMORPHONE HYDROCHLORIDE 1 MG: 1 INJECTION, SOLUTION INTRAMUSCULAR; INTRAVENOUS; SUBCUTANEOUS at 14:19

## 2025-01-26 RX ADMIN — DOCUSATE SODIUM 100 MG: 100 CAPSULE, LIQUID FILLED ORAL at 08:57

## 2025-01-26 RX ADMIN — HYDROMORPHONE HYDROCHLORIDE 1 MG: 1 INJECTION, SOLUTION INTRAMUSCULAR; INTRAVENOUS; SUBCUTANEOUS at 07:45

## 2025-01-26 RX ADMIN — TERAZOSIN HYDROCHLORIDE 4 MG: 1 CAPSULE ORAL at 17:31

## 2025-01-26 RX ADMIN — Medication 1000 UNITS: at 08:57

## 2025-01-26 RX ADMIN — TERAZOSIN HYDROCHLORIDE 4 MG: 1 CAPSULE ORAL at 08:58

## 2025-01-26 RX ADMIN — SODIUM CHLORIDE 1000 ML: 9 INJECTION, SOLUTION INTRAVENOUS at 06:23

## 2025-01-26 RX ADMIN — HYDROMORPHONE HYDROCHLORIDE 1 MG: 1 INJECTION, SOLUTION INTRAMUSCULAR; INTRAVENOUS; SUBCUTANEOUS at 00:36

## 2025-01-26 RX ADMIN — SENNOSIDES AND DOCUSATE SODIUM 2 TABLET: 50; 8.6 TABLET ORAL at 17:31

## 2025-01-26 RX ADMIN — DOCUSATE SODIUM 100 MG: 100 CAPSULE, LIQUID FILLED ORAL at 17:32

## 2025-01-26 ASSESSMENT — ENCOUNTER SYMPTOMS
HEADACHES: 0
MYALGIAS: 1
BLOOD IN STOOL: 0
PALPITATIONS: 0
FOCAL WEAKNESS: 0
HALLUCINATIONS: 0
DIARRHEA: 0
FALLS: 1
NAUSEA: 0
VOMITING: 0
HEARTBURN: 0
FEVER: 0
SHORTNESS OF BREATH: 0
MEMORY LOSS: 1
ABDOMINAL PAIN: 0
WEAKNESS: 1
DIZZINESS: 0
SORE THROAT: 0
BACK PAIN: 0
DEPRESSION: 0
CHILLS: 0
NERVOUS/ANXIOUS: 1
COUGH: 0

## 2025-01-26 ASSESSMENT — PAIN DESCRIPTION - PAIN TYPE
TYPE: SURGICAL PAIN

## 2025-01-26 ASSESSMENT — ACTIVITIES OF DAILY LIVING (ADL): TOILETING: INDEPENDENT

## 2025-01-26 ASSESSMENT — COGNITIVE AND FUNCTIONAL STATUS - GENERAL
SUGGESTED CMS G CODE MODIFIER DAILY ACTIVITY: CL
CLIMB 3 TO 5 STEPS WITH RAILING: TOTAL
SUGGESTED CMS G CODE MODIFIER MOBILITY: CN
TURNING FROM BACK TO SIDE WHILE IN FLAT BAD: TOTAL
DRESSING REGULAR LOWER BODY CLOTHING: TOTAL
HELP NEEDED FOR BATHING: TOTAL
MOVING TO AND FROM BED TO CHAIR: TOTAL
DRESSING REGULAR UPPER BODY CLOTHING: TOTAL
TOILETING: TOTAL
MOVING FROM LYING ON BACK TO SITTING ON SIDE OF FLAT BED: TOTAL
PERSONAL GROOMING: A LOT
STANDING UP FROM CHAIR USING ARMS: TOTAL
DAILY ACTIVITIY SCORE: 9
EATING MEALS: A LITTLE
WALKING IN HOSPITAL ROOM: TOTAL
MOBILITY SCORE: 6

## 2025-01-26 ASSESSMENT — GAIT ASSESSMENTS: GAIT LEVEL OF ASSIST: UNABLE TO PARTICIPATE

## 2025-01-26 ASSESSMENT — SOCIAL DETERMINANTS OF HEALTH (SDOH)
WITHIN THE PAST 12 MONTHS, YOU WORRIED THAT YOUR FOOD WOULD RUN OUT BEFORE YOU GOT THE MONEY TO BUY MORE: NEVER TRUE
IN THE PAST 12 MONTHS, HAS THE ELECTRIC, GAS, OIL, OR WATER COMPANY THREATENED TO SHUT OFF SERVICE IN YOUR HOME?: NO
WITHIN THE PAST 12 MONTHS, THE FOOD YOU BOUGHT JUST DIDN'T LAST AND YOU DIDN'T HAVE MONEY TO GET MORE: NEVER TRUE

## 2025-01-26 NOTE — PROGRESS NOTES
4 Eyes Skin Assessment Completed by SUSHANT Quinonez and SUSHANT Parker.    Head WDL  Ears WDL  Nose WDL  Mouth WDL  Neck WDL  Breast/Chest WDL  Shoulder Blades bruising,swelling,post surgical incision with dressing to right shoulder  Spine WDL  (R) Arm/Elbow/Hand swelling,bruising  (L) Arm/Elbow/Hand bruising  Abdomen bruising  Groin WDL  Scrotum/Coccyx/Buttocks Redness and Blanching  (R) Leg Bruising and Swelling to right hip  (L) Leg Swelling  (R) Heel/Foot/Toe Swelling  (L) Heel/Foot/Toe Swelling          Devices In Places Blood Pressure Cuff, Pulse Ox, SCD's, and Nasal Cannula,female wick, immobilizer      Interventions In Place NC W/Ear Foams, Heel Mepilex, TAP System, Pillows, Q2 Turns, Low Air Loss Mattress, Barrier Cream, and Heels Loaded W/Pillows    Possible Skin Injury No    Pictures Uploaded Into Epic N/A  Wound Consult Placed N/A  RN Wound Prevention Protocol Ordered Yes

## 2025-01-26 NOTE — PROGRESS NOTES
Notified Dr Jacobson patient's blood pressures have been soft this shift at 80 to low 100's systolic. Hgb dropped to 9.2 from 12.1 post-surgically. GFR 55, BUN 26. Urine output 100 mL this shift with stern in place.   Orders for 1L NS bolus received.

## 2025-01-26 NOTE — PROGRESS NOTES
Surgery: Right hip IM nail, right reverse total shoulder  Date of surgery: 1/26/2025    Subjective: Status post above surgery.  Awake and alert this morning.  Has not been up with physical therapy.    Objective:   General: Awake, oriented x 2  MSK: Dressing is clean and dry at shoulder and hip.  Fires EHL, FHL, tib ant gastrocsoleus.  Intact sensation grossly dorsal and plantar foot.  Able to demonstrate thumbs up, A-OK, cross fingers and spread fingers.          Labs:   Lab Results   Component Value Date/Time    WBC 8.8 01/26/2025 02:38 AM    WBC 4.0 08/04/2010 08:10 AM    RBC 2.80 (L) 01/26/2025 02:38 AM    RBC 4.53 08/04/2010 08:10 AM    HEMOGLOBIN 9.2 (L) 01/26/2025 02:38 AM    HEMATOCRIT 28.1 (L) 01/26/2025 02:38 AM    .4 (H) 01/26/2025 02:38 AM    MCV 95 08/04/2010 08:10 AM    MCH 32.9 01/26/2025 02:38 AM    MCH 31.8 08/04/2010 08:10 AM    MCHC 32.7 01/26/2025 02:38 AM    MPV 11.3 01/26/2025 02:38 AM    NEUTSPOLYS 86.40 (H) 01/24/2025 12:42 PM    LYMPHOCYTES 4.80 (L) 01/24/2025 12:42 PM    MONOCYTES 6.20 01/24/2025 12:42 PM    EOSINOPHILS 1.90 01/24/2025 12:42 PM    BASOPHILS 0.30 01/24/2025 12:42 PM        Assessment/Plan: Status post right IM nail, right reverse total shoulder  Weight bearing: Weightbearing as tolerated right lower extremity, nonweightbearing right upper extremity  DVT ppx: Per medical team, recommend Lovenox given 2 extremities injured  Diet: Per medical team  Antibiotics: Ancef x 2 doses  Mobilize with PT/OT  Dressing: Dry dressing, okay to reinforce or replace as needed  Disposition: Pending progress with PT and medical clearance

## 2025-01-26 NOTE — THERAPY
"Occupational Therapy   Initial Evaluation     Patient Name: Prabha Lizama  Age:  89 y.o., Sex:  female  Medical Record #: [id number]  Today's Date: 1/26/2025     Precautions  Precautions: (P) Weight Bearing As Tolerated Right Lower Extremity, Non Weight Bearing Right Upper Extremity, Immobilizer Right Upper Extremity, Fall Risk    Assessment  Patient is 89 y.o. female admitted following GLF; R humerus and R femoral neck fracture. S/p R Reverse TSA, IM nailing R femur, POD 1. Additional factors influencing patient status / progress: Indep prior and lives with spouse Phil who is very supportive. Pt presents with confusion (not baseline), pleasant and cooperative. Agreeable for EOB activity. Total A with bed mobility, most ADL's; tolerates 15\" EOB with activity. Cues needed for pt to hold eyes open. Brace in place to RUE, although much re-adjustment needed. R hand dominant. Pt encouraged to perform R hand ROM. Limited by R shoulder/RLE pain, cognitive impairments, decreased AROM LUE, weakness, impaired balance, decreased functional mobility and activity tolerance which is impacting her ADL performance. See below for CLOF. OT will follow while in house, recommend post acute rehab upon dc.                 Plan  Occupational Therapy Initial Treatment Plan   Treatment Interventions: (P) Self Care / Activities of Daily Living, Neuro Re-Education / Balance, Therapeutic Exercises, Therapeutic Activity, Cognitive Skill Development  Treatment Frequency: (P) 4 Times per Week  Duration: (P) Until Therapy Goals Met    DC Equipment Recommendations: (P) Unable to determine at this time  Discharge Recommendations: (P) Recommend post-acute placement for additional occupational therapy services prior to discharge home     Subjective  Agreeable     Objective   01/26/25 1445   Prior Living Situation   Prior Services None   Housing / Facility 1 Story House   Steps Into Home 2   Steps In Home 0   Equipment Owned Front-Wheel Walker "   Lives with - Patient's Self Care Capacity Spouse   Comments Per spouse (pt with confusion), pt indep prior. Uses her walker at nigth time up to BR.   Prior Level of ADL Function   Grooming / Hygiene Independent   Bathing Independent   Dressing Independent   Toileting Independent   Prior Level of IADL Function   Medication Management Unable To Determine At This Time   Laundry Independent   Kitchen Mobility Independent   Finances Unable To Determine At This Time   Home Management Independent   Shopping Unable To Determine At This Time   Prior Level Of Mobility Independent With Device in Home   Driving / Transportation Relatives / Others Provide Transportation   Occupation (Pre-Hospital Vocational) Retired Due To Age   Leisure Interests Unable To Determine At This Time   History of Falls   History of Falls Yes   Precautions   Precautions Weight Bearing As Tolerated Right Lower Extremity;Non Weight Bearing Right Upper Extremity;Immobilizer Right Upper Extremity;Fall Risk   Vitals   O2 (LPM) 3   O2 Delivery Device Silicone Nasal Cannula   Pain 0 - 10 Group   Location Leg;Shoulder   Location Orientation Right   Therapist Pain Assessment Post Activity Pain Same as Prior to Activity;Nurse Notified   Cognition    Level of Consciousness Alert   Comments Confusion present; per spouse this is not baseline.   Passive ROM Upper Body   Passive ROM Upper Body WDL  (LUE)   Active ROM Upper Body   Active ROM Upper Body  X   Dominant Hand Right;Using Non-Dominant Hand   Comments RUE in immobilizer which was re-adjusted to proper fit. Encouragement for ROM of R hand/fingers. LUE with limited AROM.   Strength Upper Body   Upper Body Strength  Not Tested   Sensation Upper Body   Upper Extremity Sensation  Not Tested   Upper Body Muscle Tone   Upper Body Muscle Tone  X   Coordination Upper Body   Coordination X   Balance Assessment   Sitting Balance (Static) Poor   Sitting Balance (Dynamic) Poor -   Weight Shift Sitting Absent   Bed  "Mobility    Supine to Sit Total Assist   Sit to Supine Total Assist   Scooting Total Assist   Rolling Total Assist to Lt.;Total Assist to Rt.   ADL Assessment   Eating Moderate Assist   Grooming Maximal Assist;Seated   Upper Body Dressing Total Assist   Lower Body Dressing Moderate Assist   Toileting Total Assist   How much help from another person does the patient currently need...   Putting on and taking off regular lower body clothing? 1   Bathing (including washing, rinsing, and drying)? 1   Toileting, which includes using a toilet, bedpan, or urinal? 1   Putting on and taking off regular upper body clothing? 1   Taking care of personal grooming such as brushing teeth? 2   Eating meals? 3   6 Clicks Daily Activity Score 9   Functional Mobility   Sit to Stand Unable to Participate   Bed, Chair, Wheelchair Transfer Unable to Participate   Visual Perception   Visual Perception  Not Tested   Edema / Skin Assessment   Edema / Skin  X   Comments dressings in place to R shoulder, RLE   Activity Tolerance   Sitting Edge of Bed 15\"   Short Term Goals   Short Term Goal # 1 Pt will perform grooming EOB with Ines within 5 days   Short Term Goal # 2 Pt will feed using LUE with CGA within 5 days   Short Term Goal # 3 Pt will perform ADL transfer with ModA within 5 days   Short Term Goal # 4 Pt will perform toileting at [facility] with ModA within 5 days   Education Group   Education Provided Brace Wear and Care;Activities of Daily Living;Upper Extremity Range of Motion   Role of Occupational Therapist Patient Response Patient;Family;Acceptance;Explanation;Verbal Demonstration   Upper Ext ROM Patient Response Patient;Family;Acceptance;Explanation;Verbal Demonstration   Brace Wear & Care Patient Response Patient;Family;Acceptance;Explanation;Verbal Demonstration   ADL Patient Response Patient;Family;Acceptance;Explanation;Verbal Demonstration   Occupational Therapy Initial Treatment Plan    Treatment Interventions Self Care / " Activities of Daily Living;Neuro Re-Education / Balance;Therapeutic Exercises;Therapeutic Activity;Cognitive Skill Development   Treatment Frequency 4 Times per Week   Duration Until Therapy Goals Met   Problem List   Problem List Decreased Active Daily Living Skills;Decreased Homemaking Skills;Decreased Upper Extremity Strength Right;Decreased Upper Extremity AROM Right;Decreased Upper Extremity AROM Left;Decreased Functional Mobility;Decreased Activity Tolerance;Safety Awareness Deficits / Cognition;Impaired Coordination Right Upper Extremity;Impaired Postural Control / Balance   Anticipated Discharge Equipment and Recommendations   DC Equipment Recommendations Unable to determine at this time   Discharge Recommendations Recommend post-acute placement for additional occupational therapy services prior to discharge home   Interdisciplinary Plan of Care Collaboration   IDT Collaboration with  Nursing;Certified Nursing Assistant;Physical Therapist   Patient Position at End of Therapy In Bed;Bed Alarm On;Call Light within Reach;Tray Table within Reach;Phone within Reach   Collaboration Comments RANJIT Mast

## 2025-01-26 NOTE — PROGRESS NOTES
Received patient from Night shift RN . Patient is awake and oriented x 3. On 3 liters via NC. Reports having pain to right shoulder. Right shoulder has dressing with immobilizer on,cdi. Dressing to right hip,with scant drainage,dry and intact.  Fall precautions observe,  kept bed in lowest position and call light within reach.    1230- Keenan catheter removed as ordered. Female wick applied.To monitor urine output    1400- seen by PT/OT, patient is able to sit at the edge of the bed with 2-3 assistance.    1732- patient has a hard time in taking her pills at this time, patient also refusing to eat dinner.  SLP  evaluation ordered as per protocol    1815- still patient did not void, CNA  did bladder scan , obtained 172 ml. To keep an eye and follow bladder protocol.

## 2025-01-26 NOTE — PROGRESS NOTES
4 Eyes Skin Assessment Completed by SUSHANT Vu and SUSHANT Bach.    Head WDL  Ears WDL  Nose WDL  Mouth WDL  Neck WDL  Breast/Chest WDL  Shoulder Blades Redness and Blanching  Spine Redness and Blanching  (R) Arm/Elbow/Hand Bruising and Edema, s/p R total shoulder, skin tear anterior forearm  (L) Arm/Elbow/Hand WDL  Abdomen Bruising  Groin WDL  Scrotum/Coccyx/Buttocks Redness and Blanching  (R) Leg Bruising, Swelling, and Incision s/p right hip surgery  (L) Leg WDL  (R) Heel/Foot/Toe Redness, Blanching, and Boggy  (L) Heel/Foot/Toe Redness, Blanching, and Boggy          Devices In Places Blood Pressure Cuff, Pulse Ox, and SCD's      Interventions In Place NC W/Ear Foams, Heel Mepilex, TAP System, Pillows, Q2 Turns, Low Air Loss Mattress, Barrier Cream, Dri-Sinan Pads, and Heels Loaded W/Pillows    Possible Skin Injury No    Pictures Uploaded Into Epic N/A  Wound Consult Placed N/A  RN Wound Prevention Protocol Ordered Yes

## 2025-01-26 NOTE — CARE PLAN
The patient is Stable - Low risk of patient condition declining or worsening    Shift Goals  Clinical Goals: Patient will report pain level 3/10, free from fall  Patient Goals: pain control  Family Goals: N/A    Progress made toward(s) clinical / shift goals:  Patient is having severe pain mostly upon movement on the right shoulder and right hip.Patient is medicated with pain medication as per MAR.Patient is up to edge of the bed today, PRN Dilaudid 1 mg  IV given as ordered prior to therapy    Patient is not progressing towards the following goals:    Problem: Pain - Standard  Goal: Alleviation of pain or a reduction in pain to the patient’s comfort goal  Outcome: Progressing     Problem: Knowledge Deficit - Standard  Goal: Patient and family/care givers will demonstrate understanding of plan of care, disease process/condition, diagnostic tests and medications  Outcome: Progressing     Problem: Skin Integrity  Goal: Skin integrity is maintained or improved  Outcome: Progressing     Problem: Fall Risk  Goal: Patient will remain free from falls  Outcome: Progressing     Problem: Psychosocial  Goal: Patient's level of anxiety will decrease  Outcome: Progressing  Goal: Patient's ability to verbalize feelings about condition will improve  Outcome: Progressing  Goal: Patient's ability to re-evaluate and adapt role responsibilities will improve  Outcome: Progressing  Goal: Patient and family will demonstrate ability to cope with life altering diagnosis and/or procedure  Outcome: Progressing     Problem: Communication  Goal: The ability to communicate needs accurately and effectively will improve  Outcome: Progressing     Problem: Discharge Barriers/Planning  Goal: Patient's continuum of care needs are met  Outcome: Progressing     Problem: Hemodynamics  Goal: Patient's hemodynamics, fluid balance and neurologic status will be stable or improve  Outcome: Progressing     Problem: Respiratory  Goal: Patient will  achieve/maintain optimum respiratory ventilation and gas exchange  Outcome: Progressing     Problem: Dysphagia  Goal: Dysphagia will improve  Outcome: Progressing     Problem: Risk for Aspiration  Goal: Patient's risk for aspiration will be absent or decrease  Outcome: Progressing     Problem: Nutrition  Goal: Patient's nutritional and fluid intake will be adequate or improve  Outcome: Progressing     Problem: Urinary Elimination  Goal: Establish and maintain regular urinary output  Outcome: Progressing     Problem: Bowel Elimination  Goal: Establish and maintain regular bowel function  Outcome: Progressing     Problem: Self Care  Goal: Patient will have the ability to perform ADLs independently or with assistance (bathe, groom, dress, toilet and feed)  Outcome: Progressing     Problem: Mobility  Goal: Patient's capacity to carry out activities will improve  Outcome: Progressing     Problem: Self Care  Goal: Patient will have the ability to perform ADLs independently or with assistance (bathe, groom, dress, toilet and feed)  Outcome: Progressing     Problem: Infection - Standard  Goal: Patient will remain free from infection  Outcome: Progressing     Problem: Wound/ / Incision Healing  Goal: Patient's wound/surgical incision will decrease in size and heals properly  Outcome: Progressing     Problem: Post-Operative Shoulder Replacement  Goal: Patient will achieve optimal shoulder replacement post-surgical outcomes  Outcome: Progressing  Goal: Patient's neurovascular status will be maintained or improve  Outcome: Progressing  Goal: Early mobilization post surgery  Outcome: Progressing  Goal: Proper fit of orthotic device will be assessed and maintained  Outcome: Progressing     Problem: Pain - Post Surgery  Goal: Alleviation or reduction of pain post surgery  Outcome: Progressing     Problem: Incision Care  Goal: Optimal post surgical incision care  Outcome: Progressing     Problem: Respiratory/Oxygenation Function  Post-Surgical  Goal: Patient will achieve/maintain normal respiratory rate/effort  Outcome: Progressing     Problem: Risk for Post Op Fluid Imbalance  Goal: Promotion of fluid balance  Outcome: Progressing     Problem: Post-Operative Hip Replacement  Goal: Patient's neurovascular status will be maintained or improve  Outcome: Progressing  Goal: Early mobilization post surgery  Outcome: Progressing  Goal: Patient will demonstrate understanding of post-surgical hip precautions, weight bearing restrictions and DME usage during hospital stay and post discharge  Outcome: Progressing

## 2025-01-26 NOTE — CARE PLAN
The patient is Watcher - Medium risk of patient condition declining or worsening    Shift Goals  Clinical Goals: Q2 turns, pain management to 3/10, comfort  Patient Goals: pain control  Family Goals: N/A    Progress made toward(s) clinical / shift goals:    Q2 turns utilized to maintain and improve skin integrity. Pain controlled by medication per MAR, ice, rest and repositioning.     Patient is not progressing towards the following goals:  Patient unable to mobilize due to drowsiness post operatively    Problem: Mobility  Goal: Patient's capacity to carry out activities will improve  Outcome: Not Progressing

## 2025-01-26 NOTE — PROGRESS NOTES
Hospital Medicine Daily Progress Note    Date of Service  1/26/2025    Chief Complaint  Prabha Lizama is a 89 y.o. female admitted 1/24/2025 with right hip and shoulder pain after a fall    Hospital Course  History of hypertension, diet controlled hyperlipidemia and anxiety admitted after ground-level fall with right shoulder and right hip pain found to have a right humerus and right femoral neck fracture.  She was admitted for operative repair, perioperative management and rehabilitation.    Interval Problem Update  1/25: To the OR for IM nail right femur, total shoulder arthroplasty on the right.  Postop PT OT.  Work on postop pain control    1/26: Postop day 1 status post IM nail right femur, right total shoulder arthroplasty.  Hemoglobin dropped 4 points.  No active bleeding, hold Lovenox.  Significant pain.  Pending PT OT, anticipate stent    I have discussed this patient's plan of care and discharge plan at IDT rounds today with Case Management, Nursing, Nursing leadership, and other members of the IDT team.    Consultants/Specialty  orthopedics    Code Status  Full Code    Disposition  The patient is not medically cleared for discharge to home or a post-acute facility.  Anticipate discharge to: skilled nursing facility    I have placed the appropriate orders for post-discharge needs.    Review of Systems  Review of Systems   Constitutional:  Negative for chills, fever and malaise/fatigue.   HENT:  Negative for sore throat.    Respiratory:  Negative for cough and shortness of breath.    Cardiovascular:  Negative for chest pain and palpitations.   Gastrointestinal:  Negative for abdominal pain, blood in stool, diarrhea, heartburn, nausea and vomiting.   Genitourinary:  Negative for dysuria and frequency.   Musculoskeletal:  Positive for falls, joint pain and myalgias. Negative for back pain.   Neurological:  Positive for weakness. Negative for dizziness, focal weakness and headaches.    Psychiatric/Behavioral:  Positive for memory loss. Negative for depression and hallucinations. The patient is nervous/anxious.    All other systems reviewed and are negative.       Physical Exam  Temp:  [35.8 °C (96.4 °F)-37 °C (98.6 °F)] 37 °C (98.6 °F)  Pulse:  [63-86] 86  Resp:  [16-18] 18  BP: ()/(40-98) 131/69  SpO2:  [95 %-100 %] 95 %    Physical Exam  Constitutional:       General: She is in acute distress.      Appearance: She is ill-appearing.      Comments: Appears in acute pain   HENT:      Nose: Nose normal.      Mouth/Throat:      Mouth: Mucous membranes are dry.   Cardiovascular:      Rate and Rhythm: Normal rate and regular rhythm.      Pulses: Normal pulses.   Pulmonary:      Effort: Pulmonary effort is normal. No respiratory distress.      Breath sounds: Normal breath sounds. No wheezing.   Abdominal:      General: There is no distension.      Palpations: Abdomen is soft.      Tenderness: There is no abdominal tenderness.   Musculoskeletal:         General: Tenderness and signs of injury present. No swelling.      Cervical back: No muscular tenderness.   Lymphadenopathy:      Cervical: No cervical adenopathy.   Skin:     General: Skin is warm and dry.      Findings: Bruising (Right shoulder) present. No rash.   Neurological:      General: No focal deficit present.      Mental Status: She is alert and oriented to person, place, and time.      Motor: Weakness present.   Psychiatric:         Thought Content: Thought content normal.         Fluids    Intake/Output Summary (Last 24 hours) at 1/26/2025 1404  Last data filed at 1/26/2025 1300  Gross per 24 hour   Intake 490 ml   Output 1300 ml   Net -810 ml        Laboratory  Recent Labs     01/24/25  1242 01/25/25  0249 01/26/25  0238   WBC 14.7* 10.5 8.8   RBC 3.95* 3.60* 2.80*   HEMOGLOBIN 13.0 12.1 9.2*   HEMATOCRIT 37.7 35.7* 28.1*   MCV 95.4 99.2* 100.4*   MCH 32.9 33.6* 32.9   MCHC 34.5 33.9 32.7   RDW 42.6 45.1 47.8   PLATELETCT 238 199 173    MPV 10.3 10.7 11.3     Recent Labs     01/24/25  1107 01/25/25  0249 01/26/25  0238   SODIUM 135 133* 137   POTASSIUM 4.2 4.0 4.4   CHLORIDE 104 105 108   CO2 21 19* 20   GLUCOSE 117* 125* 112*   BUN 12 16 26*   CREATININE 0.63 0.61 1.08   CALCIUM 8.3* 8.1* 7.6*     Recent Labs     01/24/25  1242   APTT 24.0*   INR 1.11               Imaging  DX-PELVIS-1 OR 2 VIEWS   Final Result      Intramedullary nail and screw fixation of right proximal femoral fracture. There is still some step-off between the fracture fragments.      DX-SHOULDER 2+ RIGHT   Final Result      Reverse right shoulder arthroplasty is well seated.      DX-HUMERUS 2+ RIGHT   Final Result      Displaced fracture of the proximal right humerus.      DX-HIP-COMPLETE - UNILATERAL 2+ LEFT   Final Result      Displaced intertrochanteric right femoral fracture.      DX-CHEST-PORTABLE (1 VIEW)   Final Result         1. No acute cardiac or pulmonary abnormalities are identified.   2. Displaced right humeral fracture.      DX-HIP-COMPLETE - UNILATERAL 2+ RIGHT    (Results Pending)   DX-PORTABLE FLUORO > 1 HOUR    (Results Pending)        Assessment/Plan  * Closed right hip fracture, initial encounter (HCC)- (present on admission)  Assessment & Plan  GLF - R hip and R proximal humeral fracture  Orthopedic repair done on 1/25  PRN oral oxycodone  Scheduled Tylenol minimize narcotics as able  DC fluids  Bowel regimen  Hold Lovenox, large drop in hemoglobin  Postop PT OT pending    Vitamin D deficiency- (present on admission)  Assessment & Plan  Vitamin D supplementation 1000 units p.o. daily    Closed fracture of proximal end of right humerus- (present on admission)  Assessment & Plan  Patient had a ground-level fall 1/25 and suffered a right femoral neck fracture and right humerus fracture  1/25 Underwent:  RIGHT INTRAMEDULLARY SAM, FEMUR, PROXIMAL  RIGHT TOTAL SHOULDER ARTHROPLASTY    Scheduled Tylenol, prn oxycodone  Hold Lovenox postop for DVT prophylaxis -  large drop in hgb today  PT OT pending    Constipation- (present on admission)  Assessment & Plan  Bowel protocol    Normocytic anemia- (present on admission)  Assessment & Plan  Hemoglobin was 13 on admission, has decreased to 9.2 after 48 hours.  Likely multifactorial due to pulmonary suppression from fracture as well as some blood loss during surgery.  She has a large right shoulder hematoma  Check hemoglobin q8  Hold Lovenox  DC IV fluids    Anxiety- (present on admission)  Assessment & Plan  Chronic anxiety  Avoid Benadryl  Manage symptoms as they arise    Pure hypercholesterolemia- (present on admission)  Assessment & Plan  Diet controlled    Impaired fasting blood sugar- (present on admission)  Assessment & Plan  Most recent hemoglobin A1c is 5.6 but this was from 2023  Repeat hemoglobin A1c  Most recent blood sugars 117    Insomnia, unspecified- (present on admission)  Assessment & Plan  Melatonin    Primary hypertension- (present on admission)  Assessment & Plan  Optimize blood pressure management keep systolic blood pressure less than 140 diastolic under 90  Continue Norvasc 2.5 mg daily  Coreg 25 mg twice daily  Telmisartan 80 mg daily  Terazosin 4 mg twice daily  As needed labetalol         VTE prophylaxis: Lovenox (hold)    I have performed a physical exam and reviewed and updated ROS and Plan today (1/26/2025). In review of yesterday's note (1/25/2025), there are no changes except as documented above.    Total time:  52 minutes.  I spent greater than 50% of the time for patient care, counseling, and coordination on this date, including unit/floor time, and face-to-face time with the patient as per interval events and assessment and plan above

## 2025-01-26 NOTE — PROGRESS NOTES
Bedside report received from day RNDevi and assumed care of patient at change of shift. Chart, labs, and orders reviewed. Pt resting in bed, breathing even and unlabored, 5/10 surgical pain and in no acute distress. A&Ox4 on 3L oxymask. Fall precautions  in place and education provided. Call light within reach, bed locked and in lowest position, denies other needs at this time. Hourly rounding in progress.

## 2025-01-27 ENCOUNTER — TELEPHONE (OUTPATIENT)
Dept: VASCULAR LAB | Facility: MEDICAL CENTER | Age: 89
End: 2025-01-27
Payer: MEDICARE

## 2025-01-27 LAB
ALBUMIN SERPL BCP-MCNC: 2.5 G/DL (ref 3.2–4.9)
BUN SERPL-MCNC: 27 MG/DL (ref 8–22)
CALCIUM ALBUM COR SERPL-MCNC: 9.1 MG/DL (ref 8.5–10.5)
CALCIUM SERPL-MCNC: 7.9 MG/DL (ref 8.4–10.2)
CHLORIDE SERPL-SCNC: 108 MMOL/L (ref 96–112)
CO2 SERPL-SCNC: 21 MMOL/L (ref 20–33)
CREAT SERPL-MCNC: 0.75 MG/DL (ref 0.5–1.4)
ERYTHROCYTE [DISTWIDTH] IN BLOOD BY AUTOMATED COUNT: 47.8 FL (ref 35.9–50)
GFR SERPLBLD CREATININE-BSD FMLA CKD-EPI: 77 ML/MIN/1.73 M 2
GLUCOSE SERPL-MCNC: 103 MG/DL (ref 65–99)
HCT VFR BLD AUTO: 24.6 % (ref 37–47)
HGB BLD-MCNC: 7.8 G/DL (ref 12–16)
HGB BLD-MCNC: 8 G/DL (ref 12–16)
HGB BLD-MCNC: 8.7 G/DL (ref 12–16)
MAGNESIUM SERPL-MCNC: 2.2 MG/DL (ref 1.5–2.5)
MCH RBC QN AUTO: 32.9 PG (ref 27–33)
MCHC RBC AUTO-ENTMCNC: 31.7 G/DL (ref 32.2–35.5)
MCV RBC AUTO: 103.8 FL (ref 81.4–97.8)
PHOSPHATE SERPL-MCNC: 2.4 MG/DL (ref 2.5–4.5)
PLATELET # BLD AUTO: 168 K/UL (ref 164–446)
PMV BLD AUTO: 10.9 FL (ref 9–12.9)
POTASSIUM SERPL-SCNC: 3.9 MMOL/L (ref 3.6–5.5)
RBC # BLD AUTO: 2.37 M/UL (ref 4.2–5.4)
SODIUM SERPL-SCNC: 139 MMOL/L (ref 135–145)
WBC # BLD AUTO: 8 K/UL (ref 4.8–10.8)

## 2025-01-27 PROCEDURE — A9270 NON-COVERED ITEM OR SERVICE: HCPCS | Performed by: INTERNAL MEDICINE

## 2025-01-27 PROCEDURE — 85027 COMPLETE CBC AUTOMATED: CPT

## 2025-01-27 PROCEDURE — 700102 HCHG RX REV CODE 250 W/ 637 OVERRIDE(OP): Performed by: INTERNAL MEDICINE

## 2025-01-27 PROCEDURE — 99233 SBSQ HOSP IP/OBS HIGH 50: CPT | Performed by: INTERNAL MEDICINE

## 2025-01-27 PROCEDURE — 770001 HCHG ROOM/CARE - MED/SURG/GYN PRIV*

## 2025-01-27 PROCEDURE — 80069 RENAL FUNCTION PANEL: CPT

## 2025-01-27 PROCEDURE — 36415 COLL VENOUS BLD VENIPUNCTURE: CPT

## 2025-01-27 PROCEDURE — 700111 HCHG RX REV CODE 636 W/ 250 OVERRIDE (IP): Mod: JZ | Performed by: HOSPITALIST

## 2025-01-27 PROCEDURE — 92610 EVALUATE SWALLOWING FUNCTION: CPT

## 2025-01-27 PROCEDURE — 85018 HEMOGLOBIN: CPT

## 2025-01-27 PROCEDURE — 83735 ASSAY OF MAGNESIUM: CPT

## 2025-01-27 PROCEDURE — 94760 N-INVAS EAR/PLS OXIMETRY 1: CPT

## 2025-01-27 PROCEDURE — A9270 NON-COVERED ITEM OR SERVICE: HCPCS | Performed by: HOSPITALIST

## 2025-01-27 PROCEDURE — A9270 NON-COVERED ITEM OR SERVICE: HCPCS | Performed by: STUDENT IN AN ORGANIZED HEALTH CARE EDUCATION/TRAINING PROGRAM

## 2025-01-27 PROCEDURE — 700102 HCHG RX REV CODE 250 W/ 637 OVERRIDE(OP): Performed by: STUDENT IN AN ORGANIZED HEALTH CARE EDUCATION/TRAINING PROGRAM

## 2025-01-27 PROCEDURE — 51798 US URINE CAPACITY MEASURE: CPT

## 2025-01-27 PROCEDURE — 700102 HCHG RX REV CODE 250 W/ 637 OVERRIDE(OP): Performed by: HOSPITALIST

## 2025-01-27 RX ORDER — OXYCODONE HYDROCHLORIDE 5 MG/1
2.5 TABLET ORAL EVERY 4 HOURS PRN
Status: DISCONTINUED | OUTPATIENT
Start: 2025-01-27 | End: 2025-01-28

## 2025-01-27 RX ORDER — OXYCODONE HYDROCHLORIDE 5 MG/1
5 TABLET ORAL EVERY 4 HOURS PRN
Status: DISCONTINUED | OUTPATIENT
Start: 2025-01-27 | End: 2025-01-28

## 2025-01-27 RX ADMIN — TERAZOSIN HYDROCHLORIDE 4 MG: 1 CAPSULE ORAL at 08:29

## 2025-01-27 RX ADMIN — SENNOSIDES AND DOCUSATE SODIUM 1 TABLET: 50; 8.6 TABLET ORAL at 22:23

## 2025-01-27 RX ADMIN — Medication 1000 UNITS: at 08:28

## 2025-01-27 RX ADMIN — OXYCODONE 2.5 MG: 5 TABLET ORAL at 13:14

## 2025-01-27 RX ADMIN — TERAZOSIN HYDROCHLORIDE 4 MG: 1 CAPSULE ORAL at 17:38

## 2025-01-27 RX ADMIN — ACETAMINOPHEN 650 MG: 325 TABLET ORAL at 09:37

## 2025-01-27 RX ADMIN — CARVEDILOL 25 MG: 25 TABLET, FILM COATED ORAL at 22:21

## 2025-01-27 RX ADMIN — HYDROMORPHONE HYDROCHLORIDE 1 MG: 1 INJECTION, SOLUTION INTRAMUSCULAR; INTRAVENOUS; SUBCUTANEOUS at 02:18

## 2025-01-27 RX ADMIN — Medication 5 MG: at 22:19

## 2025-01-27 RX ADMIN — AMLODIPINE BESYLATE 2.5 MG: 5 TABLET ORAL at 08:28

## 2025-01-27 RX ADMIN — TELMISARTAN 80 MG: 40 TABLET ORAL at 08:29

## 2025-01-27 RX ADMIN — CARVEDILOL 25 MG: 25 TABLET, FILM COATED ORAL at 08:29

## 2025-01-27 RX ADMIN — OXYCODONE 5 MG: 5 TABLET ORAL at 23:19

## 2025-01-27 ASSESSMENT — PATIENT HEALTH QUESTIONNAIRE - PHQ9
2. FEELING DOWN, DEPRESSED, IRRITABLE, OR HOPELESS: NOT AT ALL
SUM OF ALL RESPONSES TO PHQ9 QUESTIONS 1 AND 2: 0
1. LITTLE INTEREST OR PLEASURE IN DOING THINGS: NOT AT ALL
1. LITTLE INTEREST OR PLEASURE IN DOING THINGS: NOT AT ALL
2. FEELING DOWN, DEPRESSED, IRRITABLE, OR HOPELESS: NOT AT ALL
SUM OF ALL RESPONSES TO PHQ9 QUESTIONS 1 AND 2: 0

## 2025-01-27 ASSESSMENT — PAIN DESCRIPTION - PAIN TYPE
TYPE: SURGICAL PAIN

## 2025-01-27 ASSESSMENT — ENCOUNTER SYMPTOMS
WEAKNESS: 1
SHORTNESS OF BREATH: 0
DIZZINESS: 0
FALLS: 1
SORE THROAT: 0
MEMORY LOSS: 1
NERVOUS/ANXIOUS: 1
CHILLS: 0
MYALGIAS: 1
BACK PAIN: 0
HEARTBURN: 0
DIARRHEA: 0
BLOOD IN STOOL: 0
DEPRESSION: 0
VOMITING: 0
ROS SKIN COMMENTS: BRUISING
COUGH: 0
FOCAL WEAKNESS: 0
PALPITATIONS: 0
ABDOMINAL PAIN: 0
BRUISES/BLEEDS EASILY: 1
HEADACHES: 0
NAUSEA: 0
FEVER: 0
HALLUCINATIONS: 0

## 2025-01-27 NOTE — THERAPY
"Speech Language Pathology   Clinical Swallow Evaluation     Patient Name: Prabha Lizama  AGE:  89 y.o., SEX:  female  Medical Record #: [id number]  Date of Service: 1/27/2025      History of Present Illness  Pt is an 89 y/o female admitted 1/24/25 for GLF, shoulder pain, and hip pain. Found to have right hip fracture, right humerus fracture. S/p surgery for repair and humerus fracture.   PMHx: CHI, HTN, insomnia, multiple thyroid nodules.   No previous SLP notes found in EMR.     CXR 1/24:   1. No acute cardiac or pulmonary abnormalities are identified.  2. Displaced right humeral fracture.    General Information:  Vitals  O2 (LPM): 2  O2 Delivery Device: Silicone Nasal Cannula  Level of Consciousness: Alert, Awake  Patient Behaviors: Anxious, Forgetful  Orientation: Self, General place, Situation  Follows Directives: Yes - simple commands only    Prior Living Situation & Level of Function:  Prior Services: None  Lives with - Patient's Self Care Capacity: Spouse  Communication: WFL per   Swallowing: WFL per      Oral Mechanism Evaluation:  Dentition: Good, Natural dentition   Facial Symmetry: Equal, Pt did not follow commands to assess  Facial Sensation: Pt did not follow commands to assess     Labial Observations: WFL   Lingual Observations: Midline  Motor Speech: WFL    Laryngeal Function:  Secretion Management: Adequate  Voice Quality: WFL  Cough: Perceptually weak     Subjective  RN cleared patient for CSE. Per EMR and , patient was noted to have coughing with PO intake yesterday.  reported \"maybe it was her acid reflux?\" but reported she does not usually have swalllowing difficulty.    Assessment  Current Method of Nutrition: Oral diet (Regular/thins)  Positioning: Ramirez's (60-90 degrees)  Bolus Administration: SLP, Family member  O2 (LPM): 2 O2 Delivery Device: Silicone Nasal Cannula  Factor(s) Affecting Performance: Impaired endurance, Impaired mental status, Impaired command " following, Other (Anxiety, pain, poor participation)     Swallowing Trials:  Swallowing Trials  Thin Liquid (TN0): WFL  Liquidised (LQ3): WFL  Soft & Bite Sized (SB6): WFL    Comments: Patient awake, alert, with  present at bedside, attempting to feed patient from regular/thin liquid lunch tray d/t RUE sling. Patient refusing most trials and noted to be anxious, self-limiting at times, and with poor participation. Patient was agreeable to applesauce, diced fruit, and thins via straw. Patient demonstrated reduced bolus acceptance, requiring verbal cues to open mouth for bolus presentation. Bolus containment was adequate. Mastication mildly slow and prolonged, but complete oral clearance achieved. No s/sx of aspiration with any textures trialed, but delayed coughing noted approximately 60-90 seconds after PO trials were d/c'ed.     Clinical Impressions  Patient presents with s/sx of mild oropharyngeal dysphagia, evidenced by delayed coughing, prolonged mastication, fatigue, and poor participation. Patient would likely benefit from downgrade to SB6/TN0 diet (soft/bite-sized solids w/ thin liquids) w/ 1:1 feeding as needed. Please encourage PO intake. Meds okay floated whole in puree or as tolerated. Small straw sips okay. SLP to follow for diet tolerance.     Recommendations  Diet Consistency: SB6/TN0 (soft/bite-sized solids w/ thin liquids)  Instrumentation: None indicated at this time  Medication: Whole with puree, As tolerated  Supervision: Assist with meal tray set up, Direct supervision during meals, Encourage self-feeding  Positioning: Fully upright and midline during oral intake  Risk Management : Small bites/sips, Alternate bites and sips, Slow rate of intake, Reduce environmental distractions, Physical mobility, as tolerated  Oral Care: Q4h     SLP Treatment Plan  Treatment Plan: Dysphagia Treatment, Patient/Family/Caregiver Training  SLP Frequency: 4x Per Week  Estimated Duration: Until Therapy Goals  "Met    Anticipated Discharge Needs  Discharge Recommendations: Recommend home health for continued speech therapy services   Therapy Recommendations Upon DC: Dysphagia Training, Community Re-Integration, Patient / Family / Caregiver Education      Patient / Family Goals  Patient / Family Goal #1: \"I don't want to eat much\"  Short Term Goals  Short Term Goal # 1: Patient will consume SB6/TN0 diet with assistance as needed with no overt s/sx of aspiration or decline in respiratory status.    Aura Ivan, SLP   "

## 2025-01-27 NOTE — THERAPY
Physical Therapy   Initial Evaluation     Patient Name: Prabha Lizama  Age:  89 y.o., Sex:  female  Medical Record #: [id number]  Today's Date: 1/26/2025     Precautions  Precautions: Weight Bearing As Tolerated Right Lower Extremity;Non Weight Bearing Right Upper Extremity;Immobilizer Right Upper Extremity;Fall Risk    Assessment     Patient is 89 y.o. female admitted following GLF; R humerus and R femoral neck fracture. S/p R Reverse TSA (NWB in immobilizer at all times), IM nailing R femur (WBAT), POD 1. Additional factors influencing patient status / progress: Indep prior and lives with spouse Phil who is very supportive.   At baseline, pt is independent with mobility (uses a walker at night when up to bathroom) and ind in self care.      Currently she does require 2 person assist for safe bed mobility, tolerates sitting with some support for balance and follows most cues, limited by pain, decreased activity tolerance, ROM impairments, balance and decreased strength and functional endurance.  She is not at her functional baseline, and will benefit from post acute placement when medically stable for DC.  Recommend 2 person A for bed mobility/positioning for optimum pain control and coordinating with nursing for pain meds prior to sessions as able.    Plan    Physical Therapy Initial Treatment Plan   Treatment Plan : Bed Mobility, Equipment, Family / Caregiver Training, Gait Training, Neuro Re-Education / Balance, Therapeutic Activities, Therapeutic Exercise  Treatment Frequency: 4 Times per Week  Duration: Until Therapy Goals Met    DC Equipment Recommendations: Unable to determine at this time  Discharge Recommendations: Recommend post-acute placement for additional physical therapy services prior to discharge home           Objective       01/26/25 1401    Services   Is patient using  services for this encounter? No   Initial Contact Note    Initial Contact Note Order Received and  Verified, Physical Therapy Evaluation in Progress with Full Report to Follow.   Precautions   Precautions Weight Bearing As Tolerated Right Lower Extremity;Non Weight Bearing Right Upper Extremity;Immobilizer Right Upper Extremity;Fall Risk   Vitals   Vitals Comments NC in place throughout session   Pain 0 - 10 Group   Therapist Pain Assessment Post Activity Pain Same as Prior to Activity;Nurse Notified  (Coordinated with RN for allowed pain meds (IV) and also with nursing care (when pt was due for turning) also coordinated with OT for safe assist and pain)   Prior Living Situation   Prior Services None   Housing / Facility 1 Story House   Steps Into Home 2   Steps In Home 0   Equipment Owned Front-Wheel Walker   Lives with - Patient's Self Care Capacity Spouse   Comments Per spouse (pt with confusion), pt indep prior. Uses her walker at night time up to BR.   Prior Level of Functional Mobility   Bed Mobility Independent   Transfer Status Independent   Ambulation Independent   Ambulation Distance at least house distances   Assistive Devices Used Front-Wheel Walker  (night time only)   Stairs Independent   History of Falls   History of Falls Yes   Date of Last Fall 01/23/25  (Reason for admission)   Cognition    Comments Pt needing some assist to keep eyes open, but able to follow cues, make needs known - fatigue and pain limiting and spouse able ot fill in PLOF info.   Active ROM Upper Body   Comments R UE in immobilizer at all times, and NWB   Passive ROM Lower Body   Passive ROM Lower Body X   Gross Passive ROM Gross Passive Range of Motion Impaired,  but Appears Adequate for Functional Mobility.   Comments Mainly limited by pain at R LE   Active ROM Lower Body    Active ROM Lower Body  X   Gross Active ROM Gross Active Range of Motion Impaired,  but Appears Adequate for Functional Mobility.   Comments Limited by pain, but able to sit eob, able to lie supine   Strength Lower Body   Lower Body Strength  X   Gross  Strength Generalized Weakness, Equal Bilaterally   Comments R LE limited additionally by pain at R hip   Sensation Lower Body   Lower Extremity Sensation   WDL   Comments pt denies changes   Other Treatments   Other Treatments Provided Time taken when siting eob to adjust immobilizer to better seat elbow properly in sling and adjust velcro.  Time taken for additional time in rolling for linens, positioning with nursing.   Balance Assessment   Sitting Balance (Static) Poor   Sitting Balance (Dynamic) Poor -   Weight Shift Sitting Absent   Comments did not attempt stand on this date, pain and fatigue limiting   Bed Mobility    Supine to Sit Total Assist   Sit to Supine Total Assist   Scooting Total Assist   Rolling Total Assist to Rt.;Total Assist to Lt.   Comments 2 person assist   Gait Analysis   Gait Level Of Assist Unable to Participate   Functional Mobility   Sit to Stand Unable to Participate   Bed, Chair, Wheelchair Transfer Unable to Participate   Mobility Assit to eob>eob sitting for ADL, balance x 15 minutes (needs CGA for static sit, mod/max to correct balance losses when occured)   6 Clicks Assessment - How much HELP from from another person do you currently need... (If the patient hasn't done an activity recently, how much help from another person do you think he/she would need if he/she tried?)   Turning from your back to your side while in a flat bed without using bedrails? 1   Moving from lying on your back to sitting on the side of a flat bed without using bedrails? 1   Moving to and from a bed to a chair (including a wheelchair)? 1   Standing up from a chair using your arms (e.g., wheelchair, or bedside chair)? 1   Walking in hospital room? 1   Climbing 3-5 steps with a railing? 1   6 clicks Mobility Score 6   Activity Tolerance   Sitting Edge of Bed 15 minutes   Comments May have gradual balance losses laterally, needs CGA at all times   Patient / Family Goals    Patient / Family Goal #1 To get  stronger at a rehab setting prior to discharge home   Short Term Goals    Short Term Goal # 1 In 6 visits, patient will complete supine<>sit without bed features, Min A or better to improve functional mobility.   Short Term Goal # 2 In 6 visits, patient will complete safe transfers with unilateral LRAD, Min A  or better to improved functional mobility.   Short Term Goal # 3 In 6 visits, patient will ambulate 10 feet with unilateral LRAD, CGA or better to improve functional mobility.   Education Group   Education Provided Role of Physical Therapist   Role of Physical Therapist Patient Response Patient;Family;Acceptance;Explanation;Verbal Demonstration   Physical Therapy Initial Treatment Plan    Treatment Plan  Bed Mobility;Equipment;Family / Caregiver Training;Gait Training;Neuro Re-Education / Balance;Therapeutic Activities;Therapeutic Exercise   Treatment Frequency 4 Times per Week   Duration Until Therapy Goals Met   Problem List    Problems Pain;Impaired Bed Mobility;Impaired Transfers;Impaired Ambulation;Functional ROM Deficit;Functional Strength Deficit;Impaired Balance;Decreased Activity Tolerance   Anticipated Discharge Equipment and Recommendations   DC Equipment Recommendations Unable to determine at this time   Discharge Recommendations Recommend post-acute placement for additional physical therapy services prior to discharge home   Interdisciplinary Plan of Care Collaboration   IDT Collaboration with  Nursing;Occupational Therapist   Patient Position at End of Therapy In Bed;Bed Alarm On;Call Light within Reach;Tray Table within Reach;Phone within Reach;Family / Friend in Room   Collaboration Comments RN aware of session   Session Information   Date / Session Number  1/26 - 1(1/4, 2/1)

## 2025-01-27 NOTE — DISCHARGE PLANNING
PM&R referral from Dr. Sepulveda. Multiple fractures. Proximal femur fracture s/p repair WBAT and R proximal humerus fracture s/p repair Non deonte bearing. Ongoing medical management as well as therapy need Call out to spouse Phil to discuss pst acute options and rerun to community plan. Pending return call. Physiatry to consult per protocol.

## 2025-01-27 NOTE — TELEPHONE ENCOUNTER
MICHAEL BLOCH    Caller: Diony Lizama (spouse)    Topic/issue: The patient is currently admitted (Room 224 at [facility]) and they are hoping for a consult while she is in the hospital. Please advise.     Callback Number: [phone] (this is a landline and the spouse is at the hospital with her). Room 224 at [facility].      Thank you,  Aguilar LARSON

## 2025-01-27 NOTE — PROGRESS NOTES
"Bedside report received from day RN Devi, and assumed care of patient at change of shift. Chart, labs, and orders reviewed. Pt in bed with spouse present. Patient is anxious, spouse is concerned she is not \"her normal self, she's a worrier, but this is not like her.\" Patient is shouting, \"please don't leave me here\" Spouse shouting at patient to \"just drink your water.\" Encouraged spouse to go home and rest and assured I will be watching her carefully tonight. Patient currently  A&Ox2, d/o to time and place on 3L NC. Patient states she is in 9/10 pain. Fall precautions  in place and education provided. Call light within reach, bed locked and in lowest position. Hourly rounding in progress.     "

## 2025-01-27 NOTE — DIETARY
"Nutrition Services: Initial Assessment     Day 3 of admit. Prabha Lizama is 89 y.o., female with admitting DX of Closed right hip fracture, initial encounter ([facility]) [S72.001A].    Consult Received for: Intake    Current Hospital Problems List:    Closed right hip fracture  Closed fracture of proximal end of right humerus  Pure hypercholesterolemia  Impaired fasting blood sugar  Primary HTN  Vit D deficiency  Constipation  Anxiety  Insomnia        Nutrition Assessment:      Height: 167.6 cm (5' 5.98\")  Weight: 70.3 kg (154 lb 15.7 oz)  Weight taken via: Bed Scale  BMI Calculated: 25.03  BMI Classification: WNL       Weight Readings from Last 5 encounters:   Wt Readings from Last 5 Encounters:   01/24/25 70.3 kg (154 lb 15.7 oz)   11/04/24 69.4 kg (153 lb)   07/26/24 69.5 kg (153 lb 3.5 oz)   05/01/24 68.5 kg (150 lb 14.5 oz)   04/12/24 68 kg (150 lb)         Objective:   Pertinent Medical Hx: HTN, diet controlled hyperlipidemia and anxiety   Pertinent Labs: 1/27/25: glucose=103, Bun=27, phos=2.4  Pertinent Meds: Colace (refused), senna-docusate (refused), vit D    Last BM:     01/24/25 (per report)       Current Diet Order/Intake:   Regular diet.     Recorded PO intake is poor at < 25% of most meals.       Subjective:   Pt's  in room when RD visited. Pt's  reports confusion and poor PO intake. Pt said that she does not want to eat. PO encouraged by RD. Pt agreed to chocolate Ensure supplements TID with meals. She drinks this at home.   Per nutrition screen, no report of poor PO PTA.       Nutrition Focused Physical Exam (NFPE)  Weight Loss: No report of wt loss on nutrition screen. Based on wt review in Epic, pt's wt has been stable since April.   Muscle Mass: Well Nourished  Subcutaneous Fat: Well Nourished  Fluid Accumulation: generalized, dependent RUE, dependent BLEs  Reduced  Strength: N/A in acute care setting.    Nutrition Diagnosis:      Inadequate Oral Intake related to confusion and " pain s/p 2 surgeries as evidenced by PO intake < 25%.         Based on RD assessment at this time, Patient does not meet criteria in congruence with ASPEN/Academy guidelines for malnutrition    Nutrition Interventions:      Encourage PO intake.   Recommend Ensure Plus High Protein (provides 350 calories) 20 g protein per 8 fl oz) TID.  Patient aware of active plan of care as appropriate.       Nutrition Monitoring and Evaluation:      Monitor nutrition POC, goal for >50% intake from meals and supplements.  Additional fluids per MD/DO  Monitor vital signs pertinent to nutrition.    RD following and will provide updated recommendations as indicated.      Ewa Snyder R.D.                                         ASPEN/AND CRITERIA FOR MALNUTRITION

## 2025-01-27 NOTE — PROGRESS NOTES
BSSR received from night RN. Pt resting comfortably in bed on 3L at 96%. AAOx3 disoriented to time/date. Reported pain, resting for relief. Denies any nausea.  R hip dressing in place, old drainage noted. R shoulder dressing and immobilizer in place, CDI. CMS noted. Discussed POC,  at bedside. Fall risk precautions, locked bed in lowest position, bed alarm on and call light within reach. All needs met at this time. Hourly rounding in place.

## 2025-01-27 NOTE — TELEPHONE ENCOUNTER
Called and left message with Spouse (Diony)    We do not do in-patient hospital consults. That should be under the in-patient physician/specialists care.  Once released, patient can follow-up as directed by in-patient care.    Nori Cantu, Med Ass't  [facility]  Ph. [phone]  Fx. [phone]

## 2025-01-27 NOTE — PROGRESS NOTES
[facility] Medicine Daily Progress Note    Date of Service  1/27/2025    Chief Complaint  Prabha Lizama is a 89 y.o. female admitted 1/24/2025 with right hip and shoulder pain after a fall    Hospital Course  History of hypertension, diet controlled hyperlipidemia and anxiety admitted after ground-level fall with right shoulder and right hip pain found to have a right humerus and right femoral neck fracture.  She was admitted for operative repair, perioperative management and rehabilitation.    Interval Problem Update  1/25: To the OR for IM nail right femur, total shoulder arthroplasty on the right.  Postop PT OT.  Work on postop pain control    1/26: Postop day 1 status post IM nail right femur, right total shoulder arthroplasty.  Hemoglobin dropped 4 points.  No active bleeding, hold Lovenox.  Significant pain.  Pending PT OT, anticipate stent    1/27: POD2, large drop in Hgb, down to 7.8.  Large bruise right shoulder, scant bruising right hip.  Lovenox held.  Sundowning but she still has pain requiring low-dose oxycodone.  Mobility is extremely limited.   at bedside, updated.    I have discussed this patient's plan of care and discharge plan at IDT rounds today with Case Management, Nursing, Nursing leadership, and other members of the IDT team.    Consultants/Specialty  orthopedics    Code Status  Full Code    Disposition  The patient is not medically cleared for discharge to home or a post-acute facility.  Anticipate discharge to: skilled nursing facility    I have placed the appropriate orders for post-discharge needs.    Review of Systems  Review of Systems   Constitutional:  Negative for chills, fever and malaise/fatigue.   HENT:  Negative for sore throat.    Respiratory:  Negative for cough and shortness of breath.    Cardiovascular:  Negative for chest pain and palpitations.   Gastrointestinal:  Negative for abdominal pain, blood in stool, diarrhea, heartburn, nausea and vomiting.    Genitourinary:  Negative for dysuria and frequency.   Musculoskeletal:  Positive for falls, joint pain and myalgias. Negative for back pain.   Skin:         Bruising   Neurological:  Positive for weakness. Negative for dizziness, focal weakness and headaches.   Endo/Heme/Allergies:  Bruises/bleeds easily.   Psychiatric/Behavioral:  Positive for memory loss. Negative for depression and hallucinations. The patient is nervous/anxious.    All other systems reviewed and are negative.       Physical Exam  Temp:  [36.3 °C (97.4 °F)-37.3 °C (99.2 °F)] 36.3 °C (97.4 °F)  Pulse:  [74-98] 74  Resp:  [18-20] 18  BP: ()/() 99/55  SpO2:  [93 %-96 %] 95 %    Physical Exam  Constitutional:       General: She is in acute distress.      Appearance: She is ill-appearing.      Comments: Appears in acute pain, moaning   HENT:      Nose: Nose normal.      Mouth/Throat:      Mouth: Mucous membranes are dry.   Cardiovascular:      Rate and Rhythm: Normal rate and regular rhythm.      Pulses: Normal pulses.   Pulmonary:      Effort: Pulmonary effort is normal. No respiratory distress.      Breath sounds: Normal breath sounds. No wheezing.   Abdominal:      General: There is no distension.      Palpations: Abdomen is soft.      Tenderness: There is no abdominal tenderness.   Musculoskeletal:         General: Tenderness and signs of injury present. No swelling.      Cervical back: No muscular tenderness.   Lymphadenopathy:      Cervical: No cervical adenopathy.   Skin:     General: Skin is warm and dry.      Coloration: Skin is pale.      Findings: Bruising (Right shoulder large, scattered small bruises around R hip) present. No rash.   Neurological:      General: No focal deficit present.      Mental Status: She is alert and oriented to person, place, and time.      Motor: Weakness present.   Psychiatric:         Mood and Affect: Mood is anxious.         Cognition and Memory: Cognition is impaired. Memory is impaired.          Judgment: Judgment is impulsive.         Fluids    Intake/Output Summary (Last 24 hours) at 1/27/2025 1240  Last data filed at 1/27/2025 1115  Gross per 24 hour   Intake 830 ml   Output 0 ml   Net 830 ml        Laboratory  Recent Labs     01/25/25  0249 01/26/25  0238 01/26/25  1357 01/26/25  2212 01/27/25  0520   WBC 10.5 8.8  --   --  8.0   RBC 3.60* 2.80*  --   --  2.37*   HEMOGLOBIN 12.1 9.2* 9.1* 8.7* 7.8*   HEMATOCRIT 35.7* 28.1*  --   --  24.6*   MCV 99.2* 100.4*  --   --  103.8*   MCH 33.6* 32.9  --   --  32.9   MCHC 33.9 32.7  --   --  31.7*   RDW 45.1 47.8  --   --  47.8   PLATELETCT 199 173  --   --  168   MPV 10.7 11.3  --   --  10.9     Recent Labs     01/25/25  0249 01/26/25  0238 01/27/25  0520   SODIUM 133* 137 139   POTASSIUM 4.0 4.4 3.9   CHLORIDE 105 108 108   CO2 19* 20 21   GLUCOSE 125* 112* 103*   BUN 16 26* 27*   CREATININE 0.61 1.08 0.75   CALCIUM 8.1* 7.6* 7.9*     Recent Labs     01/24/25  1242   APTT 24.0*   INR 1.11               Imaging  DX-PELVIS-1 OR 2 VIEWS   Final Result      Intramedullary nail and screw fixation of right proximal femoral fracture. There is still some step-off between the fracture fragments.      DX-SHOULDER 2+ RIGHT   Final Result      Reverse right shoulder arthroplasty is well seated.      DX-HIP-COMPLETE - UNILATERAL 2+ RIGHT   Final Result      Digitized intraoperative radiograph is submitted for review. This examination is not for diagnostic purpose but for guidance during a surgical procedure. Please see the patient's chart for full procedural details.         INTERPRETING LOCATION: [address] [facility], [address]      DX-PORTABLE FLUORO > 1 HOUR   Final Result      Portable fluoroscopy utilized for 1 minute 26 seconds.         INTERPRETING LOCATION: [address] [facility], [address]      DX-HUMERUS 2+ RIGHT   Final Result      Displaced fracture of the proximal right humerus.      DX-HIP-COMPLETE - UNILATERAL 2+ LEFT   Final Result      Displaced intertrochanteric  right femoral fracture.      DX-CHEST-PORTABLE (1 VIEW)   Final Result         1. No acute cardiac or pulmonary abnormalities are identified.   2. Displaced right humeral fracture.           Assessment/Plan  * Closed right hip fracture, initial encounter (HCC)- (present on admission)  Assessment & Plan  GLF - R hip and R proximal humeral fracture  Orthopedic repair done on 1/25  PRN oral oxycodone  Scheduled Tylenol minimize narcotics as able  Bowel regimen  Hold Lovenox, large drop in hemoglobin  Pending SNF when she is medically clear    Closed fracture of proximal end of right humerus- (present on admission)  Assessment & Plan  Patient had a ground-level fall 1/25 and suffered a right femoral neck fracture and right humerus fracture  1/25 Underwent:  RIGHT INTRAMEDULLARY SAM, FEMUR, PROXIMAL  RIGHT TOTAL SHOULDER ARTHROPLASTY    Scheduled Tylenol, prn oxycodone  Hold Lovenox postop for DVT prophylaxis - significant anemia  PT OT recommending SNF    Normocytic anemia- (present on admission)  Assessment & Plan  Hemoglobin was 13 on admission, has decreased to 7.8 after 72 hours.  Likely multifactorial due to pulmonary suppression from fracture as well as some blood loss during surgery.    She has a large right shoulder hematoma at surgery site, small amount of bruising at right hip surgical site  Check hemoglobin q8  Transfuse if 7 or less or if she develops symptoms such as tachycardia  Discussed this with her  who was at bedside  Hold Lovenox    Anxiety- (present on admission)  Assessment & Plan  Chronic anxiety  Avoid Benadryl  Manage symptoms as they arise    Pure hypercholesterolemia- (present on admission)  Assessment & Plan  Diet controlled    Impaired fasting blood sugar- (present on admission)  Assessment & Plan  Stable    Insomnia, unspecified- (present on admission)  Assessment & Plan  Melatonin    Primary hypertension- (present on admission)  Assessment & Plan  Optimize blood pressure management  keep systolic blood pressure less than 140 diastolic under 90  Continue Norvasc 2.5 mg daily  Coreg 25 mg twice daily  Telmisartan 80 mg daily  Terazosin 4 mg twice daily  As needed labetalol    Vitamin D deficiency- (present on admission)  Assessment & Plan  Vitamin D supplementation 1000 units p.o. daily    Constipation- (present on admission)  Assessment & Plan  Bowel protocol         VTE prophylaxis: Lovenox (hold)    I have performed a physical exam and reviewed and updated ROS and Plan today (1/27/2025). In review of yesterday's note (1/26/2025), there are no changes except as documented above.    Total time:  51 minutes.  I spent greater than 50% of the time for patient care, counseling, and coordination on this date, including unit/floor time, and face-to-face time with the patient as per interval events and assessment and plan above

## 2025-01-28 ENCOUNTER — APPOINTMENT (OUTPATIENT)
Dept: RADIOLOGY | Facility: MEDICAL CENTER | Age: 89
End: 2025-01-28
Attending: INTERNAL MEDICINE
Payer: MEDICARE

## 2025-01-28 PROBLEM — E83.39 HYPOPHOSPHATEMIA: Status: ACTIVE | Noted: 2025-01-28

## 2025-01-28 PROBLEM — Z71.89 ADVANCE CARE PLANNING: Status: ACTIVE | Noted: 2025-01-28

## 2025-01-28 PROBLEM — R09.02 HYPOXIA: Status: ACTIVE | Noted: 2025-01-28

## 2025-01-28 LAB
ALBUMIN SERPL BCP-MCNC: 2.2 G/DL (ref 3.2–4.9)
BUN SERPL-MCNC: 27 MG/DL (ref 8–22)
CALCIUM ALBUM COR SERPL-MCNC: 9.2 MG/DL (ref 8.5–10.5)
CALCIUM SERPL-MCNC: 7.8 MG/DL (ref 8.4–10.2)
CHLORIDE SERPL-SCNC: 105 MMOL/L (ref 96–112)
CO2 SERPL-SCNC: 20 MMOL/L (ref 20–33)
CREAT SERPL-MCNC: 0.77 MG/DL (ref 0.5–1.4)
ERYTHROCYTE [DISTWIDTH] IN BLOOD BY AUTOMATED COUNT: 46 FL (ref 35.9–50)
GFR SERPLBLD CREATININE-BSD FMLA CKD-EPI: 74 ML/MIN/1.73 M 2
GLUCOSE SERPL-MCNC: 116 MG/DL (ref 65–99)
HCT VFR BLD AUTO: 22.3 % (ref 37–47)
HCT VFR BLD AUTO: 23.9 % (ref 37–47)
HGB BLD-MCNC: 7.4 G/DL (ref 12–16)
HGB BLD-MCNC: 8 G/DL (ref 12–16)
MAGNESIUM SERPL-MCNC: 2.1 MG/DL (ref 1.5–2.5)
MCH RBC QN AUTO: 32.9 PG (ref 27–33)
MCHC RBC AUTO-ENTMCNC: 33.2 G/DL (ref 32.2–35.5)
MCV RBC AUTO: 99.1 FL (ref 81.4–97.8)
PHOSPHATE SERPL-MCNC: 1.5 MG/DL (ref 2.5–4.5)
PLATELET # BLD AUTO: 181 K/UL (ref 164–446)
PMV BLD AUTO: 11.2 FL (ref 9–12.9)
POTASSIUM SERPL-SCNC: 3.8 MMOL/L (ref 3.6–5.5)
RBC # BLD AUTO: 2.25 M/UL (ref 4.2–5.4)
SODIUM SERPL-SCNC: 133 MMOL/L (ref 135–145)
WBC # BLD AUTO: 8.8 K/UL (ref 4.8–10.8)

## 2025-01-28 PROCEDURE — A9270 NON-COVERED ITEM OR SERVICE: HCPCS | Performed by: INTERNAL MEDICINE

## 2025-01-28 PROCEDURE — A9270 NON-COVERED ITEM OR SERVICE: HCPCS | Performed by: STUDENT IN AN ORGANIZED HEALTH CARE EDUCATION/TRAINING PROGRAM

## 2025-01-28 PROCEDURE — 99497 ADVNCD CARE PLAN 30 MIN: CPT | Performed by: INTERNAL MEDICINE

## 2025-01-28 PROCEDURE — 85018 HEMOGLOBIN: CPT

## 2025-01-28 PROCEDURE — 700102 HCHG RX REV CODE 250 W/ 637 OVERRIDE(OP): Performed by: INTERNAL MEDICINE

## 2025-01-28 PROCEDURE — 99233 SBSQ HOSP IP/OBS HIGH 50: CPT | Performed by: INTERNAL MEDICINE

## 2025-01-28 PROCEDURE — 94760 N-INVAS EAR/PLS OXIMETRY 1: CPT

## 2025-01-28 PROCEDURE — 97530 THERAPEUTIC ACTIVITIES: CPT

## 2025-01-28 PROCEDURE — 85014 HEMATOCRIT: CPT

## 2025-01-28 PROCEDURE — 83735 ASSAY OF MAGNESIUM: CPT

## 2025-01-28 PROCEDURE — 770001 HCHG ROOM/CARE - MED/SURG/GYN PRIV*

## 2025-01-28 PROCEDURE — 700102 HCHG RX REV CODE 250 W/ 637 OVERRIDE(OP): Performed by: HOSPITALIST

## 2025-01-28 PROCEDURE — 700111 HCHG RX REV CODE 636 W/ 250 OVERRIDE (IP): Mod: JZ | Performed by: INTERNAL MEDICINE

## 2025-01-28 PROCEDURE — 85027 COMPLETE CBC AUTOMATED: CPT

## 2025-01-28 PROCEDURE — 700102 HCHG RX REV CODE 250 W/ 637 OVERRIDE(OP): Performed by: STUDENT IN AN ORGANIZED HEALTH CARE EDUCATION/TRAINING PROGRAM

## 2025-01-28 PROCEDURE — 700105 HCHG RX REV CODE 258: Performed by: INTERNAL MEDICINE

## 2025-01-28 PROCEDURE — 80069 RENAL FUNCTION PANEL: CPT

## 2025-01-28 PROCEDURE — A9270 NON-COVERED ITEM OR SERVICE: HCPCS | Performed by: HOSPITALIST

## 2025-01-28 PROCEDURE — 700101 HCHG RX REV CODE 250: Performed by: INTERNAL MEDICINE

## 2025-01-28 PROCEDURE — 36415 COLL VENOUS BLD VENIPUNCTURE: CPT

## 2025-01-28 PROCEDURE — 97110 THERAPEUTIC EXERCISES: CPT

## 2025-01-28 RX ORDER — LACTULOSE 10 G/15ML
30 SOLUTION ORAL 2 TIMES DAILY
Status: DISCONTINUED | OUTPATIENT
Start: 2025-01-28 | End: 2025-01-30 | Stop reason: HOSPADM

## 2025-01-28 RX ORDER — HYDROMORPHONE HYDROCHLORIDE 1 MG/ML
1 INJECTION, SOLUTION INTRAMUSCULAR; INTRAVENOUS; SUBCUTANEOUS
Status: DISCONTINUED | OUTPATIENT
Start: 2025-01-28 | End: 2025-01-28

## 2025-01-28 RX ORDER — BISACODYL 10 MG
10 SUPPOSITORY, RECTAL RECTAL DAILY
Status: DISCONTINUED | OUTPATIENT
Start: 2025-01-28 | End: 2025-01-30 | Stop reason: HOSPADM

## 2025-01-28 RX ORDER — OXYCODONE HYDROCHLORIDE 5 MG/1
5 TABLET ORAL EVERY 6 HOURS PRN
Status: DISCONTINUED | OUTPATIENT
Start: 2025-01-28 | End: 2025-01-29

## 2025-01-28 RX ORDER — OXYCODONE HYDROCHLORIDE 5 MG/1
2.5 TABLET ORAL EVERY 6 HOURS PRN
Status: DISCONTINUED | OUTPATIENT
Start: 2025-01-28 | End: 2025-01-29

## 2025-01-28 RX ORDER — HYDROMORPHONE HYDROCHLORIDE 1 MG/ML
0.25 INJECTION, SOLUTION INTRAMUSCULAR; INTRAVENOUS; SUBCUTANEOUS EVERY 6 HOURS PRN
Status: DISCONTINUED | OUTPATIENT
Start: 2025-01-28 | End: 2025-01-29

## 2025-01-28 RX ORDER — ACETAMINOPHEN 500 MG
1000 TABLET ORAL 3 TIMES DAILY
Status: DISCONTINUED | OUTPATIENT
Start: 2025-01-28 | End: 2025-01-30 | Stop reason: HOSPADM

## 2025-01-28 RX ORDER — HYDROMORPHONE HYDROCHLORIDE 1 MG/ML
0.5 INJECTION, SOLUTION INTRAMUSCULAR; INTRAVENOUS; SUBCUTANEOUS
Status: DISCONTINUED | OUTPATIENT
Start: 2025-01-28 | End: 2025-01-28

## 2025-01-28 RX ADMIN — POLYETHYLENE GLYCOL 3350 1 PACKET: 17 POWDER, FOR SOLUTION ORAL at 13:08

## 2025-01-28 RX ADMIN — TERAZOSIN HYDROCHLORIDE 4 MG: 1 CAPSULE ORAL at 08:14

## 2025-01-28 RX ADMIN — TERAZOSIN HYDROCHLORIDE 4 MG: 1 CAPSULE ORAL at 17:29

## 2025-01-28 RX ADMIN — AMLODIPINE BESYLATE 2.5 MG: 5 TABLET ORAL at 06:22

## 2025-01-28 RX ADMIN — CARVEDILOL 25 MG: 25 TABLET, FILM COATED ORAL at 08:15

## 2025-01-28 RX ADMIN — ENOXAPARIN SODIUM 40 MG: 100 INJECTION SUBCUTANEOUS at 17:30

## 2025-01-28 RX ADMIN — SENNOSIDES AND DOCUSATE SODIUM 2 TABLET: 50; 8.6 TABLET ORAL at 17:31

## 2025-01-28 RX ADMIN — POTASSIUM PHOSPHATE, MONOBASIC AND POTASSIUM PHOSPHATE, DIBASIC 30 MMOL: 224; 236 INJECTION, SOLUTION, CONCENTRATE INTRAVENOUS at 10:12

## 2025-01-28 RX ADMIN — ACETAMINOPHEN 1000 MG: 500 TABLET ORAL at 14:41

## 2025-01-28 RX ADMIN — TELMISARTAN 80 MG: 40 TABLET ORAL at 08:15

## 2025-01-28 RX ADMIN — LACTULOSE 30 ML: 20 SOLUTION ORAL at 14:41

## 2025-01-28 RX ADMIN — CARVEDILOL 25 MG: 25 TABLET, FILM COATED ORAL at 17:30

## 2025-01-28 RX ADMIN — DOCUSATE SODIUM 100 MG: 100 CAPSULE, LIQUID FILLED ORAL at 08:15

## 2025-01-28 ASSESSMENT — ENCOUNTER SYMPTOMS
DIZZINESS: 0
WEAKNESS: 1
HALLUCINATIONS: 0
PALPITATIONS: 0
DEPRESSION: 0
HEADACHES: 0
COUGH: 0
HEARTBURN: 0
CHILLS: 0
DIARRHEA: 0
BACK PAIN: 0
SHORTNESS OF BREATH: 0
NAUSEA: 0
FEVER: 0
ABDOMINAL PAIN: 0
VOMITING: 0
BLOOD IN STOOL: 0
BRUISES/BLEEDS EASILY: 1
MYALGIAS: 1
ROS SKIN COMMENTS: BRUISING
SORE THROAT: 0
FOCAL WEAKNESS: 0

## 2025-01-28 ASSESSMENT — COGNITIVE AND FUNCTIONAL STATUS - GENERAL
STANDING UP FROM CHAIR USING ARMS: TOTAL
SUGGESTED CMS G CODE MODIFIER MOBILITY: CN
MOVING FROM LYING ON BACK TO SITTING ON SIDE OF FLAT BED: TOTAL
MOBILITY SCORE: 6
CLIMB 3 TO 5 STEPS WITH RAILING: TOTAL
TURNING FROM BACK TO SIDE WHILE IN FLAT BAD: TOTAL
MOVING TO AND FROM BED TO CHAIR: TOTAL
WALKING IN HOSPITAL ROOM: TOTAL

## 2025-01-28 ASSESSMENT — PAIN DESCRIPTION - PAIN TYPE
TYPE: SURGICAL PAIN

## 2025-01-28 ASSESSMENT — PATIENT HEALTH QUESTIONNAIRE - PHQ9
1. LITTLE INTEREST OR PLEASURE IN DOING THINGS: NOT AT ALL
SUM OF ALL RESPONSES TO PHQ9 QUESTIONS 1 AND 2: 0
2. FEELING DOWN, DEPRESSED, IRRITABLE, OR HOPELESS: NOT AT ALL

## 2025-01-28 ASSESSMENT — GAIT ASSESSMENTS: GAIT LEVEL OF ASSIST: UNABLE TO PARTICIPATE

## 2025-01-28 NOTE — ASSESSMENT & PLAN NOTE
1/29: I discussed with patient that the patient's  at bedside for at least 16 minutes further goals of care.  The patient's  was interested in filling out a POLST form.  We filled out a POLST form together at bedside.  Today due to the patient's status she is unable to make decisions for herself so her  signed the POLST form.  At this point the patient will continue with CPR, full treatment options including intubation, mechanical ventilation and transferred to the ICU as needed.  As per the  the patient to receive artificial nutrition/feeding tube trial no longer than 2 weeks if needed.  At this point today the patient has lacked decisional capacity so the POLST form was signed by the .  I have given the POLST form to nursing to upload to the system.

## 2025-01-28 NOTE — CARE PLAN
The patient is Stable - Low risk of patient condition declining or worsening    Shift Goals  Clinical Goals: Pt's pain will be controlled, wean off O2, monitor mentation, PT/OT and free from falls during this shift  Patient Goals: Able to manage pain, rest  Family Goals: Update POC    Progress made toward(s) clinical / shift goals:  Pt reported pain, medicated per MAR.On 2L/NC. Minimal urine output, bladder scanned 551 mL. Straight cath, output 300 mL. Pt did not sustain any fall during this shift.     Patient is not progressing towards the following goals:  Pt refused to mobilized.    Problem: Post-Operative Hip Replacement  Goal: Early mobilization post surgery  1/27/2025 1846 by Lidia Harding R.N.  Outcome: Not Progressing

## 2025-01-28 NOTE — DISCHARGE PLANNING
Following for post acute services Therapy evaluations post op day 1 will follow for tolerance and participation. PM&R referral initiated. Pending return to community plan and support.

## 2025-01-28 NOTE — PROGRESS NOTES
4 Eyes Skin Assessment Completed by SUSHANT Riojas and SUSHANT WILSON.    Head WDL  Ears WDL  Nose WDL  Mouth WDL  Neck WDL  Breast/Chest WDL  Shoulder Blades WDL  Spine WDL  (R) Arm/Elbow/Hand S/P Right Shoulder Surgery, dressing and immobilizer in place   (L) Arm/Elbow/Hand WDL  Abdomen WDL  Groin WDL  Scrotum/Coccyx/Buttocks WDL  (R) Leg Swelling, S/P R hip Surgery, dressing in place  (L) Leg Swelling  (R) Heel/Foot/Toe Swelling  (L) Heel/Foot/Toe Swelling          Devices In Places Pulse Ox, SCD's, and Nasal Cannula      Interventions In Place NC W/Ear Foams, Heel Mepilex, TAP System, Pillows, Q2 Turns, Low Air Loss Mattress, and Heels Loaded W/Pillows    Possible Skin Injury No    Pictures Uploaded Into Epic N/A  Wound Consult Placed N/A  RN Wound Prevention Protocol Ordered No

## 2025-01-28 NOTE — CARE PLAN
The patient is Watcher - Medium risk of patient condition declining or worsening    Shift Goals  Clinical Goals: pain control, Q2 turns, free from falls  Patient Goals: rest and sleep  Family Goals:    Progress made toward(s) clinical / shift goals: Q2 turns was done whiel patient was awake, pain controlled     Patient was asleep after taking oxycodone around 2300H, patient woke up around 0400H and she wanted to get up and walk. Reorient patient, re positioned her and went back to sleep. Patient voided 600ml for this shift but no BM yet. Patient is alert to self only.     Patient is not progressing towards the following goals:      Problem: Post-Operative Shoulder Replacement  Goal: Early mobilization post surgery  Outcome: Not Progressing     Problem: Pain - Post Surgery  Goal: Alleviation or reduction of pain post surgery  Outcome: Not Progressing     Problem: Post-Operative Hip Replacement  Goal: Early mobilization post surgery  Outcome: Not Progressing  Goal: Patient will demonstrate understanding of post-surgical hip precautions, weight bearing restrictions and DME usage during hospital stay and post discharge  Outcome: Not Progressing

## 2025-01-28 NOTE — THERAPY
"Physical Therapy   Daily Treatment     Patient Name: Prabha Lizama  Age:  89 y.o., Sex:  female  Medical Record #: [id number]  Today's Date: 1/28/2025     Precautions  Precautions: (P) Fall Risk;Weight Bearing As Tolerated Right Lower Extremity;Non Weight Bearing Right Upper Extremity;Immobilizer Right Upper Extremity;Swallow Precautions    Assessment    Pt spouse present and states pt is doing better today. Pt is very \" sharp\" at baseline per spouse. Pt is A & O x1, cooperative with tx and getting into chair. Max assist x 2 for transfer. High fall risk . B knees buckled during transfer.  Total assist to lower descent to sitting. Pt will need post aute PT services to maximize functional potential. See below flowsheet for tx details.        Plan    Treatment Plan Status:    Type of Treatment: Bed Mobility, Equipment, Family / Caregiver Training, Gait Training, Neuro Re-Education / Balance, Therapeutic Activities, Therapeutic Exercise  Treatment Frequency: 4 Times per Week  Treatment Duration: Until Therapy Goals Met    DC Equipment Recommendations: (P) Unable to determine at this time  Discharge Recommendations: (P) Recommend post-acute placement for additional physical therapy services prior to discharge home       01/28/25 1300   Time In/Time Out   Therapy Start Time 1216   Therapy End Time 1246   Total Therapy Time 30   Charge Group   Charges  Yes   PT Therapeutic Activities (Units) 2   PT Therapeutic Exercise (Units) 1   Total Time Spent   PT Total Time Yes   PT Therapeutic Activities Time Spent (Mins) 20   PT Therapeutic Exercise Time Spent (Mins) 10   PT Total Time Spent (Calculated) 30    Services   Is patient using  services for this encounter? No   Precautions   Precautions Fall Risk;Weight Bearing As Tolerated Right Lower Extremity;Non Weight Bearing Right Upper Extremity;Immobilizer Right Upper Extremity;Swallow Precautions   Vitals   O2 (LPM) 2   O2 Delivery Device Nasal Cannula "   Pain 0 - 10 Group   Location Generalized   Therapist Pain Assessment Nurse Notified;Post Activity Pain Same as Prior to Activity   Cognition    Cognition / Consciousness X   Level of Consciousness Alert   Comments A & O x1, confused, distracted by pain. Not following commands   Passive ROM Lower Body   Passive ROM Lower Body X   Comments RLE WFL limited by pain   Active ROM Lower Body    Active ROM Lower Body  X   Comments limited by pain.   Strength Lower Body   Lower Body Strength  X   Comments B LE weakness, R>L, B knee buckle during transfer   Supine Lower Body Exercise   Supine Lower Body Exercises Yes   Heel Slide 1 set of 10;Right   Ankle Pumps Reciprocal;Bilateral   Sitting Lower Body Exercises   Sitting Lower Body Exercises Yes   Ankle Pumps 1 set of 10;Bilateral   Other Treatments   Other Treatments Provided time taken for bed linen change, balance at EOB, re orientation, positioning in chair.   Balance   Sitting Balance (Static) Poor -   Sitting Balance (Dynamic) Poor -   Standing Balance (Static) Trace +   Standing Balance (Dynamic) Trace   Weight Shift Sitting Absent   Weight Shift Standing Absent   Skilled Intervention Verbal Cuing;Tactile Cuing;Postural Facilitation   Comments leans to L in sitting.   Bed Mobility    Supine to Sit Total Assist   Scooting Total Assist   Rolling Total Assist to Lt.;Total Assist to Rt.   Comments 2 person assist   Gait Analysis   Gait Level Of Assist Unable to Participate   Functional Mobility   Sit to Stand Maximal Assist  (x 2 , bed elevated)   Bed, Chair, Wheelchair Transfer Maximal Assist  (x 2)   Skilled Intervention Postural Facilitation;Sequencing;Verbal Cuing;Tactile Cuing;Compensatory Strategies   Comments B knee buckle during transfer   6 Clicks Assessment - How much HELP from from another person do you currently need... (If the patient hasn't done an activity recently, how much help from another person do you think he/she would need if he/she tried?)    Turning from your back to your side while in a flat bed without using bedrails? 1   Moving from lying on your back to sitting on the side of a flat bed without using bedrails? 1   Moving to and from a bed to a chair (including a wheelchair)? 1   Standing up from a chair using your arms (e.g., wheelchair, or bedside chair)? 1   Walking in hospital room? 1   Climbing 3-5 steps with a railing? 1   6 clicks Mobility Score 6   Activity Tolerance   Sitting Edge of Bed 10 mins   Comments pt fatiqued and teary once sitting in chair.   Short Term Goals    Short Term Goal # 1 In 6 visits, patient will complete supine<>sit without bed features, Min A or better to improve functional mobility.   Goal Outcome # 1 goal not met   Short Term Goal # 2 In 6 visits, patient will complete safe transfers with unilateral LRAD, Min A  or better to improved functional mobility.   Goal Outcome # 2 Goal not met   Short Term Goal # 3 In 6 visits, patient will ambulate 10 feet with unilateral LRAD, CGA or better to improve functional mobility.   Goal Outcome # 3 Goal not met   Education Group   Role of Physical Therapist Patient Response Patient;Family;Acceptance;Explanation;Verbal Demonstration   Anticipated Discharge Equipment and Recommendations   DC Equipment Recommendations Unable to determine at this time   Discharge Recommendations Recommend post-acute placement for additional physical therapy services prior to discharge home   Interdisciplinary Plan of Care Collaboration   IDT Collaboration with  Nursing;Family / Caregiver;Certified Nursing Assistant   Patient Position at End of Therapy Seated;Family / Friend in Room;Phone within Reach;Tray Table within Reach;Call Light within Reach   Collaboration Comments re; tx   Session Information   Date / Session Number  1/28-2 ( 2/4, 2/1)

## 2025-01-28 NOTE — PROGRESS NOTES
"Hospital Medicine Daily Progress Note    Date of Service  1/28/2025    Chief Complaint  Prabha Lizama is a 89 y.o. female admitted 1/24/2025 with right hip and shoulder pain after a fall    Hospital Course  As per chart review:  \"History of hypertension, diet controlled hyperlipidemia and anxiety admitted after ground-level fall with right shoulder and right hip pain found to have a right humerus and right femoral neck fracture.  She was admitted for operative repair, perioperative management and rehabilitation.\"    Interval Problem Update  1/25: To the OR for IM nail right femur, total shoulder arthroplasty on the right.  Postop PT OT.  Work on postop pain control    1/26: Postop day 1 status post IM nail right femur, right total shoulder arthroplasty.  Hemoglobin dropped 4 points.  No active bleeding, hold Lovenox.  Significant pain.  Pending PT OT    1/27: POD2, large drop in Hgb, down to 7.8.  Large bruise right shoulder, scant bruising right hip.  Lovenox held.  Sundowning but she still has pain requiring low-dose oxycodone.  Mobility is extremely limited.   at bedside, updated.    PATIENT SEEN BY PREVIOUS HOSPITALIST UNTIL 1/27 1/28: Patient seen at bedside this morning.  The patient seems to be sleepy, drowsy but able to be woken up.   is present at bedside as well.  The patient still complaining of some pain.  Hemoglobin seems to be stable so we have resumed Lovenox.  Continue to monitor closely.    I have discussed this patient's plan of care and discharge plan at IDT rounds today with Case Management, Nursing, Nursing leadership, and other members of the IDT team.    Consultants/Specialty  orthopedics    Code Status  Full Code    Disposition  The patient is not medically cleared for discharge to home or a post-acute facility.        Review of Systems  Review of Systems   Constitutional:  Negative for chills, fever and malaise/fatigue.   HENT:  Negative for sore throat.  "   Respiratory:  Negative for cough and shortness of breath.    Cardiovascular:  Negative for chest pain and palpitations.   Gastrointestinal:  Negative for abdominal pain, blood in stool, diarrhea, heartburn, nausea and vomiting.   Genitourinary:  Negative for dysuria and frequency.   Musculoskeletal:  Positive for joint pain and myalgias. Negative for back pain.   Skin:         Bruising   Neurological:  Positive for weakness (generalized). Negative for dizziness, focal weakness and headaches.   Endo/Heme/Allergies:  Bruises/bleeds easily.   Psychiatric/Behavioral:  Negative for depression and hallucinations.    All other systems reviewed and are negative.       Physical Exam  Temp:  [35.8 °C (96.5 °F)-37.6 °C (99.6 °F)] 37.6 °C (99.6 °F)  Pulse:  [72-84] 76  Resp:  [16-18] 16  BP: (116-143)/(44-68) 135/57  SpO2:  [93 %-99 %] 94 %    Physical Exam  Constitutional:       Appearance: She is ill-appearing.   HENT:      Nose: Nose normal.      Mouth/Throat:      Mouth: Mucous membranes are dry.   Cardiovascular:      Rate and Rhythm: Normal rate and regular rhythm.      Pulses: Normal pulses.   Pulmonary:      Effort: No respiratory distress.      Comments: Supplemental oxygen  Abdominal:      General: There is no distension.      Palpations: Abdomen is soft.      Tenderness: There is no abdominal tenderness.   Musculoskeletal:         General: Tenderness present. No swelling.      Cervical back: No muscular tenderness.   Lymphadenopathy:      Cervical: No cervical adenopathy.   Skin:     General: Skin is warm and dry.      Findings: Bruising present. No rash.   Neurological:      Mental Status: She is alert and oriented to person, place, and time.      Comments: Patient seems drowsy but able to be woken up   Psychiatric:         Mood and Affect: Mood normal.         Behavior: Behavior normal.         Fluids    Intake/Output Summary (Last 24 hours) at 1/28/2025 1400  Last data filed at 1/28/2025 0900  Gross per 24 hour    Intake 480 ml   Output 700 ml   Net -220 ml        Laboratory  Recent Labs     01/26/25  0238 01/26/25  1357 01/27/25  0520 01/27/25  1417 01/28/25  0112 01/28/25  0940   WBC 8.8  --  8.0  --  8.8  --    RBC 2.80*  --  2.37*  --  2.25*  --    HEMOGLOBIN 9.2*   < > 7.8* 8.0* 7.4* 8.0*   HEMATOCRIT 28.1*  --  24.6*  --  22.3* 23.9*   .4*  --  103.8*  --  99.1*  --    MCH 32.9  --  32.9  --  32.9  --    MCHC 32.7  --  31.7*  --  33.2  --    RDW 47.8  --  47.8  --  46.0  --    PLATELETCT 173  --  168  --  181  --    MPV 11.3  --  10.9  --  11.2  --     < > = values in this interval not displayed.     Recent Labs     01/26/25  0238 01/27/25  0520 01/28/25  0112   SODIUM 137 139 133*   POTASSIUM 4.4 3.9 3.8   CHLORIDE 108 108 105   CO2 20 21 20   GLUCOSE 112* 103* 116*   BUN 26* 27* 27*   CREATININE 1.08 0.75 0.77   CALCIUM 7.6* 7.9* 7.8*                     Imaging  DX-PELVIS-1 OR 2 VIEWS   Final Result      Intramedullary nail and screw fixation of right proximal femoral fracture. There is still some step-off between the fracture fragments.      DX-SHOULDER 2+ RIGHT   Final Result      Reverse right shoulder arthroplasty is well seated.      DX-HIP-COMPLETE - UNILATERAL 2+ RIGHT   Final Result      Digitized intraoperative radiograph is submitted for review. This examination is not for diagnostic purpose but for guidance during a surgical procedure. Please see the patient's chart for full procedural details.         INTERPRETING LOCATION: [address] [facility], [address]      DX-PORTABLE FLUORO > 1 HOUR   Final Result      Portable fluoroscopy utilized for 1 minute 26 seconds.         INTERPRETING LOCATION: [address] [facility], [address]      DX-HUMERUS 2+ RIGHT   Final Result      Displaced fracture of the proximal right humerus.      DX-HIP-COMPLETE - UNILATERAL 2+ LEFT   Final Result      Displaced intertrochanteric right femoral fracture.      DX-CHEST-PORTABLE (1 VIEW)   Final Result         1. No acute cardiac  or pulmonary abnormalities are identified.   2. Displaced right humeral fracture.      IR-US GUIDED PIV    (Results Pending)        Assessment/Plan  * Closed right hip fracture, initial encounter (HCC)- (present on admission)  Assessment & Plan  GLF - R hip and R proximal humeral fracture  Orthopedic repair done on 1/25  PRN oral oxycodone  Scheduled Tylenol minimize narcotics as able  Bowel regimen  Pending SNF when she is medically clear    1/28: Hemoglobin stable, we have resumed Lovenox.    Hypoxia  Assessment & Plan  We will try to wean down oxygen as tolerated  If unable to, we will consider further testing    Hypophosphatemia  Assessment & Plan  Replace as needed  monitor    Vitamin D deficiency- (present on admission)  Assessment & Plan  Vitamin D supplementation 1000 units p.o. daily    Closed fracture of proximal end of right humerus- (present on admission)  Assessment & Plan  Patient had a ground-level fall 1/25 and suffered a right femoral neck fracture and right humerus fracture  1/25 Underwent:  RIGHT INTRAMEDULLARY SAM, FEMUR, PROXIMAL  RIGHT TOTAL SHOULDER ARTHROPLASTY    Scheduled Tylenol, prn oxycodone  PT OT recommending SNF    1/28: Hemoglobin stable, we have resumed Lovenox.    Constipation- (present on admission)  Assessment & Plan  Bowel protocol    1/28: We have added lactulose until BM.    Normocytic anemia- (present on admission)  Assessment & Plan  1/28: Hb seems to be stable. Resume lovenox.    Pure hypercholesterolemia- (present on admission)  Assessment & Plan  Diet controlled    Impaired fasting blood sugar- (present on admission)  Assessment & Plan  Last known A1c is 5.4  No need for further management at this time    Primary hypertension- (present on admission)  Assessment & Plan  1/28:  Continue Norvasc 2.5 mg daily  Coreg 25 mg twice daily  Telmisartan 80 mg daily  Terazosin 4 mg twice daily  As needed labetalol    Advance care planning  Assessment & Plan  1/28: I discussed with  patient and the patient's  at bedside for at least 16 minutes further goals of care.  This included CODE STATUS which includes full code and DNR/DNI.  At this point the patient's  is making decisions for her due to the patient's clinical status at this time.  The patient, confirmed by the patient's  would like to be full code.  We also discussed further treatment goals.  For now they would like to have full treatment options available.  This includes invasive and noninvasive procedures as needed.         VTE prophylaxis: Lovenox (hold)    I have performed a physical exam and reviewed and updated ROS and Plan today (1/28/2025). In review of yesterday's note (1/27/2025), there are no changes except as documented above.      I spend at least 51 minutes providing care for this patient.  This included face-to-face interview, physical examination.  Review of lab work including CBC, hemoglobin, BMP, magnesium, phosphorus.  Discussing with multidisciplinary team including case management, nursing staff and pharmacy.  Creating plan of care, reviewing orders.    Moreover, I discussed with patient and the patient's  at bedside for at least 16 minutes further goals of care.  This included CODE STATUS which includes full code and DNR/DNI.  At this point the patient's  is making decisions for her due to the patient's clinical status at this time.  The patient, confirmed by the patient's  would like to be full code.  We also discussed further treatment goals.  For now they would like to have full treatment options available.  This includes invasive and noninvasive procedures as needed.

## 2025-01-28 NOTE — ASSESSMENT & PLAN NOTE
We will try to wean down oxygen as tolerated  If unable to, we will consider further testing    1/29: Patient on room air at the time of my evaluation.

## 2025-01-29 LAB
ALBUMIN SERPL BCP-MCNC: 2.5 G/DL (ref 3.2–4.9)
ALBUMIN/GLOB SERPL: 0.9 G/DL
ALP SERPL-CCNC: 54 U/L (ref 30–99)
ALT SERPL-CCNC: 24 U/L (ref 2–50)
ANION GAP SERPL CALC-SCNC: 10 MMOL/L (ref 7–16)
AST SERPL-CCNC: 36 U/L (ref 12–45)
BILIRUB SERPL-MCNC: 1 MG/DL (ref 0.1–1.5)
BUN SERPL-MCNC: 20 MG/DL (ref 8–22)
CALCIUM ALBUM COR SERPL-MCNC: 9.3 MG/DL (ref 8.5–10.5)
CALCIUM SERPL-MCNC: 8.1 MG/DL (ref 8.4–10.2)
CHLORIDE SERPL-SCNC: 104 MMOL/L (ref 96–112)
CO2 SERPL-SCNC: 21 MMOL/L (ref 20–33)
CREAT SERPL-MCNC: 0.63 MG/DL (ref 0.5–1.4)
ERYTHROCYTE [DISTWIDTH] IN BLOOD BY AUTOMATED COUNT: 46.5 FL (ref 35.9–50)
GFR SERPLBLD CREATININE-BSD FMLA CKD-EPI: 85 ML/MIN/1.73 M 2
GLOBULIN SER CALC-MCNC: 2.8 G/DL (ref 1.9–3.5)
GLUCOSE SERPL-MCNC: 120 MG/DL (ref 65–99)
HCT VFR BLD AUTO: 25.3 % (ref 37–47)
HGB BLD-MCNC: 8.3 G/DL (ref 12–16)
MCH RBC QN AUTO: 32.5 PG (ref 27–33)
MCHC RBC AUTO-ENTMCNC: 32.8 G/DL (ref 32.2–35.5)
MCV RBC AUTO: 99.2 FL (ref 81.4–97.8)
PHOSPHATE SERPL-MCNC: 2.1 MG/DL (ref 2.5–4.5)
PLATELET # BLD AUTO: 231 K/UL (ref 164–446)
PMV BLD AUTO: 10.8 FL (ref 9–12.9)
POTASSIUM SERPL-SCNC: 4.1 MMOL/L (ref 3.6–5.5)
PROT SERPL-MCNC: 5.3 G/DL (ref 6–8.2)
RBC # BLD AUTO: 2.55 M/UL (ref 4.2–5.4)
SODIUM SERPL-SCNC: 135 MMOL/L (ref 135–145)
WBC # BLD AUTO: 8.9 K/UL (ref 4.8–10.8)

## 2025-01-29 PROCEDURE — 700111 HCHG RX REV CODE 636 W/ 250 OVERRIDE (IP): Mod: JZ | Performed by: INTERNAL MEDICINE

## 2025-01-29 PROCEDURE — 80053 COMPREHEN METABOLIC PANEL: CPT

## 2025-01-29 PROCEDURE — A9270 NON-COVERED ITEM OR SERVICE: HCPCS | Performed by: HOSPITALIST

## 2025-01-29 PROCEDURE — 85027 COMPLETE CBC AUTOMATED: CPT

## 2025-01-29 PROCEDURE — 770001 HCHG ROOM/CARE - MED/SURG/GYN PRIV*

## 2025-01-29 PROCEDURE — 99233 SBSQ HOSP IP/OBS HIGH 50: CPT | Mod: 25 | Performed by: INTERNAL MEDICINE

## 2025-01-29 PROCEDURE — 84100 ASSAY OF PHOSPHORUS: CPT

## 2025-01-29 PROCEDURE — 700102 HCHG RX REV CODE 250 W/ 637 OVERRIDE(OP): Performed by: INTERNAL MEDICINE

## 2025-01-29 PROCEDURE — 94760 N-INVAS EAR/PLS OXIMETRY 1: CPT

## 2025-01-29 PROCEDURE — 99497 ADVNCD CARE PLAN 30 MIN: CPT | Performed by: INTERNAL MEDICINE

## 2025-01-29 PROCEDURE — 36415 COLL VENOUS BLD VENIPUNCTURE: CPT

## 2025-01-29 PROCEDURE — A9270 NON-COVERED ITEM OR SERVICE: HCPCS | Performed by: INTERNAL MEDICINE

## 2025-01-29 PROCEDURE — 700102 HCHG RX REV CODE 250 W/ 637 OVERRIDE(OP): Performed by: HOSPITALIST

## 2025-01-29 RX ORDER — GABAPENTIN 100 MG/1
100 CAPSULE ORAL 2 TIMES DAILY
Status: DISCONTINUED | OUTPATIENT
Start: 2025-01-29 | End: 2025-01-30 | Stop reason: HOSPADM

## 2025-01-29 RX ORDER — TRAMADOL HYDROCHLORIDE 50 MG/1
25 TABLET ORAL EVERY 8 HOURS PRN
Status: DISCONTINUED | OUTPATIENT
Start: 2025-01-29 | End: 2025-01-30 | Stop reason: HOSPADM

## 2025-01-29 RX ADMIN — DIBASIC SODIUM PHOSPHATE, MONOBASIC POTASSIUM PHOSPHATE AND MONOBASIC SODIUM PHOSPHATE 250 MG: 852; 155; 130 TABLET ORAL at 09:15

## 2025-01-29 RX ADMIN — OXYCODONE 5 MG: 5 TABLET ORAL at 09:33

## 2025-01-29 RX ADMIN — OXYCODONE 5 MG: 5 TABLET ORAL at 01:49

## 2025-01-29 RX ADMIN — TERAZOSIN HYDROCHLORIDE 4 MG: 1 CAPSULE ORAL at 05:28

## 2025-01-29 RX ADMIN — ACETAMINOPHEN 1000 MG: 500 TABLET ORAL at 22:03

## 2025-01-29 RX ADMIN — TELMISARTAN 80 MG: 40 TABLET ORAL at 05:29

## 2025-01-29 RX ADMIN — CARVEDILOL 25 MG: 25 TABLET, FILM COATED ORAL at 05:28

## 2025-01-29 RX ADMIN — CARVEDILOL 25 MG: 25 TABLET, FILM COATED ORAL at 17:13

## 2025-01-29 RX ADMIN — ENOXAPARIN SODIUM 40 MG: 100 INJECTION SUBCUTANEOUS at 17:09

## 2025-01-29 RX ADMIN — ACETAMINOPHEN 1000 MG: 500 TABLET ORAL at 17:05

## 2025-01-29 RX ADMIN — Medication 1000 UNITS: at 05:28

## 2025-01-29 RX ADMIN — DIBASIC SODIUM PHOSPHATE, MONOBASIC POTASSIUM PHOSPHATE AND MONOBASIC SODIUM PHOSPHATE 250 MG: 852; 155; 130 TABLET ORAL at 17:14

## 2025-01-29 RX ADMIN — AMLODIPINE BESYLATE 2.5 MG: 5 TABLET ORAL at 05:28

## 2025-01-29 RX ADMIN — GABAPENTIN 100 MG: 100 CAPSULE ORAL at 17:13

## 2025-01-29 RX ADMIN — TERAZOSIN HYDROCHLORIDE 4 MG: 1 CAPSULE ORAL at 17:06

## 2025-01-29 RX ADMIN — ACETAMINOPHEN 1000 MG: 500 TABLET ORAL at 09:13

## 2025-01-29 ASSESSMENT — COGNITIVE AND FUNCTIONAL STATUS - GENERAL
TOILETING: TOTAL
DRESSING REGULAR UPPER BODY CLOTHING: TOTAL
SUGGESTED CMS G CODE MODIFIER DAILY ACTIVITY: CM
DAILY ACTIVITIY SCORE: 8
HELP NEEDED FOR BATHING: TOTAL
DRESSING REGULAR LOWER BODY CLOTHING: TOTAL
PERSONAL GROOMING: A LOT
EATING MEALS: A LOT

## 2025-01-29 ASSESSMENT — PAIN DESCRIPTION - PAIN TYPE
TYPE: SURGICAL PAIN

## 2025-01-29 ASSESSMENT — PATIENT HEALTH QUESTIONNAIRE - PHQ9
SUM OF ALL RESPONSES TO PHQ9 QUESTIONS 1 AND 2: 0
1. LITTLE INTEREST OR PLEASURE IN DOING THINGS: NOT AT ALL
2. FEELING DOWN, DEPRESSED, IRRITABLE, OR HOPELESS: NOT AT ALL

## 2025-01-29 NOTE — PROGRESS NOTES
4 Eyes Skin Assessment Completed by SUSHANT Hsieh and SUSHANT Bañuelos.    Head WDL  Ears WDL  Nose WDL  Mouth WDL  Neck WDL  Breast/Chest WDL  Shoulder Blades WDL  Spine WDL  (R) Arm/Elbow/Hand Bruising, Surgical site with aquacel and immobilizer in place  (L) Arm/Elbow/Hand Bruising  Abdomen WDL  Groin WDL  Scrotum/Coccyx/Buttocks WDL  (R) Leg Swelling, Bruising, Surgical site with aquacel in place  (L) Leg Swelling  (R) Heel/Foot/Toe Swelling  (L) Heel/Foot/Toe Swelling          Devices In Places RUE immobilizer, purewick      Interventions In Place Heel Mepilex, TAP System, Pillows, Q2 Turns, and Low Air Loss Mattress    Possible Skin Injury No    Pictures Uploaded Into Epic N/A  Wound Consult Placed N/A  RN Wound Prevention Protocol Ordered No

## 2025-01-29 NOTE — PROGRESS NOTES
"Late Entry Note:   4 Eyes Skin Assessment Completed by SUSHANT Anderson and SUSHANT Thomas.    Head WDL  Ears WDL  Nose WDL  Mouth WDL  Neck WDL  Breast/Chest: Pt refused, Patient stated \"this is not a reason I'm admitted for\"  Shoulder Blades WDL  Spine WDL  (R) Arm/Elbow/Hand Bruising and incision site - aqualcel in place  (L) Arm/Elbow/Hand Bruising  Abdomen Bruising  Groin WDL  Scrotum/Coccyx/Buttocks WDL  (R) Leg Bruising and Incision Aqualcel in place, Edema  (L) Leg Edema  (R) Heel/Foot/Toe Edema  (L) Heel/Foot/Toe Edema      Devices In Places Blood Pressure Cuff, Pulse Ox, and SCD's, immobilizer      Interventions In Place Gray Ear Foams, TAP System, Pillows, Q2 Turns, Low Air Loss Mattress, and Pressure Redistribution Mattress, heel mepilex    Possible Skin Injury Yes    Pictures Uploaded Into Epic Yes  Wound Consult Placed N/A  RN Wound Prevention Protocol Ordered No     "

## 2025-01-29 NOTE — CARE PLAN
The patient is Stable - Low risk of patient condition declining or worsening    Shift Goals  Clinical Goals: Pt will manage pain to tolerable level, tolerate q2hr turns, and remain free from falls during shift  Patient Goals: Manage pain  Family Goals: ANNELISE    Progress made toward(s) clinical / shift goals:  Patient given PRN oxycodone d/t 10/10 pain. Patient reported decrease in pain upon reassessment. Patient tolerated q2hr turns and remained free from falls.    Patient is not progressing towards the following goals: N/A

## 2025-01-29 NOTE — DOCUMENTATION QUERY
[facility]                                                                       Query Response Note      PATIENT:               QUINTIN JOHNSTON  ACCT #:                  [id number]  MRN:                     [id number]  :                      1935  ADMIT DATE:       2025 10:48 AM  DISCH DATE:          RESPONDING  PROVIDER #:        [id number]           QUERY TEXT:    Anemia due to unspecified blood loss is documented in the medical record.  Please specify the acuity of the blood loss.    The patient's Clinical Indicators include:  Findings:  PN: admitted 2025 with right hip and shoulder pain after a fall Normocytic anemia Likely multifactorial due to pulmonary suppression from fracture as well as some blood loss during surgery.  She has a large right shoulder hematoma     PN: : POD2, large drop in Hgb, down to 7.8.  Large bruise right shoulder, scant bruising right hip.    Labs:  hgb 13   hgb 12.1   hgb 9.2    Hgb 7.8   Hgb 7.4   Hgb 8.3    Treatment: Labs, monitor     Risk Factors: Right femur and humerus fractures, s/p IM nail right femur, right shoulder arthroplasty EBL 200cc , right shoulder hematoma    Sakina Zaldivar RN BSN  Clinical Documentation   [email or web address]  Connect via [employer] Messenger    Note: If you agree with a diagnosis listed above, please remember to include it in your concurrent daily documentation and onto the Discharge Summary.  Options provided:   -- Acute blood loss anemia   -- Other explanation, (please specify other explanation)      Query created by: Sakina Steven on 2025 6:25 AM    RESPONSE TEXT:    Other explanation - blood loss anemia in the setting of post surgery          Electronically signed by:  DILEEP GARCÍA MD 2025 12:22 PM

## 2025-01-29 NOTE — PROGRESS NOTES
Bedside report received from SUSHANT Anderson. Patient resting in bed. Call bell within reach, bed alarm on and in place. All belongings within reach for patient. No further needs at this time.

## 2025-01-29 NOTE — PROGRESS NOTES
BSSR received from night RN. Pt sitting up in bed eating breakfast not in any distress on RA at 90%. AAOx3 disoriented to time/date. Reported pain, resting for relief. Denies any nausea.  R hip dressing in place, small old drainage noted. R shoulder dressing and immobilizer in place, CDI. CMS noted. Discussed POC,  at bedside. Fall risk precautions, locked bed in lowest position, bed alarm on and call light within reach. All needs met at this time. Hourly rounding in place.     Pt refused IV insertion, MD aware and will need an access. Will try again.     0800: Pt refused to ambulate due to pain, education provided and Charge RN made aware.   1130: Attempted to insert IV again and pt agreed to it.  1200: Pt removed L shoulder immobilizer.  at bedside and notified this RN. This RN put back the immobilizer back and pt screaming out of pain and confused, stated she's on the plane and leaving soon. Reoriented. MD made aware.

## 2025-01-29 NOTE — DISCHARGE PLANNING
Case Management Discharge Planning    Admission Date: 1/24/2025  GMLOS: 4.4  ALOS: 5    6-Clicks ADL Score: 9  6-Clicks Mobility Score: 6  PT and/or OT Eval ordered: Yes  Post-acute Referrals Ordered: Yes  Post-acute Choice Obtained: Yes  Has referral(s) been sent to post-acute provider:  Yes      Anticipated Discharge Dispo: Discharge Disposition: D/T to SNF with Medicare cert in anticipation of skilled care (03)    DME Needed: No    Action(s) Taken:     Per morning rounds, pt medically cleared for DC to SNF today.    1020: Spoke to pt and pt's  at bedside. Choice form completed for [facility] and faxed to [facility].     1032: Sp[facility] to L[facility]. Lamin confirmed bed is available today. Requested RNCM schedule transport for 1600 as pt requires gurney.    1138: Per MD, pt no longer medically cleared at this time, pending pain management.    [facility] admissions updated via phone call.    Escalations Completed: None    Medically Clear: No    Next Steps:     Follow-up with medical team to discuss DC needs and barriers.    Barriers to Discharge: Medical clearance

## 2025-01-29 NOTE — CARE PLAN
The patient is Stable - Low risk of patient condition declining or worsening    Shift Goals  Clinical Goals: Be evaluated by PT/OT, Pain controlled to less than <4  Patient Goals: pain control  Family Goals: be updated on POC    Progress made toward(s) clinical / shift goals:  Patient seen by PT. Pt was able to sit in chair for >3 hours. Pain has been controlled throughout shift.     Patient is not progressing towards the following goals:

## 2025-01-29 NOTE — PROGRESS NOTES
4 Eyes Skin Assessment Completed by SUSHANT Murillo and SUSHANT Tadeo.    Head WDL  Ears WDL  Nose WDL  Mouth WDL  Neck WDL  Breast/Chest WDL  Shoulder Blades Incision, R shoulder. Dressing and immobilizer in place.   Spine Redness and Blanching  (R) Arm/Elbow/Hand Incision R shoulder, dressing and immobilizer in place. Bruising. Swelling.   (L) Arm/Elbow/Hand Redness, Blanching, and Bruising. Mepilex in place, L elbow.  Abdomen WDL  Groin Bruising  Scrotum/Coccyx/Buttocks Redness and Blanching. Barrier paste in place.   (R) Leg Incision, dressings in place. Bruising, Swelling.  (L) Leg Bruising and Swelling  (R) Heel/Foot/Toe Boggy, Swelling, and Edema  (L) Heel/Foot/Toe Boggy, Swelling, and Edema          Devices In Places Pulse Ox and SCD's R shoulder immobilizer, Female Wick      Interventions In Place Heel Mepilex, TAP System, Pillows, Elbow Mepilex, Low Air Loss Mattress, Barrier Cream, and Heels Loaded W/Pillows    Possible Skin Injury No    Pictures Uploaded Into Epic N/A  Wound Consult Placed N/A  RN Wound Prevention Protocol Ordered No

## 2025-01-29 NOTE — PROGRESS NOTES
0100 - Patient pulled PIV. Potassium infusion stopped. Education provided regarding need for a new PIV in case of emergencies. Patient refused PIV at this time. Charge RN aware of situation.    0548 - Patient refused PIV insertion at this time. Education provided. NOC hospitalist aware of situation.

## 2025-01-29 NOTE — PROGRESS NOTES
"Hospital Medicine Daily Progress Note    Date of Service  1/29/2025    Chief Complaint  Prabha Lizama is a 89 y.o. female admitted 1/24/2025 with right hip and shoulder pain after a fall    Hospital Course  As per chart review:  \"History of hypertension, diet controlled hyperlipidemia and anxiety admitted after ground-level fall with right shoulder and right hip pain found to have a right humerus and right femoral neck fracture.  She was admitted for operative repair, perioperative management and rehabilitation.\"    Interval Problem Update  1/25: To the OR for IM nail right femur, total shoulder arthroplasty on the right.  Postop PT OT.  Work on postop pain control    1/26: Postop day 1 status post IM nail right femur, right total shoulder arthroplasty.  Hemoglobin dropped 4 points.  No active bleeding, hold Lovenox.  Significant pain.  Pending PT OT    1/27: POD2, large drop in Hgb, down to 7.8.  Large bruise right shoulder, scant bruising right hip.  Lovenox held.  Sundowning but she still has pain requiring low-dose oxycodone.  Mobility is extremely limited.   at bedside, updated.    PATIENT SEEN BY PREVIOUS HOSPITALIST UNTIL 1/27 1/28: Patient seen at bedside this morning.  The patient seems to be sleepy, drowsy but able to be woken up.   is present at bedside as well.  The patient still complaining of some pain.  Hemoglobin seems to be stable so we have resumed Lovenox.  Continue to monitor closely.    1/29: Patient seen at bedside this morning.  The patient seems to get confused when receiving oxycodone.  We have transition oxycodone to tramadol.  We have added gabapentin 100 mg twice daily scheduled and will continue with Tylenol 1 g scheduled for pain control.  Continue to monitor closely.  The patient is on room air at the time of my evaluation today.    I have discussed this patient's plan of care and discharge plan at IDT rounds today with Case Management, Nursing, Nursing " leadership, and other members of the IDT team.    Consultants/Specialty  orthopedics    Code Status  Full Code    Disposition  The patient is not medically cleared for discharge to home or a post-acute facility.        Review of Systems  Review of Systems   Unable to perform ROS: Mental acuity        Physical Exam  Temp:  [36.3 °C (97.3 °F)-37.6 °C (99.6 °F)] 36.3 °C (97.3 °F)  Pulse:  [76-86] 78  Resp:  [16] 16  BP: (112-156)/(46-73) 125/58  SpO2:  [90 %-92 %] 90 %    Physical Exam  Constitutional:       Appearance: She is ill-appearing.   HENT:      Nose: Nose normal.      Mouth/Throat:      Mouth: Mucous membranes are dry.   Cardiovascular:      Rate and Rhythm: Normal rate and regular rhythm.      Pulses: Normal pulses.   Pulmonary:      Effort: No respiratory distress.   Abdominal:      General: There is no distension.      Palpations: Abdomen is soft.      Tenderness: There is no abdominal tenderness.   Musculoskeletal:         General: Tenderness present. No swelling.      Cervical back: No muscular tenderness.   Lymphadenopathy:      Cervical: No cervical adenopathy.   Skin:     General: Skin is warm and dry.      Findings: Bruising present. No rash.   Neurological:      Mental Status: She is alert and oriented to person, place, and time.      Comments: Patient seems drowsy but able to be woken up   Psychiatric:         Mood and Affect: Mood normal.         Behavior: Behavior normal.         Fluids    Intake/Output Summary (Last 24 hours) at 1/29/2025 1347  Last data filed at 1/29/2025 0900  Gross per 24 hour   Intake 355.59 ml   Output 2050 ml   Net -1694.41 ml        Laboratory  Recent Labs     01/27/25  0520 01/27/25  1417 01/28/25  0112 01/28/25  0940 01/29/25  0537   WBC 8.0  --  8.8  --  8.9   RBC 2.37*  --  2.25*  --  2.55*   HEMOGLOBIN 7.8*   < > 7.4* 8.0* 8.3*   HEMATOCRIT 24.6*  --  22.3* 23.9* 25.3*   .8*  --  99.1*  --  99.2*   MCH 32.9  --  32.9  --  32.5   MCHC 31.7*  --  33.2  --  32.8    RDW 47.8  --  46.0  --  46.5   PLATELETCT 168  --  181  --  231   MPV 10.9  --  11.2  --  10.8    < > = values in this interval not displayed.     Recent Labs     01/27/25  0520 01/28/25  0112 01/29/25  0537   SODIUM 139 133* 135   POTASSIUM 3.9 3.8 4.1   CHLORIDE 108 105 104   CO2 21 20 21   GLUCOSE 103* 116* 120*   BUN 27* 27* 20   CREATININE 0.75 0.77 0.63   CALCIUM 7.9* 7.8* 8.1*                     Imaging  DX-PELVIS-1 OR 2 VIEWS   Final Result      Intramedullary nail and screw fixation of right proximal femoral fracture. There is still some step-off between the fracture fragments.      DX-SHOULDER 2+ RIGHT   Final Result      Reverse right shoulder arthroplasty is well seated.      DX-HIP-COMPLETE - UNILATERAL 2+ RIGHT   Final Result      Digitized intraoperative radiograph is submitted for review. This examination is not for diagnostic purpose but for guidance during a surgical procedure. Please see the patient's chart for full procedural details.         INTERPRETING LOCATION: [address] , [place], [address]      DX-PORTABLE FLUORO > 1 HOUR   Final Result      Portable fluoroscopy utilized for 1 minute 26 seconds.         INTERPRETING LOCATION: [address] , [place], [address]      DX-HUMERUS 2+ RIGHT   Final Result      Displaced fracture of the proximal right humerus.      DX-HIP-COMPLETE - UNILATERAL 2+ LEFT   Final Result      Displaced intertrochanteric right femoral fracture.      DX-CHEST-PORTABLE (1 VIEW)   Final Result         1. No acute cardiac or pulmonary abnormalities are identified.   2. Displaced right humeral fracture.      IR-US GUIDED PIV    (Results Pending)        Assessment/Plan  * Closed right hip fracture, initial encounter (HCC)- (present on admission)  Assessment & Plan  GLF - R hip and R proximal humeral fracture  Orthopedic repair done on 1/25  PRN oral oxycodone  Scheduled Tylenol minimize narcotics as able  Bowel regimen  Pending SNF when she is medically clear    1/28: Hemoglobin  stable, we have resumed Lovenox.    1/29: Patient today complaining of significant pain and it seems the patient getting confused with oxycodone so we have added gabapentin 100 mg twice daily, will continue with Tylenol 1 g 3 times daily scheduled and we have transition oxycodone to tramadol 25 mg every 8 hours as needed.    Hypoxia  Assessment & Plan  We will try to wean down oxygen as tolerated  If unable to, we will consider further testing    1/29: Patient on room air at the time of my evaluation.    Hypophosphatemia  Assessment & Plan  Replace as needed  monitor    Vitamin D deficiency- (present on admission)  Assessment & Plan  Vitamin D supplementation 1000 units p.o. daily    Closed fracture of proximal end of right humerus- (present on admission)  Assessment & Plan  Patient had a ground-level fall 1/25 and suffered a right femoral neck fracture and right humerus fracture  1/25 Underwent:  RIGHT INTRAMEDULLARY SAM, FEMUR, PROXIMAL  RIGHT TOTAL SHOULDER ARTHROPLASTY    Scheduled Tylenol, prn oxycodone  PT OT recommending SNF    1/28: Hemoglobin stable, we have resumed Lovenox.    1/29: Patient today complaining of significant pain and it seems the patient getting confused with oxycodone so we have added gabapentin 100 mg twice daily, will continue with Tylenol 1 g 3 times daily scheduled and we have transition oxycodone to tramadol 25 mg every 8 hours as needed.    Constipation- (present on admission)  Assessment & Plan  Bowel protocol    1/28: We have added lactulose until BM.    Normocytic anemia- (present on admission)  Assessment & Plan  1/29: Hb remains stable. continue lovenox.    Pure hypercholesterolemia- (present on admission)  Assessment & Plan  Diet controlled    Impaired fasting blood sugar- (present on admission)  Assessment & Plan  Last known A1c is 5.4  No need for further management at this time    Primary hypertension- (present on admission)  Assessment & Plan  1/29:  Continue Norvasc 2.5 mg  daily  Coreg 25 mg twice daily  Telmisartan 80 mg daily  Terazosin 4 mg twice daily  As needed labetalol    Advance care planning  Assessment & Plan  1/29: I discussed with patient that the patient's  at bedside for at least 16 minutes further goals of care.  The patient's  was interested in filling out a POLST form.  We filled out a POLST form together at bedside.  Today due to the patient's status she is unable to make decisions for herself so her  signed the POLST form.  At this point the patient will continue with CPR, full treatment options including intubation, mechanical ventilation and transferred to the ICU as needed.  As per the  the patient to receive artificial nutrition/feeding tube trial no longer than 2 weeks if needed.  At this point today the patient has lacked decisional capacity so the POLST form was signed by the .  I have given the POLST form to nursing to upload to the system.         VTE prophylaxis: Lovenox     I have performed a physical exam and reviewed and updated ROS and Plan today (1/29/2025). In review of yesterday's note (1/28/2025), there are no changes except as documented above.      I spend at least 52 minutes providing care for this patient.  This included face-to-face interview, physical examination.  Review of lab work including CBC, CMP, phosphorus.  Discussing with patient's  regarding plan of care.  Discussing with multidisciplinary team including case management, nursing staff and pharmacy.  Creating plan of care, reviewing orders.    Moreover, I discussed with patient that the patient's  at bedside for at least 16 minutes further goals of care.  The patient's  was interested in filling out a POLST form.  We filled out a POLST form together at bedside.  Today due to the patient's status she is unable to make decisions for herself so her  signed the POLST form.  At this point the patient will continue with CPR,  full treatment options including intubation, mechanical ventilation and transferred to the ICU as needed.  As per the  the patient to receive artificial nutrition/feeding tube trial no longer than 2 weeks if needed.  At this point today the patient has lacked decisional capacity so the POLST form was signed by the .  I have given the POLST form to nursing to upload to the system.

## 2025-01-30 VITALS
HEIGHT: 66 IN | SYSTOLIC BLOOD PRESSURE: 160 MMHG | HEART RATE: 67 BPM | BODY MASS INDEX: 24.91 KG/M2 | OXYGEN SATURATION: 92 % | WEIGHT: 154.98 LBS | TEMPERATURE: 98.4 F | RESPIRATION RATE: 16 BRPM | DIASTOLIC BLOOD PRESSURE: 68 MMHG

## 2025-01-30 LAB
ANION GAP SERPL CALC-SCNC: 10 MMOL/L (ref 7–16)
BUN SERPL-MCNC: 17 MG/DL (ref 8–22)
CALCIUM SERPL-MCNC: 8.1 MG/DL (ref 8.4–10.2)
CHLORIDE SERPL-SCNC: 101 MMOL/L (ref 96–112)
CO2 SERPL-SCNC: 22 MMOL/L (ref 20–33)
CREAT SERPL-MCNC: 0.56 MG/DL (ref 0.5–1.4)
ERYTHROCYTE [DISTWIDTH] IN BLOOD BY AUTOMATED COUNT: 46.5 FL (ref 35.9–50)
GFR SERPLBLD CREATININE-BSD FMLA CKD-EPI: 88 ML/MIN/1.73 M 2
GLUCOSE SERPL-MCNC: 102 MG/DL (ref 65–99)
HCT VFR BLD AUTO: 24.7 % (ref 37–47)
HGB BLD-MCNC: 8.1 G/DL (ref 12–16)
MCH RBC QN AUTO: 32.7 PG (ref 27–33)
MCHC RBC AUTO-ENTMCNC: 32.8 G/DL (ref 32.2–35.5)
MCV RBC AUTO: 99.6 FL (ref 81.4–97.8)
PHOSPHATE SERPL-MCNC: 2.8 MG/DL (ref 2.5–4.5)
PLATELET # BLD AUTO: 254 K/UL (ref 164–446)
PMV BLD AUTO: 11 FL (ref 9–12.9)
POTASSIUM SERPL-SCNC: 4.2 MMOL/L (ref 3.6–5.5)
RBC # BLD AUTO: 2.48 M/UL (ref 4.2–5.4)
SODIUM SERPL-SCNC: 133 MMOL/L (ref 135–145)
WBC # BLD AUTO: 9.1 K/UL (ref 4.8–10.8)

## 2025-01-30 PROCEDURE — 700102 HCHG RX REV CODE 250 W/ 637 OVERRIDE(OP): Performed by: HOSPITALIST

## 2025-01-30 PROCEDURE — 36415 COLL VENOUS BLD VENIPUNCTURE: CPT

## 2025-01-30 PROCEDURE — A9270 NON-COVERED ITEM OR SERVICE: HCPCS | Performed by: INTERNAL MEDICINE

## 2025-01-30 PROCEDURE — 85027 COMPLETE CBC AUTOMATED: CPT

## 2025-01-30 PROCEDURE — 99239 HOSP IP/OBS DSCHRG MGMT >30: CPT | Performed by: INTERNAL MEDICINE

## 2025-01-30 PROCEDURE — 700102 HCHG RX REV CODE 250 W/ 637 OVERRIDE(OP): Performed by: INTERNAL MEDICINE

## 2025-01-30 PROCEDURE — 84100 ASSAY OF PHOSPHORUS: CPT

## 2025-01-30 PROCEDURE — 94760 N-INVAS EAR/PLS OXIMETRY 1: CPT

## 2025-01-30 PROCEDURE — A9270 NON-COVERED ITEM OR SERVICE: HCPCS | Performed by: HOSPITALIST

## 2025-01-30 PROCEDURE — 80048 BASIC METABOLIC PNL TOTAL CA: CPT

## 2025-01-30 RX ORDER — TRAMADOL HYDROCHLORIDE 25 MG/1
25 TABLET, COATED ORAL EVERY 8 HOURS PRN
Qty: 9 TABLET | Refills: 0 | Status: SHIPPED | OUTPATIENT
Start: 2025-01-30 | End: 2025-02-02

## 2025-01-30 RX ORDER — ACETAMINOPHEN 500 MG
1000 TABLET ORAL 3 TIMES DAILY
Status: SHIPPED
Start: 2025-01-30

## 2025-01-30 RX ORDER — POLYETHYLENE GLYCOL 3350 17 G/17G
17 POWDER, FOR SOLUTION ORAL
Status: SHIPPED
Start: 2025-01-30

## 2025-01-30 RX ORDER — AMOXICILLIN 250 MG
2 CAPSULE ORAL EVERY EVENING
Status: SHIPPED
Start: 2025-01-30

## 2025-01-30 RX ORDER — ENOXAPARIN SODIUM 100 MG/ML
40 INJECTION SUBCUTANEOUS DAILY
Status: ACTIVE | DISCHARGE
Start: 2025-01-30

## 2025-01-30 RX ORDER — GABAPENTIN 100 MG/1
100 CAPSULE ORAL 2 TIMES DAILY
Status: SHIPPED
Start: 2025-01-30

## 2025-01-30 RX ADMIN — ACETAMINOPHEN 1000 MG: 500 TABLET ORAL at 11:28

## 2025-01-30 RX ADMIN — CARVEDILOL 25 MG: 25 TABLET, FILM COATED ORAL at 05:55

## 2025-01-30 RX ADMIN — TELMISARTAN 80 MG: 40 TABLET ORAL at 05:56

## 2025-01-30 RX ADMIN — GABAPENTIN 100 MG: 100 CAPSULE ORAL at 05:56

## 2025-01-30 RX ADMIN — AMLODIPINE BESYLATE 2.5 MG: 5 TABLET ORAL at 05:54

## 2025-01-30 RX ADMIN — TRAMADOL HYDROCHLORIDE 25 MG: 50 TABLET, COATED ORAL at 02:37

## 2025-01-30 RX ADMIN — TERAZOSIN HYDROCHLORIDE 4 MG: 1 CAPSULE ORAL at 05:56

## 2025-01-30 ASSESSMENT — PAIN DESCRIPTION - PAIN TYPE
TYPE: SURGICAL PAIN

## 2025-01-30 ASSESSMENT — PAIN SCALES - PAIN ASSESSMENT IN ADVANCED DEMENTIA (PAINAD)
CONSOLABILITY: UNABLE TO CONSOLE, DISTRACT OR REASSURE
BODYLANGUAGE: RELAXED
BREATHING: OCCASIONAL LABORED BREATHING, SHORT PERIOD OF HYPERVENTILATION
CONSOLABILITY: UNABLE TO CONSOLE, DISTRACT OR REASSURE
NEGVOCALIZATION: REPEATED TROUBLED CALLING OUT, LOUD MOANING/GROANING, CRYING
BREATHING: OCCASIONAL LABORED BREATHING, SHORT PERIOD OF HYPERVENTILATION
FACIALEXPRESSION: SMILING OR INEXPRESSIVE
BODYLANGUAGE: RELAXED
FACIALEXPRESSION: SAD, FRIGHTENED, FROWN
TOTALSCORE: 1
FACIALEXPRESSION: SAD, FRIGHTENED, FROWN
FACIALEXPRESSION: SMILING OR INEXPRESSIVE
TOTALSCORE: 7
BREATHING: OCCASIONAL LABORED BREATHING, SHORT PERIOD OF HYPERVENTILATION
FACIALEXPRESSION: SAD, FRIGHTENED, FROWN
BODYLANGUAGE: TENSE, DISTRESSED PACING, FIDGETING
TOTALSCORE: 6
BODYLANGUAGE: TENSE, DISTRESSED PACING, FIDGETING
BREATHING: NORMAL
CONSOLABILITY: DISTRACTED OR REASSURED BY VOICE/TOUCH
CONSOLABILITY: NO NEED TO CONSOLE
TOTALSCORE: 0
CONSOLABILITY: NO NEED TO CONSOLE
BREATHING: OCCASIONAL LABORED BREATHING, SHORT PERIOD OF HYPERVENTILATION
TOTALSCORE: 7
BODYLANGUAGE: TENSE, DISTRESSED PACING, FIDGETING
NEGVOCALIZATION: REPEATED TROUBLED CALLING OUT, LOUD MOANING/GROANING, CRYING
NEGVOCALIZATION: REPEATED TROUBLED CALLING OUT, LOUD MOANING/GROANING, CRYING

## 2025-01-30 NOTE — DIETARY
Nutrition Services: Follow-up for PO intake   Day 6 of admit. Prabha Lizama is a 89 y.o. female with admitting DX of Closed right hip fracture, initial encounter ([facility]) [S72.001A]    D/C expected today     Current diet order:   IDDSI level 0 - thin liquids and IDDSI level 6 - soft/bite-sized  Ensure Plus High Protein (provides 350 calories, 20 g protein per 8 fl oz) TID    Recorded PO now 50-75% x 1 and % x 1          PES statement: Inadequate Oral Intake related to confusion and pain s/p 2 surgeries as evidenced by PO intake < 25%.        Nutrition Dx Status: resolved     Problem: Nutritional:  Goal: Achieve adequate nutritional intake  Description: Patient will consume >50 of meals  Outcome: met      Plan/ Recommendations:      Encourage intake of meals, goal for >50% consumption from meals and/or supplements  Document intake of all meals as % taken in ADL's to provide interdisciplinary communication across all shifts.   Monitor weight.  Nutrition rep will continue to see patient for ongoing meal and snack preferences.     RD following

## 2025-01-30 NOTE — CARE PLAN
The patient is Stable - Low risk of patient condition declining or worsening    Shift Goals  Clinical Goals: Patient managed pain to tolerable level, remain free from falls, q2hr turns during shift  Patient Goals: Pain control  Family Goals: ANNELISE    Progress made toward(s) clinical / shift goals:  Patient managed pain to tolerable level with rest, ice packs, and PRN tramadol. No falls sustained during shift. Patient tolerating q2hr turns.    Patient is not progressing towards the following goals: N/A

## 2025-01-30 NOTE — PROGRESS NOTES
"2230 - Patient's PIV removed from left hand upon assessment. Education provided on needing PIV re-inserted for possible interventions by doctor/care team. Patient refused, stating \"I don't care what the doctor says\". No PIV at this time.    0050 - Patient refused PIV insertion at this time. [facility] hospitalist notified.    0414 - This RN asked patient if PIV insertion attempt can be made for possible PIV interventions. Patient refused, stating \"it makes my arm ache\". No PIV at this time. Patient refused skin check at this time. Education provided.  "

## 2025-01-30 NOTE — PROGRESS NOTES
4 Eyes Skin Assessment Completed by SUSHANT Murillo and SUSHANT Tadeo.    Head WDL  Ears WDL  Nose WDL  Mouth WDL  Neck WDL  Breast/Chest WDL  Shoulder Blades Incision R shoulder, dressing and immobilizer in place.   Spine Redness and Blanching  (R) Arm/Elbow/Hand R shoulder dressing and immobilizer in place.  (L) Arm/Elbow/Hand Redness, Blanching, and Bruising L elbow, mepilex in place.   Abdomen WDL  Groin Bruising  Scrotum/Coccyx/Buttocks Redness and Blanching Barrier paste in place.  (R) Leg Incisions, 3 dressings in pace. Bruising and Swelling.   (L) Leg Bruising and Swelling  (R) Heel/Foot/Toe Boggy, Swelling, and Edema Mepilex in place  (L) Heel/Foot/Toe Boggy, Swelling, and Edema Mepilex in place          Devices In Places Pulse Ox, Immobilizer. Female Wick      Interventions In Place Heel Mepilex, TAP System, Pillows, Elbow Mepilex, Q2 Turns, Low Air Loss Mattress, Barrier Cream, and Heels Loaded W/Pillows    Possible Skin Injury No    Pictures Uploaded Into Epic N/A  Wound Consult Placed N/A  RN Wound Prevention Protocol Ordered No

## 2025-01-30 NOTE — PROGRESS NOTES
Bedside report received from SUSHANT Murillo. Patient resting in bed. Call bell within reach, bed alarm on and in place. All belongings within reach for patient. No further needs at this time.

## 2025-01-30 NOTE — DISCHARGE SUMMARY
"Discharge Summary    CHIEF COMPLAINT ON ADMISSION  Chief Complaint   Patient presents with    GLF     Pt had MGLF this morning pt denies loc blood thinners or head strike. Pt has right shoulder pain, right hip pain  and is nonambulatory.     Shoulder Pain    Hip Pain       Reason for Admission  EMS     Admission Date  1/24/2025    CODE STATUS  Full Code    HPI & HOSPITAL COURSE  As per chart review:  \"History of hypertension, diet controlled hyperlipidemia and anxiety admitted after ground-level fall with right shoulder and right hip pain found to have a right humerus and right femoral neck fracture.  She was admitted for operative repair, perioperative management and rehabilitation.\"    Orthopedic surgery was consulted.  The patient underwent surgery for her shoulder: \"Right reverse shoulder arthroplasty for fracture, Right shoulder open biceps tenodesis, Right shoulder open rotator cuff repair\". Also she underwent surgery for her hip: \" Insertion of intramedullary jose miguel on proximal femur on the right.\"    After surgery the patient was complaining of pain.  Initially it seemed the patient was getting confused after receiving oxycodone however we transition to tramadol and it seems that she is tolerating it better.  We also added scheduled Tylenol and scheduled gabapentin.  The patient's pain today on the day of discharge seems to be better controlled.    As per nursing the patient had a bowel movement yesterday.    The patient is on room air at the time of my evaluation.  The patient has been accepted to a skilled nursing facility.  The patient will be discharged to a skilled nursing facility.  The patient will require close follow-up with PCP and orthopedic surgery as an outpatient.    Of note orthopedic surgery recommending Lovenox for DVT prophylaxis as the patient has 2 joints that had surgery.    Therefore, she is discharged in fair and stable condition to skilled nursing facility.    The patient met 2-midnight " criteria for an inpatient stay at the time of discharge.    Discharge Date  01/30/2025    FOLLOW UP ITEMS POST DISCHARGE  The patient will require close follow-up with PCP and orthopedic surgery as an outpatient.    DISCHARGE DIAGNOSES  Principal Problem:    Closed right hip fracture, initial encounter ([facility]) (POA: Yes)  Active Problems:    Primary hypertension (POA: Yes)      Overview: Pt reports at home 's-120's , higher at night SBP around 140's     Impaired fasting blood sugar (POA: Yes)      Overview: Lab Results       Component Value Date/Time        HBA1C 5.6 10/25/2023 0739                 Pure hypercholesterolemia (POA: Yes)    Normocytic anemia (POA: Yes)    Constipation (POA: Yes)      Overview: She reports recent constipation. She mentioned this to Dr. Bloch and he       recommended prunes which has been helpful.     Closed fracture of proximal end of right humerus (POA: Yes)    Vitamin D deficiency (POA: Yes)    Hypophosphatemia (POA: Unknown)    Hypoxia (POA: Unknown)    Advance care planning (POA: Unknown)  Resolved Problems:    * No resolved hospital problems. *      FOLLOW UP  Future Appointments   Date Time Provider Department Center   5/19/2025  2:00 PM Michael J Bloch, M.D. [facility] None     [facility] [address] [id number] [phone]        AI [facility]cal[facility]  [address]  [facility] [address]  [facility] [id number] [phone]    Schedule an appointment as soon as possible for a visit      Leila Cifuentes M.D.  [address]  [facility] [id number] [phone]    Schedule an appointment as soon as possible for a visit        MEDICATIONS ON DISCHARGE     Medication List        START taking these medications        Instructions   enoxaparin 40 MG/0.4ML Sosy inj  Commonly known as: Lovenox   Inject 40 mg under the skin every day at 6 PM.  Dose: 40 mg     gabapentin 100 MG Caps  Commonly known as: Neurontin   Take 1 Capsule by mouth 2  times a day.  Dose: 100 mg     polyethylene glycol/lytes Pack  Commonly known as: Miralax   Take 1 Packet by mouth 1 time a day as needed (if no bowel movement in last 2 days).  Dose: 17 g     senna-docusate 8.6-50 MG Tabs  Commonly known as: Pericolace Or Senokot S   Take 2 Tablets by mouth every evening.  Dose: 2 Tablet     traMADol HCl 25 MG Tabs   Take 25 mg by mouth every 8 hours as needed for Severe Pain for up to 3 days.  Dose: 25 mg            CHANGE how you take these medications        Instructions   acetaminophen 500 MG Tabs  What changed:   medication strength  how much to take  when to take this  reasons to take this  Commonly known as: Tylenol   Take 2 Tablets by mouth in the morning, at noon, and at bedtime.  Dose: 1,000 mg     telmisartan 80 MG Tabs  What changed: when to take this  Commonly known as: Micardis   TAKE 1 TABLET EVERY MORNING     terazosin 2 MG Caps  What changed:   when to take this  additional instructions  Commonly known as: Hytrin   TAKE 2 CAPSULES BY MOUTH TWICE DAILY            CONTINUE taking these medications        Instructions   amLODIPine 2.5 MG Tabs  Commonly known as: Norvasc   TAKE 1 TABLET BY MOUTH EVERY DAY  Dose: 2.5 mg     CALCIUM PO   Take 1 Tablet by mouth every day.  Dose: 1 Tablet     carvedilol 25 MG Tabs  Commonly known as: Coreg   TAKE 1 TABLET BY MOUTH TWICE DAILY  Dose: 25 mg     VITAMIN C PO   Take 1 Tablet by mouth every day.  Dose: 1 Tablet     VITAMIN D PO   Take 1 Tablet by mouth every day.  Dose: 1 Tablet              Allergies  Allergies   Allergen Reactions    Amoxicillin      Unsure of reaction/or allergy    Hydralazine Unspecified     Pt reported severe hypotensive reaction    Morphine Vomiting and Nausea    Penicillins      1970 reaction       DIET  Orders Placed This Encounter   Procedures    Diet Order Diet: Level 6 - Soft and Bite Sized; Liquid level: Level 0 - Thin; Tray Modifications (optional): SLP - 1:1 Supervision by Nursing, SLP - Deliver  to Nursing Station     Standing Status:   Standing     Number of Occurrences:   1     Order Specific Question:   Diet:     Answer:   Level 6 - Soft and Bite Sized [23]     Order Specific Question:   Liquid level     Answer:   Level 0 - Thin     Order Specific Question:   Tray Modifications (optional)     Answer:   SLP - 1:1 Supervision by Nursing     Order Specific Question:   Tray Modifications (optional)     Answer:   SLP - Deliver to Nursing Station       ACTIVITY  As tolerated and directed by skilled nursing.    As per ortho:  Weightbearing as tolerated right lower extremity, nonweightbearing right upper extremity    CONSULTATIONS  Orthopedic Surgery    PROCEDURES  PROCEDURE(s) PERFORMED:  Procedure(s):  Right - INSERTION, INTRAMEDULLARY SAM, FEMUR, PROXIMAL - Wound Class: Clean - Incision Closure: Deep and Superficial Layers  Right - ARTHROPLASTY, SHOULDER, TOTAL - Wound Class: Clean       DX-PELVIS-1 OR 2 VIEWS   Final Result      Intramedullary nail and screw fixation of right proximal femoral fracture. There is still some step-off between the fracture fragments.      DX-SHOULDER 2+ RIGHT   Final Result      Reverse right shoulder arthroplasty is well seated.      DX-HIP-COMPLETE - UNILATERAL 2+ RIGHT   Final Result      Digitized intraoperative radiograph is submitted for review. This examination is not for diagnostic purpose but for guidance during a surgical procedure. Please see the patient's chart for full procedural details.         INTERPRETING LOCATION: [address] , [place], [address]      DX-PORTABLE FLUORO > 1 HOUR   Final Result      Portable fluoroscopy utilized for 1 minute 26 seconds.         INTERPRETING LOCATION: [address], [place], [address]      DX-HUMERUS 2+ RIGHT   Final Result      Displaced fracture of the proximal right humerus.      DX-HIP-COMPLETE - UNILATERAL 2+ LEFT   Final Result      Displaced intertrochanteric right femoral fracture.      DX-CHEST-PORTABLE (1 VIEW)   Final Result          1. No acute cardiac or pulmonary abnormalities are identified.   2. Displaced right humeral fracture.      IR-US GUIDED PIV    (Results Pending)        LABORATORY  Lab Results   Component Value Date    SODIUM 133 (L) 01/30/2025    POTASSIUM 4.2 01/30/2025    CHLORIDE 101 01/30/2025    CO2 22 01/30/2025    GLUCOSE 102 (H) 01/30/2025    BUN 17 01/30/2025    CREATININE 0.56 01/30/2025    CREATININE 0.87 07/07/2011    GLOMRATE >59 08/04/2010        Lab Results   Component Value Date    WBC 9.1 01/30/2025    WBC 4.0 08/04/2010    HEMOGLOBIN 8.1 (L) 01/30/2025    HEMATOCRIT 24.7 (L) 01/30/2025    PLATELETCT 254 01/30/2025        Total time of the discharge process exceeds 31 minutes.

## 2025-01-30 NOTE — CARE PLAN
The patient is Stable - Low risk of patient condition declining or worsening    Shift Goals  Clinical Goals: Pt's pain will be controlled, repositioning and free from falls during thi shift  Patient Goals: Able to manag pain  Family Goals: Update POC    Progress made toward(s) clinical / shift goals:  Pt reported pain controlled post intervention. Tolerated diet, denies nausea. Pt did not sustain any fall during this shift.       Patient is not progressing towards the following goals:  Unable to mobilize, refused d/t pain.      153

## 2025-01-30 NOTE — PROGRESS NOTES
BSSR received from night RN. Pt sitting up in bed eating breakfast not in any distress on RA at 92%. AAOx4. Reported pain, cold pack and resting for relief. Denies any nausea. R hip dressing in place, small old drainage noted. R shoulder GRACE and immobilizer in place, CDI. CMS noted. Discussed POC,  at bedside. Fall risk precautions, locked bed in lowest position, bed alarm on and call light within reach. All needs met at this time. Hourly rounding in place.     Pt removed PIV and no IV access and refused to insert at this time.   Pt refused to ambulate due to pain, education provided and Charge RN made aware.

## 2025-01-30 NOTE — DISCHARGE PLANNING
DC Transport Scheduled    Transport Company Scheduled:  [facility]  Spoke with Elena at [facility] to schedule transport.    Scheduled Date: 1/30/2025  Scheduled Time: 1400    Transport Type: [facility]  Destination:   [facility] at [address] [place] [address]    Notified care team of scheduled transport via [employer].     If there are any changes needed to the DC transportation scheduled, please contact [facility] at ext. [phone] between the hours of 1440-3538. If outside those hours, contact the ED Case Manager at ext. [phone].

## 2025-01-30 NOTE — CARE PLAN
The patient is Stable - Low risk of patient condition declining or worsening    Shift Goals  Clinical Goals: Pt's pain will be controlled, Q2 repositioning, ambulate and free from falls during this shift  Patient Goals: Able to manage pain  Family Goals: Pain management, update POC    Progress made toward(s) clinical / shift goals:  Pt reported pain controlled post intervention. Q2 turns. BM today. Tolerated diet, denies nausea. BP monitored. Pt did not sustain any fall during shift.     Patient is not progressing towards the following goals:  Unable to ambulate d/t pain, refused.     Problem: Mobility  Goal: Patient's capacity to carry out activities will improve  Outcome: Not Progressing

## 2025-01-30 NOTE — PROGRESS NOTES
Discharged per MD order in a stable condition. Discussed discharge summary, medication list and follow up with pt and , verbalized understanding. Answered all questions. All belongings accounted for. Transported via [employer] with escorts.

## 2025-01-30 NOTE — THERAPY
Occupational Therapy  Daily Treatment     Patient Name: Prabha Lizama  Age:  89 y.o., Sex:  female  Medical Record #: [id number]  Today's Date: 1/29/2025     Precautions  Precautions: Non Weight Bearing Right Upper Extremity, Immobilizer Right Upper Extremity, Weight Bearing As Tolerated Right Lower Extremity, Fall Risk, Swallow Precautions    Assessment  Pt seen for OT treatment including RUE brace adjustment to proper fit (pt has taken off several times per nrsg), gentle AAROM to LUE, bed mobility during susana-care after incontinent bm, gown change, hair brush. Pt is A&Ox1, confused, follows commands <50% of time. Currently requiring TotalA with most ADL's; see below for CLOF. OT will continue to follow.      Plan  Treatment Plan Status: Continue Current Treatment Plan  Type of Treatment: Self Care / Activities of Daily Living, Neuro Re-Education / Balance, Therapeutic Activity, Cognitive Skill Development  Treatment Frequency: 4 Times per Week  Treatment Duration: Until Therapy Goals Met    DC Equipment Recommendations: Unable to determine at this time  Discharge Recommendations: Recommend post-acute placement for additional occupational therapy services prior to discharge home    Subjective  Agreeable     Objective     01/29/25 1434   Precautions   Precautions Non Weight Bearing Right Upper Extremity;Immobilizer Right Upper Extremity;Weight Bearing As Tolerated Right Lower Extremity;Fall Risk;Swallow Precautions   Vitals   O2 Delivery Device None - Room Air   Pain 0 - 10 Group   Location Shoulder;Leg   Location Orientation Right   Therapist Pain Assessment Post Activity Pain Same as Prior to Activity;Nurse Notified   Cognition    Cognition / Consciousness X   Level of Consciousness Alert   Comments Ox1, confusion. extended time to adjust brace to RUE, roll/assist with susana-care due to pain, impaired cognition.   Active ROM Upper Body   Active ROM Upper Body  X   Dominant Hand Right;Using Non-Dominant Hand  "  Strength Upper Body   Upper Body Strength  X   Comments LUE 3+/5 throughout. RUE in brace   Supine Upper Body Exercises   Supine Upper Body Exercises Yes   Comments gentle ROM to LUE; quite stiff. Spouse states she \"is not using it much\"   Other Treatments   Other Treatments Provided assisted nurse with susana-care after bm, linen change, rolling, placement of wedges for skin integrity.   Activities of Daily Living   Eating Maximal Assist   Grooming Maximal Assist  (face wash)   Upper Body Dressing Total Assist   Lower Body Dressing Total Assist   Toileting Total Assist   Skilled Intervention Verbal Cuing   How much help from another person does the patient currently need...   Putting on and taking off regular lower body clothing? 1   Bathing (including washing, rinsing, and drying)? 1   Toileting, which includes using a toilet, bedpan, or urinal? 1   Putting on and taking off regular upper body clothing? 1   Taking care of personal grooming such as brushing teeth? 2   Eating meals? 2   6 Clicks Daily Activity Score 8   Functional Mobility   Sit to Stand Unable to Participate   Bed, Chair, Wheelchair Transfer Unable to Participate   Toilet Transfers Unable to Participate   Short Term Goals   Goal Outcome # 1 Progressing slower than expected   Goal Outcome # 2 Progressing slower than expected   Goal Outcome # 3 Progressing slower than expected   Goal Outcome # 4 Progressing slower than expected   Education Group   Role of Occupational Therapist Patient Response Patient;Family;Explanation;Verbal Demonstration;Acceptance   Upper Ext ROM Patient Response Patient;Family;Explanation;Verbal Demonstration   Brace Wear & Care Patient Response Patient;Family;Explanation;Verbal Demonstration   ADL Patient Response Patient;Family;Explanation;Verbal Demonstration;Acceptance   Additional Comments pt is 0x1; spouse very supportive.   Occupational Therapy Treatment Plan    O.T. Treatment Plan Continue Current Treatment Plan "   Treatment Interventions Self Care / Activities of Daily Living;Neuro Re-Education / Balance;Therapeutic Activity;Cognitive Skill Development   Treatment Frequency 4 Times per Week   Duration Until Therapy Goals Met   Anticipated Discharge Equipment and Recommendations   DC Equipment Recommendations Unable to determine at this time   Discharge Recommendations Recommend post-acute placement for additional occupational therapy services prior to discharge home   Interdisciplinary Plan of Care Collaboration   IDT Collaboration with  Nursing;Family / Caregiver;Certified Nursing Assistant   Patient Position at End of Therapy In Bed;Bed Alarm On;Call Light within Reach;Tray Table within Reach;Family / Friend in Room   Collaboration Comments aware of visit. per nursing, pt has taken immobilizer several times since last night. Adjusted brace to proper fit.

## 2025-01-30 NOTE — DISCHARGE PLANNING
Case Management Discharge Planning    Admission Date: 1/24/2025  GMLOS: 4.4  ALOS: 6    6-Clicks ADL Score: 8  6-Clicks Mobility Score: 6  PT and/or OT Eval ordered: Yes  Post-acute Referrals Ordered: Yes  Post-acute Choice Obtained: Yes  Has referral(s) been sent to post-acute provider:  Yes      Anticipated Discharge Dispo: Discharge Disposition: D/T to SNF with Medicare cert in anticipation of skilled care (03)    DME Needed: No    Action(s) Taken:     1004: Received notification from MD, pt medically cleared for DC.    [facility] Sole confirmed they can accept pt today at 1400.    Transport request and PCS set to [facility].     1045: Spoke to pt's  Diony at bedside. Diony would now like pt to DC to [facility].     1050: Spoke to Mayi from [facility]. Mayi confirmed they have bed available for pt today and they can accept pt at 1400.    1135: [facility] notified of change via phone call. [facility] notified via [employer].    [facility] confirmation to [facility] received for today at 1400 via [facility].    Escalations Completed: None    Medically Clear: Yes    Next Steps: [facility] to [facility] at 1400 via [facility]    Barriers to Discharge: None

## 2025-01-31 ENCOUNTER — APPOINTMENT (OUTPATIENT)
Dept: MEDICAL GROUP | Facility: MEDICAL CENTER | Age: 89
End: 2025-01-31
Payer: MEDICARE

## 2025-03-12 ENCOUNTER — TELEPHONE (OUTPATIENT)
Dept: VASCULAR LAB | Facility: MEDICAL CENTER | Age: 89
End: 2025-03-12
Payer: MEDICARE

## 2025-03-12 NOTE — TELEPHONE ENCOUNTER
Spoke with Eileen, she wanted a fax number to send over a form to fill out with the list of patients current medications and have the doctor review, sign and send back.               Jarrod Starr, Medical Assistant   [facility]   Ph: [phone]  Fx: [phone]

## 2025-03-14 ENCOUNTER — TELEPHONE (OUTPATIENT)
Dept: MEDICAL GROUP | Facility: MEDICAL CENTER | Age: 89
End: 2025-03-14
Payer: MEDICARE

## 2025-03-14 NOTE — TELEPHONE ENCOUNTER
[facility] called and Left VM. Regarding pt. They need to know if you will sign orders for Physical and occupational therapy. Weekly for 2 months.     Pt. Had a right shoulder fracture and had surgery and a right femur fracture, with Intramedullary SAM placement.     Please Let know, if okay.

## 2025-03-15 NOTE — TELEPHONE ENCOUNTER
More appropriate to have orthopedic sign but if they wont then I will need to see pt for follow up to sign orders.

## 2025-03-26 ENCOUNTER — TELEPHONE (OUTPATIENT)
Dept: VASCULAR LAB | Facility: MEDICAL CENTER | Age: 89
End: 2025-03-26
Payer: MEDICARE

## 2025-03-26 DIAGNOSIS — R03.0 WHITE COAT SYNDROME WITH HIGH BLOOD PRESSURE BUT WITHOUT HYPERTENSION: ICD-10-CM

## 2025-03-26 RX ORDER — AMLODIPINE BESYLATE 2.5 MG/1
2.5 TABLET ORAL DAILY
Qty: 100 TABLET | Refills: 3 | Status: SHIPPED | OUTPATIENT
Start: 2025-03-26 | End: 2025-03-27

## 2025-03-26 RX ORDER — TERAZOSIN 2 MG/1
4 CAPSULE ORAL 2 TIMES DAILY
Qty: 200 CAPSULE | Refills: 3 | Status: SHIPPED | OUTPATIENT
Start: 2025-03-26

## 2025-03-26 NOTE — TELEPHONE ENCOUNTER
Received request via: Patient    Was the patient seen in the last year in this department? Yes    Does the patient have an active prescription (recently filled or refills available) for medication(s) requested? No    Pharmacy Name: [facility]    Does the patient have senior living Plus and need 100-day supply? (This applies to ALL medications) Patient does not have SCP

## 2025-03-26 NOTE — TELEPHONE ENCOUNTER
"Per letter from [facility]:    Patient's  states both legs have increased in swelling and wanted office to be aware of this. Letter available in media titled \"[facility] Letter\"    Beba REED , Medical Ass't  [facility]   Phone: [phone]   Fax: [phone]    "

## 2025-03-28 NOTE — TELEPHONE ENCOUNTER
Left message for pt to call back to discuss below message from RN:      Darling Araujo R.N.  Vascular Medicine Ma2 days ago       Please have her come in to be seen and evaluated. Please get recent bps and make sure she doesn't have any numbness, tingling, pain, or heat from legs.             Jarrod Starr, Medical Assistant   [facility]   Ph: [phone]  Fx: [phone]

## 2025-03-31 NOTE — TELEPHONE ENCOUNTER
Left message for pt to call back to discuss below message from RN:        Darling Araujo R.N.  Vascular Medicine Ma2 days ago         Please have her come in to be seen and evaluated. Please get recent bps and make sure she doesn't have any numbness, tingling, pain, or heat from legs.                  Jarrod Starr, Medical Assistant   [facility]   Ph: [phone]  Fx: [phone]

## 2025-04-02 ENCOUNTER — TELEPHONE (OUTPATIENT)
Dept: MEDICAL GROUP | Facility: MEDICAL CENTER | Age: 89
End: 2025-04-02
Payer: MEDICARE

## 2025-04-02 NOTE — TELEPHONE ENCOUNTER
Caller Name: Prabha Lizama    Call Back Number: [phone] (home)      How would the patient prefer to be contacted with a response: Phone call OK to leave a detailed message    Angela LOWE with [facility] [phone] left message regarding patient patient took a bad fall and fractured her right arm and leg and is needing a call back.

## 2025-04-03 NOTE — TELEPHONE ENCOUNTER
S/w home health nurse Angela. She is requesting that I continue to sign HH orders as ortho will not sign and pt is wheelchair bound and burden for her to travel.   Chart now states that Xiomy Josie COBB is pts PCP as of 3/17. Angela is not aware of this and thinks it may be something that the skilled nursing facility she is staying at changed. She will confirm with the pt and . If this provider as seen the pt since discharge than HH orders need to go to this provider. If this is inaccurate then PCP needs to be changed back in system and I can sign orders for another few weeks and pt needs an appt with me.  WENDI Curry    n/a

## 2025-04-08 PROBLEM — R60.9 EDEMA: Status: ACTIVE | Noted: 2025-04-08

## 2025-04-08 PROBLEM — M62.81 MUSCLE WEAKNESS: Status: ACTIVE | Noted: 2025-04-08

## 2025-04-08 PROBLEM — R53.83 FATIGUE: Status: ACTIVE | Noted: 2025-04-08

## 2025-04-22 PROBLEM — E78.5 HLD (HYPERLIPIDEMIA): Status: ACTIVE | Noted: 2025-04-22

## 2025-04-23 DIAGNOSIS — E87.1 HYPONATREMIA: ICD-10-CM

## 2025-04-23 DIAGNOSIS — R09.02 HYPOXIA: ICD-10-CM

## 2025-04-23 DIAGNOSIS — R73.01 IMPAIRED FASTING GLUCOSE: ICD-10-CM

## 2025-04-23 DIAGNOSIS — E55.9 VITAMIN D DEFICIENCY: ICD-10-CM

## 2025-04-23 DIAGNOSIS — D64.9 NORMOCYTIC ANEMIA: ICD-10-CM

## 2025-04-23 DIAGNOSIS — E83.39 HYPOPHOSPHATEMIA: ICD-10-CM

## 2025-04-23 DIAGNOSIS — M79.10 MYALGIA: ICD-10-CM

## 2025-04-23 DIAGNOSIS — R60.9 EDEMA, UNSPECIFIED TYPE: ICD-10-CM

## 2025-04-23 DIAGNOSIS — E04.2 MULTIPLE THYROID NODULES: ICD-10-CM

## 2025-04-23 DIAGNOSIS — I10 PRIMARY HYPERTENSION: ICD-10-CM

## 2025-04-23 DIAGNOSIS — R30.0 DYSURIA: ICD-10-CM

## 2025-04-23 DIAGNOSIS — E78.00 PURE HYPERCHOLESTEROLEMIA: ICD-10-CM

## 2025-05-08 ENCOUNTER — TELEPHONE (OUTPATIENT)
Dept: VASCULAR LAB | Facility: MEDICAL CENTER | Age: 89
End: 2025-05-08
Payer: MEDICARE

## 2025-05-08 NOTE — TELEPHONE ENCOUNTER
Caller: Diony (Patient's )    Topic/issue: He says he needs a call back from a nurse, he states there is a complicated situation that he will need to speak to the nurse about to resolve     Callback Number: [phone]    Thank You   Sakina RODRIGUEZ

## 2025-05-09 NOTE — TELEPHONE ENCOUNTER
Called  #  [phone]   Unable to leave message.    Called #[phone]   And left message returning ph call.    Nori Cantu, Med Ass't  [facility]  Ph. [phone]  Fx. [phone]

## 2025-05-15 ENCOUNTER — OFFICE VISIT (OUTPATIENT)
Dept: MEDICAL GROUP | Facility: MEDICAL CENTER | Age: 89
End: 2025-05-15
Payer: MEDICARE

## 2025-05-15 VITALS
SYSTOLIC BLOOD PRESSURE: 110 MMHG | OXYGEN SATURATION: 98 % | HEART RATE: 62 BPM | DIASTOLIC BLOOD PRESSURE: 55 MMHG | TEMPERATURE: 97 F | BODY MASS INDEX: 23.3 KG/M2 | HEIGHT: 65 IN

## 2025-05-15 DIAGNOSIS — I10 PRIMARY HYPERTENSION: ICD-10-CM

## 2025-05-15 DIAGNOSIS — R60.0 BILATERAL LEG EDEMA: ICD-10-CM

## 2025-05-15 DIAGNOSIS — R53.83 OTHER FATIGUE: ICD-10-CM

## 2025-05-15 DIAGNOSIS — N30.00 ACUTE CYSTITIS WITHOUT HEMATURIA: ICD-10-CM

## 2025-05-15 DIAGNOSIS — R53.81 DEBILITY: ICD-10-CM

## 2025-05-15 PROCEDURE — 3074F SYST BP LT 130 MM HG: CPT | Performed by: BEHAVIOR ANALYST

## 2025-05-15 PROCEDURE — 3078F DIAST BP <80 MM HG: CPT | Performed by: BEHAVIOR ANALYST

## 2025-05-15 PROCEDURE — 99215 OFFICE O/P EST HI 40 MIN: CPT | Performed by: BEHAVIOR ANALYST

## 2025-05-15 RX ORDER — LORAZEPAM 0.5 MG/1
TABLET ORAL
COMMUNITY

## 2025-05-15 NOTE — PROGRESS NOTES
Subjective:   Verbal consent was acquired by the patient to use [employer] ambient listening note generation during this visit Yes    CC:    Chief Complaint   Patient presents with    Follow-Up     Chronic conditions.     Other     Concern about feeling tired           HISTORY OF THE PRESENT ILLNESS:   Prabha presents today with her   History of Present Illness  The patient is an 89-year-old female who is here for a follow-up after being hospitalized 1/24/2025 for closed hip fracture and right shoulder fracture..  She was discharged to [facility] skilled nursing 1/30 and now resides in assisted living facility at [facility].     She is currently staying at [facility] temporarily, where Dr. Xiomy COBB checks on her every 3 weeks. She has trouble sleeping and feels tired, but she doesn't like taking sleeping pills. She is about to start occupational and physical therapy at [facility]. She uses a walker sometimes and gets help from home health services with [facility]. Her therapy sessions are on Tuesday and Wednesday mornings. She mostly uses a wheelchair.  Her  believes that she was last weighed at [facility] prior to going to assisted living, where she was about 160 pounds.  He says the home health is not checking her weight.    She had a urinary tract infection (UTI) in April 2025, which was treated with Bactrim for 5 days. She doesn't have any current UTI symptoms like burning when urinating or needing to go more often but she did not have symptoms prior to the UTI either.     She has ongoing shoulder pain that gets worse when she's resting. For pain, she uses a lidocaine patch and takes Tylenol three times a day.    Her ankles are swollen, which might be due to her amlodipine medication. She has been on 2.5 mg of amlodipine since her hospitalization. Her blood pressure has been consistently low, around 100 to 102/60. She hasn't seen Dr. Bloch in over 6 months. She wears  "compression socks for support and isn't taking furosemide.    PAST SURGICAL HISTORY:  She had a fall in the kitchen, which caused fractures on her right side. She has had surgeries to fix her humerus and femur.      Current Outpatient Medications   Medication Sig    guaifenesin dextromethorphan sugar free (MAX TUSSIN DM COUGH&CHEST LINDA)  MG/5ML Liquid soln Take 5 mL by mouth every four hours as needed for Cough.    acetaminophen (TYLENOL) 500 MG Tab Take 2 Tablets by mouth in the morning, at noon, and at bedtime.    carvedilol (COREG) 25 MG Tab Take 1 Tablet by mouth 2 times a day.    lidocaine (LIDODERM) 5 % Patch Place 2 Patches on the skin every 24 hours. Apply to right shoulder and right leg may cut patch in half, remove after 12 hours    magnesium oxide (MAG-OX) 400 MG Tab tablet Take 1 Tablet by mouth at bedtime. Dietary supplement    potassium chloride (MICRO-K) 10 MEQ capsule Take 1 Capsule by mouth every day.    senna-docusate (PERICOLACE OR SENOKOT S) 8.6-50 MG Tab Take 2 Tablets by mouth every evening. Hold for loose stools for bowel management    telmisartan (MICARDIS) 80 MG Tab Take 1 Tablet by mouth every morning.    terazosin (HYTRIN) 2 MG Cap Take 2 Capsules by mouth 2 times a day.    traMADol HCl 25 MG Tab Take 1 Tablet by mouth 2 times a day as needed (pain).    LORazepam (ATIVAN) 0.5 MG Tab TAKE 1 TABLET BY MOUTH EVERY 8 HOURS FOR UP TO 40 DOSES AS NEEDED FOR ANXIETY (Patient not taking: Reported on 5/15/2025)    furosemide (LASIX) 20 MG Tab Take 1 Tablet by mouth every day. (Patient not taking: Reported on 5/15/2025)          ROS: See HPI        Objective:     Exam: BP (!) 140/80 (BP Location: Right arm, Patient Position: Sitting, BP Cuff Size: Adult)   Pulse 62   Temp 36.1 °C (97 °F) (Temporal)   Ht 1.651 m (5' 5\")   SpO2 98%   BMI 23.30 kg/m²   Body mass index is 23.3 kg/m².    Physical Exam  Constitutional:       Appearance: Normal appearance.   Cardiovascular:      Rate and " Rhythm: Normal rate and regular rhythm.      Pulses: Normal pulses.      Heart sounds: Normal heart sounds.   Pulmonary:      Effort: Pulmonary effort is normal.      Breath sounds: Normal breath sounds.   Musculoskeletal:         General: No tenderness.      Cervical back: Normal range of motion and neck supple.      Right lower leg: 3+ Edema present.      Left lower leg: 3+ Edema present.      Comments: Right shoulder tenderness   Neurological:      Mental Status: She is alert.      Motor: Weakness present.         Admission on 2025, Discharged on 2025   Component Date Value Ref Range Status    Report 2025    Final                    Value:[facility] Emergency Dept.    Test Date:  2025  Pt Name:    QUINTIN JOHNSTON               Department: [facility]  MRN:        [id number]                      [facility]  Gender:     Female                       Technician: [id number]  :        [id number]                   Requested By:IVY APTEL  Order #:    [id number]                    Reading MD: IVY PATEL MD    Measurements  Intervals                                Axis  Rate:       60                           P:          71  CO:         170                          QRS:        45  QRSD:       98                           T:          57  QT:         464  QTc:        464    Interpretive Statements  Twelve-lead EKG shows a normal sinus rhythm with a ventricular to 60, normal  QRS, normal intervals, no ST segment elevation or depression, normal T waves.  Electronically Signed On 2025 11:21:58 PST by IVY PATEL MD      Sodium 2025 135  135 - 145 mmol/L Final    Potassium 2025 4.2  3.6 - 5.5 mmol/L Final    Chloride 2025 104  96 - 112 mmol/L Final    Co2 2025 21  20 - 33 mmol/L Final    Anion Gap 2025 10.0  7.0 - 16.0 Final    Glucose 2025 117 (H)  65 - 99 mg/dL Final    Bun 2025 12  8 - 22 mg/dL Final     Creatinine 01/24/2025 0.63  0.50 - 1.40 mg/dL Final    Calcium 01/24/2025 8.3 (L)  8.4 - 10.2 mg/dL Final    Correct Calcium 01/24/2025 8.8  8.5 - 10.5 mg/dL Final    AST(SGOT) 01/24/2025 16  12 - 45 U/L Final    ALT(SGPT) 01/24/2025 19  2 - 50 U/L Final    Alkaline Phosphatase 01/24/2025 67  30 - 99 U/L Final    Total Bilirubin 01/24/2025 0.5  0.1 - 1.5 mg/dL Final    Albumin 01/24/2025 3.4  3.2 - 4.9 g/dL Final    Total Protein 01/24/2025 5.7 (L)  6.0 - 8.2 g/dL Final    Globulin 01/24/2025 2.3  1.9 - 3.5 g/dL Final    A-G Ratio 01/24/2025 1.5  g/dL Final    GFR (CKD-EPI) 01/24/2025 85  >60 mL/min/1.73 m 2 Final    Comment: Estimated Glomerular Filtration Rate is calculated using  race neutral CKD-EPI 2021 equation per NKF-ASN recommendations.      WBC 01/24/2025 14.7 (H)  4.8 - 10.8 K/uL Final    RBC 01/24/2025 3.95 (L)  4.20 - 5.40 M/uL Final    Hemoglobin 01/24/2025 13.0  12.0 - 16.0 g/dL Final    Hematocrit 01/24/2025 37.7  37.0 - 47.0 % Final    MCV 01/24/2025 95.4  81.4 - 97.8 fL Final    MCH 01/24/2025 32.9  27.0 - 33.0 pg Final    MCHC 01/24/2025 34.5  32.2 - 35.5 g/dL Final    RDW 01/24/2025 42.6  35.9 - 50.0 fL Final    Platelet Count 01/24/2025 238  164 - 446 K/uL Final    MPV 01/24/2025 10.3  9.0 - 12.9 fL Final    Neutrophils-Polys 01/24/2025 86.40 (H)  44.00 - 72.00 % Final    Lymphocytes 01/24/2025 4.80 (L)  22.00 - 41.00 % Final    Monocytes 01/24/2025 6.20  0.00 - 13.40 % Final    Eosinophils 01/24/2025 1.90  0.00 - 6.90 % Final    Basophils 01/24/2025 0.30  0.00 - 1.80 % Final    Immature Granulocytes 01/24/2025 0.40  0.00 - 0.90 % Final    Nucleated RBC 01/24/2025 0.00  0.00 - 0.20 /100 WBC Final    Neutrophils (Absolute) 01/24/2025 12.67 (H)  1.82 - 7.42 K/uL Final    Includes immature neutrophils, if present.    Lymphs (Absolute) 01/24/2025 0.71 (L)  1.00 - 4.80 K/uL Final    Monos (Absolute) 01/24/2025 0.91 (H)  0.00 - 0.85 K/uL Final    Eos (Absolute) 01/24/2025 0.28  0.00 - 0.51 K/uL Final     Baso (Absolute) 01/24/2025 0.04  0.00 - 0.12 K/uL Final    Immature Granulocytes (abs) 01/24/2025 0.06  0.00 - 0.11 K/uL Final    NRBC (Absolute) 01/24/2025 0.00  K/uL Final    PT 01/24/2025 14.7 (H)  12.0 - 14.6 sec Final    INR 01/24/2025 1.11  0.87 - 1.13 Final    Comment: Reference range:  INR - Non-therapeutic Reference Range: 0.87-1.13  INR - Therapeutic Reference Range: 2.0-4.0      APTT 01/24/2025 24.0 (L)  24.7 - 36.0 sec Final    Magnesium 01/24/2025 2.1  1.5 - 2.5 mg/dL Final    WBC 01/25/2025 10.5  4.8 - 10.8 K/uL Final    RBC 01/25/2025 3.60 (L)  4.20 - 5.40 M/uL Final    Hemoglobin 01/25/2025 12.1  12.0 - 16.0 g/dL Final    Hematocrit 01/25/2025 35.7 (L)  37.0 - 47.0 % Final    MCV 01/25/2025 99.2 (H)  81.4 - 97.8 fL Final    MCH 01/25/2025 33.6 (H)  27.0 - 33.0 pg Final    MCHC 01/25/2025 33.9  32.2 - 35.5 g/dL Final    RDW 01/25/2025 45.1  35.9 - 50.0 fL Final    Platelet Count 01/25/2025 199  164 - 446 K/uL Final    MPV 01/25/2025 10.7  9.0 - 12.9 fL Final    Sodium 01/25/2025 133 (L)  135 - 145 mmol/L Final    Potassium 01/25/2025 4.0  3.6 - 5.5 mmol/L Final    Chloride 01/25/2025 105  96 - 112 mmol/L Final    Co2 01/25/2025 19 (L)  20 - 33 mmol/L Final    Glucose 01/25/2025 125 (H)  65 - 99 mg/dL Final    Bun 01/25/2025 16  8 - 22 mg/dL Final    Creatinine 01/25/2025 0.61  0.50 - 1.40 mg/dL Final    Calcium 01/25/2025 8.1 (L)  8.4 - 10.2 mg/dL Final    Anion Gap 01/25/2025 9.0  7.0 - 16.0 Final    GFR (CKD-EPI) 01/25/2025 86  >60 mL/min/1.73 m 2 Final    Comment: Estimated Glomerular Filtration Rate is calculated using  race neutral CKD-EPI 2021 equation per NKF-ASN recommendations.      Glycohemoglobin 01/25/2025 5.4  4.0 - 5.6 % Final    Comment: Increased risk for diabetes:  5.7 -6.4%  Diabetes:  >6.4%  Glycemic control for adults with diabetes:  <7.0%    The above interpretations are per ADA guidelines.  Diagnosis  of diabetes mellitus on the basis of elevated Hemoglobin A1c  should be  confirmed by repeating the Hb A1c test.      Est Avg Glucose 01/25/2025 108  mg/dL Final    Comment: The eAG calculation is based on the A1c-Derived Daily Glucose  (ADAG) study.  See the ADA's website for additional information.      WBC 01/26/2025 8.8  4.8 - 10.8 K/uL Final    RBC 01/26/2025 2.80 (L)  4.20 - 5.40 M/uL Final    Hemoglobin 01/26/2025 9.2 (L)  12.0 - 16.0 g/dL Final    OK TO RELEASE PER [id number], RN Results confirmed by repeat testing.    Hematocrit 01/26/2025 28.1 (L)  37.0 - 47.0 % Final    MCV 01/26/2025 100.4 (H)  81.4 - 97.8 fL Final    MCH 01/26/2025 32.9  27.0 - 33.0 pg Final    MCHC 01/26/2025 32.7  32.2 - 35.5 g/dL Final    RDW 01/26/2025 47.8  35.9 - 50.0 fL Final    Platelet Count 01/26/2025 173  164 - 446 K/uL Final    MPV 01/26/2025 11.3  9.0 - 12.9 fL Final    Sodium 01/26/2025 137  135 - 145 mmol/L Final    Potassium 01/26/2025 4.4  3.6 - 5.5 mmol/L Final    Chloride 01/26/2025 108  96 - 112 mmol/L Final    Co2 01/26/2025 20  20 - 33 mmol/L Final    Glucose 01/26/2025 112 (H)  65 - 99 mg/dL Final    Creatinine 01/26/2025 1.08  0.50 - 1.40 mg/dL Final    Bun 01/26/2025 26 (H)  8 - 22 mg/dL Final    Calcium 01/26/2025 7.6 (L)  8.4 - 10.2 mg/dL Final    Correct Calcium 01/26/2025 8.7  8.5 - 10.5 mg/dL Final    Phosphorus 01/26/2025 4.8 (H)  2.5 - 4.5 mg/dL Final    Albumin 01/26/2025 2.6 (L)  3.2 - 4.9 g/dL Final    Magnesium 01/26/2025 2.0  1.5 - 2.5 mg/dL Final    GFR (CKD-EPI) 01/26/2025 50 (A)  >60 mL/min/1.73 m 2 Final    Comment: Estimated Glomerular Filtration Rate is calculated using  race neutral CKD-EPI 2021 equation per NKF-ASN recommendations.      Hemoglobin 01/26/2025 9.1 (L)  12.0 - 16.0 g/dL Final    Hemoglobin 01/26/2025 8.7 (L)  12.0 - 16.0 g/dL Final    Sodium 01/27/2025 139  135 - 145 mmol/L Final    Potassium 01/27/2025 3.9  3.6 - 5.5 mmol/L Final    Chloride 01/27/2025 108  96 - 112 mmol/L Final    Co2 01/27/2025 21  20 - 33 mmol/L Final    Glucose 01/27/2025 103 (H)  65  - 99 mg/dL Final    Creatinine 01/27/2025 0.75  0.50 - 1.40 mg/dL Final    Bun 01/27/2025 27 (H)  8 - 22 mg/dL Final    Calcium 01/27/2025 7.9 (L)  8.4 - 10.2 mg/dL Final    Correct Calcium 01/27/2025 9.1  8.5 - 10.5 mg/dL Final    Phosphorus 01/27/2025 2.4 (L)  2.5 - 4.5 mg/dL Final    Albumin 01/27/2025 2.5 (L)  3.2 - 4.9 g/dL Final    Magnesium 01/27/2025 2.2  1.5 - 2.5 mg/dL Final    WBC 01/27/2025 8.0  4.8 - 10.8 K/uL Final    RBC 01/27/2025 2.37 (L)  4.20 - 5.40 M/uL Final    Hemoglobin 01/27/2025 7.8 (L)  12.0 - 16.0 g/dL Final    Hematocrit 01/27/2025 24.6 (L)  37.0 - 47.0 % Final    MCV 01/27/2025 103.8 (H)  81.4 - 97.8 fL Final    MCH 01/27/2025 32.9  27.0 - 33.0 pg Final    MCHC 01/27/2025 31.7 (L)  32.2 - 35.5 g/dL Final    RDW 01/27/2025 47.8  35.9 - 50.0 fL Final    Platelet Count 01/27/2025 168  164 - 446 K/uL Final    MPV 01/27/2025 10.9  9.0 - 12.9 fL Final    Hemoglobin 01/27/2025 8.0 (L)  12.0 - 16.0 g/dL Final    GFR (CKD-EPI) 01/27/2025 77  >60 mL/min/1.73 m 2 Final    Comment: Estimated Glomerular Filtration Rate is calculated using  race neutral CKD-EPI 2021 equation per NKF-ASN recommendations.      Sodium 01/28/2025 133 (L)  135 - 145 mmol/L Final    Potassium 01/28/2025 3.8  3.6 - 5.5 mmol/L Final    Chloride 01/28/2025 105  96 - 112 mmol/L Final    Co2 01/28/2025 20  20 - 33 mmol/L Final    Glucose 01/28/2025 116 (H)  65 - 99 mg/dL Final    Creatinine 01/28/2025 0.77  0.50 - 1.40 mg/dL Final    Bun 01/28/2025 27 (H)  8 - 22 mg/dL Final    Calcium 01/28/2025 7.8 (L)  8.4 - 10.2 mg/dL Final    Correct Calcium 01/28/2025 9.2  8.5 - 10.5 mg/dL Final    Phosphorus 01/28/2025 1.5 (L)  2.5 - 4.5 mg/dL Final    Albumin 01/28/2025 2.2 (L)  3.2 - 4.9 g/dL Final    Magnesium 01/28/2025 2.1  1.5 - 2.5 mg/dL Final    WBC 01/28/2025 8.8  4.8 - 10.8 K/uL Final    RBC 01/28/2025 2.25 (L)  4.20 - 5.40 M/uL Final    Hemoglobin 01/28/2025 7.4 (L)  12.0 - 16.0 g/dL Final    Hematocrit 01/28/2025 22.3 (L)   37.0 - 47.0 % Final    MCV 01/28/2025 99.1 (H)  81.4 - 97.8 fL Final    MCH 01/28/2025 32.9  27.0 - 33.0 pg Final    MCHC 01/28/2025 33.2  32.2 - 35.5 g/dL Final    RDW 01/28/2025 46.0  35.9 - 50.0 fL Final    Platelet Count 01/28/2025 181  164 - 446 K/uL Final    MPV 01/28/2025 11.2  9.0 - 12.9 fL Final    GFR (CKD-EPI) 01/28/2025 74  >60 mL/min/1.73 m 2 Final    Comment: Estimated Glomerular Filtration Rate is calculated using  race neutral CKD-EPI 2021 equation per NKF-ASN recommendations.      Hemoglobin 01/28/2025 8.0 (L)  12.0 - 16.0 g/dL Final    Hematocrit 01/28/2025 23.9 (L)  37.0 - 47.0 % Final    WBC 01/29/2025 8.9  4.8 - 10.8 K/uL Final    RBC 01/29/2025 2.55 (L)  4.20 - 5.40 M/uL Final    Hemoglobin 01/29/2025 8.3 (L)  12.0 - 16.0 g/dL Final    Hematocrit 01/29/2025 25.3 (L)  37.0 - 47.0 % Final    MCV 01/29/2025 99.2 (H)  81.4 - 97.8 fL Final    MCH 01/29/2025 32.5  27.0 - 33.0 pg Final    MCHC 01/29/2025 32.8  32.2 - 35.5 g/dL Final    RDW 01/29/2025 46.5  35.9 - 50.0 fL Final    Platelet Count 01/29/2025 231  164 - 446 K/uL Final    MPV 01/29/2025 10.8  9.0 - 12.9 fL Final    Sodium 01/29/2025 135  135 - 145 mmol/L Final    Potassium 01/29/2025 4.1  3.6 - 5.5 mmol/L Final    Chloride 01/29/2025 104  96 - 112 mmol/L Final    Co2 01/29/2025 21  20 - 33 mmol/L Final    Anion Gap 01/29/2025 10.0  7.0 - 16.0 Final    Glucose 01/29/2025 120 (H)  65 - 99 mg/dL Final    Bun 01/29/2025 20  8 - 22 mg/dL Final    Creatinine 01/29/2025 0.63  0.50 - 1.40 mg/dL Final    Calcium 01/29/2025 8.1 (L)  8.4 - 10.2 mg/dL Final    Correct Calcium 01/29/2025 9.3  8.5 - 10.5 mg/dL Final    AST(SGOT) 01/29/2025 36  12 - 45 U/L Final    ALT(SGPT) 01/29/2025 24  2 - 50 U/L Final    Alkaline Phosphatase 01/29/2025 54  30 - 99 U/L Final    Total Bilirubin 01/29/2025 1.0  0.1 - 1.5 mg/dL Final    Albumin 01/29/2025 2.5 (L)  3.2 - 4.9 g/dL Final    Total Protein 01/29/2025 5.3 (L)  6.0 - 8.2 g/dL Final    Globulin 01/29/2025 2.8   1.9 - 3.5 g/dL Final    A-G Ratio 01/29/2025 0.9  g/dL Final    Phosphorus 01/29/2025 2.1 (L)  2.5 - 4.5 mg/dL Final    GFR (CKD-EPI) 01/29/2025 85  >60 mL/min/1.73 m 2 Final    Comment: Estimated Glomerular Filtration Rate is calculated using  race neutral CKD-EPI 2021 equation per NKF-ASN recommendations.      WBC 01/30/2025 9.1  4.8 - 10.8 K/uL Final    RBC 01/30/2025 2.48 (L)  4.20 - 5.40 M/uL Final    Hemoglobin 01/30/2025 8.1 (L)  12.0 - 16.0 g/dL Final    Hematocrit 01/30/2025 24.7 (L)  37.0 - 47.0 % Final    MCV 01/30/2025 99.6 (H)  81.4 - 97.8 fL Final    MCH 01/30/2025 32.7  27.0 - 33.0 pg Final    MCHC 01/30/2025 32.8  32.2 - 35.5 g/dL Final    RDW 01/30/2025 46.5  35.9 - 50.0 fL Final    Platelet Count 01/30/2025 254  164 - 446 K/uL Final    MPV 01/30/2025 11.0  9.0 - 12.9 fL Final    Sodium 01/30/2025 133 (L)  135 - 145 mmol/L Final    Potassium 01/30/2025 4.2  3.6 - 5.5 mmol/L Final    Chloride 01/30/2025 101  96 - 112 mmol/L Final    Co2 01/30/2025 22  20 - 33 mmol/L Final    Glucose 01/30/2025 102 (H)  65 - 99 mg/dL Final    Bun 01/30/2025 17  8 - 22 mg/dL Final    Creatinine 01/30/2025 0.56  0.50 - 1.40 mg/dL Final    Calcium 01/30/2025 8.1 (L)  8.4 - 10.2 mg/dL Final    Anion Gap 01/30/2025 10.0  7.0 - 16.0 Final    Phosphorus 01/30/2025 2.8  2.5 - 4.5 mg/dL Final    GFR (CKD-EPI) 01/30/2025 88  >60 mL/min/1.73 m 2 Final    Comment: Estimated Glomerular Filtration Rate is calculated using  race neutral CKD-EPI 2021 equation per NKF-ASN recommendations.          Assessment & Plan:     89 y.o. female with the following -     1. Acute cystitis without hematuria URINALYSIS,CULTURE IF INDICATED    CANCELED: POCT Urinalysis     -Culture results showing E. Coli.   - Patient continues to be fatigued and there is concern for ongoing action  - Patient is unable to safely transfer out of her wheelchair today so we were unable to obtain a UA.  Patient states that she can transfer better with her walker but  does not have a walker today and we do not have one in clinic..   - We will send urinalysis orders to Home health who can assist with obtaining the urine sample.     2. Other fatigue       - Likely multifactorial.  Would like to confirm resolution of UTI  - Concern for cardiac causes given lower extremity edema     3. Bilateral leg edema  EC-ECHOCARDIOGRAM COMPLETE W/O CONT     -Likely not from amlodipine given patient reports she has only been taking 2.5 mg.  - Echocardiogram ordered to assess for potential cardiac causes of edema.  - Advised to continue using compression stockings.  -Today due to weakness unable to obtain weight today due to mobility  - resume furosemide 20mg daily     4. Primary hypertension     - Blood pressure readings have been consistently low per chart readings and per patient  - Stop amlodipine.   - continue carvedilol 25 mg daily, telmisartan 80 mg daily, terazosin 4 mg twice daily  - Follow-up with vascular medicine as scheduled     5. Debility       - Was unable to safely transfer out of her wheelchair due to weakness.          Assessment & Plan  **Post-hospitalization follow-up**  - Lab results do not indicate any significant concerns related to fatigue.  - Echocardiogram will be ordered to assess cardiac function.  - Urinalysis will be conducted to confirm the resolution of UTI.  - Encouraged to maintain appointments with Dr. Xiomy Galindo and Dr. Bloch.    Due to patient's immobility and difficulty with transportation, it would be most ideal for her to continue receiving care from providers at her assisted living.  I will schedule a 3-month follow-up and pt is hope she has improved mobility at this point and she is hoping that her assisted living stay is short-term.     CC Xiomy Galindo APRN    Follow-up: The patient will follow up in 3 months.      I spent greater than 45 minutes with record review, exam, communication with the patient, communication with other providers, and  documentation of this encounter.     Return in about 3 months (around 8/15/2025) for chronic conditions.    Please note that this dictation was created using voice recognition software. I have made every reasonable attempt to correct obvious errors, but I expect that there are errors of grammar and possibly content that I did not discover before finalizing the note.

## 2025-05-19 ENCOUNTER — OFFICE VISIT (OUTPATIENT)
Dept: VASCULAR LAB | Facility: MEDICAL CENTER | Age: 89
End: 2025-05-19
Attending: INTERNAL MEDICINE
Payer: MEDICARE

## 2025-05-19 VITALS
HEART RATE: 65 BPM | WEIGHT: 140 LBS | BODY MASS INDEX: 23.32 KG/M2 | HEIGHT: 65 IN | DIASTOLIC BLOOD PRESSURE: 71 MMHG | SYSTOLIC BLOOD PRESSURE: 124 MMHG

## 2025-05-19 DIAGNOSIS — E87.1 HYPONATREMIA: Primary | ICD-10-CM

## 2025-05-19 DIAGNOSIS — I10 PRIMARY HYPERTENSION: ICD-10-CM

## 2025-05-19 DIAGNOSIS — E78.00 PURE HYPERCHOLESTEROLEMIA: ICD-10-CM

## 2025-05-19 PROCEDURE — 99214 OFFICE O/P EST MOD 30 MIN: CPT | Performed by: INTERNAL MEDICINE

## 2025-05-19 PROCEDURE — 3078F DIAST BP <80 MM HG: CPT | Performed by: INTERNAL MEDICINE

## 2025-05-19 PROCEDURE — 3074F SYST BP LT 130 MM HG: CPT | Performed by: INTERNAL MEDICINE

## 2025-05-19 PROCEDURE — 99212 OFFICE O/P EST SF 10 MIN: CPT

## 2025-05-19 ASSESSMENT — FIBROSIS 4 INDEX: FIB4 SCORE: 2.52

## 2025-05-19 NOTE — PROGRESS NOTES
"[facility] - Follow up VISIT  05/19/25    Prabha Lizama is a 89 y.o. female who presents today  for   Chief Complaint   Patient presents with    Follow-Up      Subjective      HPI:  Patient returns for evaluation and management of hypertensoin  Patient had a fall in December and fractured humerus and her femur  She required surgery and then a prolonged rehab and hospital stay  She is now at a SNF  She is trying to cooperate with PT and get stronger  She is doing a bit of walking  Her  states that she did not pass out or complain of lightheadedness, tripped and fell  She has had no recent lightheadedness  Her course has been complicated by leg swelling  She started diuretic potassium sometime ago  She had her amlodipine stopped a few days ago  Blood pressures remain less than 130/80  She remains on telmisartan, carvedilol, Hernandez and  No interfering substances  She is seen by geriatrician every 3 weeks  Denies TIA or stroke symptoms  No angina or palpitations       Social History     Tobacco Use    Smoking status: Never    Smokeless tobacco: Never   Vaping Use    Vaping status: Never Used   Substance Use Topics    Alcohol use: No    Drug use: No      DIET AND EXERCISE:  Weight Change: up a few pounds and then stable  Diet: relatively low salt  Exercise: PT        Objective       Objective:     Vitals:    05/19/25 1436   BP: 124/71   BP Location: Left arm   Patient Position: Sitting   BP Cuff Size: Adult   Pulse: 65   Weight: 63.5 kg (140 lb)   Height: 1.651 m (5' 5\")      BMI Readings from Last 4 Encounters:   05/19/25 23.30 kg/m²   05/15/25 23.30 kg/m²   03/27/25 22.60 kg/m²   01/24/25 25.03 kg/m²     Wt Readings from Last 4 Encounters:   05/19/25 63.5 kg (140 lb)   03/27/25 63.5 kg (140 lb)   01/24/25 70.3 kg (154 lb 15.7 oz)   11/04/24 69.4 kg (153 lb)      Physical Exam  Vitals reviewed.   Constitutional:       General: She is not in acute distress.     Appearance: She is not " diaphoretic.   HENT:      Head: Normocephalic and atraumatic.   Eyes:      General: No scleral icterus.     Conjunctiva/sclera: Conjunctivae normal.   Neck:      Vascular: No carotid bruit.   Cardiovascular:      Rate and Rhythm: Normal rate and regular rhythm.      Heart sounds: Normal heart sounds. No murmur heard.  Pulmonary:      Effort: Pulmonary effort is normal. No respiratory distress.      Breath sounds: Normal breath sounds. No wheezing or rales.   Musculoskeletal:      Right lower leg: Edema present.      Left lower leg: Edema present.      Comments: 1+ edema bilaterally   Skin:     Coloration: Skin is not pale.   Neurological:      General: No focal deficit present.      Mental Status: She is alert and oriented to person, place, and time.      Cranial Nerves: No cranial nerve deficit.      Coordination: Coordination normal.      Gait: Gait is intact.      Comments: In wheelchair   Psychiatric:         Mood and Affect: Mood and affect normal.         Behavior: Behavior normal.          DATA REVIEW    Lab Results   Component Value Date/Time    CHOLSTRLTOT 220 (H) 04/06/2023 04:51 AM    CHOLSTRLTOT 200 (H) 02/08/2010 07:10 AM     (H) 05/02/2019 06:56 AM     (H) 02/08/2010 07:10 AM    HDL 56 04/06/2023 04:51 AM    HDL 54 02/08/2010 07:10 AM    TRIGLYCERIDE 105 04/06/2023 04:51 AM    TRIGLYCERIDE 121 02/08/2010 07:10 AM       Lab Results   Component Value Date/Time    SODIUM 133 (L) 01/30/2025 02:14 AM    POTASSIUM 4.2 01/30/2025 02:14 AM    CHLORIDE 101 01/30/2025 02:14 AM    CO2 22 01/30/2025 02:14 AM    GLUCOSE 102 (H) 01/30/2025 02:14 AM    BUN 17 01/30/2025 02:14 AM    CREATININE 0.56 01/30/2025 02:14 AM    CREATININE 0.87 07/07/2011 03:30 PM    BUNCREATRAT 18 10/25/2023 07:39 AM    BUNCREATRAT 11 07/07/2011 03:30 PM    GLOMRATE >59 08/04/2010 08:10 AM     Lab Results   Component Value Date/Time    ALKPHOSPHAT 54 01/29/2025 05:37 AM    ASTSGOT 36 01/29/2025 05:37 AM    ALTSGPT 24 01/29/2025  05:37 AM    TBILIRUBIN 1.0 01/29/2025 05:37 AM       Lab Results   Component Value Date/Time    HBA1C 5.4 01/25/2025 02:49 AM       Lab Results   Component Value Date/Time    MICRALB <3.0 03/02/2022 04:15 AM             Medical Decision Making:  Today's Assessment / Status / Plan:     1. Hyponatremia        2. Pure hypercholesterolemia        3. Primary hypertension           Patient Type: Primary Prevention    Etiology of Established CVD if Present: none    Lipid Management: Qualifies for Statin Therapy Based on 2018 ACC/AHA Guidelines: no  Calculated 10-Year Risk of ASCVD: N/A  Currently on Statin: No  Outside age range for consideration of statin therapy in primary prevention per acc/aha guidelines   - continue lifestyle mod pending further recommendations from PCP/geriatrician    Blood Pressure Management:  Acc/aha (2017) Blood Pressure Goal <130/80  Long h/o difficult to control bp with white coat htn and some degree of pseudopheo  Reasonable control both at home and in the office   Has had hyponatremia on diuretics in past, but seems to be tolerating low-dose furosemide  Unclear the extent to which amlodipine is contributing to her leg swelling  GFR and electrolytes well-maintained on most recent blood work  Plan:  - continue current meds  - conitnue home blood pressure monitoring  - Recheck GFR and electrolytes in 3 to 4 weeks per geriatrician    Glycemic Status: prediabetes  Has stable mild IFG  - Continue lifestyle mod    Anti-Platelet/Anti-Coagulant Tx: no  Not necessarily indicated at present    Smoking: continue complete avoidance     Physical Activity: Defer to PT    Weight Management and Nutrition: Focus on sodium restriction.  Avoid further weight loss    Other     -Anxiety and insomnia - seems stable. Defer further management to pcp    -Thyroid nodules - smaller on f/u u/s. Defer any further indicated w/u to pcp    -Following orthopedic injury -defer follow-up to PCP, GI attrition, PT    Instructed  to follow-up with PCP for remainder of adult medical needs: yes  We will partner with other providers in the management of established vascular disease and cardiometabolic risk factors.    Studies to Be Obtained: none  Labs to Be Obtained: per pcp -recommended they consider repeat GFR and electrolytes in a few weeks    Follow up in: 3 months     Michael Bloch, MD  [facility]    Cc: CHERIE Galindo

## 2025-05-21 PROBLEM — S72.001S CLOSED RIGHT HIP FRACTURE, SEQUELA: Status: ACTIVE | Noted: 2025-01-24

## 2025-05-21 PROBLEM — R41.89 COGNITIVE DECLINE: Status: ACTIVE | Noted: 2025-05-21

## 2025-05-27 ENCOUNTER — TELEPHONE (OUTPATIENT)
Dept: MEDICAL GROUP | Facility: MEDICAL CENTER | Age: 89
End: 2025-05-27
Payer: MEDICARE

## 2025-05-27 DIAGNOSIS — N30.00 ACUTE CYSTITIS WITHOUT HEMATURIA: Primary | ICD-10-CM

## 2025-05-27 RX ORDER — GRANULES FOR ORAL 3 G/1
3 POWDER ORAL
Qty: 3 EACH | Refills: 0 | Status: SHIPPED | OUTPATIENT
Start: 2025-05-27 | End: 2025-06-03

## 2025-05-27 NOTE — TELEPHONE ENCOUNTER
Please contact patient and let her know that her urinalysis results are showing that she continues to have a UTI .  She was previously given Bactrim about a month ago so we will avoid this medication.  She also has an allergy to penicillins so we will avoid these medications.  I have sent a prescription for fosfomycin she will take it once every 3 days for 3 doses.

## 2025-05-29 ENCOUNTER — TELEPHONE (OUTPATIENT)
Dept: MEDICAL GROUP | Facility: MEDICAL CENTER | Age: 89
End: 2025-05-29
Payer: MEDICARE

## 2025-05-29 NOTE — TELEPHONE ENCOUNTER
I got a message stating, Home Health needs orders for the antibiotics that were called in for the UTI.     Please advise

## 2025-05-30 NOTE — TELEPHONE ENCOUNTER
Labs faxed to other pcp and home health, per Ambrose and our conversation. Patient will be staying with Xiomy Galindo.

## 2025-06-19 ENCOUNTER — ANCILLARY PROCEDURE (OUTPATIENT)
Facility: MEDICAL CENTER | Age: 89
End: 2025-06-19
Attending: BEHAVIOR ANALYST
Payer: MEDICARE

## 2025-06-19 DIAGNOSIS — R60.0 BILATERAL LEG EDEMA: ICD-10-CM

## 2025-06-19 LAB
LV EJECT FRACT MOD 2C 99903: 64.68
LV EJECT FRACT MOD 4C 99902: 68.21
LV EJECT FRACT MOD BP 99901: 65.91

## 2025-06-19 PROCEDURE — 93306 TTE W/DOPPLER COMPLETE: CPT

## 2025-06-19 PROCEDURE — 93306 TTE W/DOPPLER COMPLETE: CPT | Mod: 26 | Performed by: INTERNAL MEDICINE

## 2025-06-24 ENCOUNTER — RESULTS FOLLOW-UP (OUTPATIENT)
Dept: MEDICAL GROUP | Facility: MEDICAL CENTER | Age: 89
End: 2025-06-24
Payer: MEDICARE

## 2025-06-24 NOTE — RESULT ENCOUNTER NOTE
Spoke with Diony Lizama who is designated caregiver for patient.  Relayed the information in the chart re: echocardiogram.  He understands to continue the furosemide daily.  He will notify the office if significant swelling of the lower extremities continues for a possible Cardiology referral.  Thank you,  Jaye Iraheta RN

## 2025-08-04 ENCOUNTER — HOSPITAL ENCOUNTER (EMERGENCY)
Facility: MEDICAL CENTER | Age: 89
End: 2025-08-04
Attending: STUDENT IN AN ORGANIZED HEALTH CARE EDUCATION/TRAINING PROGRAM
Payer: MEDICARE

## 2025-08-04 ENCOUNTER — APPOINTMENT (OUTPATIENT)
Dept: RADIOLOGY | Facility: MEDICAL CENTER | Age: 89
End: 2025-08-04
Attending: STUDENT IN AN ORGANIZED HEALTH CARE EDUCATION/TRAINING PROGRAM
Payer: MEDICARE

## 2025-08-04 VITALS
SYSTOLIC BLOOD PRESSURE: 184 MMHG | RESPIRATION RATE: 18 BRPM | BODY MASS INDEX: 23.32 KG/M2 | WEIGHT: 140 LBS | HEIGHT: 65 IN | OXYGEN SATURATION: 96 % | HEART RATE: 71 BPM | TEMPERATURE: 98.8 F | DIASTOLIC BLOOD PRESSURE: 85 MMHG

## 2025-08-04 DIAGNOSIS — S09.90XA CLOSED HEAD INJURY, INITIAL ENCOUNTER: ICD-10-CM

## 2025-08-04 DIAGNOSIS — S01.81XA FACIAL LACERATION, INITIAL ENCOUNTER: ICD-10-CM
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
## 2025-08-04 DIAGNOSIS — E04.1 THYROID NODULE: Primary | ICD-10-CM

## 2025-08-04 PROCEDURE — 304999 HCHG REPAIR-SIMPLE/INTERMED LEVEL 1

## 2025-08-04 PROCEDURE — 72125 CT NECK SPINE W/O DYE: CPT

## 2025-08-04 PROCEDURE — 303353 HCHG DERMABOND SKIN ADHESIVE

## 2025-08-04 PROCEDURE — 70486 CT MAXILLOFACIAL W/O DYE: CPT

## 2025-08-04 PROCEDURE — 70450 CT HEAD/BRAIN W/O DYE: CPT

## 2025-08-04 PROCEDURE — 99284 EMERGENCY DEPT VISIT MOD MDM: CPT

## 2025-08-04 ASSESSMENT — FIBROSIS 4 INDEX: FIB4 SCORE: 2.55
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
## 2025-08-19 PROBLEM — S01.81XA FACIAL LACERATION: Status: ACTIVE | Noted: 2025-08-19

## 2025-08-28 ENCOUNTER — TELEPHONE (OUTPATIENT)
Dept: VASCULAR LAB | Facility: MEDICAL CENTER | Age: 89
End: 2025-08-28
Payer: MEDICARE

## 2025-08-28 DIAGNOSIS — D64.9 NORMOCYTIC ANEMIA: ICD-10-CM

## 2025-08-28 DIAGNOSIS — M79.10 MYALGIA: ICD-10-CM

## 2025-08-28 DIAGNOSIS — E78.00 PURE HYPERCHOLESTEROLEMIA: ICD-10-CM

## 2025-08-28 DIAGNOSIS — E87.1 HYPONATREMIA: Primary | ICD-10-CM

## 2025-08-28 DIAGNOSIS — R73.01 IMPAIRED FASTING GLUCOSE: ICD-10-CM

## 2025-08-28 DIAGNOSIS — R09.02 HYPOXIA: ICD-10-CM

## 2025-08-28 DIAGNOSIS — E04.2 MULTIPLE THYROID NODULES: ICD-10-CM

## 2025-08-28 DIAGNOSIS — E55.9 VITAMIN D DEFICIENCY: ICD-10-CM

## 2025-08-28 DIAGNOSIS — R60.9 EDEMA, UNSPECIFIED TYPE: ICD-10-CM

## 2025-08-28 DIAGNOSIS — E83.39 HYPOPHOSPHATEMIA: ICD-10-CM

## 2025-08-28 DIAGNOSIS — I10 HYPERTENSION, UNSPECIFIED TYPE: ICD-10-CM

## 2025-08-28 DIAGNOSIS — F41.9 ANXIETY: ICD-10-CM

## 2025-08-28 DIAGNOSIS — R03.0 WHITE COAT SYNDROME WITH HIGH BLOOD PRESSURE BUT WITHOUT HYPERTENSION: ICD-10-CM

## 2025-08-28 DIAGNOSIS — R30.0 DYSURIA: ICD-10-CM

## 2025-08-28 DIAGNOSIS — I10 PRIMARY HYPERTENSION: ICD-10-CM

## (undated) DEVICE — LACTATED RINGERS INJ 1000 ML - (14EA/CA 60CA/PF)

## (undated) DEVICE — SPONGE GAUZESTER 4 X 4 4PLY - (128PK/CA)

## (undated) DEVICE — PAD PREP 24 X 48 CUFFED - (100/CA)

## (undated) DEVICE — TIP INTPLS HFLO ML ORFC BTRY - (12/CS) FOR SURGILAV

## (undated) DEVICE — STAPLER SKIN DISP - (6/BX 10BX/CA) VISISTAT

## (undated) DEVICE — CANISTER SUCTION RIGID RED 1500CC (40EA/CA)

## (undated) DEVICE — TRAY CATHETER FOLEY URINE METER W/STATLOCK 350ML (10EA/CA)

## (undated) DEVICE — HANDPIECE 10FT INTPLS SCT PLS IRRIGATION HAND CONTROL SET (6/PK)

## (undated) DEVICE — GOWN WARMING STANDARD FLEX - (30/CA)

## (undated) DEVICE — SET EXTENSION WITH 2 PORTS (48EA/CA) ***PART #2C8610 IS A SUBSTITUTE*****

## (undated) DEVICE — PACK MAJOR BASIN - (2EA/CA)

## (undated) DEVICE — SUTURE RETRIEVER HEWSON LIGA - 6/BX

## (undated) DEVICE — SODIUM CHL IRRIGATION 0.9% 1000ML (12EA/CA)

## (undated) DEVICE — GLOVE BIOGEL SZ 8 SURGICAL PF LTX - (50PR/BX 4BX/CA)

## (undated) DEVICE — SODIUM CHL. IRRIGATION 0.9% 3000ML (4EA/CA 65CA/PF)

## (undated) DEVICE — DRAPE SURGICAL U 77X120 - (10/CA)

## (undated) DEVICE — REAMER SHAFT MODIFIED TRINKLE 8MM X 510MM (1EA)

## (undated) DEVICE — SENSOR OXIMETER ADULT SPO2 RD SET (20EA/BX)

## (undated) DEVICE — BLADE SAGITTAL SAW DUAL CUT 75.0 X 25.0MM (1/EA)

## (undated) DEVICE — COVER LIGHT HANDLE FLEXIBLE - SOFT (2EA/PK 80PK/CA)

## (undated) DEVICE — FIBERWIRE 5.0 - 12/BX ORDER IN MULTIPLES OF 12

## (undated) DEVICE — TUBE CONNECTING SUCTION - CLEAR PLASTIC STERILE 72 IN (50EA/CA)

## (undated) DEVICE — TOWEL STOP TIMEOUT SAFETY FLAG (40EA/CA)

## (undated) DEVICE — WATER IRRIGATION STERILE 1000ML (12EA/CA)

## (undated) DEVICE — SUCTION TIP STRAIGHT ARGYLE - 50EA/CA

## (undated) DEVICE — SUCTION INSTRUMENT YANKAUER BULBOUS TIP W/O VENT (50EA/CA)

## (undated) DEVICE — HUMID-VENT HEAT AND MOISTURE EXCHANGE- (50/BX)

## (undated) DEVICE — ELECTRODE DUAL RETURN W/ CORD - (50/PK)

## (undated) DEVICE — BAG SPONGE COUNT 10.25 X 32 - BLUE (250/CA)

## (undated) DEVICE — TUBING CLEARLINK DUO-VENT - C-FLO (48EA/CA)

## (undated) DEVICE — FREEHAND DRILL 4.2MM X 185MM (1EA)

## (undated) DEVICE — SUTURE 2-0 MONOCRYL PLUS UNDYED CT-1 1 X 36 (36EA/BX)"

## (undated) DEVICE — DRILL BIT

## (undated) DEVICE — GUIDE WIRE BALL TIP 3MM X1000MM STERILE (1EA)

## (undated) DEVICE — SUTURE 2-0 VICRYL PLUS CT-1 - 8 X 18 INCH(12/BX)

## (undated) DEVICE — GUIDEWIRE